# Patient Record
Sex: FEMALE | Race: WHITE | Employment: FULL TIME | URBAN - METROPOLITAN AREA
[De-identification: names, ages, dates, MRNs, and addresses within clinical notes are randomized per-mention and may not be internally consistent; named-entity substitution may affect disease eponyms.]

---

## 2017-06-07 ENCOUNTER — TRANSFERRED RECORDS (OUTPATIENT)
Dept: HEALTH INFORMATION MANAGEMENT | Facility: CLINIC | Age: 22
End: 2017-06-07

## 2017-06-07 LAB — PAP SMEAR - HIM PATIENT REPORTED: NEGATIVE

## 2019-01-17 ENCOUNTER — OFFICE VISIT (OUTPATIENT)
Dept: PEDIATRICS | Facility: CLINIC | Age: 24
End: 2019-01-17
Payer: COMMERCIAL

## 2019-01-17 VITALS
HEART RATE: 91 BPM | SYSTOLIC BLOOD PRESSURE: 90 MMHG | DIASTOLIC BLOOD PRESSURE: 58 MMHG | BODY MASS INDEX: 23 KG/M2 | TEMPERATURE: 98.6 F | HEIGHT: 62 IN | WEIGHT: 125 LBS | OXYGEN SATURATION: 100 %

## 2019-01-17 DIAGNOSIS — R59.9 ENLARGED LYMPH NODES: Primary | ICD-10-CM

## 2019-01-17 DIAGNOSIS — M54.50 ACUTE RIGHT-SIDED LOW BACK PAIN WITHOUT SCIATICA: ICD-10-CM

## 2019-01-17 PROCEDURE — 99214 OFFICE O/P EST MOD 30 MIN: CPT | Performed by: FAMILY MEDICINE

## 2019-01-17 RX ORDER — CYCLOBENZAPRINE HCL 10 MG
5-10 TABLET ORAL 3 TIMES DAILY PRN
Qty: 21 TABLET | Refills: 0 | Status: SHIPPED | OUTPATIENT
Start: 2019-01-17 | End: 2019-02-12

## 2019-01-17 RX ORDER — IBUPROFEN 200 MG
400 TABLET ORAL EVERY 4 HOURS PRN
COMMUNITY
End: 2021-10-19

## 2019-01-17 ASSESSMENT — MIFFLIN-ST. JEOR: SCORE: 1259.12

## 2019-01-17 NOTE — PROGRESS NOTES
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 She laid down and iced her back. Continued to cause discomfort into the next day. 2 days later got to relax which was helpful. She has to do demonstrations while at work and this aggravates symptoms. If she waits about a couple days symptoms subside. Last 5 years she'll have about 2 flares up but this most recent episode was with more intensity and frequency. 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 She does have periods where the muscles do get tight in her lower back.   Denies weaknss of lower leg or numbness of lower extremities. Forward flexion is more discomforting.     Did calcium supplements, rest, physical therapy for her previous back injury/fracture (4 months away from gymnastics).      2 days of ear pain she reports in the back of left ear. No hearing difficulties at this time      Patient Active Problem List    Diagnosis Date Noted     Papanicolaou smear of cervix with low grade squamous intraepithelial lesion (LGSIL) 06/02/2015     Priority: Medium     06/02/15 LSIL, Age 19 years, Dx pap in 12 months  01/05/16 Normal. Repeat 12-18 months  06/07/17 Normal (pt reported). 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        ROS:  As above per HPI    Objective:   BP 90/58 (BP Location: Right arm, Patient Position: Chair, Cuff Size: Adult Regular)   Pulse 91   Temp 98.6  F (37  C) (Oral)   Ht 1.565 m (5' 1.61\")   Wt 56.7 kg (125 lb)   SpO2 100%   BMI 23.15 kg/m  , Body mass index is 23.15 kg/m .  Gen:  NAD, well-nourished, sitting in chair comfortably  HEENT: EOMI, sclera anicteric, Head normocephalic, ; nares patent; moist mucous membranes, 2 enlarged lymph nodes behind left ear. No focal pain at the mastoid and no redness of this area. Normal TM's bilaterally  Neck: trachea midline, no thyromegaly  CV:  Hemodynamically stable  Pulm:  no increased work of breathing , CTAB, no wheezes/rales/rhonchi   Back: good ROM in flexion/extension, negative straight leg test bilaterally, FABERs test negative, right discomfort that is focal in lower lumbar region adjacent to SI joint  Neuro: 2/4 patellar reflexes bilaterally  Extrem: no cyanosis, edema or clubbing  Skin: no obvious rashes or abnormalities  Psych: Euthymic, linear thoughts, normal rate of speech    2007 CT Scan:   Impression:  1. L5-S1 disc herniation causing some mass-effect upon the ventral  thecal sac.   2. Bilateral pars interarticularis defects without evidence of spondylolisthesis.    Assessment & Plan:     Naty MCKENZIE Pérez, 23 year old female who presents with:  Enlarged lymph nodes L postauricular lymph node  Inflamed but adjacent lymph nodes of the postauricular chain recommended patient to use as needed heat packs and to observe for the next 2 weeks if no resolution to return for evaluation.  No recent illnesses to explain why they are " aggravated. Normal middle ear exam.     Acute right-sided low back pain without sciatica  Previous history of fracture and presents today with no weakness of her lower extremities or any symptoms which would suggest nerve impingement.  I am recommending muscle relaxants to be used on an as-needed basis especially as she has recently started doing gymnastics teaching which is likely causing her flare of symptoms / muscular discomfort.  Ibuprofen/Tylenol on an as-needed. Follow-up as needed. Not indicative of SI joint dysfunction or flare.   - cyclobenzaprine (FLEXERIL) 10 MG tablet  Dispense: 21 tablet; Refill: 0      Ad Perez MD   [facility]      The use of Dragon/AiMeiWei dictation services may have been used to construct the content in this note; any grammatical or spelling errors are non-intentional. Please contact the author of this note directly if you are in need of any clarification.

## 2019-01-17 NOTE — PATIENT INSTRUCTIONS
Take half pill of the flexeril as needed (can move up to 1 pill if tolerating) when your back discomfort/tightness starts    For pain take ibuprofen 400mg every 4-6 hours okay to take with tylenol    Return if symptoms worsen or don't respond to above medications    --  Lymphd nodes: If not resolved in 2 weeks return

## 2019-02-12 ENCOUNTER — OFFICE VISIT (OUTPATIENT)
Dept: PEDIATRICS | Facility: CLINIC | Age: 24
End: 2019-02-12
Payer: COMMERCIAL

## 2019-02-12 VITALS
OXYGEN SATURATION: 99 % | BODY MASS INDEX: 23.52 KG/M2 | WEIGHT: 127 LBS | HEART RATE: 87 BPM | DIASTOLIC BLOOD PRESSURE: 58 MMHG | RESPIRATION RATE: 16 BRPM | SYSTOLIC BLOOD PRESSURE: 94 MMHG

## 2019-02-12 DIAGNOSIS — R53.83 FATIGUE, UNSPECIFIED TYPE: ICD-10-CM

## 2019-02-12 DIAGNOSIS — R07.0 THROAT PAIN: Primary | ICD-10-CM

## 2019-02-12 LAB
ALBUMIN SERPL-MCNC: 4.1 G/DL (ref 3.4–5)
ALP SERPL-CCNC: 67 U/L (ref 40–150)
ALT SERPL W P-5'-P-CCNC: 17 U/L (ref 0–50)
ANION GAP SERPL CALCULATED.3IONS-SCNC: 4 MMOL/L (ref 3–14)
AST SERPL W P-5'-P-CCNC: 17 U/L (ref 0–45)
BASOPHILS # BLD AUTO: 0 10E9/L (ref 0–0.2)
BASOPHILS NFR BLD AUTO: 0.4 %
BILIRUB SERPL-MCNC: 0.4 MG/DL (ref 0.2–1.3)
BUN SERPL-MCNC: 7 MG/DL (ref 7–30)
CALCIUM SERPL-MCNC: 8.7 MG/DL (ref 8.5–10.1)
CHLORIDE SERPL-SCNC: 109 MMOL/L (ref 94–109)
CO2 SERPL-SCNC: 26 MMOL/L (ref 20–32)
CREAT SERPL-MCNC: 0.81 MG/DL (ref 0.52–1.04)
DEPRECATED S PYO AG THROAT QL EIA: NORMAL
DIFFERENTIAL METHOD BLD: NORMAL
EOSINOPHIL # BLD AUTO: 0.1 10E9/L (ref 0–0.7)
EOSINOPHIL NFR BLD AUTO: 1.7 %
ERYTHROCYTE [DISTWIDTH] IN BLOOD BY AUTOMATED COUNT: 11.6 % (ref 10–15)
GFR SERPL CREATININE-BSD FRML MDRD: >90 ML/MIN/{1.73_M2}
GLUCOSE SERPL-MCNC: 86 MG/DL (ref 70–99)
HCT VFR BLD AUTO: 37.6 % (ref 35–47)
HGB BLD-MCNC: 12.7 G/DL (ref 11.7–15.7)
LYMPHOCYTES # BLD AUTO: 2.3 10E9/L (ref 0.8–5.3)
LYMPHOCYTES NFR BLD AUTO: 47.7 %
MCH RBC QN AUTO: 32.1 PG (ref 26.5–33)
MCHC RBC AUTO-ENTMCNC: 33.8 G/DL (ref 31.5–36.5)
MCV RBC AUTO: 95 FL (ref 78–100)
MONOCYTES # BLD AUTO: 0.4 10E9/L (ref 0–1.3)
MONOCYTES NFR BLD AUTO: 9.1 %
NEUTROPHILS # BLD AUTO: 2 10E9/L (ref 1.6–8.3)
NEUTROPHILS NFR BLD AUTO: 41.1 %
PLATELET # BLD AUTO: 292 10E9/L (ref 150–450)
POTASSIUM SERPL-SCNC: 3.8 MMOL/L (ref 3.4–5.3)
PROT SERPL-MCNC: 7.9 G/DL (ref 6.8–8.8)
RBC # BLD AUTO: 3.96 10E12/L (ref 3.8–5.2)
SODIUM SERPL-SCNC: 138 MMOL/L (ref 133–144)
SPECIMEN SOURCE: NORMAL
TSH SERPL DL<=0.005 MIU/L-ACNC: 1.53 MU/L (ref 0.4–4)
WBC # BLD AUTO: 4.8 10E9/L (ref 4–11)

## 2019-02-12 PROCEDURE — 99213 OFFICE O/P EST LOW 20 MIN: CPT | Performed by: FAMILY MEDICINE

## 2019-02-12 PROCEDURE — 36415 COLL VENOUS BLD VENIPUNCTURE: CPT | Performed by: FAMILY MEDICINE

## 2019-02-12 PROCEDURE — 87880 STREP A ASSAY W/OPTIC: CPT | Performed by: FAMILY MEDICINE

## 2019-02-12 PROCEDURE — 84443 ASSAY THYROID STIM HORMONE: CPT | Performed by: FAMILY MEDICINE

## 2019-02-12 PROCEDURE — 87081 CULTURE SCREEN ONLY: CPT | Performed by: FAMILY MEDICINE

## 2019-02-12 PROCEDURE — 80053 COMPREHEN METABOLIC PANEL: CPT | Performed by: FAMILY MEDICINE

## 2019-02-12 PROCEDURE — 85025 COMPLETE CBC W/AUTO DIFF WBC: CPT | Performed by: FAMILY MEDICINE

## 2019-02-12 NOTE — PROGRESS NOTES
SUBJECTIVE:   Naty Pérez is a 23 year old female who presents to clinic today for the following health issues:    Acute Illness   Acute illness concerns: fatigue, sore throat  Onset: started a couple days after last OV - is intermittent    Fever: no    Chills/Sweats: no    Headache (location?): no    Sinus Pressure:no    Conjunctivitis:  no    Ear Pain: no    Rhinorrhea: no    Congestion: no    Sore Throat: YES     Cough: no    Wheeze: no    Decreased Appetite: no    Nausea: no    Vomiting: no    Diarrhea:  no    Dysuria/Freq.: no    Fatigue/Achiness: YES    Sick/Strep Exposure: no     Therapies Tried and outcome: none    Had intermittent sore throat and fairly marked fatigue over 2-3 weeks.  Not especially congested and has not noticed fever.    Patient reports having mono at age 15.    History indicates that she returned after a 2-year time in the [employer].  She was in [facility] and returned in December.        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  No cough or S OB.  No abdominal pain, vomiting, diarrhea.  No urinary discomfort.  No unusual rashes.  No headache, numbness, weakness.      Past Medical History:   Diagnosis Date     Papanicolaou smear of cervix with low grade squamous intraepithelial lesion (LGSIL) 06/02/15    Age 19 years, Dx pap in 12 months       EXAM  BP 94/58 (BP Location: Right arm, Patient Position: Sitting, Cuff Size: Adult Regular)   Pulse 87   Resp 16   Wt 57.6 kg (127 lb)   SpO2 99%   BMI 23.52 kg/m      Patient appears well in general.  Tympanic membranes normal.   Pharynx mildly red tonsils without inflammation or exudate.  Minimally enlarged anterior cervical nodes, no significant posterior cervical nodes.  No thyromegaly.  Chest clear.  Abdomen unremarkable.    Results for orders placed or performed in visit on 02/12/19   CBC with platelets and differential   Result Value Ref Range    WBC 4.8 4.0 - 11.0 10e9/L    RBC Count 3.96 3.8 - 5.2 10e12/L    Hemoglobin 12.7 11.7 - 15.7 g/dL    Hematocrit 37.6 35.0 - 47.0 %    MCV 95 78 - 100 fl    MCH 32.1 26.5 - 33.0 pg    MCHC 33.8 31.5 - 36.5 g/dL    RDW 11.6 10.0 - 15.0 %    Platelet Count 292 150 - 450 10e9/L    % Neutrophils 41.1 %    % Lymphocytes 47.7 %    % Monocytes 9.1 %    % Eosinophils 1.7 %    % Basophils 0.4 %    Absolute Neutrophil 2.0 1.6 - 8.3 10e9/L    Absolute Lymphocytes 2.3 0.8 - 5.3 10e9/L    Absolute Monocytes 0.4 0.0 - 1.3 10e9/L    Absolute Eosinophils 0.1 0.0 - 0.7 10e9/L    Absolute Basophils 0.0 0.0 - 0.2 10e9/L    Diff Method Automated Method    Strep, Rapid Screen   Result Value Ref Range    Specimen Description Throat     Rapid Strep A Screen       NEGATIVE: No Group A streptococcal antigen detected by immunoassay, await culture report.           (R07.0) Throat pain  (primary encounter diagnosis)  Comment: Strep throat and mono unlikely.  She may be having some lingering viral symptoms.    Plan: Strep, Rapid Screen, Beta strep group A culture            (R53.83) Fatigue, unspecified type  Comment:   In view of her recent travel and prolonged period out of the country, I suggested that we at least obtain some basic labs.  Plan: Comprehensive metabolic panel (BMP + Alb, Alk         Phos, ALT, AST, Total. Bili, TP), TSH with free        T4 reflex, CBC with platelets and differential

## 2019-02-13 LAB
BACTERIA SPEC CULT: NORMAL
SPECIMEN SOURCE: NORMAL

## 2019-02-21 ENCOUNTER — OFFICE VISIT (OUTPATIENT)
Dept: FAMILY MEDICINE | Facility: CLINIC | Age: 24
End: 2019-02-21
Payer: COMMERCIAL

## 2019-02-21 VITALS
OXYGEN SATURATION: 98 % | WEIGHT: 123 LBS | SYSTOLIC BLOOD PRESSURE: 106 MMHG | BODY MASS INDEX: 22.78 KG/M2 | TEMPERATURE: 98.5 F | DIASTOLIC BLOOD PRESSURE: 68 MMHG | HEART RATE: 86 BPM | RESPIRATION RATE: 16 BRPM

## 2019-02-21 DIAGNOSIS — Z30.46 NEXPLANON REMOVAL: Primary | ICD-10-CM

## 2019-02-21 DIAGNOSIS — Z30.017 INSERTION OF NEXPLANON: ICD-10-CM

## 2019-02-21 PROCEDURE — 99207 ZZC DROP WITH A PROCEDURE: CPT | Performed by: NURSE PRACTITIONER

## 2019-02-21 PROCEDURE — 11976 REMOVE CONTRACEPTIVE CAPSULE: CPT | Performed by: NURSE PRACTITIONER

## 2019-02-21 PROCEDURE — 11981 INSERTION DRUG DLVR IMPLANT: CPT | Mod: 51 | Performed by: NURSE PRACTITIONER

## 2019-02-21 NOTE — PROGRESS NOTES
SUBJECTIVE:   Naty Pérez is a 23 year old female who presents to clinic today for the following health issues:      Patient is coming in for Nexplanon removal    Placed 3 years ago.  Has had some intermittent bleeding but otherwise no side effects.  She has tolerated well, would like another placed today.  Not with previous partner.  She is utd on pap smears.    Problem list and histories reviewed & adjusted, as indicated.  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 No pertinent surgical history.    Social History     Tobacco Use     Smoking status: Never Smoker     Smokeless tobacco: Never Used     Tobacco comment: non smoking home   Substance Use Topics     Alcohol use: No     Family History   Problem Relation Age of Onset     Diabetes Maternal Grandfather      Hypertension Maternal Grandfather      Cancer Paternal Grandfather      Cerebrovascular Disease Paternal Grandfather      Cancer - colorectal Paternal Grandfather      Unknown/Adopted Father      Colorectal Cancer Father      Multiple myeloma Paternal Uncle      C.A.D. No family hx of      Breast Cancer No family hx of      Prostate Cancer No family hx of      Thyroid Disease No family hx of            Reviewed and updated as needed this visit by clinical staff       Reviewed and updated as needed this visit by Provider         ROS:  SEE HPI.    OBJECTIVE:     /68 (BP Location: Right arm, Patient Position: Chair, Cuff Size: Adult Regular)   Pulse 86   Temp 98.5  F (36.9  C) (Tympanic)   Resp 16   Wt 55.8 kg (123 lb)   LMP 12/10/2018 (Approximate)   SpO2 98%   BMI 22.78 kg/m    Body mass index is 22.78 kg/m .  GENERAL: healthy, alert and no distress  PSYCH: mentation appears normal,  affect normal/bright    Diagnostic Test Results:  none     ASSESSMENT/PLAN:   1. Nexplanon removal  Tolerated well, see below.    2. Insertion of Nexplanon  Tolerated well see below.    Reviewed r/b/se of procedure including risk for infection.   Explained procedure to pt.  Consent signed.    Procedure:  Removal of nexplanon    Pt was supine with L arm at 90 degree angle.  Arm was cleansed with alcohol pad and betadine swab x3.  Anesthesia injected, lidocaine 1%, just under the distal end of the nexplanon moe.  2mm longitudinal incision at distal end of nexplanon moe.  Pressure applied to proximal end of nexplanon moe.  Nexplanon was removed without complication.  Measured to confirm 4cm in length.  Minimal bleeding.    NEXPLANON PLACEMENT:    The patient meets and is agreeable to the following conditions:  She is not interested in conception in the near future.  There is no history of unresolved abnormal uterine bleeding.  There is no history of an unresolved abnormal PAP smear.  She has no history of diabetes, AIDS, leukemia, IV drug use or chronic steroid use.  She denies the possibility of pregnancy. Patient's last menstrual period was N/A.    The patient was given patient information on the Nexplanon and the patient education brochure from the . The patient has given consent to proceed with placement of the Nexplanon.  A written consent form is present in the chart.  She wishes to proceed.    PROCEDURE:  Type of Device: Nexplanon  The patient lied in supine with the full length of her arm exposed and supported by the pillow and table.   The positioning the upper inner aspect of her nondominant arm was bent at her elbow to 90 degrees and rotated upward and outward opposite her head.  The insertion site was Identified approximately four finger  breadths/ 8cm superior and lateral to the medial epicondyle of the humerus.    Previous incision site used to insert new nexplanon.  The arm was cleansed the  insertion site with an antiseptic solution.  Anesthesia using Lidocaine 1% was injected into the dermis to raise a wheal along the planned track of the moe insertion needle.   The clear plastic needle cover was removed. The needle was placed against the insertion site holding the applicator at an angle 30 degrees to the skin. While applying counter traction to the skin around the insertion site, the skin was punctured with the needle tip. The applicator was the lowered so that it is parallel to the skin and then the needle was advanced in the subdermal connective tissue while lifting the skin with the tip of the needle. The needle was advanced to its full length under the skin. I unlocked the slider with downward finger pressure on the lever, then moved the slider fully backward (distally) and removed the applicator.  Immediately after insertion, I palpated the skin to verify correct placement of the moe; both ends were palpable. Patient was also asked to feel her implant.    The patient tolerated this procedure without immediate complication.  The patient is to return or call immediately for any unexplained fever, redness, pain and swelling at the insertion site. Recommended to take Tylenol or NSAID for mild to moderate pain.    Patient was instructed may remove the pressure bandage in 24 hours and the small bandage over the insertion site after 3-5 days.   A completed the User Card was given to the patient to keep.     Information on Nexplanon was given to patient. She was instructed to watch for signs of infection such as redness, swelling, discharge, watch for increased bleeding, worsening pain and fever and to RTC ASAP. Questions and concerns were addressed.    VALE Ang Ra, CNP  [facility]

## 2019-04-12 ENCOUNTER — OFFICE VISIT (OUTPATIENT)
Dept: PEDIATRICS | Facility: CLINIC | Age: 24
End: 2019-04-12
Payer: COMMERCIAL

## 2019-04-12 VITALS
SYSTOLIC BLOOD PRESSURE: 94 MMHG | BODY MASS INDEX: 24.41 KG/M2 | HEIGHT: 61 IN | DIASTOLIC BLOOD PRESSURE: 62 MMHG | HEART RATE: 72 BPM | WEIGHT: 129.3 LBS | OXYGEN SATURATION: 100 % | TEMPERATURE: 98.5 F

## 2019-04-12 DIAGNOSIS — J06.9 VIRAL URI: Primary | ICD-10-CM

## 2019-04-12 DIAGNOSIS — J02.9 SORE THROAT: ICD-10-CM

## 2019-04-12 LAB
DEPRECATED S PYO AG THROAT QL EIA: NORMAL
SPECIMEN SOURCE: NORMAL

## 2019-04-12 PROCEDURE — 99213 OFFICE O/P EST LOW 20 MIN: CPT | Performed by: PHYSICIAN ASSISTANT

## 2019-04-12 PROCEDURE — 87880 STREP A ASSAY W/OPTIC: CPT | Performed by: PHYSICIAN ASSISTANT

## 2019-04-12 PROCEDURE — 87081 CULTURE SCREEN ONLY: CPT | Performed by: PHYSICIAN ASSISTANT

## 2019-04-12 ASSESSMENT — ENCOUNTER SYMPTOMS
FEVER: 0
MYALGIAS: 1
ABDOMINAL PAIN: 0
FOCAL WEAKNESS: 0
SHORTNESS OF BREATH: 0
DIARRHEA: 0
VOMITING: 0
SORE THROAT: 1
CHILLS: 0
HEADACHES: 0
COUGH: 1
NAUSEA: 0

## 2019-04-12 ASSESSMENT — MIFFLIN-ST. JEOR: SCORE: 1268.88

## 2019-04-12 NOTE — PROGRESS NOTES
"  SUBJECTIVE:   Naty Pérez is a 23 year old female who presents to clinic today for the following   health issues:        Acute Illness   Acute illness concerns: Step   Onset: 1 day ago     Fever: no    Chills/Sweats: YES- both     Headache (location?): YES     Sinus Pressure:no    Conjunctivitis:  no    Ear Pain: YES: bilateral plugged feeling     Rhinorrhea: YES    Congestion: YES- both     Sore Throat: YES-  Hurts to swallow, feels puffy, hoarse voice      Cough: YES    Wheeze: no    Decreased Appetite: no    Nausea: no    Vomiting: no    Diarrhea:  no    Dysuria/Freq.: no    Fatigue/Achiness: YES- both     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

## 2019-04-12 NOTE — PROGRESS NOTES
HPI  SUBJECTIVE:   Naty Pérez is a 23 year old female who presents to clinic today for the following   health issues:        Acute Illness   Acute illness concerns: Sore throat  Onset: yesterday    Fever: no    Chills/Sweats: YES- both     Headache (location?): YES     Sinus Pressure:no    Conjunctivitis:  no    Ear Pain: no bilateral plugged feeling     Rhinorrhea: YES    Congestion: YES- both     Sore Throat: YES-  Hurts to swallow, feels puffy, hoarse voice      Cough: YES    Wheeze: no    Decreased Appetite: no    Nausea: no    Vomiting: no    Diarrhea:  no    Dysuria/Freq.: no    Fatigue/Achiness: YES- both     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  No flowsheet data found.    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 No pertinent surgical history.  Family History   Problem Relation Age of Onset     Diabetes Maternal Grandfather      Hypertension Maternal Grandfather      Cancer Paternal Grandfather      Cerebrovascular Disease Paternal Grandfather      Cancer - colorectal Paternal Grandfather      Unknown/Adopted Father      Colorectal Cancer Father      Multiple myeloma Paternal Uncle      C.A.D. No family hx of      Breast Cancer No family hx of      Prostate Cancer No family hx of      Thyroid Disease No family hx of       Social History     Tobacco Use     Smoking status: Never Smoker     Smokeless tobacco: Never Used     Tobacco comment: non smoking home   Substance Use Topics     Alcohol use: No        Problem list, Medication list, Allergies, Medical/Social/Surg hx reviewed in [facility], updated as appropriate.      Review of Systems  "  Constitutional: Positive for malaise/fatigue. Negative for chills and fever.   HENT: Positive for congestion and sore throat.         Rhinorrhea, ears plugged   Respiratory: Positive for cough. Negative for shortness of breath.    Cardiovascular: Negative for chest pain.   Gastrointestinal: Negative for abdominal pain, diarrhea, nausea and vomiting.   Musculoskeletal: Positive for myalgias.   Skin: Negative for rash.   Neurological: Negative for focal weakness and headaches.   All other systems reviewed and are negative.        Physical Exam   Constitutional: She is oriented to person, place, and time.   HENT:   Head: Normocephalic and atraumatic.   Right Ear: Tympanic membrane and external ear normal.   Left Ear: Tympanic membrane and external ear normal.   Nose: Nose normal.   Mouth/Throat: Uvula is midline and mucous membranes are normal. Posterior oropharyngeal erythema present. No oropharyngeal exudate, posterior oropharyngeal edema or tonsillar abscesses.   Cardiovascular: Normal rate, regular rhythm and normal heart sounds.   Pulmonary/Chest: Effort normal and breath sounds normal.   Musculoskeletal: Normal range of motion.   Neurological: She is alert and oriented to person, place, and time.   Skin: Skin is warm and dry.   Nursing note and vitals reviewed.    Vital Signs  BP 94/62 (BP Location: Right arm, Patient Position: Sitting, Cuff Size: Adult Regular)   Pulse 72   Temp 98.5  F (36.9  C) (Tympanic)   Ht 1.549 m (5' 1\")   Wt 58.7 kg (129 lb 4.8 oz)   SpO2 100%   BMI 24.43 kg/m     Body mass index is 24.43 kg/m .    Diagnostic Test Results:  Results for orders placed or performed in visit on 04/12/19 (from the past 24 hour(s))   Rapid strep screen   Result Value Ref Range    Specimen Description Throat     Rapid Strep A Screen       NEGATIVE: No Group A streptococcal antigen detected by immunoassay, await culture report.       ASSESSMENT/PLAN:                                                        " ICD-10-CM    1. Viral URI J06.9    2. Sore throat J02.9 Rapid strep screen     Beta strep group A culture   Lungs CTAB, afebrile. Strep negative. C/w viral etiology, supportive treatments discussed.    I have discussed any lab or imaging results, the patient's diagnosis, and my plan of treatment with the patient and/or family. Patient is aware to come back in if with worsening symptoms or if no relief despite treatment plan.  Patient voiced understanding and had no further questions.       Follow Up: Return in about 2 weeks (around 4/26/2019) for Follow up w/ PCP if not better.    DANDY Avila, PA-C  [facility]

## 2019-04-13 LAB
BACTERIA SPEC CULT: NORMAL
SPECIMEN SOURCE: NORMAL

## 2019-04-26 ENCOUNTER — OFFICE VISIT (OUTPATIENT)
Dept: FAMILY MEDICINE | Facility: CLINIC | Age: 24
End: 2019-04-26
Payer: COMMERCIAL

## 2019-04-26 VITALS
DIASTOLIC BLOOD PRESSURE: 58 MMHG | SYSTOLIC BLOOD PRESSURE: 96 MMHG | BODY MASS INDEX: 24.19 KG/M2 | TEMPERATURE: 98.7 F | OXYGEN SATURATION: 100 % | HEART RATE: 93 BPM | WEIGHT: 128 LBS

## 2019-04-26 DIAGNOSIS — J01.90 ACUTE SINUSITIS WITH SYMPTOMS > 10 DAYS: Primary | ICD-10-CM

## 2019-04-26 PROCEDURE — 99213 OFFICE O/P EST LOW 20 MIN: CPT | Performed by: PHYSICIAN ASSISTANT

## 2019-04-26 RX ORDER — FLUTICASONE PROPIONATE 50 MCG
1-2 SPRAY, SUSPENSION (ML) NASAL DAILY
Qty: 16 G | Refills: 3 | Status: SHIPPED | OUTPATIENT
Start: 2019-04-26 | End: 2020-02-06

## 2019-04-26 NOTE — PROGRESS NOTES
SUBJECTIVE:   Naty Pérez is a 23 year old female who presents to clinic today for the following health issues:      HPI  Acute Illness   Acute illness concerns: right ear pain  Onset: 4/11/19    Fever: no     Chills/Sweats: YES    Headache (location?): YES - temporal    Sinus Pressure:YES    Conjunctivitis:  no    Ear Pain: YES: right    Rhinorrhea: YES    Congestion: YES    Sore Throat: YES - was worse     Cough: YES-productive of yellow sputum    Wheeze: no in am    Decreased Appetite: YES    Nausea: no     Vomiting: no     Diarrhea:  no     Dysuria/Freq.: no     Fatigue/Achiness: YES    Sick/Strep Exposure: no      Therapies Tried and outcome:  Ibuprofen, cough drops - helps    Went to [facility] on 4/12/19 and strep was negative    Additional history: as documented    Reviewed and updated as needed this visit by clinical staff  Tobacco  Allergies  Meds  Problems  Med Hx  Surg Hx  Fam Hx  Soc Hx          Reviewed and updated as needed this visit by Provider  Tobacco  Allergies  Meds  Problems  Med Hx  Surg Hx  Fam Hx         Patient Active Problem List   Diagnosis     Allergy to insects and arachnids     Ingrowing nail     Epistaxis     Lumbosacral spondylosis without myelopathy     Papanicolaou smear of cervix with low grade squamous intraepithelial lesion (LGSIL)     History reviewed. No pertinent surgical history.    Social History     Tobacco Use     Smoking status: Never Smoker     Smokeless tobacco: Never Used     Tobacco comment: non smoking home   Substance Use Topics     Alcohol use: No     Family History   Problem Relation Age of Onset     Diabetes Maternal Grandfather      Hypertension Maternal Grandfather      Cancer Paternal Grandfather      Cerebrovascular Disease Paternal Grandfather      Cancer - colorectal Paternal Grandfather      Unknown/Adopted Father      Colorectal Cancer Father      Multiple myeloma Paternal Uncle      C.A.D. No family hx of      Breast Cancer No  family hx of      Prostate Cancer No family hx of      Thyroid Disease No family hx of          Current Outpatient Medications   Medication Sig Dispense Refill     amoxicillin-clavulanate (AUGMENTIN) 875-125 MG tablet Take 1 tablet by mouth 2 times daily for 10 days 20 tablet 0     fluticasone (FLONASE) 50 MCG/ACT nasal spray Spray 1-2 sprays into both nostrils daily 16 g 3     ibuprofen (ADVIL/MOTRIN) 200 MG tablet Take 400 mg by mouth every 4 hours as needed for mild pain       No Known Allergies  BP Readings from Last 3 Encounters:   04/26/19 96/58   04/12/19 94/62   02/21/19 106/68    Wt Readings from Last 3 Encounters:   04/26/19 58.1 kg (128 lb)   04/12/19 58.7 kg (129 lb 4.8 oz)   02/21/19 55.8 kg (123 lb)                    ROS:  Constitutional, HEENT, cardiovascular, pulmonary, gi and gu systems are negative, except as otherwise noted.    OBJECTIVE:     BP 96/58 (BP Location: Right arm, Patient Position: Sitting, Cuff Size: Adult Regular)   Pulse 93   Temp 98.7  F (37.1  C) (Oral)   Wt 58.1 kg (128 lb)   LMP 04/17/2019   SpO2 100%   BMI 24.19 kg/m    Body mass index is 24.19 kg/m .  GENERAL: healthy, alert and no distress  EYES: PERRL, EOMI and visual fields normal  HENT: normal cephalic/atraumatic, both ears: clear effusion, nasal mucosa edematous , oropharynx clear, oral mucous membranes moist and sinuses: maxillary tenderness on right  NECK: no adenopathy, no asymmetry, masses, or scars and thyroid normal to palpation  RESP: lungs clear to auscultation - no rales, rhonchi or wheezes  CV: regular rates and rhythm, normal S1 S2, no S3 or S4 and no murmur, click or rub  PSYCH: mentation appears normal, affect normal/bright    Diagnostic Test Results:  none     ASSESSMENT/PLAN:     (J01.90) Acute sinusitis with symptoms > 10 days  (primary encounter diagnosis)  Comment: evident on exam and history. Given course length without imrpovement, abx to be used with flonase and otc claritin-d. If no  improvement in 1 week, rtc. Sooner if worsening.   Plan: fluticasone (FLONASE) 50 MCG/ACT nasal spray,         amoxicillin-clavulanate (AUGMENTIN) 875-125 MG         tablet        -Medication use and side effects discussed with the patient. Patient is in complete understanding and agreement with plan.         Follow up: as above     Julio C Ross PA-C  [facility]

## 2019-04-26 NOTE — PATIENT INSTRUCTIONS
Patient Education     Acute Bacterial Rhinosinusitis (ABRS)    Acute bacterial rhinosinusitis (ABRS) is an infection of your nasal cavity and sinuses. It s caused by bacteria. Acute means that you ve had symptoms for less than 4 weeks, but possibly up to 12 weeks.  Understanding your sinuses  The nasal cavity is the large air-filled space behind your nose. The sinuses are a group of spaces formed by the bones of your face. They connect with your nasal cavity. ABRS causes the tissue lining these spaces to become inflamed. Mucus may not drain normally. This leads to facial pain and other symptoms.  What causes ABRS?  ABRS most often follows an upper respiratory infection caused by a virus. Bacteria then infect the lining of your nasal cavity and sinuses. But you can also get ABRS if you have:    Nasal allergies    Long-term nasal swelling and congestion not caused by allergies    Blockage in the nose  Symptoms of ABRS  The symptoms of ABRS may be different for each person and include:    Nasal congestion or blockage    Pain or pressure in the face    Thick, colored drainage from the nose  Other symptoms may include:    Runny nose    Fluid draining from the nose down the throat (postnasal drip)    Headache    Cough    Pain    Fever  Diagnosing ABRS  ABRS may be diagnosed if you ve had an upper respiratory infection like a cold and cough for 10 or more days without improvement or with worsening symptoms. Your healthcare provider will ask about your symptoms and your medical history. The provider will check your vital signs, including your temperature. You ll have a physical exam. The healthcare provider will check your ears, nose, and throat. You likely won t need any tests. If ABRS comes back, you may have a culture or other tests.  Treatment for ABRS  Treatment may include:    Antibiotic medicine. This is for symptoms that last for at least 10 to 14 days.    Nasal corticosteroid medicine. Drops or spray used in the  nose can lessen swelling and congestion.    Over-the-counter pain medicine. This is to lessen sinus pain and pressure.    Nasal decongestant medicine. Spray or drops may help to lessen congestion. Do not use them for more than a few days.    Salt wash (saline irrigation). This can help to loosen mucus.  Possible complications of ABRS  ABRS may come back or become long-term (chronic). In rare cases, ABRS may cause complications such as:     Inflamed tissue around the brain and spinal cord (meningitis)    Inflamed tissue around the eyes (orbital cellulitis)    Inflamed bones around the sinuses (osteitis)  These problems may need to be treated in a hospital with intravenous (IV) antibiotic medicine or surgery.  When to call the healthcare provider  Call your healthcare provider if you have any of the following:    Symptoms that don t get better, or get worse    Symptoms that don t get better after 3 to 5 days on antibiotics    Trouble seeing    Swelling around your eyes    Confusion or trouble staying awake   Date Last Reviewed: 5/1/2017 2000-2018 [employer]. [address] [place] [address], [place] [address]. All rights reserved. This information is not intended as a substitute for professional medical care. Always follow your healthcare professional's instructions.

## 2020-02-05 NOTE — PROGRESS NOTES
Pre-Visit Planning     Future Appointments   Date Time Provider Department Center   2/6/2020 11:10 AM Coming Rachell Kothari MD [facility]     Arrival Time for this Appointment: 10:50 AM   Appointment Notes for this encounter:   Digestive issues, mental health, flu shot    Questionnaires Reviewed/Assigned        Patient preferred phone number: [phone]    Unable to reach. Left voicemail. Advised patient to call clinic back at [phone].

## 2020-02-06 ENCOUNTER — OFFICE VISIT (OUTPATIENT)
Dept: FAMILY MEDICINE | Facility: CLINIC | Age: 25
End: 2020-02-06
Payer: COMMERCIAL

## 2020-02-06 VITALS
TEMPERATURE: 99.2 F | HEART RATE: 94 BPM | WEIGHT: 124.1 LBS | OXYGEN SATURATION: 100 % | SYSTOLIC BLOOD PRESSURE: 113 MMHG | HEIGHT: 62 IN | BODY MASS INDEX: 22.84 KG/M2 | DIASTOLIC BLOOD PRESSURE: 73 MMHG

## 2020-02-06 DIAGNOSIS — K59.00 CONSTIPATION, UNSPECIFIED CONSTIPATION TYPE: Primary | ICD-10-CM

## 2020-02-06 DIAGNOSIS — Z23 NEED FOR PROPHYLACTIC VACCINATION AND INOCULATION AGAINST INFLUENZA: ICD-10-CM

## 2020-02-06 PROCEDURE — 90471 IMMUNIZATION ADMIN: CPT | Performed by: FAMILY MEDICINE

## 2020-02-06 PROCEDURE — 90686 IIV4 VACC NO PRSV 0.5 ML IM: CPT | Performed by: FAMILY MEDICINE

## 2020-02-06 PROCEDURE — 99213 OFFICE O/P EST LOW 20 MIN: CPT | Mod: 25 | Performed by: FAMILY MEDICINE

## 2020-02-06 RX ORDER — POLYETHYLENE GLYCOL 3350 17 G/17G
1 POWDER, FOR SOLUTION ORAL DAILY
Qty: 578 G | Refills: 1 | Status: SHIPPED | OUTPATIENT
Start: 2020-02-06 | End: 2020-08-10

## 2020-02-06 ASSESSMENT — MIFFLIN-ST. JEOR: SCORE: 1248.78

## 2020-02-06 NOTE — PATIENT INSTRUCTIONS
Patient Education     Eating a High-Fiber Diet  Fiber is what gives strength and structure to plants. Most grains, beans, vegetables, and fruits contain fiber. Foods rich in fiber are often low in calories and fat, and they fill you up more. They may also reduce your risks for certain health problems. To find out the amount of fiber in canned, packaged, or frozen foods, read the Nutrition Facts label. It tells you how much fiber is in one serving.    Types of fiber and their benefits  There are two types of fiber: insoluble and soluble. They both aid digestion and help you maintain a healthy weight.    Insoluble fiber. This is found in whole grains, cereals, certain fruits and vegetables such as apple skin, corn, and carrots. Insoluble fiber may prevent constipation and reduce the risk for certain types of cancer. It is called insoluble because it does not dissolve in water.    Soluble fiber. This type of fiber is in oats, beans, and certain fruits and vegetables such as strawberries and peas. Soluble fiber can reduce cholesterol, which may help lower the risk for heart disease. It also helps control blood sugar levels.  Look for high-fiber foods  Try these foods to add fiber to your diet:    Whole-grain breads and cereals. Try to eat 6 to 8 ounces a day. Include wheat and oat bran cereals, whole-wheat muffins or toast, and corn tortillas in your meals.    Fruits. Try to eat 2 cups a day. Apples, oranges, strawberries, pears, and bananas are good sources. (Note: Fruit juice is low in fiber.)    Vegetables. Try to eat at least 2.5 cups a day. Add asparagus, carrots, broccoli, peas, and corn to your meals.    Beans. One cup of cooked lentils gives you over 15 grams of fiber. Try navy beans, lentils, and chickpeas.    Seeds. A small handful of seeds gives you about 3 grams of fiber. Try sunflower or walker seeds.  Keep track of your fiber  Keep track of how much fiber you eat. Start by reading food labels. Then eat a  variety of foods high in fiber. As you start to eat more fiber, ask your healthcare provider how much water you should be drinking to keep your digestive system working smoothly.  Aim for a certain amount of fiber in your diet each day. If you are a woman, that amount is between 25 and 28 grams per day. Men should aim for 30 to 33 grams per day. After age 50, your daily fiber needs drop to 22 grams for women and 28 grams for men.  Before you reach for the fiber supplements, think about this. Fiber is found naturally in healthy whole foods. It gives you that feeling of fullness after you eat. Taking fiber supplements or eating fiber-enriched foods will not give you this full feeling.  Your fiber intake is a good measure for the quality of your overall diet. If you are missing out on your daily amount of fiber, you may be lacking other important nutrients as well.  Date Last Reviewed: 7/1/2017 2000-2019 [employer]. [address] [place] [address], [place] [address]. All rights reserved. This information is not intended as a substitute for professional medical care. Always follow your healthcare professional's instructions.

## 2020-02-06 NOTE — PROGRESS NOTES
Subjective     Naty Pérez is a 24 year old female who presents to clinic today for the following health issues:    History of Present Illness        She eats 2-3 servings of fruits and vegetables daily.She consumes 0 sweetened beverage(s) daily.She exercises with enough effort to increase her heart rate 30 to 60 minutes per day.  She exercises with enough effort to increase her heart rate 5 days per week.   She is taking medications regularly.     Constipation     Onset: 11/1/19    Description:   Frequency of bowel movements: 2-3 days.  Stool consistency: hard, small caliber    Progression of Symptoms:  constant    Accompanying Signs & Symptoms:  Abdominal pain (cramping?): YES- only after a bowel movement  Blood in stool: no   Rectal pain: no   Nausea/vomiting: YES- a little nausea   Weight loss or gain: no    History:   History of abdominal surgery: no     Precipitating factors:   Recent use of narcotics, anticholinergics, calcium channel blockers, antacids, or iron supplements: no   Chronic Laxative Use: YES- just one time         Therapies Tried and outcome: none    Having problems with constipation for the last three months.  After Halloween fad a pressure in rectum, tried to have BM but was unable, so used a laxative.  Since then, has been having lots of straining, pebble like stools.  Gets abdominal pain after have normal BM.  Real BM every 2-3 days, but will have small anabell on occasion.  Has nausea after having BM now, no pain.  Water intake is good, no changes in intake in last few months.  No diet changes in last few months.  No medication changes. Denies diarrhea, vomiting.  A few episodes of acid reflux, but not chronic.  No melena or hematochezia.  No vitamins or supplements.  Still passing gas, but not more than normal.        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 No pertinent surgical history.    Social History     Tobacco Use     Smoking status: Never Smoker     Smokeless tobacco: Never Used     Tobacco comment: non smoking home   Substance Use Topics     Alcohol use: No     Family History   Problem Relation Age of Onset     Diabetes Maternal Grandfather      Hypertension Maternal Grandfather      Cancer Paternal Grandfather      Cerebrovascular Disease Paternal Grandfather      Cancer - colorectal Paternal Grandfather      Unknown/Adopted Father      Colorectal Cancer Father      Multiple myeloma Paternal Uncle      C.A.D. No family hx of      Breast Cancer No family hx of      Prostate Cancer No family hx of      Thyroid Disease No family hx of          Current Outpatient Medications   Medication Sig Dispense Refill     ibuprofen (ADVIL/MOTRIN) 200 MG tablet Take 400 mg by mouth every 4 hours as needed for mild pain       polyethylene glycol (MIRALAX/GLYCOLAX) powder Take 17 g (1 capful) by mouth daily 578 g 1     No Known Allergies    Reviewed and updated as needed this visit by Provider  Tobacco  Meds  Problems  Med Hx  Surg Hx  Fam Hx  Soc Hx        Review of Systems   ROS COMP: Constitutional, HEENT, cardiovascular, pulmonary, gi and gu systems are negative, except as otherwise noted.      Objective    /73 (BP Location: Right arm, Patient Position: Chair, Cuff Size: Adult Regular)   Pulse 94   Temp 99.2  F (37.3  C) (Oral)   Ht 1.563 m (5' 1.54\")   Wt 56.3 kg (124 lb 1.6 oz)   SpO2 100%   BMI 23.04 kg/m    Body mass index is 23.04 kg/m .  Physical Exam   GENERAL: healthy, alert and no distress  ABDOMEN: soft, nontender, no hepatosplenomegaly, no masses and bowel sounds normal  PSYCH: mentation appears normal, affect normal/bright            Assessment & Plan     1. Constipation, unspecified constipation type  Patient does have signs and symptoms " of constipation.  Physical exam normal.  No warning signs and history or on examination.  Discussed options for treating current constipation episode with magnesium citrate or other laxatives.  Discussed the use of MiraLAX daily as needed for both cleanout purposes and maintenance.  Provided information on increasing dietary fiber.  Patient will follow-up with us as needed.  - polyethylene glycol (MIRALAX/GLYCOLAX) powder; Take 17 g (1 capful) by mouth daily  Dispense: 578 g; Refill: 1    2. Need for prophylactic vaccination and inoculation against influenza  - INFLUENZA VACCINE IM > 6 MONTHS VALENT IIV4 [08305]       Patient Instructions     Patient Education     Eating a High-Fiber Diet  Fiber is what gives strength and structure to plants. Most grains, beans, vegetables, and fruits contain fiber. Foods rich in fiber are often low in calories and fat, and they fill you up more. They may also reduce your risks for certain health problems. To find out the amount of fiber in canned, packaged, or frozen foods, read the Nutrition Facts label. It tells you how much fiber is in one serving.    Types of fiber and their benefits  There are two types of fiber: insoluble and soluble. They both aid digestion and help you maintain a healthy weight.    Insoluble fiber. This is found in whole grains, cereals, certain fruits and vegetables such as apple skin, corn, and carrots. Insoluble fiber may prevent constipation and reduce the risk for certain types of cancer. It is called insoluble because it does not dissolve in water.    Soluble fiber. This type of fiber is in oats, beans, and certain fruits and vegetables such as strawberries and peas. Soluble fiber can reduce cholesterol, which may help lower the risk for heart disease. It also helps control blood sugar levels.  Look for high-fiber foods  Try these foods to add fiber to your diet:    Whole-grain breads and cereals. Try to eat 6 to 8 ounces a day. Include wheat and oat  bran cereals, whole-wheat muffins or toast, and corn tortillas in your meals.    Fruits. Try to eat 2 cups a day. Apples, oranges, strawberries, pears, and bananas are good sources. (Note: Fruit juice is low in fiber.)    Vegetables. Try to eat at least 2.5 cups a day. Add asparagus, carrots, broccoli, peas, and corn to your meals.    Beans. One cup of cooked lentils gives you over 15 grams of fiber. Try navy beans, lentils, and chickpeas.    Seeds. A small handful of seeds gives you about 3 grams of fiber. Try sunflower or walker seeds.  Keep track of your fiber  Keep track of how much fiber you eat. Start by reading food labels. Then eat a variety of foods high in fiber. As you start to eat more fiber, ask your healthcare provider how much water you should be drinking to keep your digestive system working smoothly.  Aim for a certain amount of fiber in your diet each day. If you are a woman, that amount is between 25 and 28 grams per day. Men should aim for 30 to 33 grams per day. After age 50, your daily fiber needs drop to 22 grams for women and 28 grams for men.  Before you reach for the fiber supplements, think about this. Fiber is found naturally in healthy whole foods. It gives you that feeling of fullness after you eat. Taking fiber supplements or eating fiber-enriched foods will not give you this full feeling.  Your fiber intake is a good measure for the quality of your overall diet. If you are missing out on your daily amount of fiber, you may be lacking other important nutrients as well.  Date Last Reviewed: 7/1/2017 2000-2019 [employer]. [address] [place] [address], [place] [address]. All rights reserved. This information is not intended as a substitute for professional medical care. Always follow your healthcare professional's instructions.               Return if symptoms worsen or fail to improve.    Rachell Kothari MD  [facility]

## 2020-02-16 ENCOUNTER — HEALTH MAINTENANCE LETTER (OUTPATIENT)
Age: 25
End: 2020-02-16

## 2020-03-15 ENCOUNTER — VIRTUAL VISIT (OUTPATIENT)
Dept: FAMILY MEDICINE | Facility: OTHER | Age: 25
End: 2020-03-15

## 2020-03-16 ENCOUNTER — OFFICE VISIT (OUTPATIENT)
Dept: URGENT CARE | Facility: URGENT CARE | Age: 25
End: 2020-03-16
Payer: COMMERCIAL

## 2020-03-16 ENCOUNTER — VIRTUAL VISIT (OUTPATIENT)
Dept: FAMILY MEDICINE | Facility: OTHER | Age: 25
End: 2020-03-16

## 2020-03-16 DIAGNOSIS — R05.9 COUGH: Primary | ICD-10-CM

## 2020-03-16 PROCEDURE — U0002 COVID-19 LAB TEST NON-CDC: HCPCS | Mod: 90 | Performed by: FAMILY MEDICINE

## 2020-03-16 PROCEDURE — 99000 SPECIMEN HANDLING OFFICE-LAB: CPT | Performed by: FAMILY MEDICINE

## 2020-03-16 PROCEDURE — 99213 OFFICE O/P EST LOW 20 MIN: CPT | Performed by: FAMILY MEDICINE

## 2020-03-16 NOTE — PROGRESS NOTES
"Date: 03/15/2020 20:13:21  Clinician: Tim Fulton  Clinician NPI: [id number]  Patient: Naty Pérez  Patient : 1995  Patient Address: [address] [place] [address]  Patient Phone: [phone]  Visit Protocol: URI  Patient Summary:  Naty is a 24 year old ( : 1995 ) female who initiated a Visit for COVID-19 (Coronavirus) evaluation and screening. When asked the question \"Please sign me up to receive news, health information and promotions from [employer].\", Naty responded \"No\".    Naty states her symptoms started suddenly 7-9 days ago. After her symptoms started, they improved and then got worse again.   Her symptoms consist of malaise, a headache, rhinitis, enlarged lymph nodes, facial pain or pressure, chills, a sore throat, a cough, and tooth pain. She is experiencing mild difficulty breathing with activities but can speak normally in full sentences.   Symptom details     Nasal secretions: The color of her mucus is blood-tinged.    Cough: Naty coughs a few times an hour and her cough is not more bothersome at night. Phlegm does not come into her throat when she coughs. She does not believe her cough is caused by post-nasal drip.     Sore throat: Naty reports having moderate throat pain (4-6 on a 10 point pain scale), does not have exudate on her tonsils, and can swallow liquids. The lymph nodes in her neck are enlarged. A rash has not appeared on the skin since the sore throat started.     Facial pain or pressure: The facial pain or pressure does not feel worse when bending or leaning forward.     Headache: She states the headache is moderate (4-6 on a 10 point pain scale).     Tooth pain: The tooth pain is not caused by a cavity, recent dental work, or other mouth problems.      Naty denies having ear pain, fever, myalgias, wheezing, and nasal congestion. She also denies having recent facial or sinus surgery in the past 60 days and taking antibiotic medication for the " symptoms.   Precipitating events  Within the past week, Naty has not been exposed to someone with strep throat. She has not recently been exposed to someone with influenza. Naty has been in close contact with the following high risk individuals: adults 65 or older and children under the age of 5.   Pertinent COVID-19 (Coronavirus) information  Naty has not traveled internationally or to the areas where COVID-19 (Coronavirus) is widespread in the last 14 days before the start of her symptoms.   Naty has not had close contact with a suspected or laboratory-confirmed COVID-19 patient within 14 days of symptom onset.   Naty is not a healthcare worker and does not work in a healthcare facility.   Pertinent medical history  Naty had 1 sinus infection within the past year.   Naty does not get yeast infections when she takes antibiotics.   Naty needs a return to work/school note.   Weight: 125 lbs   Naty does not smoke or use smokeless tobacco.   She denies pregnancy and denies breastfeeding. She has menstruated in the past month.   Additional information as reported by the patient (free text): Have a pressure in my chest, like under my sternum.   Weight: 125 lbs    MEDICATIONS: Nexplanon subdermal, ALLERGIES: NKDA  Clinician Response:  Dear Naty,  I am sorry you are not feeling well. To determine the most appropriate care for you, I would like you to be seen in person to further discuss your health history and symptoms.  You will not be charged for this Visit. Thank you for trusting us with your care.  COVID-19 (Coronavirus) General Information  With the increase in the number of COVID-19 (Coronavirus) cases, we understand you may have some questions. Below is some helpful information on COVID-19 (Coronavirus).  How can I protect myself and others from the COVID-19 (Coronavirus)?  Because there is currently no vaccine to prevent infection, the best way to protect yourself is to avoid being  exposed to this virus. Put distance between yourself and other people if COVID-19 (Coronavirus) is spreading in your community. The virus is thought to spread mainly from person-to-person.     Between people who are in close contact with one another (within about 6 about) for prolonged period (10 minutes or longer).    Through respiratory droplets produced when an infected person coughs or sneezes.     The CDC recommends the following additional steps to protect yourself and others:     Wash your hands often with soap and water for at least 20 seconds, especially after blowing your nose, coughing, or sneezing; going to the bathroom; and before eating or preparing food.  Use an alcohol-based hand  that contains at least 60 percent alcohol if soap and water are not available.        Avoid touching your eyes, nose and mouth with unwashed hands.    Avoid close contact with people who are sick.    Stay home when you are sick.    Cover your cough or sneeze with a tissue, then throw the tissue in the trash.    Clean and disinfect frequently touched objects and surfaces.     You can help stop COVID-19 (Coronavirus) by knowing the signs and symptoms:     Fever    Cough    Shortness of breath     Contact your healthcare provider if   Develop symptoms   AND   Have been in close contact with a person known to have COVID-19 (Coronavirus) or live in or have recently traveled from an area with ongoing spread of COVID-19 (Coronavirus). Call ahead before you go to a doctor's office or emergency room. Tell them about your recent travel and your symptoms.   For the most up to date information, visit the CDC's website.  Steps to help prevent the spread of COVID-19 (Coronavirus) if you are sick  If you are sick with COVID-19 (Coronavirus) or suspect you are infected with the virus that causes COVID-19 (Coronavirus), follow the steps below to help prevent the disease from spreading&nbsp;to people in your home and community.      "Stay home except to get medical care. Home isolation may be started in consultation with your healthcare clinician.    Separate yourself from other people and animals in your home.    Call ahead before visiting your doctor if you have a medical appointment.    Wear a facemask when you are around other people.    Cover your cough and sneezes.    Clean your hands often.    Avoid sharing personal household items.    Clean and disinfect frequently touched objects and surfaces everyday.    You will need to have someone drop off medications or household supplies (if needed) at your house without coming inside or in contact with you or others living in your house.    Monitor your symptoms and seek prompt medical care if your illness is worsening (e.g. Difficulty breathing).    Discontinue home isolation only in consultation with your healthcare provider.     For more detailed and up to date information on what to do if you are sick, visit this link: What to Do If You Are Sick With Coronavirus Disease 2019 (COVID-19).  Do I need to be tested for COVID-19 (Coronavirus)?     At this time, the limited number of tests available are controlled by the state and local health departments and are being reserved for more seriously ill patients, those with known exposure to confirmed patients, and those with recent travel (within 14 days) to countries with high rates of COVID-19 (Coronavirus).    Decisions on which patients receive testing will be based on the local spread of COVID-19 (Coronavirus) as well as the symptoms. Your healthcare provider will make the final decision on whether you should be tested.    In the meantime, if you have concerns that you may have been exposed, it is reasonable to practice \"social distancing.\"&nbsp; If you are ill with a cold or flu-like illness, please monitor your symptoms and reach out to your healthcare provider if your symptoms worsen.    For more up to date information, visit this link: " COVID-19 (Coronavirus) Frequently Asked Questions and Answers.      Diagnosis: Refer for additional evaluation  Diagnosis ICD: R69

## 2020-03-16 NOTE — PROGRESS NOTES
SUBJECTIVE:  This 24 year old female presents for COVID-19 testing.  she reports cough, ST, malaise.  Onset of symptoms was 7-9 days ago.  Exposure history: none    OBJECTIVE:  Visual assessment shows:  GENERAL APPEARANCE is healthy without evident apparent respiratory distress  BREATHING: the patient is breathing comfortably and is speaking full sentences    ASSESSMENT/PLAN:    ICD-10-CM    1. Cough  R05 Novel COVID-19 (Coronavirus) SARS-CoV-2 by PCR Nasopharyngeal swab        You are being tested for Corona Virus 19.    Please use the information at the end of this document to sign up for [facility] [employer]at where you can get your results and a message about those results sent to you through the [employer] application. If you do not have [employer] we will call you with your results but it may take longer.    Isolate Yourself:    Isolate yourself while traveling.    Do Not allow any visitors within 6 feet.    Do Not go to work or school.    Do Not go to Worship,  centers, shopping, or other public places.    Do Not shake hands.    Avoid close contact with others (hugging, kissing).    Protect Others:    Cover Your Mouth and Nose with a mask, disposable tissue or wash cloth to avoid spreading germs to others.    Wash your hands and face frequently with soap and water    Call Back If: Breathing difficulty develops or you become worse.    For more information about COVID19 and options for caring for yourself at home, please visit the CDC website at https://www.cdc.gov/coronavirus/2019-ncov/about/steps-when-sick.html  For more options for care at [facility], please visit our website at [email or web address]    You will be notified in 3-5 days by phone.  Please self quarantine until then.    Batool Fish MD on 3/16/2020 at 4:26 PM

## 2020-03-16 NOTE — PROGRESS NOTES
"Date: 2020 11:38:38  Clinician: Lavonne Evans  Clinician NPI: [id number]  Patient: Naty Pérez  Patient : 1995  Patient Address: [address] [place] [address]  Patient Phone: [phone]  Visit Protocol: URI  Patient Summary:  Naty is a 24 year old ( : 1995 ) female who initiated a Visit for COVID-19 (Coronavirus) evaluation and screening. When asked the question \"Please sign me up to receive news, health information and promotions from [employer].\", Naty responded \"No\".    Naty states her symptoms started gradually 7-9 days ago. After her symptoms started, they improved and then got worse again.   Her symptoms consist of malaise, enlarged lymph nodes, a sore throat, a cough, and nasal congestion. She is experiencing mild difficulty breathing with activities but can speak normally in full sentences.   Symptom details     Nasal secretions: The color of her mucus is blood-tinged and yellow.    Cough: Naty coughs a few times an hour and her cough is not more bothersome at night. Phlegm does not come into her throat when she coughs. She does not believe her cough is caused by post-nasal drip.     Sore throat: Naty reports having mild throat pain (1-3 on a 10 point pain scale), does not have exudate on her tonsils, and can swallow liquids. The lymph nodes in her neck are enlarged. A rash has not appeared on the skin since the sore throat started.      Naty denies having ear pain, headache, rhinitis, facial pain or pressure, myalgias, fever, chills, wheezing, and teeth pain. She also denies having recent facial or sinus surgery in the past 60 days and taking antibiotic medication for the symptoms.   Precipitating events  Within the past week, Naty has not been exposed to someone with strep throat. She has not recently been exposed to someone with influenza. Naty has been in close contact with the following high risk individuals: adults 65 or older and children " under the age of 5.   Pertinent COVID-19 (Coronavirus) information  Naty has not traveled internationally or to the areas where COVID-19 (Coronavirus) is widespread in the last 14 days before the start of her symptoms.   Naty has not had close contact with a suspected or laboratory-confirmed COVID-19 patient within 14 days of symptom onset.   Naty is not a healthcare worker and does not work in a healthcare facility.   Pertinent medical history  Naty had 1 sinus infection within the past year.   Naty does not get yeast infections when she takes antibiotics.   Naty needs a return to work/school note.   Weight: 125 lbs   Naty does not smoke or use smokeless tobacco.   She denies pregnancy and denies breastfeeding. She has menstruated in the past month.   Additional information as reported by the patient (free text): It feels like there is a pressure on my chest   Weight: 125 lbs  A synchronous phone visit was initiated by the provider for the following reason: better define the pressure in her chest    MEDICATIONS: Nexplanon subdermal, ALLERGIES: NKDA  Clinician Response:  Dear Naty,   Based on the information you have provided, it is recommended that you go to one of our designated Coronavirus (Covid-19) testing centers to get a test done from your car.To do this follow these instructions:  You should go to one of our dedicated testing centers as soon as able during the hours below at one of these locations:    Walk-in Care: [facility] at [address] [address], [place] [address]. Hours: M-F 7am - 6pm, Sat-Sun 8am -- 3pm   [facility] at [address] [address]. Hours: Every Day 9am -- 7pm  Walk-in Care: [facility] at [address] [place] [address]. Hours: M-F 7am - 6pm, Sat-Sun 8am -- 3pm  [place] [address] [place] [address]. Hours: M-F 11am -- 7pm, Sat-Sun 9am-4pm  [place]  [facility] ([facility])[address], [place] [address].Hours: M-F 730-5     What to expect:    When you arrive please come park in the parking lot.  Be prepared to present photo ID  Call [phone] (For [facility] location call [phone]) and let them know which clinic you are at, the description of your car and where you are parked. Mention you did an [facility] visit and were sent for testing.  On that phone call you will give them the information to register your for the visit.  They will add you to the queue to get your test (you will stay in your car the entire time).  You will then be met by a provider who will perform a brief assessment in your car and collect samples to test for coronavirus and possibly influenza or RSV.From now until your test results return, please follow self-isolation practices:Isolate Yourself:     Isolate yourself while traveling.  Do Not allow any visitors within 6 feet.  Do Not go to work or school.  Do Not go to [employer],  centers, shopping, or other public places.  Do Not shake hands.  Avoid close contact with others (hugging, kissing).Protect Others:     Cover Your Mouth and Nose with a mask, disposable tissue or wash cloth to avoid spreading germs to others.  Wash your hands and face frequently with soap and water      Fever Medicines:    For fever relief, take acetaminophen or ibuprofen.  Treat fevers above 101deg F (38.3deg C) to lower fevers and make you more comfortable.   Acetaminophen (e.g., Tylenol): Take 650 mg (two 325 mg pills) by mouth every 4-6 hours as needed of regular strength Tylenol or 1,000 mg (two 500 mg pills) every 8 hours as needed of Extra Strength Tylenol.   Ibuprofen (e.g., Motrin, Advil): Take 400 mg (two 200 mg pills) by mouth every 6 hours as needed.   Acetaminophen is thought to be safer than ibuprofen or naproxen for people over 65 years old. Acetaminophen is in many OTC and prescription medicines. It might be in more than one  medicine that you are taking. You need to be careful and not take an overdose. Before taking any medicine, read all the instructions on the package.  Caution -NSAIDs (e.g., ibuprofen, naproxen): Do not take nonsteroidal anti-inflammatory drugs (NSAIDs) if you have stomach problems, kidney disease, heart failure, or other contraindications to using this type of medicine. Do not take NSAID medicines for over 7 days without consulting your PCP. Do not take NSAID medicines if you are pregnant. Do not take NSAID medicines if you are also taking blood thinners.    Call Back If: Breathing difficulty develops or you become worse.  Thank you for limiting contact with others, wearing a simple mask to cover your cough, practice good hand hygiene habits and accessing our virtual services where possible to limit the spread of this virus.     For more information about COVID19 and options for caring for yourself at home, please visit the CDC website at https://www.cdc.gov/coronavirus/2019-ncov/about/steps-when-sick.html  For more options for care at [facility], please visit our website at [email or web address]    Diagnosis: Cough  Diagnosis ICD: R05  Triage Notes: I reviewed the patient's history, verified their identity, and explained the Visit process.    Pt describes the pressure in her chest as pain center of chest with movement.  feels like she ran a long distance and with talking.  started last 2days.  No history lung problems.  was in [place] in Jan.  No hx of blood clots.  Synchronous Triage: phone, status: completed, duration: 425 seconds  Addendum created: March 24 09:06:18, 2020 created by: VIGNESH Ennis body: I reviewed your lab results in [place]. The test was negative, you did not have COVID-19 at that time.

## 2020-03-16 NOTE — PATIENT INSTRUCTIONS
You are being tested for Corona virus and possibly Influenza and/or RSV.     Please use the information at the end of this document to sign up for [facility] Find[employer]Coursehart where you can get your results and a message about those results sent to you through the [employer] application. If you do not have mychart we will call you with your results but it may take longer.    Isolate Yourself:    Isolate yourself while traveling.    Do Not allow any visitors within 6 feet.    Do Not go to work or school.    Do Not go to Worship,  centers, shopping, or other public places.    Do Not shake hands.    Avoid close contact with others (hugging, kissing).    Protect Others:    Cover Your Mouth and Nose with a mask, disposable tissue or wash cloth to avoid spreading germs to others.    Wash your hands and face frequently with soap and water    Call Back If: Breathing difficulty develops or you become worse.    For more information about COVID19 and options for caring for yourself at home, please visit the CDC website at https://www.cdc.gov/coronavirus/2019-ncov/about/steps-when-sick.html  For more options for care at [facility], please visit our website at [email or web address]

## 2020-03-21 LAB
COVID-19 VIRUS PCR TO MAYO - RESULT: NORMAL
SPECIMEN SOURCE: NORMAL

## 2020-03-28 ENCOUNTER — TELEPHONE (OUTPATIENT)
Dept: EMERGENCY MEDICINE | Facility: CLINIC | Age: 25
End: 2020-03-28

## 2020-03-28 NOTE — TELEPHONE ENCOUNTER
Coronavirus (COVID-19) Notification    Reason for call  Notify of Negative COVID-19 lab result, assess symptoms,  review [facility] recommendations    Lab Result   Lab test 2019-nCoV rRt-PCR  Oropharyngeal AND/OR nasopharyngeal swabs were NEGATIVE for 2019-nCoV RNA    We have been unable to reach Patient by phone at this time to notify of their Negative COVID-19 result.  Left voicemail message requesting a call back between 8A to 6:30P to [facility] at [phone].  (Weekends, this line is available from 10A to 6:30P)       [RN/LPN Name]   Kat Smith LPN

## 2020-03-31 NOTE — TELEPHONE ENCOUNTER
Clinic Action Needed:No  Reason for Call: Pt is returning a call from 3/28/20. I gave her the information below of NEGATIVE for 2019-nCoV RNA. She says she is feeling much better although was more active yesterday, walking and riding bikes and the chest tightness which she has had was getting better, is a little worse today. No SOB and certainly not as bad as it was. Declined RN triage at this time. Advise if continues to worsen or new symptoms develop to call back for RN triage. Patient voices understanding and is in agreement with this plan.   Routed to: None.    Zuleima Eid RN  [facility]  Triage Nurse Advisors

## 2020-04-02 ENCOUNTER — MYC MEDICAL ADVICE (OUTPATIENT)
Dept: PEDIATRICS | Facility: CLINIC | Age: 25
End: 2020-04-02

## 2020-04-02 NOTE — TELEPHONE ENCOUNTER
Instructions for Patients  It is recommended that you setup a virtual visit with one of our virtual providers.  To do this follow these instructions:    1. Go to the website [email or web address]  2. Create an account (you will need your insurance information)  3. Start a new visit  4. Choose your diagnosis (e.g. COVID19)  5. Fill out the information about your symptoms  6. A provider will reach out to you by text, phone call or video visit based on your request    While you are at home please follow these instructions to care for yourself:    Regardless of if you have been tested or not:  Patient who have symptoms (cough, fever, or shortness of breath), need to isolate for 7 days from when symptoms started AND 72 hours after fever resolves (without fever reducing medications) AND improvement of respiratory symptoms (whichever is longer).      Isolate yourself at home (in own room/own bathroom if possible)    Do Not allow any visitors    Do Not go to work or school    Do Not go to [employer],  centers, shopping, or other public places.    Do Not shake hands.    Avoid close and intimate contact with others (hugging, kissing).    Follow CDC recommendations for household cleaning of frequently touched services.     After the initial 7 days, continue to isolate yourself from household members as much as possible. To continue decrease the risk of community spread and exposure, you and any members of your household should limit activities in public for 14 days after starting home isolation.     You can reference the following CDC link for helpful home isolation/care tips:  https://www.cdc.gov/coronavirus/2019-ncov/downloads/10Things.pdf    Protect Others:    Cover Your Mouth and Nose with a mask, disposable tissue or wash cloth to avoid spreading germs to others.    Wash your hands and face frequently with soap and water.    Fever Medicines:    For fever relief, take acetaminophen or ibuprofen.    Treat fevers above  101  F (38.3  C) to lower fevers and make you more comfortable.     Acetaminophen (e.g., Tylenol): Take 650 mg (two 325 mg pills) by mouth every 4-6 hours as needed of regular strength Tylenol or 1,000 mg (two 500 mg pills) every 8 hours as needed of Extra Strength Tylenol.     Ibuprofen (e.g., Motrin, Advil): Take 400 mg (two 200 mg pills) by mouth every 6 hours as needed.     Acetaminophen is thought to be safer than ibuprofen or naproxen for people over 65 years old. Acetaminophen is in many OTC and prescription medicines. It might be in more than one medicine that you are taking. You need to be careful and not take an overdose. Before taking any medicine, read all the instructions on the package.    Caution - NSAIDs (e.g., ibuprofen, naproxen): Do not take nonsteroidal anti-inflammatory drugs (NSAIDs) if you have stomach problems, kidney disease, heart failure, or other contraindications to using this type of medicine. Do not take NSAID medicines for over 7 days without consulting your PCP. Do not take NSAID medicines if you are pregnant. Do not take NSAID medicines if you are also taking blood thinners.     Call Back If: Breathing difficulty develops or you become worse.    Thank you for limiting contact with others, wearing a simple mask to cover your cough, practice good hand hygiene habits and accessing our virtual services where possible to limit the spread of this virus.    For more information about COVID19 and options for caring for yourself at home, please visit the CDC website at https://www.cdc.gov/coronavirus/2019-ncov/about/steps-when-sick.html  For more options for care at [facility], please visit our website at [email or web address]

## 2020-08-10 ENCOUNTER — VIRTUAL VISIT (OUTPATIENT)
Dept: BEHAVIORAL HEALTH | Facility: CLINIC | Age: 25
End: 2020-08-10
Payer: COMMERCIAL

## 2020-08-10 ENCOUNTER — OFFICE VISIT (OUTPATIENT)
Dept: INTERNAL MEDICINE | Facility: CLINIC | Age: 25
End: 2020-08-10
Payer: COMMERCIAL

## 2020-08-10 VITALS
SYSTOLIC BLOOD PRESSURE: 115 MMHG | BODY MASS INDEX: 23.22 KG/M2 | OXYGEN SATURATION: 100 % | DIASTOLIC BLOOD PRESSURE: 78 MMHG | HEART RATE: 90 BPM | TEMPERATURE: 97.7 F | RESPIRATION RATE: 20 BRPM | HEIGHT: 62 IN | WEIGHT: 126.2 LBS

## 2020-08-10 DIAGNOSIS — F34.1 DYSTHYMIA: ICD-10-CM

## 2020-08-10 DIAGNOSIS — Z00.00 PREVENTATIVE HEALTH CARE: Primary | ICD-10-CM

## 2020-08-10 DIAGNOSIS — R69 DIAGNOSIS DEFERRED: Primary | ICD-10-CM

## 2020-08-10 LAB
ALBUMIN SERPL-MCNC: 4.1 G/DL (ref 3.4–5)
ALP SERPL-CCNC: 68 U/L (ref 40–150)
ALT SERPL W P-5'-P-CCNC: 15 U/L (ref 0–50)
ANION GAP SERPL CALCULATED.3IONS-SCNC: 4 MMOL/L (ref 3–14)
AST SERPL W P-5'-P-CCNC: 17 U/L (ref 0–45)
BASOPHILS # BLD AUTO: 0 10E9/L (ref 0–0.2)
BASOPHILS NFR BLD AUTO: 0.4 %
BILIRUB SERPL-MCNC: 0.6 MG/DL (ref 0.2–1.3)
BUN SERPL-MCNC: 8 MG/DL (ref 7–30)
CALCIUM SERPL-MCNC: 9.1 MG/DL (ref 8.5–10.1)
CHLORIDE SERPL-SCNC: 107 MMOL/L (ref 94–109)
CHOLEST SERPL-MCNC: 177 MG/DL
CO2 SERPL-SCNC: 25 MMOL/L (ref 20–32)
CREAT SERPL-MCNC: 0.82 MG/DL (ref 0.52–1.04)
DIFFERENTIAL METHOD BLD: NORMAL
EOSINOPHIL # BLD AUTO: 0.1 10E9/L (ref 0–0.7)
EOSINOPHIL NFR BLD AUTO: 1.8 %
ERYTHROCYTE [DISTWIDTH] IN BLOOD BY AUTOMATED COUNT: 11.9 % (ref 10–15)
GFR SERPL CREATININE-BSD FRML MDRD: >90 ML/MIN/{1.73_M2}
GLUCOSE SERPL-MCNC: 87 MG/DL (ref 70–99)
HCT VFR BLD AUTO: 39.6 % (ref 35–47)
HDLC SERPL-MCNC: 50 MG/DL
HGB BLD-MCNC: 13.3 G/DL (ref 11.7–15.7)
LDLC SERPL CALC-MCNC: 106 MG/DL
LYMPHOCYTES # BLD AUTO: 2.4 10E9/L (ref 0.8–5.3)
LYMPHOCYTES NFR BLD AUTO: 47.3 %
MCH RBC QN AUTO: 31.6 PG (ref 26.5–33)
MCHC RBC AUTO-ENTMCNC: 33.6 G/DL (ref 31.5–36.5)
MCV RBC AUTO: 94 FL (ref 78–100)
MONOCYTES # BLD AUTO: 0.5 10E9/L (ref 0–1.3)
MONOCYTES NFR BLD AUTO: 9.1 %
NEUTROPHILS # BLD AUTO: 2.1 10E9/L (ref 1.6–8.3)
NEUTROPHILS NFR BLD AUTO: 41.4 %
NONHDLC SERPL-MCNC: 127 MG/DL
PLATELET # BLD AUTO: 285 10E9/L (ref 150–450)
POTASSIUM SERPL-SCNC: 3.8 MMOL/L (ref 3.4–5.3)
PROT SERPL-MCNC: 8.5 G/DL (ref 6.8–8.8)
RBC # BLD AUTO: 4.21 10E12/L (ref 3.8–5.2)
SODIUM SERPL-SCNC: 136 MMOL/L (ref 133–144)
TRIGL SERPL-MCNC: 103 MG/DL
TSH SERPL DL<=0.005 MIU/L-ACNC: 1.45 MU/L (ref 0.4–4)
WBC # BLD AUTO: 5.1 10E9/L (ref 4–11)

## 2020-08-10 PROCEDURE — 36415 COLL VENOUS BLD VENIPUNCTURE: CPT | Performed by: INTERNAL MEDICINE

## 2020-08-10 PROCEDURE — G0145 SCR C/V CYTO,THINLAYER,RESCR: HCPCS | Performed by: INTERNAL MEDICINE

## 2020-08-10 PROCEDURE — 99395 PREV VISIT EST AGE 18-39: CPT | Performed by: INTERNAL MEDICINE

## 2020-08-10 PROCEDURE — 80050 GENERAL HEALTH PANEL: CPT | Performed by: INTERNAL MEDICINE

## 2020-08-10 PROCEDURE — 80061 LIPID PANEL: CPT | Performed by: INTERNAL MEDICINE

## 2020-08-10 PROCEDURE — G0476 HPV COMBO ASSAY CA SCREEN: HCPCS | Performed by: INTERNAL MEDICINE

## 2020-08-10 ASSESSMENT — ENCOUNTER SYMPTOMS
PARESTHESIAS: 0
ARTHRALGIAS: 0
DIZZINESS: 0
FEVER: 0
NERVOUS/ANXIOUS: 0
CHILLS: 0
HEARTBURN: 0
HEMATURIA: 0
CONSTIPATION: 0
MYALGIAS: 0
NAUSEA: 0
EYE PAIN: 0
WEAKNESS: 0
DYSURIA: 0
HEADACHES: 0
PALPITATIONS: 0
SORE THROAT: 0
ABDOMINAL PAIN: 0
JOINT SWELLING: 0
HEMATOCHEZIA: 0
SHORTNESS OF BREATH: 0
COUGH: 0
DIARRHEA: 0
FREQUENCY: 0

## 2020-08-10 ASSESSMENT — PATIENT HEALTH QUESTIONNAIRE - PHQ9
SUM OF ALL RESPONSES TO PHQ QUESTIONS 1-9: 8
5. POOR APPETITE OR OVEREATING: NOT AT ALL

## 2020-08-10 ASSESSMENT — ANXIETY QUESTIONNAIRES
6. BECOMING EASILY ANNOYED OR IRRITABLE: SEVERAL DAYS
2. NOT BEING ABLE TO STOP OR CONTROL WORRYING: NOT AT ALL
5. BEING SO RESTLESS THAT IT IS HARD TO SIT STILL: SEVERAL DAYS
IF YOU CHECKED OFF ANY PROBLEMS ON THIS QUESTIONNAIRE, HOW DIFFICULT HAVE THESE PROBLEMS MADE IT FOR YOU TO DO YOUR WORK, TAKE CARE OF THINGS AT HOME, OR GET ALONG WITH OTHER PEOPLE: SOMEWHAT DIFFICULT
3. WORRYING TOO MUCH ABOUT DIFFERENT THINGS: SEVERAL DAYS
1. FEELING NERVOUS, ANXIOUS, OR ON EDGE: SEVERAL DAYS
7. FEELING AFRAID AS IF SOMETHING AWFUL MIGHT HAPPEN: SEVERAL DAYS
GAD7 TOTAL SCORE: 5

## 2020-08-10 ASSESSMENT — MIFFLIN-ST. JEOR: SCORE: 1252.75

## 2020-08-10 NOTE — PROGRESS NOTES
[facility]  No data recorded      Social Work Care Navigator Note      Patient: Naty Pérez  Date: August 10, 2020  Preferred Name: Naty    Previous PHQ-9:   PHQ-9 SCORE 8/10/2020   PHQ-9 Total Score 8     Previous ODILON-7:   ODILON-7 SCORE 8/10/2020   Total Score 5     CHARLES LEVEL:  CHARLES Score (Last Two) 2/6/2020   CHARLES Raw Score 28   Activation Score 50   CHARLES Level 2       Preferred Contact:  No data recorded    Type of Contact Today: Phone call (patient / identified key support person reached)      Data: (subjective / Objective):    [facility] Introduction:  Hi my name is JANICE Batista from your (name) primary care clinic.     I work closely with your primary care provider, Gwen George.     If it's ok I'd like to talk about some new services available to you, at no out of pocket cost to you.      Before we get started can you verify your insurance for me? [employer]    What social work or case management services do you receive? (If so, are you receiving ACT or TCM?).  Unable to discuss - Patient not interested in Behavioral Health Home ([facility]) services at this time.    Getting to Know You - Whole Person Care:  This new service is called Behavioral Health Home services, which is designed to support you as a whole person beyond just your medical needs.        I'm here to be a central point of contact for your healthcare needs and to help with:    Housing    Transportation    Financial resources    Comprehensive Health needs (appointment help, medication costs, etc.)    Employment    Education    Health Insurance applications    And connecting with social supports or community resources    Out of the things I mentioned what would you find helpful?    Patient states she is currently living with her parents and housing is stable. She owns an older car but it is running ok and does not need assistance in this area.  Patient reports she is currently on furlough from work but due to living with her parents,  she feels stable enough financially and for food.    Patient denies needing any assistance at this time. [facility] informed patient that if she were interested in Behavioral Health Home ([facility]) in the future, she may reach out to any clinic staff at any [facility] to get her in tough with this worker to discuss enrollment.    Patient response to [facility] Service offering:   Not interested enrolling in [facility] services    JANICE Batista  Behavioral Health Home ([facility])   [facility]  [phone]

## 2020-08-10 NOTE — PROGRESS NOTES
SUBJECTIVE:   CC: Naty Pérez is an 25 year old woman who presents for preventive health visit.     Fasting.    Healthy Habits:     Getting at least 3 servings of Calcium per day:  NO    Bi-annual eye exam:  Yes    Dental care twice a year:  Yes    Sleep apnea or symptoms of sleep apnea:  None    Diet:  Regular (no restrictions)    Frequency of exercise:  2-3 days/week    Duration of exercise:  15-30 minutes    Taking medications regularly:  Yes    Medication side effects:  Not applicable    PHQ-2 Total Score: 2    Additional concerns today:  Yes      HPI:   anxiety and depression run on her moms side of the family. She has learned to manage her anxiety. But about 1 week out of the month she will have no energy, can get tearful with low mood. Usually correlates with her period but not always. She would like to work with a counselor before she tries medication.         Today's PHQ-2 Score:   PHQ-2 ( 1999 [employer]) 2/6/2020   Q1: Little interest or pleasure in doing things 1   Q2: Feeling down, depressed or hopeless 1   PHQ-2 Score 2   Q1: Little interest or pleasure in doing things Several days   Q2: Feeling down, depressed or hopeless Several days   PHQ-2 Score 2       Abuse: Current or Past(Physical, Sexual or Emotional)- No  Do you feel safe in your environment? Yes        Social History     Tobacco Use     Smoking status: Never Smoker     Smokeless tobacco: Never Used     Tobacco comment: non smoking home   Substance Use Topics     Alcohol use: No     If you drink alcohol do you typically have >3 drinks per day or >7 drinks per week? No    Alcohol Use 2/19/2016   Prescreen: >3 drinks/day or >7 drinks/week? The patient does not drink >3 drinks per day nor >7 drinks per week.       Reviewed orders with patient.  Reviewed health maintenance and updated orders accordingly - Yes  BP Readings from Last 3 Encounters:   08/10/20 115/78   02/06/20 113/73   04/26/19 96/58    Wt Readings from Last 3 Encounters:    08/10/20 57.2 kg (126 lb 3.2 oz)   02/06/20 56.3 kg (124 lb 1.6 oz)   04/26/19 58.1 kg (128 lb)                    Mammogram not appropriate for this patient based on age.    Pertinent mammograms are reviewed under the imaging tab.  History of abnormal Pap smear: NO - age 21-29 PAP every 3 years recommended  PAP / HPV 1/5/2016 6/2/2015   PAP NIL LSIL(A)     Reviewed and updated as needed this visit by clinical staff  Tobacco  Allergies  Meds  Med Hx  Surg Hx  Fam Hx  Soc Hx        Reviewed and updated as needed this visit by Provider            Review of Systems   Constitutional: Negative for chills and fever.   HENT: Negative for congestion, ear pain, hearing loss and sore throat.    Eyes: Negative for pain and visual disturbance.   Respiratory: Negative for cough and shortness of breath.    Cardiovascular: Negative for chest pain, palpitations and peripheral edema.   Gastrointestinal: Negative for abdominal pain, constipation, diarrhea, heartburn, hematochezia and nausea.   Genitourinary: Negative for dysuria, frequency, genital sores, hematuria and urgency.   Musculoskeletal: Negative for arthralgias, joint swelling and myalgias.   Skin: Negative for rash.   Neurological: Negative for dizziness, weakness, headaches and paresthesias.   Psychiatric/Behavioral: Negative for mood changes. The patient is not nervous/anxious.           OBJECTIVE:   There were no vitals taken for this visit.  Physical Exam  GENERAL: healthy, alert and no distress  EYES: Eyes grossly normal to inspection, PERRL and conjunctivae and sclerae normal  HENT: ear canals and TM's normal, nose and mouth without ulcers or lesions  NECK: no adenopathy, no asymmetry, masses, or scars and thyroid normal to palpation  RESP: lungs clear to auscultation - no rales, rhonchi or wheezes  BREAST: normal without masses, tenderness or nipple discharge and no palpable axillary masses or adenopathy  CV: regular rate and rhythm, normal S1 S2, no S3 or  "S4, no murmur, click or rub, no peripheral edema and peripheral pulses strong  ABDOMEN: soft, nontender, no hepatosplenomegaly, no masses and bowel sounds normal   (female): normal female external genitalia, normal urethral meatus, vaginal mucosa pink, moist, well rugated, and normal cervix/adnexa/uterus without masses or discharge  MS: no gross musculoskeletal defects noted, no edema  SKIN: no suspicious lesions or rashes  NEURO: Normal strength and tone, mentation intact and speech normal  PSYCH: mentation appears normal, affect normal/bright        ASSESSMENT/PLAN:   1. Preventative health care     - CBC with platelets and differential  - Comprehensive metabolic panel (BMP + Alb, Alk Phos, ALT, AST, Total. Bili, TP)  - TSH with free T4 reflex  - Lipid Profile (Chol, Trig, HDL, LDL calc)  - Pap imaged thin layer screen with HPV - recommended age 30 - 65 years (select HPV order below)    2. Dysthymia  Will have Vanita call her.sounds like possible PMS.       COUNSELING:  Reviewed preventive health counseling, as reflected in patient instructions       Regular exercise       Healthy diet/nutrition    Estimated body mass index is 23.04 kg/m  as calculated from the following:    Height as of 2/6/20: 1.563 m (5' 1.54\").    Weight as of 2/6/20: 56.3 kg (124 lb 1.6 oz).         reports that she has never smoked. She has never used smokeless tobacco.      Counseling Resources:  ATP IV Guidelines  Pooled Cohorts Equation Calculator  Breast Cancer Risk Calculator  FRAX Risk Assessment  ICSI Preventive Guidelines  Dietary Guidelines for Americans, 2010  USDA's MyPlate  ASA Prophylaxis  Lung CA Screening    Renee Cool MD  [facility]  "

## 2020-08-11 ENCOUNTER — TELEPHONE (OUTPATIENT)
Dept: BEHAVIORAL HEALTH | Facility: CLINIC | Age: 25
End: 2020-08-11

## 2020-08-11 ASSESSMENT — ANXIETY QUESTIONNAIRES: GAD7 TOTAL SCORE: 5

## 2020-08-11 NOTE — TELEPHONE ENCOUNTER
Called pt per pcp to schedule. Denies any current SI,plans or intentions. Scheduled for 8/25/2020.

## 2020-08-12 LAB
COPATH REPORT: NORMAL
PAP: NORMAL

## 2020-08-13 LAB
FINAL DIAGNOSIS: NORMAL
HPV HR 12 DNA CVX QL NAA+PROBE: NEGATIVE
HPV16 DNA SPEC QL NAA+PROBE: NEGATIVE
HPV18 DNA SPEC QL NAA+PROBE: NEGATIVE
SPECIMEN DESCRIPTION: NORMAL
SPECIMEN SOURCE CVX/VAG CYTO: NORMAL

## 2020-08-25 ENCOUNTER — VIRTUAL VISIT (OUTPATIENT)
Dept: BEHAVIORAL HEALTH | Facility: CLINIC | Age: 25
End: 2020-08-25
Payer: COMMERCIAL

## 2020-08-25 DIAGNOSIS — F41.1 GAD (GENERALIZED ANXIETY DISORDER): Primary | ICD-10-CM

## 2020-08-25 PROCEDURE — 90791 PSYCH DIAGNOSTIC EVALUATION: CPT | Mod: 95 | Performed by: MARRIAGE & FAMILY THERAPIST

## 2020-08-25 ASSESSMENT — ANXIETY QUESTIONNAIRES
3. WORRYING TOO MUCH ABOUT DIFFERENT THINGS: SEVERAL DAYS
5. BEING SO RESTLESS THAT IT IS HARD TO SIT STILL: SEVERAL DAYS
7. FEELING AFRAID AS IF SOMETHING AWFUL MIGHT HAPPEN: NOT AT ALL
7. FEELING AFRAID AS IF SOMETHING AWFUL MIGHT HAPPEN: NOT AT ALL
4. TROUBLE RELAXING: SEVERAL DAYS
2. NOT BEING ABLE TO STOP OR CONTROL WORRYING: NOT AT ALL
GAD7 TOTAL SCORE: 5
6. BECOMING EASILY ANNOYED OR IRRITABLE: SEVERAL DAYS
1. FEELING NERVOUS, ANXIOUS, OR ON EDGE: SEVERAL DAYS

## 2020-08-25 ASSESSMENT — COLUMBIA-SUICIDE SEVERITY RATING SCALE - C-SSRS
6. HAVE YOU EVER DONE ANYTHING, STARTED TO DO ANYTHING, OR PREPARED TO DO ANYTHING TO END YOUR LIFE?: NO
TOTAL  NUMBER OF ABORTED OR SELF INTERRUPTED ATTEMPTS PAST LIFETIME: NO
2. HAVE YOU ACTUALLY HAD ANY THOUGHTS OF KILLING YOURSELF?: NO
5. HAVE YOU STARTED TO WORK OUT OR WORKED OUT THE DETAILS OF HOW TO KILL YOURSELF? DO YOU INTEND TO CARRY OUT THIS PLAN?: NO
5. HAVE YOU STARTED TO WORK OUT OR WORKED OUT THE DETAILS OF HOW TO KILL YOURSELF? DO YOU INTEND TO CARRY OUT THIS PLAN?: NO
TOTAL  NUMBER OF INTERRUPTED ATTEMPTS PAST 3 MONTHS: NO
1. IN THE PAST MONTH, HAVE YOU WISHED YOU WERE DEAD OR WISHED YOU COULD GO TO SLEEP AND NOT WAKE UP?: NO
3. HAVE YOU BEEN THINKING ABOUT HOW YOU MIGHT KILL YOURSELF?: NO
2. HAVE YOU ACTUALLY HAD ANY THOUGHTS OF KILLING YOURSELF LIFETIME?: NO
4. HAVE YOU HAD THESE THOUGHTS AND HAD SOME INTENTION OF ACTING ON THEM?: NO
ATTEMPT PAST THREE MONTHS: NO
1. IN THE PAST MONTH, HAVE YOU WISHED YOU WERE DEAD OR WISHED YOU COULD GO TO SLEEP AND NOT WAKE UP?: NO
4. HAVE YOU HAD THESE THOUGHTS AND HAD SOME INTENTION OF ACTING ON THEM?: NO
TOTAL  NUMBER OF INTERRUPTED ATTEMPTS LIFETIME: NO
ATTEMPT LIFETIME: NO
TOTAL  NUMBER OF ABORTED OR SELF INTERRUPTED ATTEMPTS PAST 3 MONTHS: NO
6. HAVE YOU EVER DONE ANYTHING, STARTED TO DO ANYTHING, OR PREPARED TO DO ANYTHING TO END YOUR LIFE?: NO

## 2020-08-25 ASSESSMENT — PATIENT HEALTH QUESTIONNAIRE - PHQ9
10. IF YOU CHECKED OFF ANY PROBLEMS, HOW DIFFICULT HAVE THESE PROBLEMS MADE IT FOR YOU TO DO YOUR WORK, TAKE CARE OF THINGS AT HOME, OR GET ALONG WITH OTHER PEOPLE: SOMEWHAT DIFFICULT
SUM OF ALL RESPONSES TO PHQ QUESTIONS 1-9: 7
SUM OF ALL RESPONSES TO PHQ QUESTIONS 1-9: 7

## 2020-08-25 NOTE — PROGRESS NOTES
Telemedicine Visit: The patient's condition can be safely assessed and treated via synchronous audio and visual telemedicine encounter.      Reason for Telemedicine Visit: Services only offered telehealth    Originating Site (Patient Location): Patient's home    Distant Site (Provider Location): [facility]    Consent:  The patient/guardian has verbally consented to: the potential risks and benefits of telemedicine (video visit) versus in person care; bill my insurance or make self-payment for services provided; and responsibility for payment of non-covered services.     Mode of Communication:  Video Conference via [facility]    As the provider I attest to compliance with applicable laws and regulations related to telemedicine.    [facility] Primary Care: Integrated Behavioral Health  Evaluator Name: Vnaita Negrete     Credentials:  LMFT    PATIENT'S NAME: Naty RINCON Beto  PREFERRED NAME: Naty  PREFERRED PRONOUNS:       MRN:   [id number]  :   1995   ACCT. NUMBER: [id number]  DATE OF SERVICE: 20  START TIME: 2:04pm  END TIME: 2:58pm  PREFERRED PHONE: [phone]  May we leave a program related message: Yes  Service Modality:  Video Visit:    Telemedicine Visit: The patient's condition can be safely assessed and treated via synchronous audio and visual telemedicine encounter.      Reason for Telemedicine Visit: Services only offered telehealth    Originating Site (Patient Location): Patient's home    Distant Site (Provider Location): [facility]    Consent:  The patient/guardian has verbally consented to: the potential risks and benefits of telemedicine (video visit) versus in person care; bill my insurance or make self-payment for services provided; and responsibility for payment of non-covered services.     Patient would like the video invitation sent by: Text to cell phone: [phone]}     Mode of Communication:  Video Conference via  "[place]    As the provider I attest to compliance with applicable laws and regulations related to telemedicine.    STANDARD ADULT DIAGNOSTIC ASSESSMENT      Identifying Information:  Patient is a 25 year old, .  The pronoun use throughout this assessment reflects the patient's chosen pronoun.  Patient was referred for an assessment by self.  Patient attended the session alone.     Chief Complaint:   The reason for seeking services at this time is: \" I have noticed I have anxiety and have started to notice triggers.  She has notices that once a week that I get depressed. She is wondering if it is related to her PMS. She does want to be on medication \"   Likes therapy to help process and feels therapy have been helpful and wants to do try again.The problem(s) began in College. Patient has attempted to resolve these concerns in the past through therapy.    Social/Family History:  Patient reported they grew up in [place], [place].  They were raised by biological mother and biological father.    20 years ago when the client was 5 years old. The client's mother did remarry 15 years ago.   Patient reported that     childhood was good .  Patient described their current relationships with family of origin as dad has some anger issues.      The patient describes their cultural background as Denies.  Cultural influences and impact on patient's life structure, values, norms, and healthcare: Pt denies.  Contextual influences on patient's health include: Family Factors dad's anger and mom;s upcoming divorce.    These factors will be addressed in the Preliminary Treatment plan.  Patient identified their preferred language to be English. Patient reported they does not need the assistance of an  or other support involved in therapy.     Patient reported had no significant delays in developmental tasks.   Patient's highest education level was graduate school. Patient identified the following " learning problems: none reported.  Modifications will not be used to assist communication in therapy.   Patient reports they are  able to understand written materials.    Patient reported the following relationship history currently in a relationships-.  Patient's current relationship status is partnered / significant other for 1.   Patient identified their sexual orientation as heterosexual.  Patient reported having zero child(charlee). Patient identified partner, parents, mother, father and pets as part of their support system.  Patient identified the quality of these relationships as stable and meaningful.      Patient's current living/housing situation involves staying with mom.  They live with mom and they report that housing is not stable.     Patient is currently a student and reports they are able to function appropriately at school..  Patient reports their finances are obtained through employment and parents.  Patient does not identify finances as a current stressor.      Patient reported that they have not been involved with the legal system.   Patient denies being on probation / parole / under the jurisdiction of the court.        Patient's Strengths and Limitations:  Patient identified the following strengths or resources that will help them succeed in treatment: commitment to health and well being, community involvement, exercise routine, friends / good social support, insight, intelligence, positive school connection, motivation and work ethic. Things that may interfere with the patient's success in treatment include: none identified.   _______________________________________________  Personal and Family Medical History:   Patient did report a family history of mental health concerns.  Patient reports family history includes Cancer in her paternal grandfather; Cancer - colorectal in her paternal grandfather; Cerebrovascular Disease in her paternal grandfather; Colorectal Cancer in her father; Diabetes in  her maternal grandfather; Hypertension in her maternal grandfather; Multiple myeloma in her paternal uncle; Unknown/Adopted in her father..     Patient reported the following previous diagnoses which include(s): an Anxiety Disorder.  Patient reported symptoms began Middle School and High School.   Patient has received mental health services in the past: therapy with Don't remember names- [employer] student services, [facility] and [facility].  Psychiatric Hospitalizations: None.  Patient denies a history of civil commitment.  Currently, patient is not receiving other mental health services.  These include none.   Patient has had a physical exam to rule out medical causes for current symptoms.  Date of last physical exam was within the past year. Client was encouraged to follow up with PCP if symptoms were to develop. The patient has a [facility] Primary Care Provider, who is named Gwen George.  Patient reports no current medical concerns.  There are not significant appetite / nutritional concerns / weight changes.   Patient does not report a history of head injury / trauma / cognitive impairment.      Patient reports current meds as:   No outpatient medications have been marked as taking for the 8/25/20 encounter (Appointment) with Vanita Negrete LMFT.     Current Facility-Administered Medications for the 8/25/20 encounter (Appointment) with Vanita Negrete LMFT   Medication     etonogestrel (IMPLANON/NEXPLANON) subdermal implant 68 mg       Medication Adherence:  Patient reports taking prescribed medications as prescribed.    Patient Allergies:  No Known Allergies    Medical History:    Past Medical History:   Diagnosis Date     Papanicolaou smear of cervix with low grade squamous intraepithelial lesion (LGSIL) 06/02/15    Age 19 years, Dx pap in 12 months         Current Mental Status Exam:   Appearance:  Appropriate    Eye Contact:  Good   Psychomotor:  Normal       Gait / station:  no  problem  Attitude / Demeanor: Cooperative   Speech      Rate / Production: Normal/ Responsive      Volume:  Normal  volume      Language:  intact  Mood:   Anxious   Affect:   Appropriate    Thought Content: Clear   Thought Process: Goal Directed       Associations: No loosening of associations  Insight:   Good   Judgment:  Intact   Orientation:  Person  Attention/concentration: Good    Rating Scales:    PHQ9:    PHQ-9 SCORE 8/10/2020 8/25/2020   PHQ-9 Total Score MyChart - 7 (Mild depression)   PHQ-9 Total Score 8 7   ;    GAD7:    ODILON-7 SCORE 8/10/2020 8/25/2020   Total Score - 5 (mild anxiety)   Total Score 5 5     CGI:     First:Considering your total clinical experience with this particular patient population, how severe are the patient's symptoms at this time?: 4 (8/25/2020  2:37 PM)  ;    Most recentNo data recorded    Substance Use:  Patient did report a family history of substance use concerns; see medical history section for details.  Patient has not received chemical dependency treatment in the past.  Patient has not ever been to detox.      Patient is not currently receiving any chemical dependency treatment. Patient reported the following problems as a result of their substance use: Denies.    Patient denies using alcohol.  Patient denies using tobacco.  Patient denies using marijuana.  Patient denies using caffeine.  Patient reports using/abusing the following substance(s). Patient reported no other substance use.     CAGE- AID:    CAGE-AID Total Score 8/25/2020   Total Score 0   Total Score MyChart 0 (A total score of 2 or greater is considered clinically significant)       Substance Use: No symptoms    Based on the negative CAGE score and clinical interview there  are not indications of drug or alcohol abuse.    Significant Losses / Trauma / Abuse / Neglect Issues:   Patient did not serve in the .  There are indications or report of significant loss, trauma, abuse or neglect issues related to:  Denies.  Concerns for possible neglect are not present.     Safety Assessment:   Current Safety Concerns:  Brady Suicide Severity Rating Scale (Lifetime/Recent)  Brady Suicide Severity Rating (Lifetime/Recent) 8/25/2020   1. Wish to be Dead (Lifetime) No   1. Wish to be Dead (Recent) No   2. Non-Specific Active Suicidal Thoughts (Lifetime) No   2. Non-Specific Active Suicidal Thoughts (Recent) No   3. Active Suicidal Ideation with any Methods (Not Plan) Without Intent to Act (Lifetime) No   3. Active Sucidal Ideation with any Methods (Not Plan) Without Intent to Act (Recent) No   4. Active Suicidal Ideation with Some Intent to Act, Without Specific Plan (Lifetime) No   4. Active Suicidal Ideation with Some Intent to Act, Without Specific Plan (Recent) No   5. Active Suicidal Ideation with Specific Plan and Intent (Lifetime) No   5. Active Suicidal Ideation with Specific Plan and Intent (Recent) No   Most Severe Ideation Rating (Lifetime) NA   Frequency (Lifetime) NA   Duration (Lifetime) NA   Controllability (Lifetime) NA   Protective Factors  (Lifetime) NA   Reasons for Ideation (Lifetime) NA   Most Severe Ideation Rating (Past Month) NA   Frequency (Past Month) NA   Duration (Past Month) NA   Controllability (Past Month) NA   Protective Factors (Past Month) NA   Reasons for Ideation (Past Month) NA   Actual Attempt (Lifetime) No   Actual Attempt (Past 3 Months) No   Has subject engaged in non-suicidal self-injurious behavior? (Lifetime) No   Has subject engaged in non-suicidal self-injurious behavior? (Past 3 Months) No   Interrupted Attempts (Lifetime) No   Interrupted Attempts (Past 3 Months) No   Aborted or Self-Interrupted Attempt (Lifetime) No   Aborted or Self-Interrupted Attempt (Past 3 Months) No   Preparatory Acts or Behavior (Lifetime) No   Preparatory Acts or Behavior (Past 3 Months) No   Most Recent Attempt Actual Lethality Code NA   Most Lethal Attempt Actual Lethality Code NA   Initial/First  Attempt Actual Lethality Code NA     Patient denies current homicidal ideation and behaviors.  Patient denies current self-injurious ideation and behaviors.    Patient denied risk behaviors associated with substance use.  Patient denies any high risk behaviors associated with mental health symptoms.  Patient reports the following current concerns for their personal safety: None.  Patient reports there are firearms in the house. The firearms are secured in a locked space.     History of Safety Concerns:  Patient denied a history of homicidal ideation.     Patient denied a history of personal safety concerns.    Patient denied a history of assaultive behaviors.    Patient denied a history of sexual assault behaviors.     Patient denied a history of risk behaviors associated with substance use.  Patient denies any history of high risk behaviors associated with mental health symptoms.  Patient reports the following protective factors: safe and stable environment, secure attachment, living with other people, structured day and pets    Risk Plan:  See Preliminary Treatment Plan for Safety and Risk Management Plan    Review of Symptoms per patient report:  Depression: Change in sleep, Lack of interest, Change in energy level, Change in appetite, Ruminations, Irritability, Feeling sad, down, or depressed and Withdrawn  Michelle:  No Symptoms  Psychosis: No Symptoms  Anxiety: Excessive worry, Nervousness, Physical complaints, such as headaches, stomachaches, muscle tension, Sleep disturbance, Psychomotor agitation, Ruminations, Poor concentration and Irritability  Panic:  Palpitations and Stomach pains  Post Traumatic Stress Disorder:  No Symptoms   Eating Disorder: No Symptoms  ADD / ADHD:  No symptoms  Conduct Disorder: No symptoms  Autism Spectrum Disorder: No symptoms  Obsessive Compulsive Disorder: No Symptoms    Patient reports the following compulsive behaviors and treatment history: Denies.      Diagnostic Criteria:    A. Excessive anxiety and worry about a number of events or activities (such as work or school performance).   B. The person finds it difficult to control the worry.  C. Select 3 or more symptoms (required for diagnosis). Only one item is required in children.   - Restlessness or feeling keyed up or on edge.    - Being easily fatigued.    - Difficulty concentrating or mind going blank.    - Irritability.    - Muscle tension.    - Sleep disturbance (difficulty falling or staying asleep, or restless unsatisfying sleep).   D. The focus of the anxiety and worry is not confined to features of an Axis I disorder.  E. The anxiety, worry, or physical symptoms cause clinically significant distress or impairment in social, occupational, or other important areas of functioning.   F. The disturbance is not due to the direct physiological effects of a substance (e.g., a drug of abuse, a medication) or a general medical condition (e.g., hyperthyroidism) and does not occur exclusively during a Mood Disorder, a Psychotic Disorder, or a Pervasive Developmental Disorder.    Functional Status:  Patient reports the following functional impairments: educational activities, relationship(s), self-care, social interactions and work / vocational responsibilities.     WHODAS:   WHODAS 2.0 Total Score 8/25/2020   Total Score 21   Total Score MyChart 21       Clinical Summary:  1. Reason for assessment: anxiety due to mom's relationship and wondering about depression and is it PMS  .  2. Psychosocial, Cultural and Contextual Factors: [place] passive  .  3. Principal DSM5 Diagnoses  (Sustained by DSM5 Criteria Listed Above):   300.02 (F41.1) Generalized Anxiety Disorder.  4. Other Diagnoses that is relevant to services:   300.02 (F41.1) Generalized Anxiety Disorder.    6. Prognosis: Return to Normal Functioning.  7. Likely consequences of symptoms if not treated: gets worse.  8. Client strengths include:  caring, creative, employed, goal-focused,  good listener, has a previous history of therapy, insightful, intelligent, motivated, support of family, friends and providers, supportive, wants to learn, willing to ask questions, willing to relate to others and work history .     Recommendations:     1. Plan for Safety and Risk Management:Recommended that patient call 911 or go to the local ED should there be a change in any of these risk factors..  Report to child / adult protection services was NA.     2. Patient's identified mental health concerns with a cultural influence will be addressed by Therapist.     3. Initial Treatment will focus on: Anxiety - increase coping skills.     4. Resources/Service Plan:       services are not indicated.     Modifications to assist communication are not indicated.     Additional disability accommodations are not indicated.      5. Collaboration:  Collaboration / coordination of treatment will be initiated with the following support professionals: primary care physician.      6.  Referrals:  The following referral(s) will be initiated: n/a. Next Scheduled Appointment: 9/8/2020.  A Release of Information has been obtained for the following: outpatient therapist.    7. DIANE: DIANE:  Discussed n/a. Provider gave patient printed information about the effects of chemical use on their health and well being. Recommendations:  n/a .     8. Records were reviewed at time of assessment.  Information in this assessment was obtained from the medical record and provided by patient who is a good historian.   Patient will have open access to their mental health medical record.      Eval type:  Mental Health    Staff Name/Credentials:  Vanita Negrete MA [facility]  August 25, 2020

## 2020-08-26 ASSESSMENT — PATIENT HEALTH QUESTIONNAIRE - PHQ9: SUM OF ALL RESPONSES TO PHQ QUESTIONS 1-9: 7

## 2020-08-26 ASSESSMENT — ANXIETY QUESTIONNAIRES: GAD7 TOTAL SCORE: 5

## 2020-09-08 ENCOUNTER — VIRTUAL VISIT (OUTPATIENT)
Dept: BEHAVIORAL HEALTH | Facility: CLINIC | Age: 25
End: 2020-09-08
Payer: COMMERCIAL

## 2020-09-08 DIAGNOSIS — F41.1 GAD (GENERALIZED ANXIETY DISORDER): Primary | ICD-10-CM

## 2020-09-08 PROCEDURE — 90834 PSYTX W PT 45 MINUTES: CPT | Mod: 95 | Performed by: MARRIAGE & FAMILY THERAPIST

## 2020-09-08 NOTE — PROGRESS NOTES
Telemedicine Visit: The patient's condition can be safely assessed and treated via synchronous audio and visual telemedicine encounter.      Reason for Telemedicine Visit: Services only offered telehealth    Originating Site (Patient Location): Patient's home    Distant Site (Provider Location): [facility]    Consent:  The patient/guardian has verbally consented to: the potential risks and benefits of telemedicine (video visit) versus in person care; bill my insurance or make self-payment for services provided; and responsibility for payment of non-covered services.     Mode of Communication:  Video Conference via [employer]    As the provider I attest to compliance with applicable laws and regulations related to telemedicine.    [facility] Primary Care: Integrated Behavioral Health  September 8, 2020      Behavioral Health Clinician Progress Note    Patient Name: Naty Pérez           Service Type:  Individual      Service Location:   Face to Face in Home / Community     Session Start Time: 5:00pm  Session End Time: 5:48pm      Session Length: 38 - 52      Attendees: Client    Visit Activities (Refresh list every visit): [facility] Only    Diagnostic Assessment Date: 8/25/2020  Treatment Plan Review Date: 12/8/2020  See Flowsheets for today's PHQ-9 and ODILON-7 results  Previous PHQ-9:   PHQ-9 SCORE 8/10/2020 8/25/2020   PHQ-9 Total Score MyChart - 7 (Mild depression)   PHQ-9 Total Score 8 7     Previous ODILON-7:   ODILON-7 SCORE 8/10/2020 8/25/2020   Total Score - 5 (mild anxiety)   Total Score 5 5       CHARLES LEVEL:  CHARLES Score (Last Two) 2/6/2020   CHARLES Raw Score 28   Activation Score 50   CHARLES Level 2       DATA  Extended Session (60+ minutes): No  Interactive Complexity: No  Crisis: No  [facility] Patient: No    Treatment Objective(s) Addressed in This Session:  Target Behavior(s): PMS    Anxiety: will experience a reduction in anxiety, will develop more effective coping skills to manage  anxiety symptoms, will develop healthy cognitive patterns and beliefs and will increase ability to function adaptively    Current Stressors / Issues:  Discussed what patient feels is stress- new job vs what she calls anxiety- when I do something wrong or I can't fixed. Started to work on cbt skills to help. Pt denied any current SI,plans or intentions.   COMMON COGNITIVE ERRORS     We all have patterns of thinking, and this may impact our emotional state and behavior. Sometimes our patterns are less than accurate. These are cognitive errors or cognitive distortions, and they typically fall into certain categories. Learning to recognize our own cognitive errors increases our ability to ignore the negative thought or actively change it, which enables us to intentionally change our emotions and our behaviors. The following is a list of the most common cognitive distortions:     1. All-or-Nothing Thinking Putting experiences in one of two categories Examples: 1) People are all good or all bad. 2) Projects are perfect or failures. 3) I am a sinner, or I am a saint.   2. Overgeneralizing Believing that something will always happen because it happened once Examples: 1) I will never be able to make friends at a party because I once made an awkward statement to someone, and they didn t want to be my friend. 2) I will never be able to speak in public because I once had a panic attack before giving a speech.   3. Discounting the Positive Deciding that if a good thing happens, it must not be important or doesn t count Examples: 1) I passed the exam this time, but it was a fluke. 2) I didn t have a panic attack today, but it s only because I was too busy to be worried.   4. Jumping to Conclusions Deciding how to respond to a situation without having all the information Examples: 1) The man/woman I am interested in never called me back because he thinks I m stupid. 2) That person cut me off in traffic because he/she is a jerk!    5. Mind Reading Believing that you know how someone else is feeling or what they are thinking without any evidence Examples: 1) I know she hates my guts. 2) That person thinks I m a loser.   6. Fortunetelling Believing that you can predict a future outcome, while ignoring other alternatives Examples: 1) I m going to fail this test. 2) I m going to have a panic attack if I go out in public.   7. Magnifying (Catastrophizing) or Minimizing Distorting the importance of positive and negative events Examples: 1) I said the wrong thing so I will never have a boyfriend/girlfriend. 2) My nose is so big that no one will ever love me. 3) It doesn t matter if I m smart because I will never be attractive, athletic, popular, rich, etc. 4) Making a mountain out of a molehill.    8. Emotional Reasoning Believing something to be true because it feels true. Examples: 1) I am a failure because I feel like a failure. 2) I am worthless because I feel worthless.   9.  Should-y  Thinking Telling yourself you should, should not, or should have done something when it is more accurate to say that you would have preferred or wished you had or had not done something Examples: 1) I should be perfect. 2) I should never make mistakes. 3) I should not be anxious. 4) I should have done something to help.   10. Labeling (or Mis-Labeling) Using a label to describe a behavior or error Examples: 1) He s a bad person (instead of  He made a mistake when he lied. ) 2) I m stupid (instead of  I didn t study for my test, and I failed it. )   11. Personalization Taking blame for some negative event even though you were not responsible, you could not have known to do differently, there were extenuating circumstances, or other people were involved. Examples: 1) It s my fault he hits me. 2) My mother is unhappy because of me.     Cognitive Therapy    Primary Care Cognitive Therapy    Noticing and Questioning Thoughts that Don t Work    The idea behind the  following script is to let people know they can manage distress differently by questioning their thoughts and how their thoughts are working or not working for them.       Often when people get stressed/anxious/depressed their minds will tell them all kinds of things that can get them more stressed/anxious/depressed than they would like to be.    You can t stop your mind from talking to you, that s its job;  But you can get really good at noticing what your mind is telling you .. stepping back from those thoughts and  asking yourself some questions about how useful or helpful those thoughts are.      The idea here is to have you get really good at choosing how do you want to respond to a situation and your thoughts instead of just reacting.     Questioning your thoughts gives you the opportunity to respond in a manner consistent with your values and how you want to represent yourself to other.      This can allow you to turn the volume down on any distressing responses you might be having.  The idea is not that you will think happy or positive thought that will make everything better, but instead be able to look at your initial thinking and determine if it is helpful, useful and/or accurate and how would you need to think in order to change how you feel and/or live your life in a way consistent with your values.      Questioning you thoughts is a skill that you can learn and get better at with practice.  What I would encourage you to do before you go to bed tonight is look at the 18 groups of questions and statements here and pick a few that really jump off the page at you as being good to ask or tell yourself when you are getting more stressed/anxious/depressed and or not living your life in a satisfactory way.  By doing this you can start to be more aware of your unhelpful thinking, interrupt its effect on you and start to change your thinking so it works for you instead of against you.          How to Question  Stressful/Anxious/Depressed Thinking  ______________________________________________________________________________     1. Am I upsetting myself unnecessarily?  How can I see this another way?     2. Is my thinking working for or against me?  How could I view this in a less    upsetting way?     3. What am I demanding must happen?  What do I want or prefer, rather than need?     4. Am I making something too terrible?  Is it really that awful?  What would be so     terrible about that?     5. Am I labeling a person?  What is the action that I don t like?     6. What s untrue about my thoughts?  How can I stick to the facts?  What s the proof    for what I am thinking or believing about this?     7. Am I using extreme, black-and-white language?  What less extreme words might     be more accurate?     8. Am I fortune telling or mind reading in a way that gets me upset or unhappy?      What are the odds (percent chance -- e.g., there is a 5% chance...) that it will      really turn out the way I m thinking or imagining?     9. What are my options in this situation?  How would I like to respond?     10. Create more moderate, helpful, or realistic statements to replace the upsetting    ones.    11.   Have I had any experiences that show that this thought might not be completely true?    12.   If my best friend or someone I loved had this thought, what would I tell them?    13. If my best friend or someone I loved knew I was thinking this thought, what would they say to me?  What evidence would they point out to me that would suggest that my thought is not completely true?    14. Are there strengths in me or positives in the situation that I am ignoring?  Am I underestimating my ability to cope with unfortunate circumstances?    15. When I am not feeling this way, do I think about this situation any differently? How?    16. Have I been in this type of situation before? What happened?  What have I learned from prior  experiences that could help me now?    17. Five years from now, if I look back on this situation, will I look at it any differently?  Will I focus on any different part of my experience?    18. Am I blaming myself for something over which I do not have complete control?        Progress on Treatment Objective(s) / Homework:  New Objective established this session - PREPARATION (Decided to change - considering how); Intervened by negotiating a change plan and determining options / strategies for behavior change, identifying triggers, exploring social supports, and working towards setting a date to begin behavior change    Motivational Interviewing    MI Intervention: Expressed Empathy/Understanding, Supported Autonomy, Collaboration, Evocation, Permission to raise concern or advise, Open-ended questions and Reflections: simple and complex     Change Talk Expressed by the Patient: Desire to change Ability to change Reasons to change Need to change Committment to change Activation    Provider Response to Change Talk: E - Evoked more info from patient about behavior change, A - Affirmed patient's thoughts, decisions, or attempts at behavior change, R - Reflected patient's change talk and S - Summarized patient's change talk statements      Situation        Automatic Thoughts  Cognitive Distortions      Feelings        Behavior        Questioning Thoughts            Also provided psychoeducation about behavioral health condition, symptoms, and treatment options    Care Plan review completed: Yes    Medication Review:  No current psychiatric medications prescribed    Medication Compliance:  NA    Changes in Health Issues:   None reported    Chemical Use Review:   Substance Use: Chemical use reviewed, no active concerns identified      Tobacco Use: No current tobacco use.      Assessment: Current Emotional / Mental Status (status of significant symptoms):  Risk status (Self / Other harm or suicidal ideation)  Patient denies  a history of suicidal ideation, suicide attempts, self-injurious behavior, homicidal ideation, homicidal behavior and and other safety concerns  Patient denies current fears or concerns for personal safety.  Patient denies current or recent suicidal ideation or behaviors.  Patient denies current or recent homicidal ideation or behaviors.  Patient denies current or recent self injurious behavior or ideation.  Patient denies other safety concerns.  A safety and risk management plan has not been developed at this time, however patient was encouraged to call [place] [phone] should there be a change in any of these risk factors.    Appearance:   Appropriate   Eye Contact:   Good   Psychomotor Behavior: Normal   Attitude:   Cooperative   Orientation:   All  Speech   Rate / Production: Normal    Volume:  Normal   Mood:    Normal  Affect:    Appropriate   Thought Content:  Clear   Thought Form:  Coherent  Logical   Insight:    Good     Diagnoses:  1. ODILON (generalized anxiety disorder)        Collateral Reports Completed:  Not Applicable    Plan: (Homework, other):  Patient was given information about behavioral services and encouraged to schedule a follow up appointment with the clinic [facility] [place] in 1 week, in 2 weeks.  She was also given Cognitive Behavioral Therapy skills to practice when experiencing anxiety.  CD Recommendations: No indications of CD issues.  XENIA Carrero, [facility] [place]      ______________________________________________________________________    Integrated Primary Care Behavioral Health Treatment Plan    Patient's Name: Naty Pérez  YOB: 1995    Date: 9/8/2020    DSM-V Diagnoses: 300.02 (F41.1) Generalized Anxiety Disorder  Psychosocial / Contextual Factors: job and relationships  WHODAS:   WHODAS 2.0 Total Score 8/25/2020   Total Score 21   Total Score MyChart 21         Referral / Collaboration:  Referral to another professional/service is not indicated at this time..    Anticipated  number of session or this episode of care: 6-8      MeasurableTreatment Goal(s) related to diagnosis / functional impairment(s)  Goal 1: Patient will increase coping skills to lower anxiety    I will know I've met my goal when less anxious.      Objective #A (Patient Action)    Patient will use cognitive strategies identified in therapy to challenge anxious thoughts.  Status: New - Date: 9/8/2020     Intervention(s)  [facility] will teach emotional recognition/identification. CBT skills.    Objective #B  Patient will use thought-stopping strategy daily to reduce intrusive thoughts.  Status: New - Date: 9/8/2020     Intervention(s)  [facility] will assign homework STOP TECHnique.    Patient has reviewed and agreed to the above plan.      Vanita Negrete, LMFT  September 8, 2020

## 2020-09-22 ENCOUNTER — VIRTUAL VISIT (OUTPATIENT)
Dept: BEHAVIORAL HEALTH | Facility: CLINIC | Age: 25
End: 2020-09-22
Payer: COMMERCIAL

## 2020-09-22 DIAGNOSIS — F41.1 GAD (GENERALIZED ANXIETY DISORDER): Primary | ICD-10-CM

## 2020-09-22 PROCEDURE — 90832 PSYTX W PT 30 MINUTES: CPT | Mod: 95 | Performed by: MARRIAGE & FAMILY THERAPIST

## 2020-09-22 ASSESSMENT — ANXIETY QUESTIONNAIRES
1. FEELING NERVOUS, ANXIOUS, OR ON EDGE: SEVERAL DAYS
7. FEELING AFRAID AS IF SOMETHING AWFUL MIGHT HAPPEN: SEVERAL DAYS
6. BECOMING EASILY ANNOYED OR IRRITABLE: SEVERAL DAYS
7. FEELING AFRAID AS IF SOMETHING AWFUL MIGHT HAPPEN: SEVERAL DAYS
3. WORRYING TOO MUCH ABOUT DIFFERENT THINGS: SEVERAL DAYS
2. NOT BEING ABLE TO STOP OR CONTROL WORRYING: SEVERAL DAYS
GAD7 TOTAL SCORE: 6
GAD7 TOTAL SCORE: 6
4. TROUBLE RELAXING: SEVERAL DAYS
5. BEING SO RESTLESS THAT IT IS HARD TO SIT STILL: NOT AT ALL
GAD7 TOTAL SCORE: 6

## 2020-09-22 ASSESSMENT — PATIENT HEALTH QUESTIONNAIRE - PHQ9
10. IF YOU CHECKED OFF ANY PROBLEMS, HOW DIFFICULT HAVE THESE PROBLEMS MADE IT FOR YOU TO DO YOUR WORK, TAKE CARE OF THINGS AT HOME, OR GET ALONG WITH OTHER PEOPLE: SOMEWHAT DIFFICULT
SUM OF ALL RESPONSES TO PHQ QUESTIONS 1-9: 4
SUM OF ALL RESPONSES TO PHQ QUESTIONS 1-9: 4

## 2020-09-22 NOTE — PROGRESS NOTES
Telemedicine Visit: The patient's condition can be safely assessed and treated via synchronous audio and visual telemedicine encounter.      Reason for Telemedicine Visit: Services only offered telehealth    Originating Site (Patient Location): Patient's home    Distant Site (Provider Location): [facility]    Consent:  The patient/guardian has verbally consented to: the potential risks and benefits of telemedicine (video visit) versus in person care; bill my insurance or make self-payment for services provided; and responsibility for payment of non-covered services.     Mode of Communication:  Video Conference via [employer]    As the provider I attest to compliance with applicable laws and regulations related to telemedicine.    [facility] Primary Care: Integrated Behavioral Health  September 22, 2020      Behavioral Health Clinician Progress Note    Patient Name: Naty Pérez           Service Type:  Individual      Service Location:   Face to Face in Home / Community     Session Start Time: 4:01pm  Session End Time: 4:31pm      Session Length: 16 - 37      Attendees: Client    Visit Activities (Refresh list every visit): [facility] Only    Diagnostic Assessment Date: 8/25/2020  Treatment Plan Review Date: 12/8/2020  See Flowsheets for today's PHQ-9 and ODILON-7 results  Previous PHQ-9:   PHQ-9 SCORE 8/10/2020 8/25/2020 9/22/2020   PHQ-9 Total Score MyChart - 7 (Mild depression) 4 (Minimal depression)   PHQ-9 Total Score 8 7 4     Previous ODILON-7:   ODILON-7 SCORE 8/10/2020 8/25/2020 9/22/2020   Total Score - 5 (mild anxiety) 6 (mild anxiety)   Total Score 5 5 6       CHARLES LEVEL:  CHARLES Score (Last Two) 2/6/2020   CHARLES Raw Score 28   Activation Score 50   CHARLES Level 2       DATA  Extended Session (60+ minutes): No  Interactive Complexity: No  Crisis: No  BH Patient: No    Treatment Objective(s) Addressed in This Session:  Target Behavior(s): PMS    Anxiety: will experience a  reduction in anxiety, will develop more effective coping skills to manage anxiety symptoms, will develop healthy cognitive patterns and beliefs and will increase ability to function adaptively    Current Stressors / Issues:    Continued to work on CCBT skills to lower anxiety.   Took the election and how patient is worried. Worked on bringing things down to a smaller level. USed  Situation        Automatic Thoughts  Cognitive Distortions      Feelings        Behavior        Questioning Thoughts          Reviewed safety- pt denied any SI, plans or intentions.           Progress on Treatment Objective(s) / Homework:  New Objective established this session - PREPARATION (Decided to change - considering how); Intervened by negotiating a change plan and determining options / strategies for behavior change, identifying triggers, exploring social supports, and working towards setting a date to begin behavior change    Motivational Interviewing    MI Intervention: Expressed Empathy/Understanding, Supported Autonomy, Collaboration, Evocation, Permission to raise concern or advise, Open-ended questions and Reflections: simple and complex     Change Talk Expressed by the Patient: Desire to change Ability to change Reasons to change Need to change Committment to change Activation    Provider Response to Change Talk: E - Evoked more info from patient about behavior change, A - Affirmed patient's thoughts, decisions, or attempts at behavior change, R - Reflected patient's change talk and S - Summarized patient's change talk statements      Situation        Automatic Thoughts  Cognitive Distortions      Feelings        Behavior        Questioning Thoughts            Also provided psychoeducation about behavioral health condition, symptoms, and treatment options    Care Plan review completed: Yes    Medication Review:  No current psychiatric medications prescribed    Medication Compliance:  NA    Changes in Health Issues:   None  reported    Chemical Use Review:   Substance Use: Chemical use reviewed, no active concerns identified      Tobacco Use: No current tobacco use.      Assessment: Current Emotional / Mental Status (status of significant symptoms):  Risk status (Self / Other harm or suicidal ideation)  Patient denies a history of suicidal ideation, suicide attempts, self-injurious behavior, homicidal ideation, homicidal behavior and and other safety concerns  Patient denies current fears or concerns for personal safety.  Patient denies current or recent suicidal ideation or behaviors.  Patient denies current or recent homicidal ideation or behaviors.  Patient denies current or recent self injurious behavior or ideation.  Patient denies other safety concerns.  A safety and risk management plan has not been developed at this time, however patient was encouraged to call [place] [phone] should there be a change in any of these risk factors.    Appearance:   Appropriate   Eye Contact:   Good   Psychomotor Behavior: Normal   Attitude:   Cooperative   Orientation:   All  Speech   Rate / Production: Normal    Volume:  Normal   Mood:    Normal  Affect:    Appropriate   Thought Content:  Clear   Thought Form:  Coherent  Logical   Insight:    Good     Diagnoses:  1. ODILON (generalized anxiety disorder)        Collateral Reports Completed:  Not Applicable    Plan: (Homework, other):  Patient was given information about behavioral services and encouraged to schedule a follow up appointment with the clinic [facility] in 1 week, in 2 weeks.  She was also given Cognitive Behavioral Therapy skills to practice when experiencing anxiety.  CD Recommendations: No indications of CD issues.  XENIA Carrero, [facility]      ______________________________________________________________________    Integrated Primary Care Behavioral Health Treatment Plan    Patient's Name: Naty Pérez  YOB: 1995    Date: 9/8/2020    DSM-V Diagnoses: 300.02 (F41.1)  Generalized Anxiety Disorder  Psychosocial / Contextual Factors: job and relationships  WHODAS:   WHODAS 2.0 Total Score 8/25/2020   Total Score 21   Total Score MyChart 21         Referral / Collaboration:  Referral to another professional/service is not indicated at this time..    Anticipated number of session or this episode of care: 6-8      MeasurableTreatment Goal(s) related to diagnosis / functional impairment(s)  Goal 1: Patient will increase coping skills to lower anxiety    I will know I've met my goal when less anxious.      Objective #A (Patient Action)    Patient will use cognitive strategies identified in therapy to challenge anxious thoughts.  Status: New - Date: 9/8/2020     Intervention(s)  [facility] will teach emotional recognition/identification. CBT skills.    Objective #B  Patient will use thought-stopping strategy daily to reduce intrusive thoughts.  Status: New - Date: 9/8/2020     Intervention(s)  [facility] will assign homework STOP TECHnique.    Patient has reviewed and agreed to the above plan.      Vanita Negrete, LMFT  September 8, 2020

## 2020-09-23 ASSESSMENT — PATIENT HEALTH QUESTIONNAIRE - PHQ9: SUM OF ALL RESPONSES TO PHQ QUESTIONS 1-9: 4

## 2020-09-23 ASSESSMENT — ANXIETY QUESTIONNAIRES: GAD7 TOTAL SCORE: 6

## 2020-10-06 ENCOUNTER — VIRTUAL VISIT (OUTPATIENT)
Dept: BEHAVIORAL HEALTH | Facility: CLINIC | Age: 25
End: 2020-10-06
Payer: COMMERCIAL

## 2020-10-06 DIAGNOSIS — F41.1 GAD (GENERALIZED ANXIETY DISORDER): Primary | ICD-10-CM

## 2020-10-06 PROCEDURE — 90834 PSYTX W PT 45 MINUTES: CPT | Mod: 95 | Performed by: MARRIAGE & FAMILY THERAPIST

## 2020-10-15 ENCOUNTER — OFFICE VISIT (OUTPATIENT)
Dept: OBGYN | Facility: CLINIC | Age: 25
End: 2020-10-15
Payer: COMMERCIAL

## 2020-10-15 VITALS — SYSTOLIC BLOOD PRESSURE: 100 MMHG | BODY MASS INDEX: 23.98 KG/M2 | WEIGHT: 129 LBS | DIASTOLIC BLOOD PRESSURE: 60 MMHG

## 2020-10-15 DIAGNOSIS — N94.3 PMS (PREMENSTRUAL SYNDROME): Primary | ICD-10-CM

## 2020-10-15 PROCEDURE — 99203 OFFICE O/P NEW LOW 30 MIN: CPT | Performed by: ADVANCED PRACTICE MIDWIFE

## 2020-10-15 RX ORDER — DESOGESTREL AND ETHINYL ESTRADIOL 0.15-0.03
1 KIT ORAL DAILY
Qty: 28 TABLET | Refills: 3 | Status: SHIPPED | OUTPATIENT
Start: 2020-10-15 | End: 2021-02-01

## 2020-10-15 NOTE — PATIENT INSTRUCTIONS
Birth Control Pills    Combination birth control pills contain both estrogen and progestin.  There are numerous brands of birth control pills otherwise known as oral contraceptive pills (OCP's).  Each brand has a different combination of estrogen and progestin so every woman can find the one that is right for her.  OCP's are a safe and effective way to prevent pregnancy in most women.    How do OCP's work  OCP's work by several different mechanisms.  They cause changes in the cervix and the lining of the uterus.  The cervical mucus becomes thicker which will prevent the sperm from entering the cervix.  The lining of the uterus becomes thin which helps prevent an egg from attaching to it.  In combination, these events make it unlikely that you will get pregnant. It may also prevent ovulation completely.    Benefits of OCP's  May reduce your risk of:  Cancer of the uterus and ovary, ovarian cysts, pelvic infection, bone loss, benign breast disease, anemia, ectopic pregnancy and acne.  It may also decrease symptoms of PCOS (Polycystic Ovarian Syndrome). OCP's may also improve cramping during menstrual cycle and may make you cycle shorter and lighter.    How to take OCP's  You have several choices on how to start taking your OCP's:    You can start the pill on the first day of your next period    You can start the pill on the Sunday after your next period starts    You can start the pill on the first day it was prescribed no matter where you are in your cycle.  In this case, you will need to make sure you are not pregnant.    No matter when you start your first pack, you will always start your next pack on the same day you started your first pack.    You should take the pill at the same time every day.  Do not skip any pills.  If you miss any pills, are taking antibiotics or vomit, use a backup method of birth control until you get your next period.    Pills come in packs of 21, 28 or 91 pills:      21 Pills:  Take one  pill at the same time every day for 21 days.  Wait 7 days before beginning your next pack.  During these 7 days you will have your period.    28 Pills:  Take one pill at the same time every day for 28 days.  The last 7 pills in the pack do not contain estrogen/progestin.  During these 7 days you will have your period.      91 Pills:  Take one pill at the same time every day for 91 days.  The last 7 pills in the pack do not contain estrogen/progestin.  During these 7 days you will have your period.  With this method you will only have 4 periods a year.  Some women eventually have no bleeding at all.    Each pill pack comes with instructions.  Please make sure you read them and understand these instructions.      What to do if you miss a pill    Occasionally you may forget to take a pill or not take it on time.  Take the missed pill as soon as you remember.  Take the next pill at the regular time.  It is ok if you take two pills in one day.  You may feel a bit queasy or have some spotting, this is normal and should not be concerning.  If you have missed more than one pill use a back up method of birth control and call the clinic for instructions on how to proceed.    Who should not take Combined OCP's    If you have a history or have blood clots    A history of cerebral vascular accident (stroke)    If you have ischemic heart or coronary artery disease    Known of suspected breast cancer    Known or suspected pregnancy    Smoker and over age 35    Any know liver abnormality    Migraine headaches with an aura    Undiagnosed abnormal vaginal bleeding    High blood pressure    Common side effects when starting OCP's  Headache, nausea, dizziness, breakthrough bleeding, missed periods, tender breasts, depression and anxiety.  Most side effects are minor and resolve in the first few months. Take the pill with meals or at bedtime if nausea occurs.    Call or return for care in the following circumstances:      Unexpected  missed periods or very heavy bleeding    Persistent vaginal bleeding    Depression    Suspected pregnancy    Persistent side effects such as:  Nausea, irregular menses or mood changes.    Seek emergency care immediately for the following:  ACHES    Abdominal or pelvic pain    Chest pain    Severe headache     Visual disturbances    Severe leg pain or numbness or tingling of extremities    Lastly-    Use of a backup method is recommended for the first cycle    Condoms are recommended to protect against STI's    OCP's are 99% effective if take correctly.    The pill helps to keep your periods regular, lighter and shorter and reduces cramps.  If you desire a pregnancy, you may stop taking your OCPs.     Please call the clinic with questions and concerns  [facility]  [phone]  Patient Education     Managing PMS: Diet and Nutrition     Nutrients in fresh fruits and vegetables can help you manage PMS.     Maintaining a healthy diet helps your body counter PMS. Certain foods boost serotonin levels and give you the energy to cope with symptoms. Other foods can be avoided to ease symptoms.  About supplements  Ask your healthcare provider about supplements before trying them.  Benefits of a balanced diet  To counter PMS symptoms, maintain a balanced diet. Eat foods from all the food groups: dairy, grains, fruits and vegetables, and protein. When planning meals, know that:    Calcium may ease mood swings, headache, bloating, and irritability. It's found in dairy products such as milk, cheese, and yogurt. Some juices, breads, cereals, and soy products have calcium added (fortified).    Magnesium may relieve bloating and breast tenderness. It's found in many foods, including fresh fruits and vegetables. To help your body get enough magnesium, eat 5 or more servings of a variety of fruits and vegetables a day.    Vitamin B6 helps the body use serotonin, thereby helping to ease depression. It's found in chicken, fish,  "potatoes, eggs, and carrots.    Vitamin E may reduce headache and breast tenderness. It's found in nuts such as almonds, peanuts, and hazelnuts. It's also found in green leafy vegetables.  \"Good mood\" foods  Eating foods high in carbohydrates (carbs) and fiber can help you manage PMS. That's because carbs raise serotonin levels. Carbs are also your body's main source of energy. To help keep energy and serotonin levels steady, eat small amounts throughout the day. High-fiber carbs include:    Whole-grain foods. Brown rice, whole-wheat pasta, whole-grain bread, and buckwheat noodles are good choices.    Fresh fruits and vegetables, especially when eaten unpeeled.    Beans and legumes, such as kidney beans, peas, and lentils.  Foods to limit  Some foods can make PMS symptoms worse. Know that:    Salt can cause bloating. Since canned vegetables are often high in salt, buy fresh instead. Flavor with herbs, lemon, or salt-free seasonings.    Sugar is a carb that provides only short bursts of energy. If you crave sugar, choose a food that's also high in fiber, like an unpeeled apple or a bran muffin.    Caffeine can disrupt sleep, which makes symptoms harder to cope with. Caffeine can also cause breast tenderness. Try to limit chocolate and caffeinated drinks, such as coffee or soda.    Alcohol can make you feel depressed and can disrupt sleep. Many kinds of alcohol are also high in sugar. You may try limiting the amount of alcohol you drink.  Date Last Reviewed: 3/1/2017    [id number] [employer]. [address] [place] [address], [place] [address]. All rights reserved. This information is not intended as a substitute for professional medical care. Always follow your healthcare professional's instructions.           "

## 2020-10-15 NOTE — NURSING NOTE
"Chief Complaint   Patient presents with     Consult     Symptoms of PMS, reports fatigued and unmotivated, depression, breast enlargement and tenderness. Symptoms start about week prior to the start of her period. Started a couple years ago. Has never been on a medication for PMS in the past.        Initial /60 (BP Location: Left arm, Cuff Size: Adult Regular)   Wt 58.5 kg (129 lb)   LMP 10/14/2020 (Exact Date)   Breastfeeding No   BMI 23.98 kg/m   Estimated body mass index is 23.98 kg/m  as calculated from the following:    Height as of 8/10/20: 1.562 m (5' 1.5\").    Weight as of this encounter: 58.5 kg (129 lb).  BP completed using cuff size: regular    Questioned patient about current smoking habits.  Pt. has never smoked.          Lobito Yoder MA             "

## 2020-10-15 NOTE — PROGRESS NOTES
SUBJECTIVE:                                                   Naty Pérez is a 25 year old who presents to clinic today for the following health issue(s):  Patient presents with:  Consult: Symptoms of PMS, reports fatigued and unmotivated, depression, breast enlargement and tenderness. Symptoms start about week prior to the start of her period. Started a couple years ago. Has never been on a medication for PMS in the past.       HPI:  Patient reports feelings of depression, fatigue, and breast symptoms for the week before her menses. Currently has the Nexplanon for birth control. She has been using this for the past 4.5 years. Discussed options for treatment of PMS. Patient would like to try a three month course of birth control pills.    The use of the oral contraceptive pill has been fully discussed with the patient. This includes the proper method to initiate  and continue the pill, the need for regular compliance to ensure adequate contraceptive effect, the physiology which makes the pill effective, the instructions for what to do in event of a missed pill, and warnings about anticipated minor side effects such as breakthrough spotting, nausea, breast tenderness, weight changes, acne, headaches, etc. She was informed of the irregular bleeding pattern that can occur when the pill is first started or a new form is changed over for the first 2-3 months.  She has been told of the more serious potential side effects such as MI, stroke, and deep vein thrombosis, all of which are very unlikely.  She has been asked to report any signs of such serious problems immediately.   She understands and wishes to take the medication as prescribed.    Patient's last menstrual period was 10/14/2020 (exact date).  Menstrual History: irregular due to Nexplanon  Patient is sexually active  .  Using Nexplanon for contraception.   Health maintenance updated:  yes      Last PHQ-9 score on record =   PHQ-9 SCORE 2020    PHQ-9 Total Score MyChart 4 (Minimal depression)   PHQ-9 Total Score 4     Last GAD7 score on record =   ODILON-7 SCORE 9/22/2020   Total Score 6 (mild anxiety)   Total Score 6         Problem list and histories reviewed & adjusted, as indicated.  Additional history: as documented.    Patient Active Problem List   Diagnosis     Allergy to insects and arachnids     Ingrowing nail     Epistaxis     Lumbosacral spondylosis without myelopathy     Papanicolaou smear of cervix with low grade squamous intraepithelial lesion (LGSIL)     ODILON (generalized anxiety disorder)     History reviewed. No pertinent surgical history.   Social History     Tobacco Use     Smoking status: Never Smoker     Smokeless tobacco: Never Used     Tobacco comment: non smoking home   Substance Use Topics     Alcohol use: No      Problem (# of Occurrences) Relation (Name,Age of Onset)    Cancer (1) Paternal Grandfather    Cancer - colorectal (1) Paternal Grandfather    Cerebrovascular Disease (1) Paternal Grandfather    Colorectal Cancer (1) Father    Diabetes (1) Maternal Grandfather    Hypertension (1) Maternal Grandfather    Multiple myeloma (1) Paternal Uncle    Unknown/Adopted (1) Father       Negative family history of: C.A.D., Breast Cancer, Prostate Cancer, Thyroid Disease            Current Outpatient Medications   Medication Sig     desogestrel-ethinyl estradiol (APRI) 0.15-30 MG-MCG tablet Take 1 tablet by mouth daily     ibuprofen (ADVIL/MOTRIN) 200 MG tablet Take 400 mg by mouth every 4 hours as needed for mild pain     Current Facility-Administered Medications   Medication     etonogestrel (IMPLANON/NEXPLANON) subdermal implant 68 mg     No Known Allergies    ROS:  CONSTITUTIONAL: NEGATIVE for fever, chills, change in weight  GI: NEGATIVE for nausea, abdominal pain, heartburn, or change in bowel habits  : NEGATIVE for unusual urinary or vaginal symptoms. Periods are iregular.  NEURO: NEGATIVE for weakness, dizziness or  paresthesias  HEME/ALLERGY/IMMUNE: NEGATIVE for bleeding problems  PSYCHIATRIC: NEGATIVE for changes in mood or affect usually  POSITIVE for depression and fatigue the week prior to her menses    OBJECTIVE:     /60 (BP Location: Left arm, Cuff Size: Adult Regular)   Wt 58.5 kg (129 lb)   LMP 10/14/2020 (Exact Date)   Breastfeeding No   BMI 23.98 kg/m    Body mass index is 23.98 kg/m .    PHYSICAL EXAM:  Constitutional:  Appearance: Well nourished, well developed alert, in no acute distress  Chest:  Respiratory Effort:  Breathing unlabored.   Neurologic:  Mental Status:  Oriented X3.  Normal strength and tone, sensory exam grossly normal, mentation intact and speech normal.    Psychiatric:  Mentation appears normal and affect normal/bright.     In-Clinic Test Results:  No results found for this or any previous visit (from the past 24 hour(s)).    ASSESSMENT/PLAN:                                                        ICD-10-CM    1. PMS (premenstrual syndrome)  N94.3 desogestrel-ethinyl estradiol (APRI) 0.15-30 MG-MCG tablet       PLAN:    Will try a three month course of COCs. Will see patient back in three months for follow up. Advised patient to call if symptoms worsen in the interim.    VALE Harris, CNM

## 2020-10-20 ENCOUNTER — VIRTUAL VISIT (OUTPATIENT)
Dept: BEHAVIORAL HEALTH | Facility: CLINIC | Age: 25
End: 2020-10-20
Payer: COMMERCIAL

## 2020-10-20 DIAGNOSIS — F41.1 GAD (GENERALIZED ANXIETY DISORDER): Primary | ICD-10-CM

## 2020-10-20 PROCEDURE — 90834 PSYTX W PT 45 MINUTES: CPT | Mod: 95 | Performed by: MARRIAGE & FAMILY THERAPIST

## 2020-10-20 ASSESSMENT — ANXIETY QUESTIONNAIRES
GAD7 TOTAL SCORE: 7
2. NOT BEING ABLE TO STOP OR CONTROL WORRYING: SEVERAL DAYS
GAD7 TOTAL SCORE: 7
6. BECOMING EASILY ANNOYED OR IRRITABLE: MORE THAN HALF THE DAYS
7. FEELING AFRAID AS IF SOMETHING AWFUL MIGHT HAPPEN: SEVERAL DAYS
7. FEELING AFRAID AS IF SOMETHING AWFUL MIGHT HAPPEN: SEVERAL DAYS
1. FEELING NERVOUS, ANXIOUS, OR ON EDGE: SEVERAL DAYS
5. BEING SO RESTLESS THAT IT IS HARD TO SIT STILL: NOT AT ALL
4. TROUBLE RELAXING: SEVERAL DAYS
3. WORRYING TOO MUCH ABOUT DIFFERENT THINGS: SEVERAL DAYS
GAD7 TOTAL SCORE: 7

## 2020-10-20 ASSESSMENT — PATIENT HEALTH QUESTIONNAIRE - PHQ9
10. IF YOU CHECKED OFF ANY PROBLEMS, HOW DIFFICULT HAVE THESE PROBLEMS MADE IT FOR YOU TO DO YOUR WORK, TAKE CARE OF THINGS AT HOME, OR GET ALONG WITH OTHER PEOPLE: SOMEWHAT DIFFICULT
SUM OF ALL RESPONSES TO PHQ QUESTIONS 1-9: 9
SUM OF ALL RESPONSES TO PHQ QUESTIONS 1-9: 9

## 2020-10-20 NOTE — PROGRESS NOTES
Telemedicine Visit: The patient's condition can be safely assessed and treated via synchronous audio and visual telemedicine encounter.      Reason for Telemedicine Visit: Services only offered telehealth    Originating Site (Patient Location): Patient's home    Distant Site (Provider Location): [facility]    Consent:  The patient/guardian has verbally consented to: the potential risks and benefits of telemedicine (video visit) versus in person care; bill my insurance or make self-payment for services provided; and responsibility for payment of non-covered services.     Mode of Communication:  Video Conference via [employer]    As the provider I attest to compliance with applicable laws and regulations related to telemedicine.    [facility] Primary Care: Integrated Behavioral Health  October 20, 2020      Behavioral Health Clinician Progress Note    Patient Name: Naty Pérez           Service Type:  Individual      Service Location:   Face to Face in Home / Community     Session Start Time: 4:06pm  Session End Time: 4:45pm      Session Length: 38 - 52      Attendees: Client    Visit Activities (Refresh list every visit): [facility] Only    Diagnostic Assessment Date: 8/25/2020  Treatment Plan Review Date: 12/8/2020  See Flowsheets for today's PHQ-9 and ODILON-7 results  Previous PHQ-9:   PHQ-9 SCORE 8/25/2020 9/22/2020 10/20/2020   PHQ-9 Total Score MyChart 7 (Mild depression) 4 (Minimal depression) 9 (Mild depression)   PHQ-9 Total Score 7 4 9     Previous ODILON-7:   ODILON-7 SCORE 8/25/2020 9/22/2020 10/20/2020   Total Score 5 (mild anxiety) 6 (mild anxiety) 7 (mild anxiety)   Total Score 5 6 7       CHARLES LEVEL:  CHARLES Score (Last Two) 2/6/2020   CHARLES Raw Score 28   Activation Score 50   CHARLES Level 2       DATA  Extended Session (60+ minutes): No  Interactive Complexity: No  Crisis: No  BH Patient: No    Treatment Objective(s) Addressed in This Session:  Target Behavior(s):  PMS    Anxiety: will experience a reduction in anxiety, will develop more effective coping skills to manage anxiety symptoms, will develop healthy cognitive patterns and beliefs and will increase ability to function adaptively    Current Stressors / Issues:      Reviewed safety- pt denied any SI, plans or intentions.   Saw her OBGYN and feels like she has a good plan with her to get her hormones back in order.   Talked about her screenings. Very irritability after losing a paper. Processed how it is not about the paper but her doing too many things. Worked on saying no. Talked about how she doesn't have to justify her no. Worked on progressive muscle relaxation to help with tension.   Progress on Treatment Objective(s) / Homework:  Minimal progress - PREPARATION (Decided to change - considering how); Intervened by negotiating a change plan and determining options / strategies for behavior change, identifying triggers, exploring social supports, and working towards setting a date to begin behavior change    Motivational Interviewing    MI Intervention: Expressed Empathy/Understanding, Supported Autonomy, Collaboration, Evocation, Permission to raise concern or advise, Open-ended questions and Reflections: simple and complex     Change Talk Expressed by the Patient: Desire to change Ability to change Reasons to change Need to change Committment to change Activation    Provider Response to Change Talk: E - Evoked more info from patient about behavior change, A - Affirmed patient's thoughts, decisions, or attempts at behavior change, R - Reflected patient's change talk and S - Summarized patient's change talk statements      Situation        Automatic Thoughts  Cognitive Distortions      Feelings        Behavior        Questioning Thoughts            Also provided psychoeducation about behavioral health condition, symptoms, and treatment options    Care Plan review completed: Yes    Medication Review:  No current  psychiatric medications prescribed    Medication Compliance:  NA    Changes in Health Issues:   None reported    Chemical Use Review:   Substance Use: Chemical use reviewed, no active concerns identified      Tobacco Use: No current tobacco use.      Assessment: Current Emotional / Mental Status (status of significant symptoms):  Risk status (Self / Other harm or suicidal ideation)  Patient denies a history of suicidal ideation, suicide attempts, self-injurious behavior, homicidal ideation, homicidal behavior and and other safety concerns  Patient denies current fears or concerns for personal safety.  Patient denies current or recent suicidal ideation or behaviors.  Patient denies current or recent homicidal ideation or behaviors.  Patient denies current or recent self injurious behavior or ideation.  Patient denies other safety concerns.  A safety and risk management plan has not been developed at this time, however patient was encouraged to call [place] [phone] should there be a change in any of these risk factors.    Appearance:   Appropriate   Eye Contact:   Good   Psychomotor Behavior: Normal   Attitude:   Cooperative   Orientation:   All  Speech   Rate / Production: Normal    Volume:  Normal   Mood:    Normal  Affect:    Appropriate   Thought Content:  Clear   Thought Form:  Coherent  Logical   Insight:    Good     Diagnoses:  1. ODILON (generalized anxiety disorder)        Collateral Reports Completed:  Not Applicable    Plan: (Homework, other):  Patient was given information about behavioral services and encouraged to schedule a follow up appointment with the clinic [facility] in 1 week, in 2 weeks.  She was also given Cognitive Behavioral Therapy skills to practice when experiencing anxiety.  CD Recommendations: No indications of CD issues.  XENIA Carrero, [facility]      ______________________________________________________________________    Integrated Primary Care Behavioral Health Treatment  Plan    Patient's Name: Naty Pérez  YOB: 1995    Date: 9/8/2020    DSM-V Diagnoses: 300.02 (F41.1) Generalized Anxiety Disorder  Psychosocial / Contextual Factors: job and relationships  WHODAS:   WHODAS 2.0 Total Score 8/25/2020   Total Score 21   Total Score MyChart 21         Referral / Collaboration:  Referral to another professional/service is not indicated at this time..    Anticipated number of session or this episode of care: 6-8      MeasurableTreatment Goal(s) related to diagnosis / functional impairment(s)  Goal 1: Patient will increase coping skills to lower anxiety    I will know I've met my goal when less anxious.      Objective #A (Patient Action)    Patient will use cognitive strategies identified in therapy to challenge anxious thoughts.  Status: New - Date: 9/8/2020     Intervention(s)  [facility] will teach emotional recognition/identification. CBT skills.    Objective #B  Patient will use thought-stopping strategy daily to reduce intrusive thoughts.  Status: New - Date: 9/8/2020     Intervention(s)  [facility] will assign homework STOP TECHnique.    Patient has reviewed and agreed to the above plan.      Vanita Ngerete, LMFT  September 8, 2020

## 2020-10-21 ASSESSMENT — PATIENT HEALTH QUESTIONNAIRE - PHQ9: SUM OF ALL RESPONSES TO PHQ QUESTIONS 1-9: 9

## 2020-10-21 ASSESSMENT — ANXIETY QUESTIONNAIRES: GAD7 TOTAL SCORE: 7

## 2020-10-30 ENCOUNTER — VIRTUAL VISIT (OUTPATIENT)
Dept: INTERNAL MEDICINE | Facility: CLINIC | Age: 25
End: 2020-10-30
Payer: COMMERCIAL

## 2020-10-30 VITALS — BODY MASS INDEX: 23.79 KG/M2 | WEIGHT: 128 LBS

## 2020-10-30 DIAGNOSIS — Z20.822 EXPOSURE TO COVID-19 VIRUS: Primary | ICD-10-CM

## 2020-10-30 PROCEDURE — 99207 PR NO BILLABLE SERVICE THIS VISIT: CPT | Mod: 95 | Performed by: NURSE PRACTITIONER

## 2020-10-30 NOTE — NURSING NOTE
"Chief Complaint   Patient presents with     Suspected Covid     pt wants to get tested for Covid her friend she was with on Monday was tested positive     initial LMP 10/14/2020 (Exact Date)  Estimated body mass index is 23.98 kg/m  as calculated from the following:    Height as of 8/10/20: 1.562 m (5' 1.5\").    Weight as of 10/15/20: 58.5 kg (129 lb)..  bp completed using cuff size NA (Not Taken)  ROYA JAY LPN  "

## 2020-10-30 NOTE — PROGRESS NOTES
"Naty Pérez is a 25 year old female who is being evaluated via a billable video visit.      The patient has been notified of following:     \"This video visit will be conducted via a call between you and your physician/provider. We have found that certain health care needs can be provided without the need for an in-person physical exam.  This service lets us provide the care you need with a video conversation.  If a prescription is necessary we can send it directly to your pharmacy.  If lab work is needed we can place an order for that and you can then stop by our lab to have the test done at a later time.    Video visits are billed at different rates depending on your insurance coverage.  Please reach out to your insurance provider with any questions.    If during the course of the call the physician/provider feels a video visit is not appropriate, you will not be charged for this service.\"    Patient has given verbal consent for Video visit? Yes  How would you like to obtain your AVS? MyChart  If you are dropped from the video visit, the video invite should be resent to: Text to cell phone: [phone]  Will anyone else be joining your video visit? 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      Objective           Vitals:  No vitals were obtained today due to virtual visit.    Physical Exam     GENERAL: alert and no " distress  EYES: Eyes grossly normal to inspection.  No discharge or erythema, or obvious scleral/conjunctival abnormalities.  RESP: No audible wheeze, cough, or visible cyanosis.  No visible retractions or increased work of breathing.    SKIN: Visible skin clear. No significant rash, abnormal pigmentation or lesions.  NEURO: Cranial nerves grossly intact.  Mentation and speech appropriate for age.  PSYCH: Mentation appears normal, affect normal/bright, judgement and insight intact, normal speech and appearance well-groomed.      covid test         Assessment & Plan     Exposure to COVID-19 virus  Has a little sore throat   - Symptomatic COVID-19 Virus (Coronavirus) by PCR; Future        Patient Instructions   covid test       No follow-ups on file.    VALE Cleaning CNP  [facility]      Video-Visit Details    Type of service:  Video Visit    Video End Time:10:58 AM    Originating Location (pt. Location): Home    Distant Location (provider location):  [facility]     Platform used for Video Visit: Rian

## 2020-11-02 ENCOUNTER — MYC MEDICAL ADVICE (OUTPATIENT)
Dept: OBGYN | Facility: CLINIC | Age: 25
End: 2020-11-02

## 2020-11-07 DIAGNOSIS — Z20.822 EXPOSURE TO COVID-19 VIRUS: ICD-10-CM

## 2020-11-07 PROCEDURE — U0003 INFECTIOUS AGENT DETECTION BY NUCLEIC ACID (DNA OR RNA); SEVERE ACUTE RESPIRATORY SYNDROME CORONAVIRUS 2 (SARS-COV-2) (CORONAVIRUS DISEASE [COVID-19]), AMPLIFIED PROBE TECHNIQUE, MAKING USE OF HIGH THROUGHPUT TECHNOLOGIES AS DESCRIBED BY CMS-2020-01-R: HCPCS | Performed by: NURSE PRACTITIONER

## 2020-11-09 LAB
SARS-COV-2 RNA SPEC QL NAA+PROBE: NOT DETECTED
SPECIMEN SOURCE: NORMAL

## 2020-11-10 ENCOUNTER — VIRTUAL VISIT (OUTPATIENT)
Dept: BEHAVIORAL HEALTH | Facility: CLINIC | Age: 25
End: 2020-11-10
Payer: COMMERCIAL

## 2020-11-10 DIAGNOSIS — F41.1 GAD (GENERALIZED ANXIETY DISORDER): Primary | ICD-10-CM

## 2020-11-10 PROCEDURE — 90837 PSYTX W PT 60 MINUTES: CPT | Mod: 95 | Performed by: MARRIAGE & FAMILY THERAPIST

## 2020-11-10 NOTE — PROGRESS NOTES
Telemedicine Visit: The patient's condition can be safely assessed and treated via synchronous audio and visual telemedicine encounter.      Reason for Telemedicine Visit: Services only offered telehealth    Originating Site (Patient Location): Patient's home    Distant Site (Provider Location): [facility]    Consent:  The patient/guardian has verbally consented to: the potential risks and benefits of telemedicine (video visit) versus in person care; bill my insurance or make self-payment for services provided; and responsibility for payment of non-covered services.     Mode of Communication:  Video Conference via [employer]    As the provider I attest to compliance with applicable laws and regulations related to telemedicine.    [facility] Primary Care: Integrated Behavioral Health  November 10th, 2020      Behavioral Health Clinician Progress Note    Patient Name: Naty Pérez           Service Type:  Individual      Service Location:   Face to Face in Home / Community     Session Start Time: 4:58 pm  Session End Time: 6:00pm      Session Length: 53 - 60      Attendees: Client    Visit Activities (Refresh list every visit): [facility] Only    Diagnostic Assessment Date: 8/25/2020  Treatment Plan Review Date: 12/8/2020  See Flowsheets for today's PHQ-9 and ODILON-7 results  Previous PHQ-9:   PHQ-9 SCORE 8/25/2020 9/22/2020 10/20/2020   PHQ-9 Total Score MyChart 7 (Mild depression) 4 (Minimal depression) 9 (Mild depression)   PHQ-9 Total Score 7 4 9     Previous ODILON-7:   ODILON-7 SCORE 8/25/2020 9/22/2020 10/20/2020   Total Score 5 (mild anxiety) 6 (mild anxiety) 7 (mild anxiety)   Total Score 5 6 7       CHARLES LEVEL:  CHARLES Score (Last Two) 2/6/2020   CHARLES Raw Score 28   Activation Score 50   CHARLES Level 2       DATA  Extended Session (60+ minutes): No  Interactive Complexity: No  Crisis: No  BH Patient: No    Treatment Objective(s) Addressed in This Session:  Target Behavior(s):  PMS    Anxiety: will experience a reduction in anxiety, will develop more effective coping skills to manage anxiety symptoms, will develop healthy cognitive patterns and beliefs and will increase ability to function adaptively    Current Stressors / Issues:    Reviewed safety- pt denied any SI, plans or intentions.   Processed her mom and step-dad's divorce. Talked about the stages of grief. Discussed how letter writing can be helpful with dealing with this.     Progress on Treatment Objective(s) / Homework:  Minimal progress - PREPARATION (Decided to change - considering how); Intervened by negotiating a change plan and determining options / strategies for behavior change, identifying triggers, exploring social supports, and working towards setting a date to begin behavior change    Motivational Interviewing    MI Intervention: Expressed Empathy/Understanding, Supported Autonomy, Collaboration, Evocation, Permission to raise concern or advise, Open-ended questions and Reflections: simple and complex     Change Talk Expressed by the Patient: Desire to change Ability to change Reasons to change Need to change Committment to change Activation    Provider Response to Change Talk: E - Evoked more info from patient about behavior change, A - Affirmed patient's thoughts, decisions, or attempts at behavior change, R - Reflected patient's change talk and S - Summarized patient's change talk statements      Situation        Automatic Thoughts  Cognitive Distortions      Feelings        Behavior        Questioning Thoughts            Also provided psychoeducation about behavioral health condition, symptoms, and treatment options    Care Plan review completed: Yes    Medication Review:  No current psychiatric medications prescribed    Medication Compliance:  NA    Changes in Health Issues:   None reported    Chemical Use Review:   Substance Use: Chemical use reviewed, no active concerns identified      Tobacco Use: No current  tobacco use.      Assessment: Current Emotional / Mental Status (status of significant symptoms):  Risk status (Self / Other harm or suicidal ideation)  Patient denies a history of suicidal ideation, suicide attempts, self-injurious behavior, homicidal ideation, homicidal behavior and and other safety concerns  Patient denies current fears or concerns for personal safety.  Patient denies current or recent suicidal ideation or behaviors.  Patient denies current or recent homicidal ideation or behaviors.  Patient denies current or recent self injurious behavior or ideation.  Patient denies other safety concerns.  A safety and risk management plan has not been developed at this time, however patient was encouraged to call [place] [phone] should there be a change in any of these risk factors.    Appearance:   Appropriate   Eye Contact:   Good   Psychomotor Behavior: Normal   Attitude:   Cooperative   Orientation:   All  Speech   Rate / Production: Normal    Volume:  Normal   Mood:    Normal  Affect:    Appropriate   Thought Content:  Clear   Thought Form:  Coherent  Logical   Insight:    Good     Diagnoses:  1. ODILON (generalized anxiety disorder)        Collateral Reports Completed:  Not Applicable    Plan: (Homework, other):  Patient was given information about behavioral services and encouraged to schedule a follow up appointment with the clinic [facility] in 1 week, in 2 weeks.  She was also given Cognitive Behavioral Therapy skills to practice when experiencing anxiety.  CD Recommendations: No indications of CD issues.  XENIA Carrero, [facility]      ______________________________________________________________________    Integrated Primary Care Behavioral Health Treatment Plan    Patient's Name: Naty Pérez  YOB: 1995    Date: 9/8/2020    DSM-V Diagnoses: 300.02 (F41.1) Generalized Anxiety Disorder  Psychosocial / Contextual Factors: job and relationships  WHODAS:   WHODAS 2.0 Total Score 8/25/2020    Total Score 21   Total Score MyChart 21         Referral / Collaboration:  Referral to another professional/service is not indicated at this time..    Anticipated number of session or this episode of care: 6-8      MeasurableTreatment Goal(s) related to diagnosis / functional impairment(s)  Goal 1: Patient will increase coping skills to lower anxiety    I will know I've met my goal when less anxious.      Objective #A (Patient Action)    Patient will use cognitive strategies identified in therapy to challenge anxious thoughts.  Status: New - Date: 9/8/2020     Intervention(s)  [facility] will teach emotional recognition/identification. CBT skills.    Objective #B  Patient will use thought-stopping strategy daily to reduce intrusive thoughts.  Status: New - Date: 9/8/2020     Intervention(s)  [facility] will assign homework STOP TECHnique.    Patient has reviewed and agreed to the above plan.      Vanita Negrete, LMFT  September 8, 2020

## 2020-11-29 ENCOUNTER — HEALTH MAINTENANCE LETTER (OUTPATIENT)
Age: 25
End: 2020-11-29

## 2020-12-01 ENCOUNTER — VIRTUAL VISIT (OUTPATIENT)
Dept: BEHAVIORAL HEALTH | Facility: CLINIC | Age: 25
End: 2020-12-01
Payer: COMMERCIAL

## 2020-12-01 DIAGNOSIS — F41.1 GAD (GENERALIZED ANXIETY DISORDER): Primary | ICD-10-CM

## 2020-12-01 PROCEDURE — 90837 PSYTX W PT 60 MINUTES: CPT | Mod: 95 | Performed by: MARRIAGE & FAMILY THERAPIST

## 2020-12-01 ASSESSMENT — ANXIETY QUESTIONNAIRES
7. FEELING AFRAID AS IF SOMETHING AWFUL MIGHT HAPPEN: SEVERAL DAYS
2. NOT BEING ABLE TO STOP OR CONTROL WORRYING: SEVERAL DAYS
GAD7 TOTAL SCORE: 8
7. FEELING AFRAID AS IF SOMETHING AWFUL MIGHT HAPPEN: SEVERAL DAYS
5. BEING SO RESTLESS THAT IT IS HARD TO SIT STILL: SEVERAL DAYS
GAD7 TOTAL SCORE: 8
GAD7 TOTAL SCORE: 8
1. FEELING NERVOUS, ANXIOUS, OR ON EDGE: SEVERAL DAYS
6. BECOMING EASILY ANNOYED OR IRRITABLE: SEVERAL DAYS
3. WORRYING TOO MUCH ABOUT DIFFERENT THINGS: SEVERAL DAYS
4. TROUBLE RELAXING: MORE THAN HALF THE DAYS

## 2020-12-01 ASSESSMENT — PATIENT HEALTH QUESTIONNAIRE - PHQ9
SUM OF ALL RESPONSES TO PHQ QUESTIONS 1-9: 10
SUM OF ALL RESPONSES TO PHQ QUESTIONS 1-9: 10
10. IF YOU CHECKED OFF ANY PROBLEMS, HOW DIFFICULT HAVE THESE PROBLEMS MADE IT FOR YOU TO DO YOUR WORK, TAKE CARE OF THINGS AT HOME, OR GET ALONG WITH OTHER PEOPLE: VERY DIFFICULT

## 2020-12-01 NOTE — PROGRESS NOTES
Telemedicine Visit: The patient's condition can be safely assessed and treated via synchronous audio and visual telemedicine encounter.      Reason for Telemedicine Visit: Services only offered telehealth    Originating Site (Patient Location): Patient's home    Distant Site (Provider Location): [facility]    Consent:  The patient/guardian has verbally consented to: the potential risks and benefits of telemedicine (video visit) versus in person care; bill my insurance or make self-payment for services provided; and responsibility for payment of non-covered services.     Mode of Communication:  Video Conference via [employer]    As the provider I attest to compliance with applicable laws and regulations related to telemedicine.    [facility] Primary Care: Integrated Behavioral Health  December 1st, 2020      Behavioral Health Clinician Progress Note    Patient Name: Naty Pérez           Service Type:  Individual      Service Location:   Face to Face in Home / Community     Session Start Time: 4:01 pm  Session End Time: 4:58:pm      Session Length: 53 - 60      Attendees: Client    Visit Activities (Refresh list every visit): [facility] Only    Diagnostic Assessment Date: 8/25/2020  Treatment Plan Review Date: 12/8/2020  See Flowsheets for today's PHQ-9 and ODILON-7 results  Previous PHQ-9:   PHQ-9 SCORE 9/22/2020 10/20/2020 12/1/2020   PHQ-9 Total Score MyChart 4 (Minimal depression) 9 (Mild depression) 10 (Moderate depression)   PHQ-9 Total Score 4 9 10     Previous ODILON-7:   ODILON-7 SCORE 9/22/2020 10/20/2020 12/1/2020   Total Score 6 (mild anxiety) 7 (mild anxiety) 8 (mild anxiety)   Total Score 6 7 8       CHARLES LEVEL:  CHARLES Score (Last Two) 2/6/2020   CHARLES Raw Score 28   Activation Score 50   CHARLES Level 2       DATA  Extended Session (60+ minutes): No  Interactive Complexity: No  Crisis: No  BH Patient: No    Treatment Objective(s) Addressed in This Session:  Target Behavior(s):  PMS    Anxiety: will experience a reduction in anxiety, will develop more effective coping skills to manage anxiety symptoms, will develop healthy cognitive patterns and beliefs and will increase ability to function adaptively    Current Stressors / Issues:    Reviewed screenings. Reviewed safety- pt denied any SI, plans or intentions.   Processed her screenings and the effect of  her mom and step-dad's divorce. He left last week. Discussed struggling with boundaries with her mom and being empathetic vs sympathetic.   Discussed self-care and what that looks like- fireplace, face mask, [employer] movies. Discussed ways to to more self-care.   Progress on Treatment Objective(s) / Homework:  Minimal progress - PREPARATION (Decided to change - considering how); Intervened by negotiating a change plan and determining options / strategies for behavior change, identifying triggers, exploring social supports, and working towards setting a date to begin behavior change    Motivational Interviewing    MI Intervention: Expressed Empathy/Understanding, Supported Autonomy, Collaboration, Evocation, Permission to raise concern or advise, Open-ended questions and Reflections: simple and complex     Change Talk Expressed by the Patient: Desire to change Ability to change Reasons to change Need to change Committment to change Activation    Provider Response to Change Talk: E - Evoked more info from patient about behavior change, A - Affirmed patient's thoughts, decisions, or attempts at behavior change, R - Reflected patient's change talk and S - Summarized patient's change talk statements      Situation        Automatic Thoughts  Cognitive Distortions      Feelings        Behavior        Questioning Thoughts            Also provided psychoeducation about behavioral health condition, symptoms, and treatment options    Care Plan review completed: Yes    Medication Review:  No current psychiatric medications prescribed    Medication  Compliance:  NA    Changes in Health Issues:   None reported    Chemical Use Review:   Substance Use: Chemical use reviewed, no active concerns identified      Tobacco Use: No current tobacco use.      Assessment: Current Emotional / Mental Status (status of significant symptoms):  Risk status (Self / Other harm or suicidal ideation)  Patient denies a history of suicidal ideation, suicide attempts, self-injurious behavior, homicidal ideation, homicidal behavior and and other safety concerns  Patient denies current fears or concerns for personal safety.  Patient denies current or recent suicidal ideation or behaviors.  Patient denies current or recent homicidal ideation or behaviors.  Patient denies current or recent self injurious behavior or ideation.  Patient denies other safety concerns.  A safety and risk management plan has not been developed at this time, however patient was encouraged to call [place] [phone] should there be a change in any of these risk factors.    Appearance:   Appropriate   Eye Contact:   Good   Psychomotor Behavior: Normal   Attitude:   Cooperative   Orientation:   All  Speech   Rate / Production: Normal    Volume:  Normal   Mood:    Normal  Affect:    Appropriate   Thought Content:  Clear   Thought Form:  Coherent  Logical   Insight:    Good     Diagnoses:  1. ODILON (generalized anxiety disorder)        Collateral Reports Completed:  Not Applicable    Plan: (Homework, other):  Patient was given information about behavioral services and encouraged to schedule a follow up appointment with the clinic [facility] in 1 week, in 2 weeks.  She was also given Cognitive Behavioral Therapy skills to practice when experiencing anxiety.  CD Recommendations: No indications of CD issues.  XENIA Carrero, [facility]      ______________________________________________________________________    Integrated Primary Care Behavioral Health Treatment Plan    Patient's Name: Naty RINCON Beto  Date Of  Birth: 1995    Date: 9/8/2020    DSM-V Diagnoses: 300.02 (F41.1) Generalized Anxiety Disorder  Psychosocial / Contextual Factors: job and relationships  WHODAS:   WHODAS 2.0 Total Score 8/25/2020   Total Score 21   Total Score MyChart 21         Referral / Collaboration:  Referral to another professional/service is not indicated at this time..    Anticipated number of session or this episode of care: 6-8      MeasurableTreatment Goal(s) related to diagnosis / functional impairment(s)  Goal 1: Patient will increase coping skills to lower anxiety    I will know I've met my goal when less anxious.      Objective #A (Patient Action)    Patient will use cognitive strategies identified in therapy to challenge anxious thoughts.  Status: New - Date: 9/8/2020     Intervention(s)  [facility] will teach emotional recognition/identification. CBT skills.    Objective #B  Patient will use thought-stopping strategy daily to reduce intrusive thoughts.  Status: New - Date: 9/8/2020     Intervention(s)  [facility] will assign homework STOP TECH[employer]ique.    Patient has reviewed and agreed to the above plan.      Vanita Negrete, LMFT  September 8, 2020

## 2020-12-02 ASSESSMENT — ANXIETY QUESTIONNAIRES: GAD7 TOTAL SCORE: 8

## 2020-12-02 ASSESSMENT — PATIENT HEALTH QUESTIONNAIRE - PHQ9: SUM OF ALL RESPONSES TO PHQ QUESTIONS 1-9: 10

## 2020-12-08 ENCOUNTER — OFFICE VISIT (OUTPATIENT)
Dept: PEDIATRICS | Facility: CLINIC | Age: 25
End: 2020-12-08
Payer: COMMERCIAL

## 2020-12-08 VITALS
TEMPERATURE: 98.1 F | HEIGHT: 62 IN | SYSTOLIC BLOOD PRESSURE: 100 MMHG | OXYGEN SATURATION: 100 % | HEART RATE: 77 BPM | BODY MASS INDEX: 23.55 KG/M2 | DIASTOLIC BLOOD PRESSURE: 56 MMHG | WEIGHT: 128 LBS

## 2020-12-08 DIAGNOSIS — Z23 FLU VACCINE NEED: ICD-10-CM

## 2020-12-08 DIAGNOSIS — H65.91 OME (OTITIS MEDIA WITH EFFUSION), RIGHT: Primary | ICD-10-CM

## 2020-12-08 PROCEDURE — 99213 OFFICE O/P EST LOW 20 MIN: CPT | Mod: 25 | Performed by: NURSE PRACTITIONER

## 2020-12-08 PROCEDURE — 90686 IIV4 VACC NO PRSV 0.5 ML IM: CPT | Performed by: NURSE PRACTITIONER

## 2020-12-08 PROCEDURE — 90471 IMMUNIZATION ADMIN: CPT | Performed by: NURSE PRACTITIONER

## 2020-12-08 ASSESSMENT — MIFFLIN-ST. JEOR: SCORE: 1260.91

## 2020-12-08 NOTE — PROGRESS NOTES
"Subjective     Naty Pérez is a 25 year old female who presents to clinic today for the following health issues:    HPI         Concern - R Ear Problem  Onset: 5 days ago at night  Description: Pain, ringing and fullness sensation \"like a marshmallow\"  Intensity: mild  Progression of Symptoms:  worsening and constant  Accompanying Signs & Symptoms: Ringing in ears, pt notes ear feels full, balance issues  Previous history of similar problem: None  Precipitating factors:        Worsened by: Laying down, movement, swallowing  Alleviating factors:        Improved by: Staying still  Therapies tried and outcome: Pt used a heating pad with some relief    Ringing and fullness of R ear started 5 days ago. Subsequently she had some mild throat pain on that side with swallowing. The pain improved, but worsened last night. She denies discharge, cough, fever.     Review of Systems   Constitutional, HEENT, cardiovascular, pulmonary, gi and gu systems are negative, except as otherwise noted.      Objective    /56 (BP Location: Right arm, Patient Position: Sitting, Cuff Size: Adult Regular)   Pulse 77   Temp 98.1  F (36.7  C) (Tympanic)   Ht 1.562 m (5' 1.5\")   Wt 58.1 kg (128 lb)   SpO2 100%   BMI 23.79 kg/m    Body mass index is 23.79 kg/m .  Physical Exam   GENERAL: healthy, alert and no distress  EYES: Eyes grossly normal to inspection, PERRL and conjunctivae and sclerae normal  HENT: normal cephalic/atraumatic, right ear: clear effusion, left ear: normal: no effusions, no erythema, normal landmarks, nose and mouth without ulcers or lesions, oropharynx clear and oral mucous membranes moist  NECK: no adenopathy, no asymmetry, masses, or scars and thyroid normal to palpation  RESP: lungs clear to auscultation - no rales, rhonchi or wheezes  CV: regular rate and rhythm, normal S1 S2, no S3 or S4, no murmur, click or rub, no peripheral edema and peripheral pulses strong  SKIN: no suspicious lesions or rashes  PSYCH: " mentation appears normal, affect normal/bright          Assessment & Plan     OME (otitis media with effusion), right  Diagnosis reviewd, could be viral, allergy. Will try otc nasal steroid and/or antihistamine and monitor    Flu vaccine need    - ADMIN 1st VACCINE          Return in about 1 year (around 12/8/2021) for Annual Preventative Exam.    Skyla Gray, [facility]

## 2020-12-08 NOTE — LETTER
My Depression Action Plan  Name: Naty Pérez   Date of Birth 1995  Date: 12/8/2020    My doctor: No Ref-Primary, Physician   My clinic: [facility]  [facility]  [address]  [place] [address] [phone]          GREEN    ZONE   Good Control    What it looks like:     Things are going generally well. You have normal ups and downs. You may even feel depressed from time to time, but bad moods usually last less than a day.   What you need to do:  1. Continue to care for yourself (see self care plan)  2. Check your depression survival kit and update it as needed  3. Follow your physician s recommendations including any medication.  4. Do not stop taking medication unless you consult with your physician first.           YELLOW         ZONE Getting Worse    What it looks like:     Depression is starting to interfere with your life.     It may be hard to get out of bed; you may be starting to isolate yourself from others.    Symptoms of depression are starting to last most all day and this has happened for several days.     You may have suicidal thoughts but they are not constant.   What you need to do:     1. Call your care team. Your response to treatment will improve if you keep your care team informed of your progress. Yellow periods are signs an adjustment may need to be made.     2. Continue your self-care.  Just get dressed and ready for the day.  Don't give yourself time to talk yourself out of it.    3. Talk to someone in your support network.    4. Open up your Depression Self-Care Plan/Wellness Kit.           RED    ZONE Medical Alert - Get Help    What it looks like:     Depression is seriously interfering with your life.     You may experience these or other symptoms: You can t get out of bed most days, can t work or engage in other necessary activities, you have trouble taking care of basic hygiene, or basic responsibilities, thoughts of suicide or  death that will not go away, self-injurious behavior.     What you need to do:  1. Call your care team and request a same-day appointment. If they are not available (weekends or after hours) call your local crisis line, emergency room or 911.            Depression Self-Care Plan / Wellness Kit    Self-Care for Depression  Here s the deal. Your body and mind are really not as separate as most people think.  What you do and think affects how you feel and how you feel influences what you do and think. This means if you do things that people who feel good do, it will help you feel better.  Sometimes this is all it takes.  There is also a place for medication and therapy depending on how severe your depression is, so be sure to consult with your medical provider and/ or Behavioral Health Consultant if your symptoms are worsening or not improving.     In order to better manage my stress, I will:    Exercise  Get some form of exercise, every day. This will help reduce pain and release endorphins, the  feel good  chemicals in your brain. This is almost as good as taking antidepressants!  This is not the same as joining a gym and then never going! (they count on that by the way ) It can be as simple as just going for a walk or doing some gardening, anything that will get you moving.      Hygiene   Maintain good hygiene (get out of bed in the morning, make your bed, brush your teeth, take a shower, and get dressed like you were going to work, even if you are unemployed).  If your clothes don't fit try to get ones that do.    Diet  Strive to eat foods that are good for me, drink plenty of water, and avoid excessive sugar, caffeine, alcohol, and other mood-altering substances.  Some foods that are helpful in depression are: complex carbohydrates, B vitamins, flaxseed, fish or fish oil, fresh fruits and vegetables.    Psychotherapy  Agree to participate in Individual Therapy (if recommended).    Medication  If prescribed  medications, I agree to take them.  Missing doses can result in serious side effects.  I understand that drinking alcohol, or other illicit drug use, may cause potential side effects.  I will not stop my medication abruptly without first discussing it with my provider.    Staying Connected With Others  Stay in touch with my friends, family members, and my primary care provider/team.    Use your imagination  Be creative.  We all have a creative side; it doesn t matter if it s oil painting, sand castles, or mud pies! This will also kick up the endorphins.    Witness Beauty  (AKA stop and smell the roses) Take a look outside, even in mid-winter. Notice colors, textures. Watch the squirrels and birds.     Service to others  Be of service to others.  There is always someone else in need.  By helping others we can  get out of ourselves  and remember the really important things.  This also provides opportunities for practicing all the other parts of the program.    Humor  Laugh and be silly!  Adjust your TV habits for less news and crime-drama and more comedy.    Control your stress  Try breathing deep, massage therapy, biofeedback, and meditation. Find time to relax each day.     Crisis Text Line  http://www.crisistextline.org    The Crisis Text Line serves anyone, in any type of crisis, providing access to free, 24/7 support and information via the medium people already use and trust:    Here's how it works:  1.  Text 026-972 from anywhere in the [place], anytime, about any type of crisis.  2.  A live, trained Crisis Counselor receives the text and responds quickly.  3.  The volunteer Crisis Counselor will help you move from a 'hot moment to a cool moment'.    My support system    Clinic Contact:  Phone number:    Contact 1:  Phone number:    Contact 2:  Phone number:    Buddhism/:  Phone number:    Therapist:  Phone number:    Local crisis center:    Phone number:    Other community support:  Phone number:

## 2020-12-22 ENCOUNTER — VIRTUAL VISIT (OUTPATIENT)
Dept: BEHAVIORAL HEALTH | Facility: CLINIC | Age: 25
End: 2020-12-22
Payer: COMMERCIAL

## 2020-12-22 DIAGNOSIS — F41.1 GAD (GENERALIZED ANXIETY DISORDER): Primary | ICD-10-CM

## 2020-12-22 PROCEDURE — 90837 PSYTX W PT 60 MINUTES: CPT | Mod: 95 | Performed by: MARRIAGE & FAMILY THERAPIST

## 2020-12-22 NOTE — PROGRESS NOTES
Telemedicine Visit: The patient's condition can be safely assessed and treated via synchronous audio and visual telemedicine encounter.      Reason for Telemedicine Visit: Services only offered telehealth    Originating Site (Patient Location): Patient's home    Distant Site (Provider Location): [facility]    Consent:  The patient/guardian has verbally consented to: the potential risks and benefits of telemedicine (video visit) versus in person care; bill my insurance or make self-payment for services provided; and responsibility for payment of non-covered services.     Mode of Communication:  Video Conference via [employer]    As the provider I attest to compliance with applicable laws and regulations related to telemedicine.    [facility] Primary Care: Integrated Behavioral Health  December 22 sd, 2020      Behavioral Health Clinician Progress Note    Patient Name: Naty Pérez           Service Type:  Individual      Service Location:   Face to Face in Home / Community     Session Start Time: 4:01 pm  Session End Time: 4:58:pm      Session Length: 53 - 60      Attendees: Client    Visit Activities (Refresh list every visit): [facility] Only    Diagnostic Assessment Date: 8/25/2020  Treatment Plan Review Date: 12/8/2020  See Flowsheets for today's PHQ-9 and ODILON-7 results  Previous PHQ-9:   PHQ-9 SCORE 9/22/2020 10/20/2020 12/1/2020   PHQ-9 Total Score MyChart 4 (Minimal depression) 9 (Mild depression) 10 (Moderate depression)   PHQ-9 Total Score 4 9 10     Previous ODILON-7:   ODILON-7 SCORE 9/22/2020 10/20/2020 12/1/2020   Total Score 6 (mild anxiety) 7 (mild anxiety) 8 (mild anxiety)   Total Score 6 7 8       CHARLES LEVEL:  CHARLES Score (Last Two) 2/6/2020   CHARLES Raw Score 28   Activation Score 50   CHARLES Level 2       DATA  Extended Session (60+ minutes): No  Interactive Complexity: No  Crisis: No  [facility] Patient: No    Treatment Objective(s) Addressed in This Session:  Target  Behavior(s): PMS    Anxiety: will experience a reduction in anxiety, will develop more effective coping skills to manage anxiety symptoms, will develop healthy cognitive patterns and beliefs and will increase ability to function adaptively    Current Stressors / Issues:    Reviewed safety- pt denied any SI, plans or intentions.   Pt reports doing better. She is talking again to her step-father and feels like that is going well. Doing some self--care. Continued to work on boundaries with family members and using I statements and feeling words to help her with this.     Progress on Treatment Objective(s) / Homework:  Minimal progress - PREPARATION (Decided to change - considering how); Intervened by negotiating a change plan and determining options / strategies for behavior change, identifying triggers, exploring social supports, and working towards setting a date to begin behavior change    Motivational Interviewing    MI Intervention: Expressed Empathy/Understanding, Supported Autonomy, Collaboration, Evocation, Permission to raise concern or advise, Open-ended questions and Reflections: simple and complex     Change Talk Expressed by the Patient: Desire to change Ability to change Reasons to change Need to change Committment to change Activation    Provider Response to Change Talk: E - Evoked more info from patient about behavior change, A - Affirmed patient's thoughts, decisions, or attempts at behavior change, R - Reflected patient's change talk and S - Summarized patient's change talk statements      Situation        Automatic Thoughts  Cognitive Distortions      Feelings        Behavior        Questioning Thoughts            Also provided psychoeducation about behavioral health condition, symptoms, and treatment options    Care Plan review completed: Yes    Medication Review:  No current psychiatric medications prescribed    Medication Compliance:  NA    Changes in Health Issues:   None reported    Chemical Use  Review:   Substance Use: Chemical use reviewed, no active concerns identified      Tobacco Use: No current tobacco use.      Assessment: Current Emotional / Mental Status (status of significant symptoms):  Risk status (Self / Other harm or suicidal ideation)  Patient denies a history of suicidal ideation, suicide attempts, self-injurious behavior, homicidal ideation, homicidal behavior and and other safety concerns  Patient denies current fears or concerns for personal safety.  Patient denies current or recent suicidal ideation or behaviors.  Patient denies current or recent homicidal ideation or behaviors.  Patient denies current or recent self injurious behavior or ideation.  Patient denies other safety concerns.  A safety and risk management plan has not been developed at this time, however patient was encouraged to call [place] [phone] should there be a change in any of these risk factors.    Appearance:   Appropriate   Eye Contact:   Good   Psychomotor Behavior: Normal   Attitude:   Cooperative   Orientation:   All  Speech   Rate / Production: Normal    Volume:  Normal   Mood:    Normal  Affect:    Appropriate   Thought Content:  Clear   Thought Form:  Coherent  Logical   Insight:    Good     Diagnoses:  1. ODILON (generalized anxiety disorder)        Collateral Reports Completed:  Not Applicable    Plan: (Homework, other):  Patient was given information about behavioral services and encouraged to schedule a follow up appointment with the clinic [facility] in 1 week, in 2 weeks.  She was also given Cognitive Behavioral Therapy skills to practice when experiencing anxiety.  CD Recommendations: No indications of CD issues.  XENIA Carrero, [facility]      ______________________________________________________________________    Integrated Primary Care Behavioral Health Treatment Plan    Patient's Name: Naty Pérez  YOB: 1995    Date: 9/8/2020    DSM-V Diagnoses: 300.02 (F41.1) Generalized Anxiety  Disorder  Psychosocial / Contextual Factors: job and relationships  WHODAS:   WHODAS 2.0 Total Score 8/25/2020   Total Score 21   Total Score MyChart 21         Referral / Collaboration:  Referral to another professional/service is not indicated at this time..    Anticipated number of session or this episode of care: 6-8      MeasurableTreatment Goal(s) related to diagnosis / functional impairment(s)  Goal 1: Patient will increase coping skills to lower anxiety    I will know I've met my goal when less anxious.      Objective #A (Patient Action)    Patient will use cognitive strategies identified in therapy to challenge anxious thoughts.  Status: New - Date: 9/8/2020     Intervention(s)  [facility] will teach emotional recognition/identification. CBT skills.    Objective #B  Patient will use thought-stopping strategy daily to reduce intrusive thoughts.  Status: New - Date: 9/8/2020     Intervention(s)  [facility] will assign homework STOP TECHnique.    Patient has reviewed and agreed to the above plan.      Vanita Negrete, LMFT  September 8, 2020

## 2021-01-05 ENCOUNTER — VIRTUAL VISIT (OUTPATIENT)
Dept: BEHAVIORAL HEALTH | Facility: CLINIC | Age: 26
End: 2021-01-05
Payer: COMMERCIAL

## 2021-01-05 DIAGNOSIS — F41.1 GAD (GENERALIZED ANXIETY DISORDER): Primary | ICD-10-CM

## 2021-01-05 PROCEDURE — 90834 PSYTX W PT 45 MINUTES: CPT | Mod: 95 | Performed by: MARRIAGE & FAMILY THERAPIST

## 2021-01-05 ASSESSMENT — PATIENT HEALTH QUESTIONNAIRE - PHQ9
10. IF YOU CHECKED OFF ANY PROBLEMS, HOW DIFFICULT HAVE THESE PROBLEMS MADE IT FOR YOU TO DO YOUR WORK, TAKE CARE OF THINGS AT HOME, OR GET ALONG WITH OTHER PEOPLE: SOMEWHAT DIFFICULT
SUM OF ALL RESPONSES TO PHQ QUESTIONS 1-9: 3
SUM OF ALL RESPONSES TO PHQ QUESTIONS 1-9: 3

## 2021-01-05 ASSESSMENT — ANXIETY QUESTIONNAIRES
7. FEELING AFRAID AS IF SOMETHING AWFUL MIGHT HAPPEN: NOT AT ALL
2. NOT BEING ABLE TO STOP OR CONTROL WORRYING: NOT AT ALL
6. BECOMING EASILY ANNOYED OR IRRITABLE: SEVERAL DAYS
GAD7 TOTAL SCORE: 3
5. BEING SO RESTLESS THAT IT IS HARD TO SIT STILL: NOT AT ALL
3. WORRYING TOO MUCH ABOUT DIFFERENT THINGS: NOT AT ALL
GAD7 TOTAL SCORE: 3
4. TROUBLE RELAXING: SEVERAL DAYS
1. FEELING NERVOUS, ANXIOUS, OR ON EDGE: SEVERAL DAYS
GAD7 TOTAL SCORE: 3
7. FEELING AFRAID AS IF SOMETHING AWFUL MIGHT HAPPEN: NOT AT ALL

## 2021-01-05 NOTE — PROGRESS NOTES
Telemedicine Visit: The patient's condition can be safely assessed and treated via synchronous audio and visual telemedicine encounter.      Reason for Telemedicine Visit: Services only offered telehealth    Originating Site (Patient Location): Patient's home    Distant Site (Provider Location): [facility]    Consent:  The patient/guardian has verbally consented to: the potential risks and benefits of telemedicine (video visit) versus in person care; bill my insurance or make self-payment for services provided; and responsibility for payment of non-covered services.     Mode of Communication:  Video Conference via [employer]    As the provider I attest to compliance with applicable laws and regulations related to telemedicine.    [facility] Primary Care: Integrated Behavioral Health  January 5th, 2020      Behavioral Health Clinician Progress Note    Patient Name: Naty Pérez           Service Type:  Individual      Service Location:   Face to Face in Home / Community     Session Start Time: 4:11 pm  Session End Time: 4:58:pm      Session Length: 38 - 52      Attendees: Client    Visit Activities (Refresh list every visit): [facility] Only    Diagnostic Assessment Date: 8/25/2020  Treatment Plan Review Date: 4/5/2021  See Flowsheets for today's PHQ-9 and ODILON-7 results  Previous PHQ-9:   PHQ-9 SCORE 10/20/2020 12/1/2020 1/5/2021   PHQ-9 Total Score MyChart 9 (Mild depression) 10 (Moderate depression) 3 (Minimal depression)   PHQ-9 Total Score 9 10 3     Previous ODILON-7:   ODILON-7 SCORE 10/20/2020 12/1/2020 1/5/2021   Total Score 7 (mild anxiety) 8 (mild anxiety) 3 (minimal anxiety)   Total Score 7 8 3       CHARLES LEVEL:  CHARLES Score (Last Two) 2/6/2020   CHARLES Raw Score 28   Activation Score 50   CHARLES Level 2       DATA  Extended Session (60+ minutes): No  Interactive Complexity: No  Crisis: No  [facility] Patient: No    Treatment Objective(s) Addressed in This Session:  Target Behavior(s):  PMS    Anxiety: will experience a reduction in anxiety, will develop more effective coping skills to manage anxiety symptoms, will develop healthy cognitive patterns and beliefs and will increase ability to function adaptively    Current Stressors / Issues:    Reviewed safety- pt denied any SI, plans or intentions.   Pt reports doing better. Has been focusing on self-care more. Was able to get a lot of things done. Talked about her going back to classes in February and her feelings about this. Talked about continuing to do her selfcare. Reviewed safety with Patient. Patient denies any current SI,plans or intentions.          Progress on Treatment Objective(s) / Homework:  Minimal progress - PREPARATION (Decided to change - considering how); Intervened by negotiating a change plan and determining options / strategies for behavior change, identifying triggers, exploring social supports, and working towards setting a date to begin behavior change    Motivational Interviewing    MI Intervention: Expressed Empathy/Understanding, Supported Autonomy, Collaboration, Evocation, Permission to raise concern or advise, Open-ended questions and Reflections: simple and complex     Change Talk Expressed by the Patient: Desire to change Ability to change Reasons to change Need to change Committment to change Activation    Provider Response to Change Talk: E - Evoked more info from patient about behavior change, A - Affirmed patient's thoughts, decisions, or attempts at behavior change, R - Reflected patient's change talk and S - Summarized patient's change talk statements      Situation        Automatic Thoughts  Cognitive Distortions      Feelings        Behavior        Questioning Thoughts            Also provided psychoeducation about behavioral health condition, symptoms, and treatment options    Care Plan review completed: Yes    Medication Review:  No current psychiatric medications prescribed    Medication  Compliance:  NA    Changes in Health Issues:   None reported    Chemical Use Review:   Substance Use: Chemical use reviewed, no active concerns identified      Tobacco Use: No current tobacco use.      Assessment: Current Emotional / Mental Status (status of significant symptoms):  Risk status (Self / Other harm or suicidal ideation)  Patient denies a history of suicidal ideation, suicide attempts, self-injurious behavior, homicidal ideation, homicidal behavior and and other safety concerns  Patient denies current fears or concerns for personal safety.  Patient denies current or recent suicidal ideation or behaviors.  Patient denies current or recent homicidal ideation or behaviors.  Patient denies current or recent self injurious behavior or ideation.  Patient denies other safety concerns.  A safety and risk management plan has not been developed at this time, however patient was encouraged to call [place] [phone] should there be a change in any of these risk factors.    Appearance:   Appropriate   Eye Contact:   Good   Psychomotor Behavior: Normal   Attitude:   Cooperative   Orientation:   All  Speech   Rate / Production: Normal    Volume:  Normal   Mood:    Normal  Affect:    Appropriate   Thought Content:  Clear   Thought Form:  Coherent  Logical   Insight:    Good     Diagnoses:  1. ODILON (generalized anxiety disorder)        Collateral Reports Completed:  Not Applicable    Plan: (Homework, other):  Patient was given information about behavioral services and encouraged to schedule a follow up appointment with the clinic [facility] in 2 weeks.  She was also given Cognitive Behavioral Therapy skills to practice when experiencing anxiety.  CD Recommendations: No indications of CD issues.  XENIA Carrero, [facility]      ______________________________________________________________________    Integrated Primary Care Behavioral Health Treatment Plan    Patient's Name: Naty Pérez  YOB: 1995    Date:  1/5/2021    DSM-V Diagnoses: 300.02 (F41.1) Generalized Anxiety Disorder  Psychosocial / Contextual Factors: job and relationships  WHODAS:   WHODAS 2.0 Total Score 8/25/2020   Total Score 21   Total Score MyChart 21         Referral / Collaboration:  Referral to another professional/service is not indicated at this time..    Anticipated number of session or this episode of care: 6-8      MeasurableTreatment Goal(s) related to diagnosis / functional impairment(s)  Goal 1: Patient will increase coping skills to lower anxiety    I will know I've met my goal when less anxious.      Objective #A (Patient Action)    Patient will use cognitive strategies identified in therapy to challenge anxious thoughts.  Status: Continued - Date(s):1/5/2021     Intervention(s)  [facility] will teach emotional recognition/identification. CBT skills.    Objective #B  Patient will use thought-stopping strategy daily to reduce intrusive thoughts.  Status: Continued - Date(s):1/5/2021     Intervention(s)  [facility] will assign homework STOP TECHnique.    Patient has reviewed and agreed to the above plan.      XENIA Adan  January 5, 2021

## 2021-01-06 ASSESSMENT — ANXIETY QUESTIONNAIRES: GAD7 TOTAL SCORE: 3

## 2021-01-06 ASSESSMENT — PATIENT HEALTH QUESTIONNAIRE - PHQ9: SUM OF ALL RESPONSES TO PHQ QUESTIONS 1-9: 3

## 2021-01-19 ENCOUNTER — VIRTUAL VISIT (OUTPATIENT)
Dept: BEHAVIORAL HEALTH | Facility: CLINIC | Age: 26
End: 2021-01-19
Payer: COMMERCIAL

## 2021-01-19 DIAGNOSIS — F41.1 GAD (GENERALIZED ANXIETY DISORDER): Primary | ICD-10-CM

## 2021-01-19 PROCEDURE — 90837 PSYTX W PT 60 MINUTES: CPT | Mod: GT | Performed by: MARRIAGE & FAMILY THERAPIST

## 2021-01-19 NOTE — PROGRESS NOTES
Telemedicine Visit: The patient's condition can be safely assessed and treated via synchronous audio and visual telemedicine encounter.      Reason for Telemedicine Visit: Services only offered telehealth    Originating Site (Patient Location): Patient's home    Distant Site (Provider Location): [facility]    Consent:  The patient/guardian has verbally consented to: the potential risks and benefits of telemedicine (video visit) versus in person care; bill my insurance or make self-payment for services provided; and responsibility for payment of non-covered services.     Mode of Communication:  Video Conference via [employer]    As the provider I attest to compliance with applicable laws and regulations related to telemedicine.    [facility] Primary Care: Integrated Behavioral Health  January 5th, 2020      Behavioral Health Clinician Progress Note    Patient Name: Naty Pérez           Service Type:  Individual      Service Location:   Face to Face in Home / Community     Session Start Time: 4:00 pm  Session End Time: 4:58:pm      Session Length: 53 - 60      Attendees: Client    Visit Activities (Refresh list every visit): [facility] Only    Diagnostic Assessment Date: 8/25/2020  Treatment Plan Review Date: 4/5/2021  See Flowsheets for today's PHQ-9 and ODILON-7 results  Previous PHQ-9:   PHQ-9 SCORE 10/20/2020 12/1/2020 1/5/2021   PHQ-9 Total Score MyChart 9 (Mild depression) 10 (Moderate depression) 3 (Minimal depression)   PHQ-9 Total Score 9 10 3     Previous ODILON-7:   ODILON-7 SCORE 10/20/2020 12/1/2020 1/5/2021   Total Score 7 (mild anxiety) 8 (mild anxiety) 3 (minimal anxiety)   Total Score 7 8 3       CHARLES LEVEL:  CHARLES Score (Last Two) 2/6/2020   CHARLES Raw Score 28   Activation Score 50   CHARLES Level 2       DATA  Extended Session (60+ minutes): No  Interactive Complexity: No  Crisis: No  [facility] Patient: No    Treatment Objective(s) Addressed in This Session:  Target Behavior(s):  PMS    Anxiety: will experience a reduction in anxiety, will develop more effective coping skills to manage anxiety symptoms, will develop healthy cognitive patterns and beliefs and will increase ability to function adaptively    Current Stressors / Issues:    Reviewed safety- pt denied any SI, plans or intentions.   Processed when feeling anxious her fixation on her problems and tries to solve them. Talked about things out of control. Talked about sometimes acknowledging the feelings and setting with the comfortable.      Progress on Treatment Objective(s) / Homework:  Minimal progress - PREPARATION (Decided to change - considering how); Intervened by negotiating a change plan and determining options / strategies for behavior change, identifying triggers, exploring social supports, and working towards setting a date to begin behavior change    Motivational Interviewing    MI Intervention: Expressed Empathy/Understanding, Supported Autonomy, Collaboration, Evocation, Permission to raise concern or advise, Open-ended questions and Reflections: simple and complex     Change Talk Expressed by the Patient: Desire to change Ability to change Reasons to change Need to change Committment to change Activation    Provider Response to Change Talk: E - Evoked more info from patient about behavior change, A - Affirmed patient's thoughts, decisions, or attempts at behavior change, R - Reflected patient's change talk and S - Summarized patient's change talk statements      Situation        Automatic Thoughts  Cognitive Distortions      Feelings        Behavior        Questioning Thoughts            Also provided psychoeducation about behavioral health condition, symptoms, and treatment options    Care Plan review completed: Yes    Medication Review:  No current psychiatric medications prescribed    Medication Compliance:  NA    Changes in Health Issues:   None reported    Chemical Use Review:   Substance Use: Chemical use  reviewed, no active concerns identified      Tobacco Use: No current tobacco use.      Assessment: Current Emotional / Mental Status (status of significant symptoms):  Risk status (Self / Other harm or suicidal ideation)  Patient denies a history of suicidal ideation, suicide attempts, self-injurious behavior, homicidal ideation, homicidal behavior and and other safety concerns  Patient denies current fears or concerns for personal safety.  Patient denies current or recent suicidal ideation or behaviors.  Patient denies current or recent homicidal ideation or behaviors.  Patient denies current or recent self injurious behavior or ideation.  Patient denies other safety concerns.  A safety and risk management plan has not been developed at this time, however patient was encouraged to call [place] [phone] should there be a change in any of these risk factors.    Appearance:   Appropriate   Eye Contact:   Good   Psychomotor Behavior: Normal   Attitude:   Cooperative   Orientation:   All  Speech   Rate / Production: Normal    Volume:  Normal   Mood:    Normal  Affect:    Appropriate   Thought Content:  Clear   Thought Form:  Coherent  Logical   Insight:    Good     Diagnoses:  1. ODILON (generalized anxiety disorder)        Collateral Reports Completed:  Not Applicable    Plan: (Homework, other):  Patient was given information about behavioral services and encouraged to schedule a follow up appointment with the clinic [facility] in 2 weeks.  She was also given Cognitive Behavioral Therapy skills to practice when experiencing anxiety.  CD Recommendations: No indications of CD issues.  XENIA Carrero, [facility]      ______________________________________________________________________    Integrated Primary Care Behavioral Health Treatment Plan    Patient's Name: Naty Pérez  YOB: 1995    Date: 1/5/2021    DSM-V Diagnoses: 300.02 (F41.1) Generalized Anxiety Disorder  Psychosocial / Contextual Factors: job and  relationships  WHODAS:   WHODAS 2.0 Total Score 8/25/2020   Total Score 21   Total Score MyChart 21         Referral / Collaboration:  Referral to another professional/service is not indicated at this time..    Anticipated number of session or this episode of care: 6-8      MeasurableTreatment Goal(s) related to diagnosis / functional impairment(s)  Goal 1: Patient will increase coping skills to lower anxiety    I will know I've met my goal when less anxious.      Objective #A (Patient Action)    Patient will use cognitive strategies identified in therapy to challenge anxious thoughts.  Status: Continued - Date(s):1/5/2021     Intervention(s)  [facility] will teach emotional recognition/identification. CBT skills.    Objective #B  Patient will use thought-stopping strategy daily to reduce intrusive thoughts.  Status: Continued - Date(s):1/5/2021     Intervention(s)  [facility] will assign homework STOP TECHnique.    Patient has reviewed and agreed to the above plan.      XENIA Adan  January 5, 2021

## 2021-02-01 DIAGNOSIS — N94.3 PMS (PREMENSTRUAL SYNDROME): ICD-10-CM

## 2021-02-01 RX ORDER — DESOGESTREL AND ETHINYL ESTRADIOL 0.15-0.03
1 KIT ORAL DAILY
Qty: 28 TABLET | Refills: 3 | Status: SHIPPED | OUTPATIENT
Start: 2021-02-01 | End: 2021-06-28

## 2021-02-01 NOTE — TELEPHONE ENCOUNTER
Prescription approved per [facility] Refill Protocol.    Kait JURADO RN

## 2021-02-01 NOTE — TELEPHONE ENCOUNTER
Isibloom 0.15MGG-0.03MG Tablets 28S      Last Written Prescription Date:  10/15/2020  Last Fill Quantity: 28,   # refills: 3  Last Office Visit: 10/15/2020  Future Office visit:    Next 5 appointments (look out 90 days)    Feb 09, 2021  2:30 PM  Office Visit with Treasure Cassidy CNM  [facility] [address]  [address]  [facility] [id number] [phone]         [facility]

## 2021-02-09 ENCOUNTER — OFFICE VISIT (OUTPATIENT)
Dept: OBGYN | Facility: CLINIC | Age: 26
End: 2021-02-09
Payer: COMMERCIAL

## 2021-02-09 ENCOUNTER — VIRTUAL VISIT (OUTPATIENT)
Dept: BEHAVIORAL HEALTH | Facility: CLINIC | Age: 26
End: 2021-02-09
Payer: COMMERCIAL

## 2021-02-09 VITALS — BODY MASS INDEX: 24.54 KG/M2 | SYSTOLIC BLOOD PRESSURE: 110 MMHG | DIASTOLIC BLOOD PRESSURE: 76 MMHG | WEIGHT: 132 LBS

## 2021-02-09 DIAGNOSIS — F41.1 GAD (GENERALIZED ANXIETY DISORDER): Primary | ICD-10-CM

## 2021-02-09 DIAGNOSIS — N94.3 PMS (PREMENSTRUAL SYNDROME): Primary | ICD-10-CM

## 2021-02-09 PROCEDURE — 99213 OFFICE O/P EST LOW 20 MIN: CPT | Performed by: ADVANCED PRACTICE MIDWIFE

## 2021-02-09 PROCEDURE — 90837 PSYTX W PT 60 MINUTES: CPT | Mod: 95 | Performed by: MARRIAGE & FAMILY THERAPIST

## 2021-02-09 RX ORDER — SERTRALINE HYDROCHLORIDE 25 MG/1
25 TABLET, FILM COATED ORAL DAILY
Qty: 30 TABLET | Refills: 4 | Status: SHIPPED | OUTPATIENT
Start: 2021-02-09 | End: 2021-10-19

## 2021-02-09 ASSESSMENT — PATIENT HEALTH QUESTIONNAIRE - PHQ9
SUM OF ALL RESPONSES TO PHQ QUESTIONS 1-9: 10
10. IF YOU CHECKED OFF ANY PROBLEMS, HOW DIFFICULT HAVE THESE PROBLEMS MADE IT FOR YOU TO DO YOUR WORK, TAKE CARE OF THINGS AT HOME, OR GET ALONG WITH OTHER PEOPLE: VERY DIFFICULT
SUM OF ALL RESPONSES TO PHQ QUESTIONS 1-9: 10

## 2021-02-09 ASSESSMENT — ANXIETY QUESTIONNAIRES
7. FEELING AFRAID AS IF SOMETHING AWFUL MIGHT HAPPEN: NOT AT ALL
GAD7 TOTAL SCORE: 5
4. TROUBLE RELAXING: SEVERAL DAYS
3. WORRYING TOO MUCH ABOUT DIFFERENT THINGS: SEVERAL DAYS
5. BEING SO RESTLESS THAT IT IS HARD TO SIT STILL: SEVERAL DAYS
6. BECOMING EASILY ANNOYED OR IRRITABLE: NOT AT ALL
7. FEELING AFRAID AS IF SOMETHING AWFUL MIGHT HAPPEN: NOT AT ALL
GAD7 TOTAL SCORE: 5
2. NOT BEING ABLE TO STOP OR CONTROL WORRYING: SEVERAL DAYS
GAD7 TOTAL SCORE: 5
1. FEELING NERVOUS, ANXIOUS, OR ON EDGE: SEVERAL DAYS

## 2021-02-09 NOTE — NURSING NOTE
"Chief Complaint   Patient presents with     Follow Up     Started on birth control at last visit on 10/15/2020 for her PMS/PMDD and is here for follow up.        Initial /76 (BP Location: Left arm, Cuff Size: Adult Regular)   Wt 59.9 kg (132 lb)   LMP 2021 (Approximate)   Breastfeeding No   BMI 24.54 kg/m   Estimated body mass index is 24.54 kg/m  as calculated from the following:    Height as of 20: 1.562 m (5' 1.5\").    Weight as of this encounter: 59.9 kg (132 lb).  BP completed using cuff size: regular    Questioned patient about current smoking habits.  Pt. has never smoked.          Lobito Yoedr MA             "

## 2021-02-09 NOTE — PROGRESS NOTES
Telemedicine Visit: The patient's condition can be safely assessed and treated via synchronous audio and visual telemedicine encounter.      Reason for Telemedicine Visit: Services only offered telehealth    Originating Site (Patient Location): Patient's home    Distant Site (Provider Location): [facility]    Consent:  The patient/guardian has verbally consented to: the potential risks and benefits of telemedicine (video visit) versus in person care; bill my insurance or make self-payment for services provided; and responsibility for payment of non-covered services.     Mode of Communication:  Video Conference via [employer]    As the provider I attest to compliance with applicable laws and regulations related to telemedicine.    [facility] Primary Care: Integrated Behavioral Health  February 9th, 2021      Behavioral Health Clinician Progress Note    Patient Name: Nayt Pérez           Service Type:  Individual      Service Location:   Face to Face in Home / Community     Session Start Time: 4:00 pm  Session End Time: 4:58:pm      Session Length: 53 - 60      Attendees: Client    Visit Activities (Refresh list every visit): [facility] Only    Diagnostic Assessment Date: 8/25/2020  Treatment Plan Review Date: 4/5/2021  See Flowsheets for today's PHQ-9 and ODILON-7 results  Previous PHQ-9:   PHQ-9 SCORE 12/1/2020 1/5/2021 2/9/2021   PHQ-9 Total Score MyChart 10 (Moderate depression) 3 (Minimal depression) 10 (Moderate depression)   PHQ-9 Total Score 10 3 10     Previous ODILON-7:   ODILON-7 SCORE 12/1/2020 1/5/2021 2/9/2021   Total Score 8 (mild anxiety) 3 (minimal anxiety) 5 (mild anxiety)   Total Score 8 3 5       CHARLES LEVEL:  CHARLES Score (Last Two) 2/6/2020   CHARLES Raw Score 28   Activation Score 50   CHARLES Level 2       DATA  Extended Session (60+ minutes): No  Interactive Complexity: No  Crisis: No  [facility] Patient: No    Treatment Objective(s) Addressed in This Session:  Target  Behavior(s): PMS    Anxiety: will experience a reduction in anxiety, will develop more effective coping skills to manage anxiety symptoms, will develop healthy cognitive patterns and beliefs and will increase ability to function adaptively    Current Stressors / Issues:       Reviewed screenings. Reviewed safety with Patient. Patient denies any current SI,plans or intentions. Feeling overwhelmed with decisions. Talked about what they were and worked on proc/cons. Discussed negative errors she goes to and how to slow them down. Continued to work on CBT skills.       Progress on Treatment Objective(s) / Homework:  Minimal progress - PREPARATION (Decided to change - considering how); Intervened by negotiating a change plan and determining options / strategies for behavior change, identifying triggers, exploring social supports, and working towards setting a date to begin behavior change    Motivational Interviewing    MI Intervention: Expressed Empathy/Understanding, Supported Autonomy, Collaboration, Evocation, Permission to raise concern or advise, Open-ended questions and Reflections: simple and complex     Change Talk Expressed by the Patient: Desire to change Ability to change Reasons to change Need to change Committment to change Activation    Provider Response to Change Talk: E - Evoked more info from patient about behavior change, A - Affirmed patient's thoughts, decisions, or attempts at behavior change, R - Reflected patient's change talk and S - Summarized patient's change talk statements      Situation        Automatic Thoughts  Cognitive Distortions      Feelings        Behavior        Questioning Thoughts            Also provided psychoeducation about behavioral health condition, symptoms, and treatment options    Care Plan review completed: Yes    Medication Review:  No current psychiatric medications prescribed    Medication Compliance:  NA    Changes in Health Issues:   None reported    Chemical Use  Review:   Substance Use: Chemical use reviewed, no active concerns identified      Tobacco Use: No current tobacco use.      Assessment: Current Emotional / Mental Status (status of significant symptoms):  Risk status (Self / Other harm or suicidal ideation)  Patient denies a history of suicidal ideation, suicide attempts, self-injurious behavior, homicidal ideation, homicidal behavior and and other safety concerns  Patient denies current fears or concerns for personal safety.  Patient denies current or recent suicidal ideation or behaviors.  Patient denies current or recent homicidal ideation or behaviors.  Patient denies current or recent self injurious behavior or ideation.  Patient denies other safety concerns.  A safety and risk management plan has not been developed at this time, however patient was encouraged to call [place] [phone] should there be a change in any of these risk factors.    Appearance:   Appropriate   Eye Contact:   Good   Psychomotor Behavior: Normal   Attitude:   Cooperative   Orientation:   All  Speech   Rate / Production: Normal    Volume:  Normal   Mood:    Normal  Affect:    Appropriate   Thought Content:  Clear   Thought Form:  Coherent  Logical   Insight:    Good     Diagnoses:  1. ODILON (generalized anxiety disorder)        Collateral Reports Completed:  Not Applicable    Plan: (Homework, other):  Patient was given information about behavioral services and encouraged to schedule a follow up appointment with the clinic [facility] in 2 weeks.  She was also given Cognitive Behavioral Therapy skills to practice when experiencing anxiety.  CD Recommendations: No indications of CD issues.  XENIA Carrero, [facility]      ______________________________________________________________________    Integrated Primary Care Behavioral Health Treatment Plan    Patient's Name: Naty Pérez  YOB: 1995    Date: 1/5/2021    DSM-V Diagnoses: 300.02 (F41.1) Generalized Anxiety  Disorder  Psychosocial / Contextual Factors: job and relationships  WHODAS:   WHODAS 2.0 Total Score 8/25/2020   Total Score 21   Total Score MyChart 21         Referral / Collaboration:  Referral to another professional/service is not indicated at this time..    Anticipated number of session or this episode of care: 6-8      MeasurableTreatment Goal(s) related to diagnosis / functional impairment(s)  Goal 1: Patient will increase coping skills to lower anxiety    I will know I've met my goal when less anxious.      Objective #A (Patient Action)    Patient will use cognitive strategies identified in therapy to challenge anxious thoughts.  Status: Continued - Date(s):1/5/2021     Intervention(s)  [facility] will teach emotional recognition/identification. CBT skills.    Objective #B  Patient will use thought-stopping strategy daily to reduce intrusive thoughts.  Status: Continued - Date(s):1/5/2021     Intervention(s)  [facility] will assign homework STOP TECHnique.    Patient has reviewed and agreed to the above plan.      XENIA Adan  January 5, 2021

## 2021-02-09 NOTE — PATIENT INSTRUCTIONS
"  Patient Education     Managing PMS: Diet and Nutrition     Nutrients in fresh fruits and vegetables can help you manage PMS.   Maintaining a healthy diet helps your body counter PMS. Certain foods boost serotonin levels and give you the energy to cope with symptoms. Other foods can be avoided to ease symptoms.   About supplements  Ask your healthcare provider about supplements before trying them.  Benefits of a balanced diet  To counter PMS symptoms, maintain a balanced diet. Eat foods from all the food groups: dairy, grains, fruits and vegetables, and protein. When planning meals, know that:     Calcium may ease mood swings, headache, bloating, and irritability. It's found in dairy products such as milk, cheese, and yogurt. Some juices, breads, cereals, and soy products have calcium added (fortified).    Magnesium may relieve bloating and breast tenderness. It's found in many foods, including fresh fruits and vegetables. To help your body get enough magnesium, eat 5 or more servings of a variety of fruits and vegetables a day.    Vitamin B-6 helps the body use serotonin, thereby helping to ease depression. It's found in chicken, fish, potatoes, eggs, and carrots.    Vitamin E may reduce headache and breast tenderness. It's found in nuts such as almonds, peanuts, and hazelnuts. It's also found in green leafy vegetables.  \"Good mood\" foods  Eating foods high in carbohydrates (carbs) and fiber can help you manage PMS. That's because carbs raise serotonin levels. Carbs are also your body's main source of energy. To help keep energy and serotonin levels steady, eat small amounts throughout the day. High-fiber carbs include:     Whole-grain foods. Brown rice, whole-wheat pasta, whole-grain bread, and buckwheat noodles are good choices.    Fresh fruits and vegetables. These are especially fiber-rich when eaten unpeeled.    Beans and legumes. These include kidney beans, peas, and lentils.  Foods to limit  Some foods can " make PMS symptoms worse. Know that:    Salt can cause bloating. Since canned vegetables are often high in salt, buy fresh instead. Flavor with herbs, lemon, or salt-free seasonings.    Sugar is a carb that provides only short bursts of energy. If you crave sugar, choose a food that's also high in fiber, like an unpeeled apple or a bran muffin.    Caffeine can disrupt sleep, which makes symptoms harder to cope with. Caffeine can also cause breast tenderness. Try to limit chocolate and caffeinated drinks, such as coffee or soda.    Alcohol can make you feel depressed and can disrupt sleep. Many kinds of alcohol are also high in sugar. You may try limiting the amount of alcohol you drink.  [employer] last reviewed this educational content on 5/1/2020 2000-2020 [employer]. [address] [place] [address], [place] [address]. All rights reserved. This information is not intended as a substitute for professional medical care. Always follow your healthcare professional's instructions.

## 2021-02-09 NOTE — PROGRESS NOTES
SUBJECTIVE:                                                   Naty Pérez is a 25 year old who presents to clinic today for the following health issue(s):  Patient presents with:  Follow Up: Started on birth control at last visit on 10/15/2020 for her PMS/PMDD and is here for follow up.       HPI:  Naty presents today for follow up of her PMS symptoms. She was started on COCs to help regulate her symptoms. She is happy with regular menses and improvement in acne.  She is also using Nexplanon for birth control. Has found that her symptoms have improved a little, and with the pills, she can anticipate when her symptoms will occur. Her most irritating symptom is fatigue. She notices fatigue on day 10-12. The symptoms last ~ 1 week, then resolve. She has had recent lab work ruling out any other pathology for her symptoms. Patient is wondering if there is a medication that she can take just when she is having symptoms.      Patient's last menstrual period was 2021 (approximate).  Menstrual History: frequency: every 28 days  Patient is sexually active  .  Using Nexplanon for contraception.   Health maintenance updated:  yes  STI infx testing offered:  Declined    Last PHQ-9 score on record =   PHQ-9 SCORE 2021   PHQ-9 Total Score MyChart 3 (Minimal depression)   PHQ-9 Total Score 3     Last GAD7 score on record =   ODILON-7 SCORE 2021   Total Score 3 (minimal anxiety)   Total Score 3         Problem list and histories reviewed & adjusted, as indicated.  Additional history: as documented.    Patient Active Problem List   Diagnosis     Allergy to insects and arachnids     Ingrowing nail     Epistaxis     Lumbosacral spondylosis without myelopathy     Papanicolaou smear of cervix with low grade squamous intraepithelial lesion (LGSIL)     ODILON (generalized anxiety disorder)     No past surgical history on file.   Social History     Tobacco Use     Smoking status: Never Smoker     Smokeless tobacco:  Never Used     Tobacco comment: non smoking home   Substance Use Topics     Alcohol use: No      Problem (# of Occurrences) Relation (Name,Age of Onset)    Cancer (1) Paternal Grandfather    Cancer - colorectal (1) Paternal Grandfather    Cerebrovascular Disease (1) Paternal Grandfather    Colorectal Cancer (1) Father    Diabetes (1) Maternal Grandfather    Hypertension (1) Maternal Grandfather    Multiple myeloma (1) Paternal Uncle    Unknown/Adopted (1) Father       Negative family history of: C.A.D., Breast Cancer, Prostate Cancer, Thyroid Disease            Current Outpatient Medications   Medication Sig     desogestrel-ethinyl estradiol (APRI) 0.15-30 MG-MCG tablet Take 1 tablet by mouth daily     ibuprofen (ADVIL/MOTRIN) 200 MG tablet Take 400 mg by mouth every 4 hours as needed for mild pain     sertraline (ZOLOFT) 25 MG tablet Take 1 tablet (25 mg) by mouth daily     Current Facility-Administered Medications   Medication     etonogestrel (IMPLANON/NEXPLANON) subdermal implant 68 mg     No Known Allergies    ROS:  CONSTITUTIONAL: NEGATIVE for fever, chills, change in weight  GI: NEGATIVE for nausea, abdominal pain, heartburn, or change in bowel habits  : NEGATIVE for unusual urinary or vaginal symptoms. Periods are regular.  NEURO: NEGATIVE for weakness, dizziness or paresthesias  HEME/ALLERGY/IMMUNE: NEGATIVE for bleeding problems  PSYCHIATRIC: NEGATIVE for changes in mood or affect  POSITIVE for fatigue and depression in week 2 of her cycle    OBJECTIVE:     /76 (BP Location: Left arm, Cuff Size: Adult Regular)   Wt 59.9 kg (132 lb)   LMP 01/19/2021 (Approximate)   Breastfeeding No   BMI 24.54 kg/m    Body mass index is 24.54 kg/m .    PHYSICAL EXAM:  Constitutional:  Appearance: Well nourished, well developed alert, in no acute distress  Chest:  Respiratory Effort:  Breathing unlabored.   Neurologic:  Mental Status:  Oriented X3.  Normal strength and tone, sensory exam grossly normal, mentation  intact and speech normal.    Psychiatric:  Mentation appears normal and affect normal/bright.     In-Clinic Test Results:  No results found for this or any previous visit (from the past 24 hour(s)).    ASSESSMENT/PLAN:                                                        ICD-10-CM    1. PMS (premenstrual syndrome)  N94.3 sertraline (ZOLOFT) 25 MG tablet       PLAN:    Will add cyclic antidepressants. Advised patient that she should discontinue either the Nexplanon or the COCs  Patient will consider and let us know.  Will see patient back for follow up in one month. Encouraged patient to call with any questions or concerns.      VALE Harris, CNM

## 2021-02-10 ASSESSMENT — PATIENT HEALTH QUESTIONNAIRE - PHQ9: SUM OF ALL RESPONSES TO PHQ QUESTIONS 1-9: 10

## 2021-02-10 ASSESSMENT — ANXIETY QUESTIONNAIRES: GAD7 TOTAL SCORE: 5

## 2021-02-23 ENCOUNTER — VIRTUAL VISIT (OUTPATIENT)
Dept: BEHAVIORAL HEALTH | Facility: CLINIC | Age: 26
End: 2021-02-23
Payer: COMMERCIAL

## 2021-02-23 DIAGNOSIS — F41.1 GAD (GENERALIZED ANXIETY DISORDER): Primary | ICD-10-CM

## 2021-02-23 PROCEDURE — 90834 PSYTX W PT 45 MINUTES: CPT | Mod: 95 | Performed by: MARRIAGE & FAMILY THERAPIST

## 2021-02-23 NOTE — PROGRESS NOTES
Telemedicine Visit: The patient's condition can be safely assessed and treated via synchronous audio and visual telemedicine encounter.      Reason for Telemedicine Visit: Services only offered telehealth    Originating Site (Patient Location): Patient's home    Distant Site (Provider Location): [facility]    Consent:  The patient/guardian has verbally consented to: the potential risks and benefits of telemedicine (video visit) versus in person care; bill my insurance or make self-payment for services provided; and responsibility for payment of non-covered services.     Mode of Communication:  Video Conference via [employer]    As the provider I attest to compliance with applicable laws and regulations related to telemedicine.    [facility] Primary Care: Integrated Behavioral Health  February 23rd, 2021      Behavioral Health Clinician Progress Note    Patient Name: Naty Pérez           Service Type:  Individual      Service Location:   Face to Face in Home / Community     Session Start Time: 4:05 pm  Session End Time: 4:53:pm      Session Length: 38 - 52      Attendees: Client    Visit Activities (Refresh list every visit): [facility] Only    Diagnostic Assessment Date: 8/25/2020  Treatment Plan Review Date: 4/5/2021  See Flowsheets for today's PHQ-9 and ODILON-7 results  Previous PHQ-9:   PHQ-9 SCORE 12/1/2020 1/5/2021 2/9/2021   PHQ-9 Total Score MyChart 10 (Moderate depression) 3 (Minimal depression) 10 (Moderate depression)   PHQ-9 Total Score 10 3 10     Previous ODILON-7:   ODILON-7 SCORE 12/1/2020 1/5/2021 2/9/2021   Total Score 8 (mild anxiety) 3 (minimal anxiety) 5 (mild anxiety)   Total Score 8 3 5       CHARLES LEVEL:  CHARLES Score (Last Two) 2/6/2020   CHARLES Raw Score 28   Activation Score 50   CHARLES Level 2       DATA  Extended Session (60+ minutes): No  Interactive Complexity: No  Crisis: No  [facility] Patient: No    Treatment Objective(s) Addressed in This Session:  Target  Behavior(s): PMS    Anxiety: will experience a reduction in anxiety, will develop more effective coping skills to manage anxiety symptoms, will develop healthy cognitive patterns and beliefs and will increase ability to function adaptively    Current Stressors / Issues:      Reviewed safety with Patient. Patient denies any current SI,plans or intentions. Processed the loss of her cat. Talked about grief. Pt wants to take a self-care day with all the upcoming things she needs to complete. Processed that and how that is good self-care. Discussed the negative thoughts in her head telling her it is not okay. Processed how far she has come and how 6 years ago she would of never thought about addressing her anxiety.    Progress on Treatment Objective(s) / Homework:  Minimal progress - PREPARATION (Decided to change - considering how); Intervened by negotiating a change plan and determining options / strategies for behavior change, identifying triggers, exploring social supports, and working towards setting a date to begin behavior change    Motivational Interviewing    MI Intervention: Expressed Empathy/Understanding, Supported Autonomy, Collaboration, Evocation, Permission to raise concern or advise, Open-ended questions and Reflections: simple and complex     Change Talk Expressed by the Patient: Desire to change Ability to change Reasons to change Need to change Committment to change Activation    Provider Response to Change Talk: E - Evoked more info from patient about behavior change, A - Affirmed patient's thoughts, decisions, or attempts at behavior change, R - Reflected patient's change talk and S - Summarized patient's change talk statements      Situation        Automatic Thoughts  Cognitive Distortions      Feelings        Behavior        Questioning Thoughts            Also provided psychoeducation about behavioral health condition, symptoms, and treatment options    Care Plan review completed:  Yes    Medication Review:  No current psychiatric medications prescribed    Medication Compliance:  NA    Changes in Health Issues:   None reported    Chemical Use Review:   Substance Use: Chemical use reviewed, no active concerns identified      Tobacco Use: No current tobacco use.      Assessment: Current Emotional / Mental Status (status of significant symptoms):  Risk status (Self / Other harm or suicidal ideation)  Patient denies a history of suicidal ideation, suicide attempts, self-injurious behavior, homicidal ideation, homicidal behavior and and other safety concerns  Patient denies current fears or concerns for personal safety.  Patient denies current or recent suicidal ideation or behaviors.  Patient denies current or recent homicidal ideation or behaviors.  Patient denies current or recent self injurious behavior or ideation.  Patient denies other safety concerns.  A safety and risk management plan has not been developed at this time, however patient was encouraged to call [place] [phone] should there be a change in any of these risk factors.    Appearance:   Appropriate   Eye Contact:   Good   Psychomotor Behavior: Normal   Attitude:   Cooperative   Orientation:   All  Speech   Rate / Production: Normal    Volume:  Normal   Mood:    Normal  Affect:    Appropriate   Thought Content:  Clear   Thought Form:  Coherent  Logical   Insight:    Good     Diagnoses:  1. ODILON (generalized anxiety disorder)        Collateral Reports Completed:  Not Applicable    Plan: (Homework, other):  Patient was given information about behavioral services and encouraged to schedule a follow up appointment with the clinic [facility] in 2 weeks.  She was also given Cognitive Behavioral Therapy skills to practice when experiencing anxiety.  CD Recommendations: No indications of CD issues.  XENIA Carrero, [facility]      ______________________________________________________________________    Integrated Primary Care Behavioral Health  Treatment Plan    Patient's Name: Naty Pérez  YOB: 1995    Date: 1/5/2021    DSM-V Diagnoses: 300.02 (F41.1) Generalized Anxiety Disorder  Psychosocial / Contextual Factors: job and relationships  WHODAS:   WHODAS 2.0 Total Score 8/25/2020   Total Score 21   Total Score MyChart 21         Referral / Collaboration:  Referral to another professional/service is not indicated at this time..    Anticipated number of session or this episode of care: 6-8      MeasurableTreatment Goal(s) related to diagnosis / functional impairment(s)  Goal 1: Patient will increase coping skills to lower anxiety    I will know I've met my goal when less anxious.      Objective #A (Patient Action)    Patient will use cognitive strategies identified in therapy to challenge anxious thoughts.  Status: Continued - Date(s):1/5/2021     Intervention(s)  [facility] will teach emotional recognition/identification. CBT skills.    Objective #B  Patient will use thought-stopping strategy daily to reduce intrusive thoughts.  Status: Continued - Date(s):1/5/2021     Intervention(s)  [facility] will assign homework STOP TECHnique.    Patient has reviewed and agreed to the above plan.      Vanita Negrete, LMFT  January 5, 2021

## 2021-03-09 ENCOUNTER — VIRTUAL VISIT (OUTPATIENT)
Dept: BEHAVIORAL HEALTH | Facility: CLINIC | Age: 26
End: 2021-03-09
Payer: COMMERCIAL

## 2021-03-09 DIAGNOSIS — F41.1 GAD (GENERALIZED ANXIETY DISORDER): Primary | ICD-10-CM

## 2021-03-09 PROCEDURE — 90834 PSYTX W PT 45 MINUTES: CPT | Mod: 95 | Performed by: MARRIAGE & FAMILY THERAPIST

## 2021-03-09 ASSESSMENT — ANXIETY QUESTIONNAIRES
1. FEELING NERVOUS, ANXIOUS, OR ON EDGE: SEVERAL DAYS
6. BECOMING EASILY ANNOYED OR IRRITABLE: SEVERAL DAYS
GAD7 TOTAL SCORE: 6
3. WORRYING TOO MUCH ABOUT DIFFERENT THINGS: SEVERAL DAYS
7. FEELING AFRAID AS IF SOMETHING AWFUL MIGHT HAPPEN: NOT AT ALL
4. TROUBLE RELAXING: SEVERAL DAYS
2. NOT BEING ABLE TO STOP OR CONTROL WORRYING: SEVERAL DAYS
GAD7 TOTAL SCORE: 6
7. FEELING AFRAID AS IF SOMETHING AWFUL MIGHT HAPPEN: NOT AT ALL
5. BEING SO RESTLESS THAT IT IS HARD TO SIT STILL: SEVERAL DAYS
GAD7 TOTAL SCORE: 6

## 2021-03-09 NOTE — PROGRESS NOTES
Telemedicine Visit: The patient's condition can be safely assessed and treated via synchronous audio and visual telemedicine encounter.      Reason for Telemedicine Visit: Services only offered telehealth    Originating Site (Patient Location): Patient's home    Distant Site (Provider Location): Provider Remote Setting    Consent:  The patient/guardian has verbally consented to: the potential risks and benefits of telemedicine (video visit) versus in person care; bill my insurance or make self-payment for services provided; and responsibility for payment of non-covered services.     Mode of Communication:  Video Conference via [employer]    As the provider I attest to compliance with applicable laws and regulations related to telemedicine.    [facility] Primary Care: Integrated Behavioral Health  March 9th, 2021      Behavioral Health Clinician Progress Note    Patient Name: Naty Pérez           Service Type:  Individual      Service Location:   Face to Face in Home / Community     Session Start Time: 4:00 pm  Session End Time: 4:45pm      Session Length: 38 - 52      Attendees: Client    Visit Activities (Refresh list every visit): [facility] Only    Diagnostic Assessment Date: 8/25/2020  Treatment Plan Review Date: 4/5/2021  See Flowsheets for today's PHQ-9 and ODILON-7 results  Previous PHQ-9:   PHQ-9 SCORE 1/5/2021 2/9/2021 3/9/2021   PHQ-9 Total Score MyChart 3 (Minimal depression) 10 (Moderate depression) 7 (Mild depression)   PHQ-9 Total Score 3 10 7     Previous ODILON-7:   ODILON-7 SCORE 1/5/2021 2/9/2021 3/9/2021   Total Score 3 (minimal anxiety) 5 (mild anxiety) 6 (mild anxiety)   Total Score 3 5 6       CHARLES LEVEL:  CHARLES Score (Last Two) 2/6/2020   CHARLES Raw Score 28   Activation Score 50   CHARLSE Level 2       DATA  Extended Session (60+ minutes): No  Interactive Complexity: No  Crisis: No  [facility] Patient: No    Treatment Objective(s) Addressed in This Session:  Target Behavior(s): PMS    Anxiety: will  experience a reduction in anxiety, will develop more effective coping skills to manage anxiety symptoms, will develop healthy cognitive patterns and beliefs and will increase ability to function adaptively    Current Stressors / Issues:      Reviewed safety with Patient. Patient denies any current SI,plans or intentions. Started medication yesterday. Feeling better. Seeing the light with spring coming and school ending.  Processed her brother coming home and setting up boundaries with him. Worked on adding self-care to her schedule ever day to help with the next two weeks being so busy.     Progress on Treatment Objective(s) / Homework:  Minimal progress - PREPARATION (Decided to change - considering how); Intervened by negotiating a change plan and determining options / strategies for behavior change, identifying triggers, exploring social supports, and working towards setting a date to begin behavior change    Motivational Interviewing    MI Intervention: Expressed Empathy/Understanding, Supported Autonomy, Collaboration, Evocation, Permission to raise concern or advise, Open-ended questions and Reflections: simple and complex     Change Talk Expressed by the Patient: Desire to change Ability to change Reasons to change Need to change Committment to change Activation    Provider Response to Change Talk: E - Evoked more info from patient about behavior change, A - Affirmed patient's thoughts, decisions, or attempts at behavior change, R - Reflected patient's change talk and S - Summarized patient's change talk statements      Situation        Automatic Thoughts  Cognitive Distortions      Feelings        Behavior        Questioning Thoughts            Also provided psychoeducation about behavioral health condition, symptoms, and treatment options    Care Plan review completed: Yes    Medication Review:  No current psychiatric medications prescribed    Medication Compliance:  NA    Changes in Health Issues:   None  reported    Chemical Use Review:   Substance Use: Chemical use reviewed, no active concerns identified      Tobacco Use: No current tobacco use.      Assessment: Current Emotional / Mental Status (status of significant symptoms):  Risk status (Self / Other harm or suicidal ideation)  Patient denies a history of suicidal ideation, suicide attempts, self-injurious behavior, homicidal ideation, homicidal behavior and and other safety concerns  Patient denies current fears or concerns for personal safety.  Patient denies current or recent suicidal ideation or behaviors.  Patient denies current or recent homicidal ideation or behaviors.  Patient denies current or recent self injurious behavior or ideation.  Patient denies other safety concerns.  A safety and risk management plan has not been developed at this time, however patient was encouraged to call [place] [phone] should there be a change in any of these risk factors.    Appearance:   Appropriate   Eye Contact:   Good   Psychomotor Behavior: Normal   Attitude:   Cooperative   Orientation:   All  Speech   Rate / Production: Normal    Volume:  Normal   Mood:    Normal  Affect:    Appropriate   Thought Content:  Clear   Thought Form:  Coherent  Logical   Insight:    Good     Diagnoses:  1. ODILON (generalized anxiety disorder)        Collateral Reports Completed:  Not Applicable    Plan: (Homework, other):  Patient was given information about behavioral services and encouraged to schedule a follow up appointment with the clinic [facility] in 2 weeks.  She was also given Cognitive Behavioral Therapy skills to practice when experiencing anxiety.  CD Recommendations: No indications of CD issues.  XENIA Carrero, [facility]      ______________________________________________________________________    Integrated Primary Care Behavioral Health Treatment Plan    Patient's Name: Naty Pérez  YOB: 1995    Date: 1/5/2021    DSM-V Diagnoses: 300.02 (F41.1) Generalized  Anxiety Disorder  Psychosocial / Contextual Factors: job and relationships  WHODAS:   WHODAS 2.0 Total Score 8/25/2020   Total Score 21   Total Score MyChart 21         Referral / Collaboration:  Referral to another professional/service is not indicated at this time..    Anticipated number of session or this episode of care: 6-8      MeasurableTreatment Goal(s) related to diagnosis / functional impairment(s)  Goal 1: Patient will increase coping skills to lower anxiety    I will know I've met my goal when less anxious.      Objective #A (Patient Action)    Patient will use cognitive strategies identified in therapy to challenge anxious thoughts.  Status: Continued - Date(s):1/5/2021     Intervention(s)  [facility] will teach emotional recognition/identification. CBT skills.    Objective #B  Patient will use thought-stopping strategy daily to reduce intrusive thoughts.  Status: Continued - Date(s):1/5/2021     Intervention(s)  [facility] will assign homework STOP TECHnique.    Patient has reviewed and agreed to the above plan.      XENIA Adan  January 5, 2021

## 2021-03-10 ASSESSMENT — PATIENT HEALTH QUESTIONNAIRE - PHQ9: SUM OF ALL RESPONSES TO PHQ QUESTIONS 1-9: 7

## 2021-03-10 ASSESSMENT — ANXIETY QUESTIONNAIRES: GAD7 TOTAL SCORE: 6

## 2021-03-30 ENCOUNTER — VIRTUAL VISIT (OUTPATIENT)
Dept: BEHAVIORAL HEALTH | Facility: CLINIC | Age: 26
End: 2021-03-30
Payer: COMMERCIAL

## 2021-03-30 DIAGNOSIS — F41.1 GAD (GENERALIZED ANXIETY DISORDER): Primary | ICD-10-CM

## 2021-03-30 PROCEDURE — 90837 PSYTX W PT 60 MINUTES: CPT | Mod: 95 | Performed by: MARRIAGE & FAMILY THERAPIST

## 2021-03-30 NOTE — PROGRESS NOTES
Telemedicine Visit: The patient's condition can be safely assessed and treated via synchronous audio and visual telemedicine encounter.      Reason for Telemedicine Visit: Services only offered telehealth    Originating Site (Patient Location): Patient's home    Distant Site (Provider Location): Provider Remote Setting    Consent:  The patient/guardian has verbally consented to: the potential risks and benefits of telemedicine (video visit) versus in person care; bill my insurance or make self-payment for services provided; and responsibility for payment of non-covered services.     Mode of Communication:  Video Conference via [employer]    As the provider I attest to compliance with applicable laws and regulations related to telemedicine.    [facility] Primary Care: Integrated Behavioral Health  March 30th, 2021      Behavioral Health Clinician Progress Note    Patient Name: Naty Pérez           Service Type:  Individual      Service Location:   Face to Face in Home / Community     Session Start Time: 4:00 pm  Session End Time: 4:58pm      Session Length: 53 - 60      Attendees: Client    Visit Activities (Refresh list every visit): [facility] Only    Diagnostic Assessment Date: 8/25/2020  Treatment Plan Review Date: 4/5/2021  See Flowsheets for today's PHQ-9 and ODILON-7 results  Previous PHQ-9:   PHQ-9 SCORE 1/5/2021 2/9/2021 3/9/2021   PHQ-9 Total Score MyChart 3 (Minimal depression) 10 (Moderate depression) 7 (Mild depression)   PHQ-9 Total Score 3 10 7     Previous ODILON-7:   ODILON-7 SCORE 1/5/2021 2/9/2021 3/9/2021   Total Score 3 (minimal anxiety) 5 (mild anxiety) 6 (mild anxiety)   Total Score 3 5 6       CHARLES LEVEL:  CHARLES Score (Last Two) 2/6/2020   CHARLES Raw Score 28   Activation Score 50   CHARLES Level 2       DATA  Extended Session (60+ minutes): No  Interactive Complexity: No  Crisis: No  [facility] Patient: No    Treatment Objective(s) Addressed in This Session:  Target Behavior(s): PMS    Anxiety: will  experience a reduction in anxiety, will develop more effective coping skills to manage anxiety symptoms, will develop healthy cognitive patterns and beliefs and will increase ability to function adaptively    Current Stressors / Issues:      Reviewed safety with Patient. Patient denies any current SI,plans or intentions. Processed the family fight and how it brought up past struggling's in the family. Processed HALT and ways to fight fare. Talked about how setting boundaries is not running away.   Progress on Treatment Objective(s) / Homework:  Minimal progress - PREPARATION (Decided to change - considering how); Intervened by negotiating a change plan and determining options / strategies for behavior change, identifying triggers, exploring social supports, and working towards setting a date to begin behavior change    Motivational Interviewing    MI Intervention: Expressed Empathy/Understanding, Supported Autonomy, Collaboration, Evocation, Permission to raise concern or advise, Open-ended questions and Reflections: simple and complex     Change Talk Expressed by the Patient: Desire to change Ability to change Reasons to change Need to change Committment to change Activation    Provider Response to Change Talk: E - Evoked more info from patient about behavior change, A - Affirmed patient's thoughts, decisions, or attempts at behavior change, R - Reflected patient's change talk and S - Summarized patient's change talk statements      Situation        Automatic Thoughts  Cognitive Distortions      Feelings        Behavior        Questioning Thoughts            Also provided psychoeducation about behavioral health condition, symptoms, and treatment options    Care Plan review completed: Yes    Medication Review:  No current psychiatric medications prescribed    Medication Compliance:  NA    Changes in Health Issues:   None reported    Chemical Use Review:   Substance Use: Chemical use reviewed, no active concerns  identified      Tobacco Use: No current tobacco use.      Assessment: Current Emotional / Mental Status (status of significant symptoms):  Risk status (Self / Other harm or suicidal ideation)  Patient denies a history of suicidal ideation, suicide attempts, self-injurious behavior, homicidal ideation, homicidal behavior and and other safety concerns  Patient denies current fears or concerns for personal safety.  Patient denies current or recent suicidal ideation or behaviors.  Patient denies current or recent homicidal ideation or behaviors.  Patient denies current or recent self injurious behavior or ideation.  Patient denies other safety concerns.  A safety and risk management plan has not been developed at this time, however patient was encouraged to call [place] [phone] should there be a change in any of these risk factors.    Appearance:   Appropriate   Eye Contact:   Good   Psychomotor Behavior: Normal   Attitude:   Cooperative   Orientation:   All  Speech   Rate / Production: Normal    Volume:  Normal   Mood:    Normal  Affect:    Appropriate   Thought Content:  Clear   Thought Form:  Coherent  Logical   Insight:    Good     Diagnoses:  1. ODILON (generalized anxiety disorder)        Collateral Reports Completed:  Not Applicable    Plan: (Homework, other):  Patient was given information about behavioral services and encouraged to schedule a follow up appointment with the clinic [facility] in 2 weeks.  She was also given Cognitive Behavioral Therapy skills to practice when experiencing anxiety.  CD Recommendations: No indications of CD issues.  XENIA Carrero, [facility]      ______________________________________________________________________    Integrated Primary Care Behavioral Health Treatment Plan    Patient's Name: Naty Pérez  YOB: 1995    Date: 1/5/2021    DSM-V Diagnoses: 300.02 (F41.1) Generalized Anxiety Disorder  Psychosocial / Contextual Factors: job and relationships  WHODAS:    WHODAS 2.0 Total Score 8/25/2020   Total Score 21   Total Score MyChart 21         Referral / Collaboration:  Referral to another professional/service is not indicated at this time..    Anticipated number of session or this episode of care: 6-8      MeasurableTreatment Goal(s) related to diagnosis / functional impairment(s)  Goal 1: Patient will increase coping skills to lower anxiety    I will know I've met my goal when less anxious.      Objective #A (Patient Action)    Patient will use cognitive strategies identified in therapy to challenge anxious thoughts.  Status: Continued - Date(s):1/5/2021     Intervention(s)  [facility] will teach emotional recognition/identification. CBT skills.    Objective #B  Patient will use thought-stopping strategy daily to reduce intrusive thoughts.  Status: Continued - Date(s):1/5/2021     Intervention(s)  [facility] will assign homework STOP TECHnique.    Patient has reviewed and agreed to the above plan.      Vanita Negrete, LMFT  January 5, 2021

## 2021-03-31 ENCOUNTER — OFFICE VISIT (OUTPATIENT)
Dept: OBGYN | Facility: CLINIC | Age: 26
End: 2021-03-31
Payer: COMMERCIAL

## 2021-03-31 VITALS — BODY MASS INDEX: 24.54 KG/M2 | WEIGHT: 132 LBS | DIASTOLIC BLOOD PRESSURE: 76 MMHG | SYSTOLIC BLOOD PRESSURE: 110 MMHG

## 2021-03-31 DIAGNOSIS — N94.3 PMS (PREMENSTRUAL SYNDROME): Primary | ICD-10-CM

## 2021-03-31 PROCEDURE — 99213 OFFICE O/P EST LOW 20 MIN: CPT | Performed by: ADVANCED PRACTICE MIDWIFE

## 2021-03-31 RX ORDER — SERTRALINE HYDROCHLORIDE 25 MG/1
25 TABLET, FILM COATED ORAL DAILY
Qty: 30 TABLET | Refills: 3 | Status: SHIPPED | OUTPATIENT
Start: 2021-03-31 | End: 2021-08-02

## 2021-03-31 NOTE — PROGRESS NOTES
SUBJECTIVE:                                                   Naty Pérez is a 25 year old who presents to clinic today for the following health issue(s):  Patient presents with:  Recheck Medication      HPI:  Naty was seen in 2021 for premenstrual syndrome, and was placed on sertraline to take cyclically. She states the medication has been very helpful and would like to continue.     Patient's last menstrual period was 2021.  Menstrual History: frequency: every 28-30 days  Patient is sexually active  .  Using nexplanon for contraception.   Health maintenance updated:  yes  STI infx testing offered:  Declined    Last PHQ-9 score on record =   PHQ-9 SCORE 3/9/2021   PHQ-9 Total Score MyChart 7 (Mild depression)   PHQ-9 Total Score 7     Last GAD7 score on record =   ODILON-7 SCORE 3/9/2021   Total Score 6 (mild anxiety)   Total Score 6         Problem list and histories reviewed & adjusted, as indicated.  Additional history: as documented.    Patient Active Problem List   Diagnosis     Allergy to insects and arachnids     Ingrowing nail     Epistaxis     Lumbosacral spondylosis without myelopathy     Papanicolaou smear of cervix with low grade squamous intraepithelial lesion (LGSIL)     ODILON (generalized anxiety disorder)     No past surgical history on file.   Social History     Tobacco Use     Smoking status: Never Smoker     Smokeless tobacco: Never Used     Tobacco comment: non smoking home   Substance Use Topics     Alcohol use: No      Problem (# of Occurrences) Relation (Name,Age of Onset)    Cancer (1) Paternal Grandfather    Cancer - colorectal (1) Paternal Grandfather    Cerebrovascular Disease (1) Paternal Grandfather    Colorectal Cancer (1) Father    Diabetes (1) Maternal Grandfather    Hypertension (1) Maternal Grandfather    Multiple myeloma (1) Paternal Uncle    Unknown/Adopted (1) Father       Negative family history of: C.A.D., Breast Cancer, Prostate Cancer, Thyroid Disease             Current Outpatient Medications   Medication Sig     sertraline (ZOLOFT) 25 MG tablet Take 1 tablet (25 mg) by mouth daily     sertraline (ZOLOFT) 25 MG tablet Take 1 tablet (25 mg) by mouth daily     desogestrel-ethinyl estradiol (APRI) 0.15-30 MG-MCG tablet Take 1 tablet by mouth daily (Patient not taking: Reported on 3/31/2021)     ibuprofen (ADVIL/MOTRIN) 200 MG tablet Take 400 mg by mouth every 4 hours as needed for mild pain     Current Facility-Administered Medications   Medication     etonogestrel (IMPLANON/NEXPLANON) subdermal implant 68 mg     No Known Allergies    ROS:  CONSTITUTIONAL: NEGATIVE for fever, chills, change in weight  GI: NEGATIVE for nausea, abdominal pain, heartburn, or change in bowel habits  : NEGATIVE for unusual urinary or vaginal symptoms. Periods are regular.  NEURO: NEGATIVE for weakness, dizziness or paresthesias  HEME/ALLERGY/IMMUNE: NEGATIVE for bleeding problems  PSYCHIATRIC: NEGATIVE for changes in mood or affect    OBJECTIVE:     /76 (BP Location: Left arm, Cuff Size: Adult Regular)   Wt 59.9 kg (132 lb)   LMP 03/21/2021   BMI 24.54 kg/m    Body mass index is 24.54 kg/m .    PHYSICAL EXAM:  Constitutional:  Appearance: Well nourished, well developed alert, in no acute distress  Chest:  Respiratory Effort:  Breathing unlabored.   Neurologic:  Mental Status:  Oriented X3.  Normal strength and tone, sensory exam grossly normal, mentation intact and speech normal.    Psychiatric:  Mentation appears normal and affect normal/bright.     In-Clinic Test Results:  No results found for this or any previous visit (from the past 24 hour(s)).    ASSESSMENT/PLAN:                                                        ICD-10-CM    1. PMS (premenstrual syndrome)  N94.3 sertraline (ZOLOFT) 25 MG tablet       PLAN:    Will continue zoloft for next 6 months, and plan to check in with Naty after that point. Encouraged patient to call with any questions or concerns.      Treasure  Khanh, VALE, CNM

## 2021-03-31 NOTE — NURSING NOTE
"Chief Complaint   Patient presents with     Recheck Medication       Initial /76 (BP Location: Left arm, Cuff Size: Adult Regular)   Wt 59.9 kg (132 lb)   LMP 2021   BMI 24.54 kg/m   Estimated body mass index is 24.54 kg/m  as calculated from the following:    Height as of 20: 1.562 m (5' 1.5\").    Weight as of this encounter: 59.9 kg (132 lb).  BP completed using cuff size: regular    Questioned patient about current smoking habits.  Pt. has never smoked.          The following HM Due: NONE  Dinorah Alvarez CMA      "

## 2021-04-13 ENCOUNTER — VIRTUAL VISIT (OUTPATIENT)
Dept: BEHAVIORAL HEALTH | Facility: CLINIC | Age: 26
End: 2021-04-13
Payer: COMMERCIAL

## 2021-04-13 DIAGNOSIS — F41.1 GAD (GENERALIZED ANXIETY DISORDER): Primary | ICD-10-CM

## 2021-04-13 PROCEDURE — 90837 PSYTX W PT 60 MINUTES: CPT | Mod: 95 | Performed by: MARRIAGE & FAMILY THERAPIST

## 2021-04-13 ASSESSMENT — ANXIETY QUESTIONNAIRES
GAD7 TOTAL SCORE: 10
4. TROUBLE RELAXING: MORE THAN HALF THE DAYS
GAD7 TOTAL SCORE: 10
6. BECOMING EASILY ANNOYED OR IRRITABLE: SEVERAL DAYS
7. FEELING AFRAID AS IF SOMETHING AWFUL MIGHT HAPPEN: SEVERAL DAYS
5. BEING SO RESTLESS THAT IT IS HARD TO SIT STILL: SEVERAL DAYS
3. WORRYING TOO MUCH ABOUT DIFFERENT THINGS: MORE THAN HALF THE DAYS
2. NOT BEING ABLE TO STOP OR CONTROL WORRYING: SEVERAL DAYS
7. FEELING AFRAID AS IF SOMETHING AWFUL MIGHT HAPPEN: SEVERAL DAYS
1. FEELING NERVOUS, ANXIOUS, OR ON EDGE: MORE THAN HALF THE DAYS
GAD7 TOTAL SCORE: 10

## 2021-04-13 ASSESSMENT — PATIENT HEALTH QUESTIONNAIRE - PHQ9
SUM OF ALL RESPONSES TO PHQ QUESTIONS 1-9: 8
SUM OF ALL RESPONSES TO PHQ QUESTIONS 1-9: 8
10. IF YOU CHECKED OFF ANY PROBLEMS, HOW DIFFICULT HAVE THESE PROBLEMS MADE IT FOR YOU TO DO YOUR WORK, TAKE CARE OF THINGS AT HOME, OR GET ALONG WITH OTHER PEOPLE: SOMEWHAT DIFFICULT
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
## 2021-04-14 ASSESSMENT — PATIENT HEALTH QUESTIONNAIRE - PHQ9: SUM OF ALL RESPONSES TO PHQ QUESTIONS 1-9: 8

## 2021-04-14 ASSESSMENT — ANXIETY QUESTIONNAIRES: GAD7 TOTAL SCORE: 10

## 2021-04-27 ENCOUNTER — VIRTUAL VISIT (OUTPATIENT)
Dept: BEHAVIORAL HEALTH | Facility: CLINIC | Age: 26
End: 2021-04-27
Payer: COMMERCIAL

## 2021-04-27 DIAGNOSIS — F41.1 GAD (GENERALIZED ANXIETY DISORDER): Primary | ICD-10-CM

## 2021-04-27 DIAGNOSIS — F33.1 MODERATE EPISODE OF RECURRENT MAJOR DEPRESSIVE DISORDER (H): ICD-10-CM

## 2021-04-27 PROCEDURE — 90837 PSYTX W PT 60 MINUTES: CPT | Mod: 95 | Performed by: MARRIAGE & FAMILY THERAPIST

## 2021-04-27 ASSESSMENT — ANXIETY QUESTIONNAIRES
GAD7 TOTAL SCORE: 10
3. WORRYING TOO MUCH ABOUT DIFFERENT THINGS: MORE THAN HALF THE DAYS
5. BEING SO RESTLESS THAT IT IS HARD TO SIT STILL: SEVERAL DAYS
1. FEELING NERVOUS, ANXIOUS, OR ON EDGE: MORE THAN HALF THE DAYS
GAD7 TOTAL SCORE: 10
7. FEELING AFRAID AS IF SOMETHING AWFUL MIGHT HAPPEN: SEVERAL DAYS
GAD7 TOTAL SCORE: 10
2. NOT BEING ABLE TO STOP OR CONTROL WORRYING: MORE THAN HALF THE DAYS
6. BECOMING EASILY ANNOYED OR IRRITABLE: SEVERAL DAYS
4. TROUBLE RELAXING: SEVERAL DAYS
7. FEELING AFRAID AS IF SOMETHING AWFUL MIGHT HAPPEN: SEVERAL DAYS

## 2021-04-27 ASSESSMENT — PATIENT HEALTH QUESTIONNAIRE - PHQ9
SUM OF ALL RESPONSES TO PHQ QUESTIONS 1-9: 11
10. IF YOU CHECKED OFF ANY PROBLEMS, HOW DIFFICULT HAVE THESE PROBLEMS MADE IT FOR YOU TO DO YOUR WORK, TAKE CARE OF THINGS AT HOME, OR GET ALONG WITH OTHER PEOPLE: VERY DIFFICULT
SUM OF ALL RESPONSES TO PHQ QUESTIONS 1-9: 11
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
## 2021-04-28 ASSESSMENT — ANXIETY QUESTIONNAIRES: GAD7 TOTAL SCORE: 10

## 2021-04-28 ASSESSMENT — PATIENT HEALTH QUESTIONNAIRE - PHQ9: SUM OF ALL RESPONSES TO PHQ QUESTIONS 1-9: 11

## 2021-05-11 ENCOUNTER — VIRTUAL VISIT (OUTPATIENT)
Dept: BEHAVIORAL HEALTH | Facility: CLINIC | Age: 26
End: 2021-05-11
Payer: COMMERCIAL

## 2021-05-11 DIAGNOSIS — F33.1 MODERATE EPISODE OF RECURRENT MAJOR DEPRESSIVE DISORDER (H): ICD-10-CM

## 2021-05-11 DIAGNOSIS — F41.1 GAD (GENERALIZED ANXIETY DISORDER): Primary | ICD-10-CM

## 2021-05-11 PROCEDURE — 90837 PSYTX W PT 60 MINUTES: CPT | Mod: 95 | Performed by: MARRIAGE & FAMILY THERAPIST

## 2021-05-11 NOTE — PROGRESS NOTES
Telemedicine Visit: The patient's condition can be safely assessed and treated via synchronous audio and visual telemedicine encounter.      Reason for Telemedicine Visit: Services only offered telehealth    Originating Site (Patient Location): Patient's home    Distant Site (Provider Location): [facility]    Consent:  The patient/guardian has verbally consented to: the potential risks and benefits of telemedicine (video visit) versus in person care; bill my insurance or make self-payment for services provided; and responsibility for payment of non-covered services.     Mode of Communication:  Video Conference via [employer]    As the provider I attest to compliance with applicable laws and regulations related to telemedicine.    [facility] Primary Care: Integrated Behavioral Health  May 11th , 2021      Behavioral Health Clinician Progress Note    Patient Name: Naty Pérez           Service Type:  Individual      Service Location:   Face to Face in Home / Community     Session Start Time: 4:00 pm  Session End Time: 5:01pm      Session Length: 53 - 60      Attendees: Client    Visit Activities (Refresh list every visit): [facility] Only    Diagnostic Assessment Date: 8/25/2020  Treatment Plan Review Date: 6/13/21  See Flowsheets for today's PHQ-9 and ODILON-7 results  Previous PHQ-9:   PHQ-9 SCORE 3/9/2021 4/13/2021 4/27/2021   PHQ-9 Total Score MyChart 7 (Mild depression) 8 (Mild depression) 11 (Moderate depression)   PHQ-9 Total Score 7 8 11     Previous ODILON-7:   ODILON-7 SCORE 3/9/2021 4/13/2021 4/27/2021   Total Score 6 (mild anxiety) 10 (moderate anxiety) 10 (moderate anxiety)   Total Score 6 10 10       CHARLES LEVEL:  CHARLES Score (Last Two) 2/6/2020   CHARLES Raw Score 28   Activation Score 50   CHARLES Level 2       DATA  Extended Session (60+ minutes): No  Interactive Complexity: No  Crisis: No  [facility] Patient: No    Treatment Objective(s) Addressed in This Session:  Target Behavior(s):  "PMS    Anxiety: will experience a reduction in anxiety, will develop more effective coping skills to manage anxiety symptoms, will develop healthy cognitive patterns and beliefs and will increase ability to function adaptively    Current Stressors / Issues:      Reviewed safety with Patient. Patient denies any current SI,plans or intentions. \"No, none at all.\"   Continued to work on communication skills. Worked on I statements and feeling words. Talked about ways to feel validated and heard. Discussed this in different relationships- her mom and with her boyfriend.     Progress on Treatment Objective(s) / Homework:  Minimal progress - PREPARATION (Decided to change - considering how); Intervened by negotiating a change plan and determining options / strategies for behavior change, identifying triggers, exploring social supports, and working towards setting a date to begin behavior change    Motivational Interviewing    MI Intervention: Expressed Empathy/Understanding, Supported Autonomy, Collaboration, Evocation, Permission to raise concern or advise, Open-ended questions and Reflections: simple and complex     Change Talk Expressed by the Patient: Desire to change Ability to change Reasons to change Need to change Committment to change Activation    Provider Response to Change Talk: E - Evoked more info from patient about behavior change, A - Affirmed patient's thoughts, decisions, or attempts at behavior change, R - Reflected patient's change talk and S - Summarized patient's change talk statements      Situation        Automatic Thoughts  Cognitive Distortions      Feelings        Behavior        Questioning Thoughts            Also provided psychoeducation about behavioral health condition, symptoms, and treatment options    Care Plan review completed: Yes    Medication Review:  No current psychiatric medications prescribed    Medication Compliance:  NA    Changes in Health Issues:   None reported    Chemical " Use Review:   Substance Use: Chemical use reviewed, no active concerns identified      Tobacco Use: No current tobacco use.      Assessment: Current Emotional / Mental Status (status of significant symptoms):  Risk status (Self / Other harm or suicidal ideation)  Patient denies a history of suicidal ideation, suicide attempts, self-injurious behavior, homicidal ideation, homicidal behavior and and other safety concerns  Patient denies current fears or concerns for personal safety.  Patient denies current or recent suicidal ideation or behaviors.  Patient denies current or recent homicidal ideation or behaviors.  Patient denies current or recent self injurious behavior or ideation.  Patient denies other safety concerns.  A safety and risk management plan has not been developed at this time, however patient was encouraged to call [place] [phone] should there be a change in any of these risk factors.    Appearance:   Appropriate   Eye Contact:   Good   Psychomotor Behavior: Normal   Attitude:   Cooperative   Orientation:   All  Speech   Rate / Production: Normal    Volume:  Normal   Mood:    Normal  Affect:    Appropriate   Thought Content:  Clear   Thought Form:  Coherent  Logical   Insight:    Good     Diagnoses:  1. ODILON (generalized anxiety disorder)    2. Moderate episode of recurrent major depressive disorder (H)        Collateral Reports Completed:  Not Applicable    Plan: (Homework, other):  Patient was given information about behavioral services and encouraged to schedule a follow up appointment with the clinic [facility] in 2 weeks.  She was also given Cognitive Behavioral Therapy skills to practice when experiencing anxiety.  CD Recommendations: No indications of CD issues.  XENIA Carrero, [facility]      ______________________________________________________________________    Integrated Primary Care Behavioral Health Treatment Plan    Patient's Name: Naty Pérez  YOB: 1995    Date:  4/13/21    DSM-V Diagnoses: 296.32 (F33.1) Major Depressive Disorder, Recurrent Episode, Moderate With anxious distress or 300.02 (F41.1) Generalized Anxiety Disorder  Psychosocial / Contextual Factors: job and relationships  WHODAS:   WHODAS 2.0 Total Score 8/25/2020   Total Score 21   Total Score MyChart 21         Referral / Collaboration:  Referral to another professional/service is not indicated at this time..    Anticipated number of session or this episode of care: ongoing      MeasurableTreatment Goal(s) related to diagnosis / functional impairment(s)  Goal 1: Patient will increase coping skills to lower anxiety    I will know I've met my goal when less anxious.      Objective #A (Patient Action)    Patient will use cognitive strategies identified in therapy to challenge anxious thoughts.  Status: Continued - Date(s): 4/13/21    Intervention(s)  [facility] will teach emotional recognition/identification. CBT skills.    Objective #B  Patient will use cognitive strategies identified in therapy to challenge anxious thoughts.  Status: Continued - Date(s): 4/13/21    Intervention(s)  [facility] will teach about healthy boundaries. Conflict managment with family fair fighting.    Patient has reviewed and agreed to the above plan.      Vanita Negrete, LMFT  April 13th, 2021

## 2021-05-12 ENCOUNTER — MYC MEDICAL ADVICE (OUTPATIENT)
Dept: OBGYN | Facility: CLINIC | Age: 26
End: 2021-05-12

## 2021-06-08 ENCOUNTER — VIRTUAL VISIT (OUTPATIENT)
Dept: BEHAVIORAL HEALTH | Facility: CLINIC | Age: 26
End: 2021-06-08
Payer: COMMERCIAL

## 2021-06-08 DIAGNOSIS — F41.1 GAD (GENERALIZED ANXIETY DISORDER): ICD-10-CM

## 2021-06-08 DIAGNOSIS — F33.1 MODERATE EPISODE OF RECURRENT MAJOR DEPRESSIVE DISORDER (H): Primary | ICD-10-CM

## 2021-06-08 PROCEDURE — 90837 PSYTX W PT 60 MINUTES: CPT | Mod: 95 | Performed by: MARRIAGE & FAMILY THERAPIST

## 2021-06-08 ASSESSMENT — ANXIETY QUESTIONNAIRES
2. NOT BEING ABLE TO STOP OR CONTROL WORRYING: MORE THAN HALF THE DAYS
3. WORRYING TOO MUCH ABOUT DIFFERENT THINGS: MORE THAN HALF THE DAYS
7. FEELING AFRAID AS IF SOMETHING AWFUL MIGHT HAPPEN: SEVERAL DAYS
GAD7 TOTAL SCORE: 11
7. FEELING AFRAID AS IF SOMETHING AWFUL MIGHT HAPPEN: SEVERAL DAYS
GAD7 TOTAL SCORE: 11
1. FEELING NERVOUS, ANXIOUS, OR ON EDGE: SEVERAL DAYS
6. BECOMING EASILY ANNOYED OR IRRITABLE: MORE THAN HALF THE DAYS
5. BEING SO RESTLESS THAT IT IS HARD TO SIT STILL: SEVERAL DAYS
4. TROUBLE RELAXING: MORE THAN HALF THE DAYS
GAD7 TOTAL SCORE: 11

## 2021-06-08 ASSESSMENT — PATIENT HEALTH QUESTIONNAIRE - PHQ9
SUM OF ALL RESPONSES TO PHQ QUESTIONS 1-9: 13
10. IF YOU CHECKED OFF ANY PROBLEMS, HOW DIFFICULT HAVE THESE PROBLEMS MADE IT FOR YOU TO DO YOUR WORK, TAKE CARE OF THINGS AT HOME, OR GET ALONG WITH OTHER PEOPLE: VERY DIFFICULT
SUM OF ALL RESPONSES TO PHQ QUESTIONS 1-9: 13

## 2021-06-08 NOTE — PROGRESS NOTES
Telemedicine Visit: The patient's condition can be safely assessed and treated via synchronous audio and visual telemedicine encounter.      Reason for Telemedicine Visit: Services only offered telehealth    Originating Site (Patient Location): Patient's home    Distant Site (Provider Location): [facility]    Consent:  The patient/guardian has verbally consented to: the potential risks and benefits of telemedicine (video visit) versus in person care; bill my insurance or make self-payment for services provided; and responsibility for payment of non-covered services.     Mode of Communication:  Video Conference via [employer]    As the provider I attest to compliance with applicable laws and regulations related to telemedicine.    [facility] Primary Care: Integrated Behavioral Health  June 8th , 2021      Behavioral Health Clinician Progress Note    Patient Name: Naty Pérez           Service Type:  Individual      Service Location:   Face to Face in Home / Community     Session Start Time: 5:01 pm  Session End Time:6:00 pm      Session Length: 53 - 60      Attendees: Client    Visit Activities (Refresh list every visit): [facility] Only    Diagnostic Assessment Date: 8/25/2020  Treatment Plan Review Date: 6/13/21  See Flowsheets for today's PHQ-9 and ODILON-7 results  Previous PHQ-9:   PHQ-9 SCORE 4/13/2021 4/27/2021 6/8/2021   PHQ-9 Total Score MyChart 8 (Mild depression) 11 (Moderate depression) 13 (Moderate depression)   PHQ-9 Total Score 8 11 13     Previous ODILON-7:   ODILON-7 SCORE 4/13/2021 4/27/2021 6/8/2021   Total Score 10 (moderate anxiety) 10 (moderate anxiety) 11 (moderate anxiety)   Total Score 10 10 11       CHARLES LEVEL:  CHARLES Score (Last Two) 2/6/2020   CHARLES Raw Score 28   Activation Score 50   CHARLES Level 2       DATA  Extended Session (60+ minutes): No  Interactive Complexity: No  Crisis: No  [facility] Patient: No    Treatment Objective(s) Addressed in This Session:  Target  "Behavior(s): PMS    Anxiety: will experience a reduction in anxiety, will develop more effective coping skills to manage anxiety symptoms, will develop healthy cognitive patterns and beliefs and will increase ability to function adaptively    Current Stressors / Issues:  Reviewed safety with Patient. Patient denies any current SI,plans or intentions. \"No, none at all.\"   Processed school just stopping with no warning. Let pt process the sudden stop. Talked about ways to still reach out and have some closure.  Processed her recent job interview and her negative cognitive errors.   Processed family relationship and how she wishes she was closure to her dad's family. Processed her feelings be okay of jealousy and sadness. Discussed how she would like to try to connect with them and be closure.        Progress on Treatment Objective(s) / Homework:  Minimal progress - PREPARATION (Decided to change - considering how); Intervened by negotiating a change plan and determining options / strategies for behavior change, identifying triggers, exploring social supports, and working towards setting a date to begin behavior change    Motivational Interviewing    MI Intervention: Expressed Empathy/Understanding, Supported Autonomy, Collaboration, Evocation, Permission to raise concern or advise, Open-ended questions and Reflections: simple and complex     Change Talk Expressed by the Patient: Desire to change Ability to change Reasons to change Need to change Committment to change Activation    Provider Response to Change Talk: E - Evoked more info from patient about behavior change, A - Affirmed patient's thoughts, decisions, or attempts at behavior change, R - Reflected patient's change talk and S - Summarized patient's change talk statements      Situation        Automatic Thoughts  Cognitive Distortions      Feelings        Behavior        Questioning Thoughts            Also provided psychoeducation about behavioral health " condition, symptoms, and treatment options    Care Plan review completed: Yes    Medication Review:  No current psychiatric medications prescribed    Medication Compliance:  NA    Changes in Health Issues:   None reported    Chemical Use Review:   Substance Use: Chemical use reviewed, no active concerns identified      Tobacco Use: No current tobacco use.      Assessment: Current Emotional / Mental Status (status of significant symptoms):  Risk status (Self / Other harm or suicidal ideation)  Patient denies a history of suicidal ideation, suicide attempts, self-injurious behavior, homicidal ideation, homicidal behavior and and other safety concerns  Patient denies current fears or concerns for personal safety.  Patient denies current or recent suicidal ideation or behaviors.  Patient denies current or recent homicidal ideation or behaviors.  Patient denies current or recent self injurious behavior or ideation.  Patient denies other safety concerns.  A safety and risk management plan has not been developed at this time, however patient was encouraged to call [place] [phone] should there be a change in any of these risk factors.    Appearance:   Appropriate   Eye Contact:   Good   Psychomotor Behavior: Normal   Attitude:   Cooperative   Orientation:   All  Speech   Rate / Production: Normal    Volume:  Normal   Mood:    Normal  Affect:    Appropriate   Thought Content:  Clear   Thought Form:  Coherent  Logical   Insight:    Good     Diagnoses:  1. Moderate episode of recurrent major depressive disorder (H)    2. ODILON (generalized anxiety disorder)        Collateral Reports Completed:  Not Applicable    Plan: (Homework, other):  Patient was given information about behavioral services and encouraged to schedule a follow up appointment with the clinic [facility] in 2 weeks.  She was also given Cognitive Behavioral Therapy skills to practice when experiencing anxiety.  CD Recommendations: No indications of CD issues.  Vanita  XENIA Negrete, [facility]      ______________________________________________________________________    Integrated Primary Care Behavioral Health Treatment Plan    Patient's Name: Naty Pérez  YOB: 1995    Date: 4/13/21    DSM-V Diagnoses: 296.32 (F33.1) Major Depressive Disorder, Recurrent Episode, Moderate With anxious distress or 300.02 (F41.1) Generalized Anxiety Disorder  Psychosocial / Contextual Factors: job and relationships  WHODAS:   WHODAS 2.0 Total Score 8/25/2020   Total Score 21   Total Score MyChart 21         Referral / Collaboration:  Referral to another professional/service is not indicated at this time..    Anticipated number of session or this episode of care: ongoing      MeasurableTreatment Goal(s) related to diagnosis / functional impairment(s)  Goal 1: Patient will increase coping skills to lower anxiety    I will know I've met my goal when less anxious.      Objective #A (Patient Action)    Patient will use cognitive strategies identified in therapy to challenge anxious thoughts.  Status: Continued - Date(s): 4/13/21    Intervention(s)  [facility] will teach emotional recognition/identification. CBT skills.    Objective #B  Patient will use cognitive strategies identified in therapy to challenge anxious thoughts.  Status: Continued - Date(s): 4/13/21    Intervention(s)  [facility] will teach about healthy boundaries. Conflict managment with family fair fighting.    Patient has reviewed and agreed to the above plan.      XENIA Adan  April 13th, 2021

## 2021-06-09 ASSESSMENT — ANXIETY QUESTIONNAIRES: GAD7 TOTAL SCORE: 11

## 2021-06-09 ASSESSMENT — PATIENT HEALTH QUESTIONNAIRE - PHQ9: SUM OF ALL RESPONSES TO PHQ QUESTIONS 1-9: 13

## 2021-06-28 DIAGNOSIS — N94.3 PMS (PREMENSTRUAL SYNDROME): ICD-10-CM

## 2021-06-28 RX ORDER — DESOGESTREL AND ETHINYL ESTRADIOL 0.15-0.03
1 KIT ORAL DAILY
Qty: 28 TABLET | Refills: 3 | Status: SHIPPED | OUTPATIENT
Start: 2021-06-28 | End: 2021-10-19

## 2021-06-28 NOTE — TELEPHONE ENCOUNTER
[email or web address]      Last Written Prescription Date:  2/1/21  Last Fill Quantity: 28,   # refills: 3  Last Office Visit: 3/21/21  Future Office visit:

## 2021-06-29 ENCOUNTER — VIRTUAL VISIT (OUTPATIENT)
Dept: BEHAVIORAL HEALTH | Facility: CLINIC | Age: 26
End: 2021-06-29
Payer: COMMERCIAL

## 2021-06-29 DIAGNOSIS — F41.1 GAD (GENERALIZED ANXIETY DISORDER): ICD-10-CM

## 2021-06-29 DIAGNOSIS — F33.1 MODERATE EPISODE OF RECURRENT MAJOR DEPRESSIVE DISORDER (H): Primary | ICD-10-CM

## 2021-06-29 PROCEDURE — 90837 PSYTX W PT 60 MINUTES: CPT | Mod: 95 | Performed by: MARRIAGE & FAMILY THERAPIST
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
## 2021-07-08 ENCOUNTER — MYC MEDICAL ADVICE (OUTPATIENT)
Dept: OBGYN | Facility: CLINIC | Age: 26
End: 2021-07-08

## 2021-07-20 ENCOUNTER — VIRTUAL VISIT (OUTPATIENT)
Dept: BEHAVIORAL HEALTH | Facility: CLINIC | Age: 26
End: 2021-07-20
Payer: COMMERCIAL

## 2021-07-20 DIAGNOSIS — F41.1 GAD (GENERALIZED ANXIETY DISORDER): Primary | ICD-10-CM

## 2021-07-20 DIAGNOSIS — F33.1 MODERATE EPISODE OF RECURRENT MAJOR DEPRESSIVE DISORDER (H): ICD-10-CM

## 2021-07-20 PROCEDURE — 90837 PSYTX W PT 60 MINUTES: CPT | Mod: GT | Performed by: MARRIAGE & FAMILY THERAPIST

## 2021-07-20 ASSESSMENT — ANXIETY QUESTIONNAIRES
8. IF YOU CHECKED OFF ANY PROBLEMS, HOW DIFFICULT HAVE THESE MADE IT FOR YOU TO DO YOUR WORK, TAKE CARE OF THINGS AT HOME, OR GET ALONG WITH OTHER PEOPLE?: SOMEWHAT DIFFICULT
2. NOT BEING ABLE TO STOP OR CONTROL WORRYING: SEVERAL DAYS
3. WORRYING TOO MUCH ABOUT DIFFERENT THINGS: SEVERAL DAYS
6. BECOMING EASILY ANNOYED OR IRRITABLE: MORE THAN HALF THE DAYS
1. FEELING NERVOUS, ANXIOUS, OR ON EDGE: MORE THAN HALF THE DAYS
5. BEING SO RESTLESS THAT IT IS HARD TO SIT STILL: MORE THAN HALF THE DAYS
GAD7 TOTAL SCORE: 10
GAD7 TOTAL SCORE: 10
7. FEELING AFRAID AS IF SOMETHING AWFUL MIGHT HAPPEN: NOT AT ALL
GAD7 TOTAL SCORE: 10
7. FEELING AFRAID AS IF SOMETHING AWFUL MIGHT HAPPEN: NOT AT ALL
4. TROUBLE RELAXING: MORE THAN HALF THE DAYS

## 2021-07-20 ASSESSMENT — PATIENT HEALTH QUESTIONNAIRE - PHQ9
SUM OF ALL RESPONSES TO PHQ QUESTIONS 1-9: 6
10. IF YOU CHECKED OFF ANY PROBLEMS, HOW DIFFICULT HAVE THESE PROBLEMS MADE IT FOR YOU TO DO YOUR WORK, TAKE CARE OF THINGS AT HOME, OR GET ALONG WITH OTHER PEOPLE: SOMEWHAT DIFFICULT
SUM OF ALL RESPONSES TO PHQ QUESTIONS 1-9: 6

## 2021-07-20 NOTE — PROGRESS NOTES
Telemedicine Visit: The patient's condition can be safely assessed and treated via synchronous audio and visual telemedicine encounter.      Reason for Telemedicine Visit: Services only offered telehealth    Originating Site (Patient Location): Patient's home    Distant Site (Provider Location): [facility]    Consent:  The patient/guardian has verbally consented to: the potential risks and benefits of telemedicine (video visit) versus in person care; bill my insurance or make self-payment for services provided; and responsibility for payment of non-covered services.     Mode of Communication:  Video Conference via [employer]    As the provider I attest to compliance with applicable laws and regulations related to telemedicine.    [facility] Primary Care: Integrated Behavioral Health  July 20th , 2021      Behavioral Health Clinician Progress Note    Patient Name: Naty Pérez           Service Type:  Individual      Service Location:   Face to Face in Home / Community     Session Start Time: 11:00 am  Session End Time: 11:53 am      Session Length: 53 - 60      Attendees: Client    Visit Activities (Refresh list every visit): [facility] Only    Diagnostic Assessment Date: 8/25/2020  Treatment Plan Review Date: 6/13/21  See Flowsheets for today's PHQ-9 and ODILON-7 results  Previous PHQ-9:   PHQ-9 SCORE 4/27/2021 6/8/2021 7/20/2021   PHQ-9 Total Score MyChart 11 (Moderate depression) 13 (Moderate depression) 6 (Mild depression)   PHQ-9 Total Score 11 13 6     Previous ODILON-7:   ODILON-7 SCORE 4/27/2021 6/8/2021 7/20/2021   Total Score 10 (moderate anxiety) 11 (moderate anxiety) 10 (moderate anxiety)   Total Score 10 11 10       CHARLES LEVEL:  CHARLES Score (Last Two) 2/6/2020   CHARLES Raw Score 28   Activation Score 50   CHARLES Level 2       DATA  Extended Session (60+ minutes): No  Interactive Complexity: No  Crisis: No  [facility] Patient: No    Treatment Objective(s) Addressed in This  Session:  Target Behavior(s): PMS    Anxiety: will experience a reduction in anxiety, will develop more effective coping skills to manage anxiety symptoms, will develop healthy cognitive patterns and beliefs and will increase ability to function adaptively    Current Stressors / Issues:    Continued to process family dynamics and her struggles with feeling caught in the middle. Worked on cognitive errors on trying to solve others problems. Continued to work on boundaries.   Reviewed safety with Patient. Patient denies any current SI,plans or intentions.     Progress on Treatment Objective(s) / Homework:  Satisfactory progress - ACTION (Actively working towards change); Intervened by reinforcing change plan / affirming steps taken    Motivational Interviewing    MI Intervention: Expressed Empathy/Understanding, Supported Autonomy, Collaboration, Evocation, Permission to raise concern or advise, Open-ended questions and Reflections: simple and complex     Change Talk Expressed by the Patient: Desire to change Ability to change Reasons to change Need to change Committment to change Activation    Provider Response to Change Talk: E - Evoked more info from patient about behavior change, A - Affirmed patient's thoughts, decisions, or attempts at behavior change, R - Reflected patient's change talk and S - Summarized patient's change talk statements      Situation        Automatic Thoughts  Cognitive Distortions      Feelings        Behavior        Questioning Thoughts            Also provided psychoeducation about behavioral health condition, symptoms, and treatment options    Care Plan review completed: Yes    Medication Review:  No changes to current psychiatric medication(s)    Medication Compliance:  NA    Changes in Health Issues:   None reported    Chemical Use Review:   Substance Use: Chemical use reviewed, no active concerns identified      Tobacco Use: No current tobacco use.      Assessment: Current Emotional /  Mental Status (status of significant symptoms):  Risk status (Self / Other harm or suicidal ideation)  Patient denies a history of suicidal ideation, suicide attempts, self-injurious behavior, homicidal ideation, homicidal behavior and and other safety concerns  Patient denies current fears or concerns for personal safety.  Patient denies current or recent suicidal ideation or behaviors.  Patient denies current or recent homicidal ideation or behaviors.  Patient denies current or recent self injurious behavior or ideation.  Patient denies other safety concerns.  A safety and risk management plan has not been developed at this time, however patient was encouraged to call [place] [phone] should there be a change in any of these risk factors.    Appearance:   Appropriate   Eye Contact:   Good   Psychomotor Behavior: Normal   Attitude:   Cooperative   Orientation:   All  Speech   Rate / Production: Normal    Volume:  Normal   Mood:    Normal  Affect:    Appropriate   Thought Content:  Clear   Thought Form:  Coherent  Logical   Insight:    Good     Diagnoses:  No diagnosis found.    Collateral Reports Completed:  Not Applicable    Plan: (Homework, other):  Patient was given information about behavioral services and encouraged to schedule a follow up appointment with the clinic [facility] in 2 weeks.  She was also given Cognitive Behavioral Therapy skills to practice when experiencing anxiety.  CD Recommendations: No indications of CD issues.  XENIA Carrero, [facility]      ______________________________________________________________________    Integrated Primary Care Behavioral Health Treatment Plan    Patient's Name: Naty Pérez  YOB: 1995    Date: 4/13/21    DSM-V Diagnoses: 296.32 (F33.1) Major Depressive Disorder, Recurrent Episode, Moderate With anxious distress or 300.02 (F41.1) Generalized Anxiety Disorder  Psychosocial / Contextual Factors: job and relationships  WHODAS:   WHODAS 2.0 Total Score  8/25/2020   Total Score 21   Total Score MyChart 21         Referral / Collaboration:  Referral to another professional/service is not indicated at this time..    Anticipated number of session or this episode of care: ongoing      MeasurableTreatment Goal(s) related to diagnosis / functional impairment(s)  Goal 1: Patient will increase coping skills to lower anxiety    I will know I've met my goal when less anxious.      Objective #A (Patient Action)    Patient will use cognitive strategies identified in therapy to challenge anxious thoughts.  Status: Continued - Date(s): 4/13/21    Intervention(s)  [facility] will teach emotional recognition/identification. CBT skills.    Objective #B  Patient will use cognitive strategies identified in therapy to challenge anxious thoughts.  Status: Continued - Date(s): 4/13/21    Intervention(s)  [facility] will teach about healthy boundaries. Conflict managment with family fair fighting.    Patient has reviewed and agreed to the above plan.      Vanita Negrete, LMFT  April 13th, 2021

## 2021-07-21 ASSESSMENT — ANXIETY QUESTIONNAIRES: GAD7 TOTAL SCORE: 10

## 2021-07-21 ASSESSMENT — PATIENT HEALTH QUESTIONNAIRE - PHQ9: SUM OF ALL RESPONSES TO PHQ QUESTIONS 1-9: 6

## 2021-08-02 DIAGNOSIS — N94.3 PMS (PREMENSTRUAL SYNDROME): ICD-10-CM

## 2021-08-02 RX ORDER — SERTRALINE HYDROCHLORIDE 25 MG/1
25 TABLET, FILM COATED ORAL DAILY
Qty: 30 TABLET | Refills: 3 | Status: SHIPPED | OUTPATIENT
Start: 2021-08-02 | End: 2022-03-15

## 2021-08-02 NOTE — TELEPHONE ENCOUNTER
sertraline      Last Written Prescription Date:  3/31/21  Last Fill Quantity: 30,   # refills: 3  Last Office Visit: 3/31/21  Future Office visit:    Next 5 appointments (look out 90 days)    Aug 10, 2021 11:00 AM  [facility] Office Visit with Treasure Cassidy CNM  [facility] [address]  [address]  [facility] [id number] [phone]

## 2021-08-03 ENCOUNTER — VIRTUAL VISIT (OUTPATIENT)
Dept: BEHAVIORAL HEALTH | Facility: CLINIC | Age: 26
End: 2021-08-03
Payer: COMMERCIAL

## 2021-08-03 DIAGNOSIS — F33.1 MODERATE EPISODE OF RECURRENT MAJOR DEPRESSIVE DISORDER (H): ICD-10-CM

## 2021-08-03 DIAGNOSIS — F41.1 GAD (GENERALIZED ANXIETY DISORDER): Primary | ICD-10-CM

## 2021-08-03 PROCEDURE — 90834 PSYTX W PT 45 MINUTES: CPT | Mod: GT | Performed by: MARRIAGE & FAMILY THERAPIST

## 2021-08-03 NOTE — PROGRESS NOTES
Telemedicine Visit: The patient's condition can be safely assessed and treated via synchronous audio and visual telemedicine encounter.      Reason for Telemedicine Visit: Services only offered telehealth    Originating Site (Patient Location): Patient's home    Distant Site (Provider Location): [facility]    Consent:  The patient/guardian has verbally consented to: the potential risks and benefits of telemedicine (video visit) versus in person care; bill my insurance or make self-payment for services provided; and responsibility for payment of non-covered services.     Mode of Communication:  Video Conference via [employer]    As the provider I attest to compliance with applicable laws and regulations related to telemedicine.    [facility] Primary Care: Integrated Behavioral Health  August 3rd , 2021      Behavioral Health Clinician Progress Note    Patient Name: Naty Pérez           Service Type:  Individual      Service Location:   Face to Face in Home / Community     Session Start Time: 9:00 am  Session End Time: 9:48 am      Session Length: 38 - 52      Attendees: Client    Visit Activities (Refresh list every visit): [facility] Only    Diagnostic Assessment Date: 8/25/2020  Treatment Plan Review Date: 11/3/21  See Flowsheets for today's PHQ-9 and ODILON-7 results  Previous PHQ-9:   PHQ-9 SCORE 4/27/2021 6/8/2021 7/20/2021   PHQ-9 Total Score MyChart 11 (Moderate depression) 13 (Moderate depression) 6 (Mild depression)   PHQ-9 Total Score 11 13 6     Previous ODILON-7:   ODILON-7 SCORE 4/27/2021 6/8/2021 7/20/2021   Total Score 10 (moderate anxiety) 11 (moderate anxiety) 10 (moderate anxiety)   Total Score 10 11 10       CHARLES LEVEL:  CHARLES Score (Last Two) 2/6/2020   CHARLES Raw Score 28   Activation Score 50   CHARLES Level 2       DATA  Extended Session (60+ minutes): No  Interactive Complexity: No  Crisis: No  [facility] Patient: No    Treatment Objective(s) Addressed in This  Session:  Target Behavior(s): PMS    Anxiety: will experience a reduction in anxiety, will develop more effective coping skills to manage anxiety symptoms, will develop healthy cognitive patterns and beliefs and will increase ability to function adaptively    Current Stressors / Issues:    Continued process anxiety and having to wait. Discussed what we can control, influence and need to let go. Pt is struggling with sleeping since her office was brought into her bedroom. Discussed creating those boundaries. Worked on ways to help relax before bedtime. With talk to provider about medication and if that is a side effect. Reviewed safety with Patient. Patient denies any current SI,plans or intentions.       Progress on Treatment Objective(s) / Homework:  Satisfactory progress - ACTION (Actively working towards change); Intervened by reinforcing change plan / affirming steps taken    Motivational Interviewing    MI Intervention: Expressed Empathy/Understanding, Supported Autonomy, Collaboration, Evocation, Permission to raise concern or advise, Open-ended questions and Reflections: simple and complex     Change Talk Expressed by the Patient: Desire to change Ability to change Reasons to change Need to change Committment to change Activation    Provider Response to Change Talk: E - Evoked more info from patient about behavior change, A - Affirmed patient's thoughts, decisions, or attempts at behavior change, R - Reflected patient's change talk and S - Summarized patient's change talk statements      Situation        Automatic Thoughts  Cognitive Distortions      Feelings        Behavior        Questioning Thoughts            Also provided psychoeducation about behavioral health condition, symptoms, and treatment options    Care Plan review completed: Yes    Medication Review:  No changes to current psychiatric medication(s)    Medication Compliance:  NA    Changes in Health Issues:   None reported    Chemical Use  Review:   Substance Use: Chemical use reviewed, no active concerns identified      Tobacco Use: No current tobacco use.      Assessment: Current Emotional / Mental Status (status of significant symptoms):  Risk status (Self / Other harm or suicidal ideation)  Patient denies a history of suicidal ideation, suicide attempts, self-injurious behavior, homicidal ideation, homicidal behavior and and other safety concerns  Patient denies current fears or concerns for personal safety.  Patient denies current or recent suicidal ideation or behaviors.  Patient denies current or recent homicidal ideation or behaviors.  Patient denies current or recent self injurious behavior or ideation.  Patient denies other safety concerns.  A safety and risk management plan has not been developed at this time, however patient was encouraged to call [place] [phone] should there be a change in any of these risk factors.    Appearance:   Appropriate   Eye Contact:   Good   Psychomotor Behavior: Normal   Attitude:   Cooperative   Orientation:   All  Speech   Rate / Production: Normal    Volume:  Normal   Mood:    Normal  Affect:    Appropriate   Thought Content:  Clear   Thought Form:  Coherent  Logical   Insight:    Good     Diagnoses:  1. ODILON (generalized anxiety disorder)    2. Moderate episode of recurrent major depressive disorder (H)        Collateral Reports Completed:  Not Applicable    Plan: (Homework, other):  Patient was given information about behavioral services and encouraged to schedule a follow up appointment with the clinic [facility] in 2 weeks.  She was also given Cognitive Behavioral Therapy skills to practice when experiencing anxiety.  CD Recommendations: No indications of CD issues.  XENIA Carrero, [facility]      ______________________________________________________________________    Integrated Primary Care Behavioral Health Treatment Plan    Patient's Name: Naty Pérez  YOB: 1995    Date:  8/3/21    DSM-V Diagnoses: 296.32 (F33.1) Major Depressive Disorder, Recurrent Episode, Moderate With anxious distress or 300.02 (F41.1) Generalized Anxiety Disorder  Psychosocial / Contextual Factors: job and relationships  WHODAS:   WHODAS 2.0 Total Score 8/25/2020   Total Score 21   Total Score MyChart 21         Referral / Collaboration:  Referral to another professional/service is not indicated at this time..    Anticipated number of session or this episode of care: ongoing      MeasurableTreatment Goal(s) related to diagnosis / functional impairment(s)  Goal 1: Patient will increase coping skills to lower anxiety    I will know I've met my goal when less anxious.      Objective #A (Patient Action)    Patient will use cognitive strategies identified in therapy to challenge anxious thoughts.  Status: Continued - Date(s): 8/3/21    Intervention(s)  [facility] will teach emotional recognition/identification. CBT skills.    Objective #B  Patient will use cognitive strategies identified in therapy to challenge anxious thoughts.  Status: Continued - Date(s): 8/3/21    Intervention(s)  [facility] will teach about healthy boundaries. Conflict managment with family fair fighting.    Patient has reviewed and agreed to the above plan.      Vanita Negrete, LMFT  August 3rd, 2021

## 2021-08-10 ENCOUNTER — OFFICE VISIT (OUTPATIENT)
Dept: OBGYN | Facility: CLINIC | Age: 26
End: 2021-08-10
Payer: COMMERCIAL

## 2021-08-10 VITALS — BODY MASS INDEX: 23.61 KG/M2 | WEIGHT: 127 LBS | SYSTOLIC BLOOD PRESSURE: 108 MMHG | DIASTOLIC BLOOD PRESSURE: 64 MMHG

## 2021-08-10 DIAGNOSIS — N92.6 IRREGULAR BLEEDING: Primary | ICD-10-CM

## 2021-08-10 DIAGNOSIS — F51.01 PRIMARY INSOMNIA: ICD-10-CM

## 2021-08-10 LAB
HCG IFA URINE: NEGATIVE
PROLACTIN SERPL-MCNC: 10 UG/L (ref 3–27)

## 2021-08-10 PROCEDURE — 36415 COLL VENOUS BLD VENIPUNCTURE: CPT | Performed by: ADVANCED PRACTICE MIDWIFE

## 2021-08-10 PROCEDURE — 84703 CHORIONIC GONADOTROPIN ASSAY: CPT | Performed by: ADVANCED PRACTICE MIDWIFE

## 2021-08-10 PROCEDURE — 84443 ASSAY THYROID STIM HORMONE: CPT | Performed by: ADVANCED PRACTICE MIDWIFE

## 2021-08-10 PROCEDURE — 99214 OFFICE O/P EST MOD 30 MIN: CPT | Performed by: ADVANCED PRACTICE MIDWIFE

## 2021-08-10 PROCEDURE — 84146 ASSAY OF PROLACTIN: CPT | Performed by: ADVANCED PRACTICE MIDWIFE

## 2021-08-10 NOTE — PROGRESS NOTES
SUBJECTIVE:                                                   Naty Pérez is a 26 year old who presents to clinic today for the following health issue(s):  Patient presents with:  Abnormal Uterine Bleeding: longer and more frequent periods, also increased back pain       HPI:  Naty presents today for two issues.     1. Patient has noted that for the past 6 months her menses have become longer (7-10 days) and more frequent. Her bleeding has also been more heavy and she is having more cramps and back pain. She has a Nexplanon for birth control, and it is due to be replaced 2022. States that she is a little worried as she has been told in the past that she had ovarian cysts.    2. Patient has noted that although her mood is much improved on sertraline, she continues to feel fatigued. Also, she has started having some insomnia. At times, takes about an hour to fall asleep. She currently takes her sertraline during the day.    Patient's last menstrual period was 2021.  Menstrual History: irregular   Patient is sexually active  .  Using Nexplanon for contraception.   Health maintenance updated:  yes  STI infx testing offered:  Declined    Last PHQ-9 score on record =   PHQ-9 SCORE 2021   PHQ-9 Total Score MyChart 6 (Mild depression)   PHQ-9 Total Score 6     Last GAD7 score on record =   ODILON-7 SCORE 2021   Total Score 10 (moderate anxiety)   Total Score 10         Problem list and histories reviewed & adjusted, as indicated.  Additional history: as documented.    Patient Active Problem List   Diagnosis     Allergy to insects and arachnids     Ingrowing nail     Epistaxis     Lumbosacral spondylosis without myelopathy     Papanicolaou smear of cervix with low grade squamous intraepithelial lesion (LGSIL)     ODILON (generalized anxiety disorder)     Moderate episode of recurrent major depressive disorder (H)     History reviewed. No pertinent surgical history.   Social History     Tobacco Use      Smoking status: Never Smoker     Smokeless tobacco: Never Used     Tobacco comment: non smoking home   Substance Use Topics     Alcohol use: No      Problem (# of Occurrences) Relation (Name,Age of Onset)    Cancer (1) Paternal Grandfather    Cancer - colorectal (1) Paternal Grandfather    Cerebrovascular Disease (1) Paternal Grandfather    Colorectal Cancer (1) Father    Diabetes (1) Maternal Grandfather    Hypertension (1) Maternal Grandfather    Multiple myeloma (1) Paternal Uncle    Unknown/Adopted (1) Father       Negative family history of: C.A.D., Breast Cancer, Prostate Cancer, Thyroid Disease            Current Outpatient Medications   Medication Sig     sertraline (ZOLOFT) 25 MG tablet Take 1 tablet (25 mg) by mouth daily     desogestrel-ethinyl estradiol (APRI) 0.15-30 MG-MCG tablet Take 1 tablet by mouth daily (Patient not taking: Reported on 8/10/2021)     ibuprofen (ADVIL/MOTRIN) 200 MG tablet Take 400 mg by mouth every 4 hours as needed for mild pain (Patient not taking: Reported on 8/10/2021)     sertraline (ZOLOFT) 25 MG tablet Take 1 tablet (25 mg) by mouth daily     Current Facility-Administered Medications   Medication     etonogestrel (IMPLANON/NEXPLANON) subdermal implant 68 mg     No Known Allergies    ROS:  CONSTITUTIONAL: NEGATIVE for fever, chills, change in weight  POSITIVE for insomnia  GI: NEGATIVE for nausea, abdominal pain, heartburn, or change in bowel habits  : NEGATIVE for unusual urinary or vaginal symptoms. Periods are irregular.  NEURO: NEGATIVE for weakness, dizziness or paresthesias  HEME/ALLERGY/IMMUNE: NEGATIVE for bleeding problems  PSYCHIATRIC: NEGATIVE for changes in mood or affect    OBJECTIVE:     /64 (BP Location: Left arm, Cuff Size: Adult Regular)   Wt 57.6 kg (127 lb)   LMP 08/07/2021   BMI 23.61 kg/m    Body mass index is 23.61 kg/m .    PHYSICAL EXAM:  Constitutional:  Appearance: Well nourished, well developed alert, in no acute distress  Chest:   Respiratory Effort:  Breathing unlabored.   Neurologic:  Mental Status:  Oriented X3.  Normal strength and tone, sensory exam grossly normal, mentation intact and speech normal.    Psychiatric:  Mentation appears normal and affect normal/bright.     Pelvic Exam:  Vulva: No external lesions, normal hair distribution, no adenopathy  Vagina: Moist, pink, no abnormal discharge, well rugated, no lesions, moderate amount of vaginal bleeding  Cervix:  smooth, pink, no visible lesions, blood noted at cervical os  Uterus: Normal size, anteverted, non-tender, mobile  Ovaries: No mass, non-tender, mobile  Rectal exam: Defered    In-Clinic Test Results:  Results for orders placed or performed in visit on 08/10/21 (from the past 24 hour(s))   HCG IFA Urine POCT, Enter/Edit Result   Result Value Ref Range    HCG IFA Urine Negative neg       ASSESSMENT/PLAN:                                                          ICD-10-CM    1. Irregular bleeding  N92.6 HCG IFA Urine POCT, Enter/Edit Result     TSH with free T4 reflex     Prolactin     US Pelvic Complete with Transvaginal     TSH with free T4 reflex     Prolactin   2. Primary insomnia  F51.01        PLAN:    1. Discussed with patient that this irregular bleeding is possible with Nexplanon. Labs and ultrasound pending to rule out another reason for the irregular bleeding. Will advise patient of results when available. Encouraged patient to call with any questions or concerns.    2. Advised patient to try to take the sertraline at bedtime as it seems to make her feel fatigued during the day. Reviewed sleep hygiene options. Given written and verbal information. Encouraged patient to call with any questions or concerns.        VALE Harris, CNM

## 2021-08-10 NOTE — PATIENT INSTRUCTIONS
Patient Education     Dysfunctional Uterine Bleeding    Dysfunctional uterine bleeding is also called abnormal uterine bleeding. It's a condition in which bleeding is abnormal and occurs at unexpected times of the month. This happens because of changes in the hormones that help control a woman s menstrual cycle each month.   The bleeding may be heavier or lighter than normal. If you have heavy bleeding often, this can lead to a problem called anemia. With anemia, your red blood cell count is too low. Red blood cells help carry oxygen throughout your body. Severe anemia may cause you to look pale and feel very weak or tired. You might also become short of breath easily.   To treat dysfunctional uterine bleeding, you may take medicines. If these don t help, or if you have other symptoms or have reached menopause, you may need more testing and other treatments. Discuss all of your options with your provider.   Home care  Medicines  If you re prescribed medicines, take them as directed. Some of the more common medicines you may be prescribed include:     Hormone therapy. These include most methods of hormonal birth control such as pills, shots, or a hormone-releasing IUD.    Nonsteroidal anti-inflammatory drugs (NSAIDs), such as ibuprofen    Tranexamic acid. This helps your blood clot.    Iron supplements, if you have anemia     General care    Get plenty of rest if you tire easily. Don't do strenuous exercise.    To help ease pain or cramping that may occur with bleeding, try using a heating pad on the lower belly or back. A warm bath may also help.    Follow-up care  Follow up with your healthcare provider, or as directed.  When to seek medical advice  Call your healthcare provider right away if:    Bleeding becomes heavy (soaking 1 pad or tampon every hour for 3 hours)    Increased abdominal pain    Irregular bleeding gets worse or does not get better even with treatment    Fever of 100.4 F (38 C) or higher, or as  "directed by your provider    Signs of anemia, such as pale skin, extreme fatigue or weakness, or shortness of breath    Dizziness or fainting   [employer] last reviewed this educational content on 7/1/2020 2000-2021 [employer]. All rights reserved. This information is not intended as a substitute for professional medical care. Always follow your healthcare professional's instructions.           Patient Education     Tips for Sleep Hygiene  \"Sleep hygiene\" means having good sleep habits.Follow these tips to sleep better at night:     Get on a schedule. Go to bed and get up at about the same time every day.    Listen to your body. Only try to sleep when you actually feel tired or sleepy.    Be patient. If you haven't been able to get to sleep after about 30 minutes or more, get up and do something calming or boring until you feel sleepy. Then return to bed and try again.    Don't have caffeine (coffee, tea, cola drinks, chocolate and some medicines), alcohol or nicotine (cigarettes). These can make it harder for you to fall asleep and stay asleep.    Use your bed for sleeping only. That means no TV, computer or homework in bed, especially during the evening and before bedtime.    Don't nap during the day. If you must nap, make sure it is for less than 20 minutes.    Create sleep rituals that remind your body it is time to sleep. Examples include breathing exercises, stretching or reading a book.    Avoid all electronic media (smart phone, computer, tablet) within 2 hours of bed time. The \"blue light\" in these devices activates the part of the brain that keeps you awake.    Dim the lights at night.    Get early morning sources of light (walk in the sunshine) to help set sleep patterns at night.    Try a bath or shower before bed. Having a warm bath 1 to 2 hours before bedtime can help you feel sleepy. Hot baths can make you alert, so be mindful of the temperature.    Don't watch the clock. Checking the " "clock during the night can wake you up. It can also lead to negative thoughts such as, \"I will never fall asleep,\" which can increase anxiety and sleeplessness.    Use a sleep diary. Track your sleep schedule to know your sleep patterns and to see where you can improve.    Get regular exercise every day. Try not to do heavy exercise in the 4 hours before bedtime.    Eat a healthy, balanced diet.    Try eating a light, healthy snack before bed, but avoid eating a heavy meal.    Create the right sleeping area. A cool, dark, quiet room is best. If needed, try earplugs, fans and blackout curtains.    Keep your daytime routine the same even if you have a bad night sleep. Avoiding activities the next day can make it harder to sleep.  For informational purposes only. Not to replace the advice of your health care provider.   Copyright   2013 [employer]. All rights reserved. [employer] [id number] - 01/16.           "

## 2021-08-10 NOTE — NURSING NOTE
"Chief Complaint   Patient presents with     Abnormal Uterine Bleeding     longer and more frequent periods, also increased back pain        Initial /64 (BP Location: Left arm, Cuff Size: Adult Regular)   Wt 57.6 kg (127 lb)   LMP 2021   BMI 23.61 kg/m   Estimated body mass index is 23.61 kg/m  as calculated from the following:    Height as of 20: 1.562 m (5' 1.5\").    Weight as of this encounter: 57.6 kg (127 lb).  BP completed using cuff size: regular    Questioned patient about current smoking habits.  Pt. has never smoked.          The following HM Due: NONE    "

## 2021-08-11 LAB — TSH SERPL DL<=0.005 MIU/L-ACNC: 0.96 MU/L (ref 0.4–4)

## 2021-08-17 ENCOUNTER — VIRTUAL VISIT (OUTPATIENT)
Dept: BEHAVIORAL HEALTH | Facility: CLINIC | Age: 26
End: 2021-08-17
Payer: COMMERCIAL

## 2021-08-17 DIAGNOSIS — F33.0 MAJOR DEPRESSIVE DISORDER, RECURRENT EPISODE, MILD (H): ICD-10-CM

## 2021-08-17 DIAGNOSIS — F41.1 GAD (GENERALIZED ANXIETY DISORDER): Primary | ICD-10-CM

## 2021-08-17 PROCEDURE — 90791 PSYCH DIAGNOSTIC EVALUATION: CPT | Mod: GT | Performed by: MARRIAGE & FAMILY THERAPIST

## 2021-08-17 ASSESSMENT — ANXIETY QUESTIONNAIRES
GAD7 TOTAL SCORE: 6
GAD7 TOTAL SCORE: 6
3. WORRYING TOO MUCH ABOUT DIFFERENT THINGS: SEVERAL DAYS
4. TROUBLE RELAXING: SEVERAL DAYS
GAD7 TOTAL SCORE: 6
7. FEELING AFRAID AS IF SOMETHING AWFUL MIGHT HAPPEN: NOT AT ALL
5. BEING SO RESTLESS THAT IT IS HARD TO SIT STILL: SEVERAL DAYS
1. FEELING NERVOUS, ANXIOUS, OR ON EDGE: SEVERAL DAYS
8. IF YOU CHECKED OFF ANY PROBLEMS, HOW DIFFICULT HAVE THESE MADE IT FOR YOU TO DO YOUR WORK, TAKE CARE OF THINGS AT HOME, OR GET ALONG WITH OTHER PEOPLE?: SOMEWHAT DIFFICULT
7. FEELING AFRAID AS IF SOMETHING AWFUL MIGHT HAPPEN: NOT AT ALL
6. BECOMING EASILY ANNOYED OR IRRITABLE: SEVERAL DAYS
2. NOT BEING ABLE TO STOP OR CONTROL WORRYING: SEVERAL DAYS

## 2021-08-17 NOTE — PROGRESS NOTES
[facility] Primary Care: Integrated Behavioral Health  Integrated Behavioral Health Services   Diagnostic Assessment Update      PATIENT'S NAME: Naty Pérez  MRN:   [id number]  :   1995  DATE OF SERVICE: 2021  SERVICE LOCATION: Face to Face in Home / Community  Visit Activities: [facility] Only    Identifying Information:  Patient is a 26 year old year old, , single female.  Patient attended the session alone.        Updates on Presenting Concern(s):  Patient reports the following reason(s) for continuing to receive behavioral services: anxiety.  Patient reported that her symptoms and concerns are stable.  Patient attributed the status of her current symptoms to changes in their life stressors.      Patient stated that her symptoms have resulted in the following functional impairments: health maintenance, management of the household and or completion of tasks, relationship(s), self-care, social interactions and work / vocational responsibilities    Patient reports that other professional(s) are involved in providing support / services.      Standard Screening tools completed, including PHQ9 and GAD7.  See Epic for today's results.  Historical PHQ9:  PHQ-9 SCORE 2021   PHQ-9 Total Score MyChart 13 (Moderate depression) 6 (Mild depression) 6 (Mild depression)   PHQ-9 Total Score 13 6 6     Historical GAD7:  ODILON-7 SCORE 2021   Total Score 11 (moderate anxiety) 10 (moderate anxiety) 6 (mild anxiety)   Total Score 11 10 6       Review of Updates to Patient's Life Situation:  Patient reported no significant changes to their relationships and identified support(s).  Patient identified extensive changes in the stability of their social connections and identified supports. Patient noted that their living situation did not change within the last year.     Patient's employment status did change.  Patient is currently employed full  time and reports @[employer]@ is able to function appropriately at work..     Patient reported no changes or new involvment with the legal system.  There are no ethnic, cultural or Faith factors that may be relevant for therapy.       Mental Health History:  Patient is currently receiving the following services: counseling and primary care behavioral health provider.      Chemical Health History:   Patient is not currently receiving any chemical dependency treatment. Patient reports no problems as a result of their drinking / drug use.      Cage-AID score is: 0   Based on Cage-Aid score and clinical interview there  are not indications of drug or alcohol abuse.      Discussed the general effects of drugs and alcohol on health and well-being.      Significant Losses / Trauma / Abuse / Neglect Issues:  There are no no indications or report of: significant losses, trauma, abuse or neglect.    Issues of possible neglect are not present.      Medical History:   Patient Active Problem List   Diagnosis     Allergy to insects and arachnids     Ingrowing nail     Epistaxis     Lumbosacral spondylosis without myelopathy     Papanicolaou smear of cervix with low grade squamous intraepithelial lesion (LGSIL)     ODILON (generalized anxiety disorder)     Moderate episode of recurrent major depressive disorder (H)       Medication Review:  Current Outpatient Medications   Medication     desogestrel-ethinyl estradiol (APRI) 0.15-30 MG-MCG tablet     ibuprofen (ADVIL/MOTRIN) 200 MG tablet     sertraline (ZOLOFT) 25 MG tablet     sertraline (ZOLOFT) 25 MG tablet     Current Facility-Administered Medications   Medication     etonogestrel (IMPLANON/NEXPLANON) subdermal implant 68 mg       Medication Compliance:  Yes    Patient was provided recommendation to follow-up with physician.      Mental Status Assessment:  Appearance:   Appropriate   Eye Contact:   Good   Psychomotor Behavior: Normal   Attitude:   Cooperative    Orientation:   All  Speech   Rate / Production: Normal    Volume:  Normal   Mood:    Normal  Affect:    Appropriate   Thought Content:  Clear   Thought Form:  Coherent  Logical   Insight:    Good       Safety Assessment:    Patient denies a history of suicidal ideation, suicide attempts, self-injurious behavior, homicidal ideation, homicidal behavior and and other safety concerns  Patient denies current or recent suicidal ideation or behaviors.  Patient denies current or recent homicidal ideation or behaviors.  Patient denies current or recent self injurious behavior or ideation.  Patient denies other safety concerns.  Patient reports there are no firearms in the house  Protective Factors Sense of responsibility to family and Life Satisfaction   Risk Factors Current high stress  [place] Suicide Severity Rating Scale (Short Version)  [place] Suicide Severity Rating (Short Version) 8/17/2021   Over the past 2 weeks have you felt down, depressed, or hopeless? no   Over the past 2 weeks have you had thoughts of killing yourself? no   Have you ever attempted to kill yourself? no     [place] Bernard Suicide Severity Rating Scale (Lifetime/Recent)  [place] Bernard Suicide Severity Rating (Lifetime/Recent) 8/25/2020   1. Wish to be Dead (Lifetime) No   1. Wish to be Dead (Recent) No   2. Non-Specific Active Suicidal Thoughts (Lifetime) No   2. Non-Specific Active Suicidal Thoughts (Recent) No   3. Active Suicidal Ideation with any Methods (Not Plan) Without Intent to Act (Lifetime) No   3. Active Suicidal Ideation with any Methods (Not Plan) Without Intent to Act (Recent) No   4. Active Suicidal Ideation with Some Intent to Act, Without Specific Plan (Lifetime) No   4. Active Suicidal Ideation with Some Intent to Act, Without Specific Plan (Recent) No   5. Active Suicidal Ideation with Specific Plan and Intent (Lifetime) No   5. Active Suicidal Ideation with Specific Plan and Intent (Recent) No   Most Severe Ideation Rating  (Lifetime) NA   Frequency (Lifetime) NA   Duration (Lifetime) NA   Controllability (Lifetime) NA   Protective Factors  (Lifetime) NA   Reasons for Ideation (Lifetime) NA   Most Severe Ideation Rating (Past Month) NA   Frequency (Past Month) NA   Duration (Past Month) NA   Controllability (Past Month) NA   Protective Factors (Past Month) NA   Reasons for Ideation (Past Month) NA   Actual Attempt (Lifetime) No   Actual Attempt (Past 3 Months) No   Has subject engaged in non-suicidal self-injurious behavior? (Lifetime) No   Has subject engaged in non-suicidal self-injurious behavior? (Past 3 Months) No   Interrupted Attempts (Lifetime) No   Interrupted Attempts (Past 3 Months) No   Aborted or Self-Interrupted Attempt (Lifetime) No   Aborted or Self-Interrupted Attempt (Past 3 Months) No   Preparatory Acts or Behavior (Lifetime) No   Preparatory Acts or Behavior (Past 3 Months) No   Most Recent Attempt Actual Lethality Code NA   Most Lethal Attempt Actual Lethality Code NA   Initial/First Attempt Actual Lethality Code NA         Plan for Safety and Risk Management:  A safety and risk management plan has not been developed at this time, however patient was encouraged to call [place] [phone] should there be a change in any of these risk factors.      Patient's Strengths and Limitations:  Patient identified the following strengths or resources that will help her succeed in counseling: commitment to health and well being, friends / good social support, family support, insight, intelligence, positive work environment, motivation, sense of humor, strong social skills and work ethic. Patient identified the following supports: family and friends. Things that may interfere with the patient's success in counseling include:none identified.    Diagnostic Criteria:  A. Excessive anxiety and worry about a number of events or activities (such as work or school performance).   B. The person finds it difficult to control the  worry.  C. Select 3 or more symptoms (required for diagnosis). Only one item is required in children.   - Restlessness or feeling keyed up or on edge.    - Being easily fatigued.    - Difficulty concentrating or mind going blank.    - Irritability.    - Muscle tension.    - Sleep disturbance (difficulty falling or staying asleep, or restless unsatisfying sleep).   D. The focus of the anxiety and worry is not confined to features of an Axis I disorder.  E. The anxiety, worry, or physical symptoms cause clinically significant distress or impairment in social, occupational, or other important areas of functioning.   F. The disturbance is not due to the direct physiological effects of a substance (e.g., a drug of abuse, a medication) or a general medical condition (e.g., hyperthyroidism) and does not occur exclusively during a Mood Disorder, a Psychotic Disorder, or a Pervasive Developmental Disorder.   - Significant weight gainincrease in appetite.    - Decreased sleep.    - Psychomotor activity agitation.    - Fatigue or loss of energy.    - Feelings of worthlessness or inappropriate and excessive guilt.    - Diminished ability to think or concentrate, or indecisiveness.   B) The symptoms cause clinically significant distress or impairment in social, occupational, or other important areas of functioning  C) The episode is not attributable to the physiological effects of a substance or to another medical condition  D) The occurence of major depressive episode is not better explained by other thought / psychotic disorders  E) There has never been a manic episode or hypomanic episode      Functional Status:  Patient's symptoms are causing reduced functional status in the following areas: Activities of Daily Living - family life      DSM5 Diagnoses: (Sustained by DSM5 Criteria Listed Above)  Diagnoses: 296.31 (F33.0) Major Depressive Disorder, Recurrent Episode, Mild With anxious distress  300.02 (F41.1) Generalized  Anxiety Disorder  Psychosocial & Contextual Factors: work, family conflict  WHODAS Score:   WHODAS 2.0 Total Score 8/25/2020 8/17/2021   Total Score 21 29   Total Score MyChart 21 29       See Media section of EPIC medical record for completed WHODAS    Preliminary Treatment Plan:    Treatment will focus on: Depressed Mood - increase coping skills  Anxiety - increase coping skills.    The Following referrals were discussed and initiated: Outpatient Therapy    The patient is receiving treatment / structured support from the following professional(s) / service and treatment. Collaboration will be initiated with: primary care physician.    Referral to another professional/service is not indicated at this time.    A Release of Information is not needed at this time.    Report to child or adult protection services was NA.    XENIA Adan, Behavioral Health Clinician

## 2021-08-18 ASSESSMENT — PATIENT HEALTH QUESTIONNAIRE - PHQ9: SUM OF ALL RESPONSES TO PHQ QUESTIONS 1-9: 6

## 2021-08-18 ASSESSMENT — ANXIETY QUESTIONNAIRES: GAD7 TOTAL SCORE: 6

## 2021-08-20 ENCOUNTER — ANCILLARY PROCEDURE (OUTPATIENT)
Dept: ULTRASOUND IMAGING | Facility: CLINIC | Age: 26
End: 2021-08-20
Attending: ADVANCED PRACTICE MIDWIFE
Payer: COMMERCIAL

## 2021-08-20 DIAGNOSIS — N92.6 IRREGULAR BLEEDING: ICD-10-CM

## 2021-08-20 PROCEDURE — 76830 TRANSVAGINAL US NON-OB: CPT | Performed by: OBSTETRICS & GYNECOLOGY

## 2021-08-20 PROCEDURE — 76856 US EXAM PELVIC COMPLETE: CPT | Performed by: OBSTETRICS & GYNECOLOGY

## 2021-09-13 ENCOUNTER — VIRTUAL VISIT (OUTPATIENT)
Dept: BEHAVIORAL HEALTH | Facility: CLINIC | Age: 26
End: 2021-09-13
Payer: COMMERCIAL

## 2021-09-13 DIAGNOSIS — F33.0 MAJOR DEPRESSIVE DISORDER, RECURRENT EPISODE, MILD (H): ICD-10-CM

## 2021-09-13 DIAGNOSIS — F41.1 GAD (GENERALIZED ANXIETY DISORDER): Primary | ICD-10-CM

## 2021-09-13 PROCEDURE — 90837 PSYTX W PT 60 MINUTES: CPT | Mod: GT | Performed by: MARRIAGE & FAMILY THERAPIST

## 2021-09-13 ASSESSMENT — ANXIETY QUESTIONNAIRES
7. FEELING AFRAID AS IF SOMETHING AWFUL MIGHT HAPPEN: NOT AT ALL
5. BEING SO RESTLESS THAT IT IS HARD TO SIT STILL: SEVERAL DAYS
6. BECOMING EASILY ANNOYED OR IRRITABLE: SEVERAL DAYS
GAD7 TOTAL SCORE: 8
8. IF YOU CHECKED OFF ANY PROBLEMS, HOW DIFFICULT HAVE THESE MADE IT FOR YOU TO DO YOUR WORK, TAKE CARE OF THINGS AT HOME, OR GET ALONG WITH OTHER PEOPLE?: SOMEWHAT DIFFICULT
2. NOT BEING ABLE TO STOP OR CONTROL WORRYING: SEVERAL DAYS
1. FEELING NERVOUS, ANXIOUS, OR ON EDGE: MORE THAN HALF THE DAYS
GAD7 TOTAL SCORE: 8
3. WORRYING TOO MUCH ABOUT DIFFERENT THINGS: SEVERAL DAYS
7. FEELING AFRAID AS IF SOMETHING AWFUL MIGHT HAPPEN: NOT AT ALL
GAD7 TOTAL SCORE: 8
4. TROUBLE RELAXING: MORE THAN HALF THE DAYS

## 2021-09-13 ASSESSMENT — PATIENT HEALTH QUESTIONNAIRE - PHQ9
SUM OF ALL RESPONSES TO PHQ QUESTIONS 1-9: 8
10. IF YOU CHECKED OFF ANY PROBLEMS, HOW DIFFICULT HAVE THESE PROBLEMS MADE IT FOR YOU TO DO YOUR WORK, TAKE CARE OF THINGS AT HOME, OR GET ALONG WITH OTHER PEOPLE: SOMEWHAT DIFFICULT
SUM OF ALL RESPONSES TO PHQ QUESTIONS 1-9: 8

## 2021-09-13 NOTE — PROGRESS NOTES
Telemedicine Visit: The patient's condition can be safely assessed and treated via synchronous audio and visual telemedicine encounter.      Reason for Telemedicine Visit: Services only offered telehealth    Originating Site (Patient Location): Patient's home    Distant Site (Provider Location): [facility]    Consent:  The patient/guardian has verbally consented to: the potential risks and benefits of telemedicine (video visit) versus in person care; bill my insurance or make self-payment for services provided; and responsibility for payment of non-covered services.     Mode of Communication:  Video Conference via [employer]    As the provider I attest to compliance with applicable laws and regulations related to telemedicine.    [facility] Primary Care: Integrated Behavioral Health  September 13th, 2021      Behavioral Health Clinician Progress Note    Patient Name: Naty Pérez           Service Type:  Individual      Service Location:   Face to Face in Home / Community     Session Start Time:  4:31 am  Session End Time: 5:37 pm      Session Length: 53 - 60      Attendees: Client    Visit Activities (Refresh list every visit): [facility] Only    Diagnostic Assessment Date: 8/17/21  Treatment Plan Review Date: 11/3/21  See Flowsheets for today's PHQ-9 and ODILON-7 results  Previous PHQ-9:   PHQ-9 SCORE 7/20/2021 8/17/2021 9/13/2021   PHQ-9 Total Score MyChart 6 (Mild depression) 6 (Mild depression) 8 (Mild depression)   PHQ-9 Total Score 6 6 8     Previous ODILON-7:   ODILON-7 SCORE 7/20/2021 8/17/2021 9/13/2021   Total Score 10 (moderate anxiety) 6 (mild anxiety) 8 (mild anxiety)   Total Score 10 6 8       CHARLES LEVEL:  CHARLES Score (Last Two) 2/6/2020   CHARLES Raw Score 28   Activation Score 50   CHARLES Level 2       DATA  Extended Session (60+ minutes): No  Interactive Complexity: No  Crisis: No  [facility] Patient: No    Treatment Objective(s) Addressed in This Session:  Target Behavior(s):  PMS    Anxiety: will experience a reduction in anxiety, will develop more effective coping skills to manage anxiety symptoms, will develop healthy cognitive patterns and beliefs and will increase ability to function adaptively    Current Stressors / Issues:    Pt is wondering about ADHD. struggle with remembering. I struggle with getting tasks completed and then one day do all of it. Waited until the deadline to complete items. Patient feels like she is hyper fixated on things. Feels like she is easily distracted and feels like she jumps around all over the place. Pt ould like to get tested. Talked about how anxiety could be causing this as well. Referral placed.     Talked about her wanting to help her brother. Processed her own anxiety and other behaviors she can do instead of offering to help him.   Reviewed safety with Patient. Patient denies any current SI,plans or intentions.         Progress on Treatment Objective(s) / Homework:  Satisfactory progress - ACTION (Actively working towards change); Intervened by reinforcing change plan / affirming steps taken    Motivational Interviewing    MI Intervention: Expressed Empathy/Understanding, Supported Autonomy, Collaboration, Evocation, Permission to raise concern or advise, Open-ended questions and Reflections: simple and complex     Change Talk Expressed by the Patient: Desire to change Ability to change Reasons to change Need to change Committment to change Activation    Provider Response to Change Talk: E - Evoked more info from patient about behavior change, A - Affirmed patient's thoughts, decisions, or attempts at behavior change, R - Reflected patient's change talk and S - Summarized patient's change talk statements      Situation        Automatic Thoughts  Cognitive Distortions      Feelings        Behavior        Questioning Thoughts            Also provided psychoeducation about behavioral health condition, symptoms, and treatment options    Care Plan  review completed: Yes    Medication Review:  No changes to current psychiatric medication(s)    Medication Compliance:  NA    Changes in Health Issues:   None reported    Chemical Use Review:   Substance Use: Chemical use reviewed, no active concerns identified      Tobacco Use: No current tobacco use.      Assessment: Current Emotional / Mental Status (status of significant symptoms):  Risk status (Self / Other harm or suicidal ideation)  Patient denies a history of suicidal ideation, suicide attempts, self-injurious behavior, homicidal ideation, homicidal behavior and and other safety concerns  Patient denies current fears or concerns for personal safety.  Patient denies current or recent suicidal ideation or behaviors.  Patient denies current or recent homicidal ideation or behaviors.  Patient denies current or recent self injurious behavior or ideation.  Patient denies other safety concerns.  A safety and risk management plan has not been developed at this time, however patient was encouraged to call [place] [phone] should there be a change in any of these risk factors.    Appearance:   Appropriate   Eye Contact:   Good   Psychomotor Behavior: Normal   Attitude:   Cooperative   Orientation:   All  Speech   Rate / Production: Normal    Volume:  Normal   Mood:    Normal  Affect:    Appropriate   Thought Content:  Clear   Thought Form:  Coherent  Logical   Insight:    Good     Diagnoses:  1. ODILON (generalized anxiety disorder)    2. Major depressive disorder, recurrent episode, mild (H)        Collateral Reports Completed:  Not Applicable    Plan: (Homework, other):  Patient was given information about behavioral services and encouraged to schedule a follow up appointment with the clinic [facility] in 2 weeks.  She was also given Cognitive Behavioral Therapy skills to practice when experiencing anxiety.  CD Recommendations: No indications of CD issues.  XENIA aCrrero,  [facility]      ______________________________________________________________________    Integrated Primary Care Behavioral Health Treatment Plan    Patient's Name: Naty Pérez  YOB: 1995    Date: 8/3/21    DSM-V Diagnoses: 296.32 (F33.1) Major Depressive Disorder, Recurrent Episode, Moderate With anxious distress or 300.02 (F41.1) Generalized Anxiety Disorder  Psychosocial / Contextual Factors: job and relationships  WHODAS:   WHODAS 2.0 Total Score 8/25/2020 8/17/2021   Total Score 21 29   Total Score MyChart 21 29         Referral / Collaboration:  The following referral(s) will be initiated: ADHD testing.    Anticipated number of session or this episode of care: ongoing      MeasurableTreatment Goal(s) related to diagnosis / functional impairment(s)  Goal 1: Patient will increase coping skills to lower anxiety    I will know I've met my goal when less anxious.      Objective #A (Patient Action)    Patient will use cognitive strategies identified in therapy to challenge anxious thoughts.  Status: Continued - Date(s): 8/3/21    Intervention(s)  [facility] will teach emotional recognition/identification. CBT skills.    Objective #B  Patient will use cognitive strategies identified in therapy to challenge anxious thoughts.  Status: Continued - Date(s): 8/3/21    Intervention(s)  [facility] will teach about healthy boundaries. Conflict managment with family fair fighting.    Patient has reviewed and agreed to the above plan.      Vanita Negrete, LMFT  August 3rd, 2021

## 2021-09-14 ASSESSMENT — PATIENT HEALTH QUESTIONNAIRE - PHQ9: SUM OF ALL RESPONSES TO PHQ QUESTIONS 1-9: 8

## 2021-09-14 ASSESSMENT — ANXIETY QUESTIONNAIRES: GAD7 TOTAL SCORE: 8

## 2021-09-19 ENCOUNTER — HEALTH MAINTENANCE LETTER (OUTPATIENT)
Age: 26
End: 2021-09-19

## 2021-09-27 ENCOUNTER — VIRTUAL VISIT (OUTPATIENT)
Dept: BEHAVIORAL HEALTH | Facility: CLINIC | Age: 26
End: 2021-09-27
Payer: COMMERCIAL

## 2021-09-27 DIAGNOSIS — F33.0 MAJOR DEPRESSIVE DISORDER, RECURRENT EPISODE, MILD (H): ICD-10-CM

## 2021-09-27 DIAGNOSIS — F41.1 GAD (GENERALIZED ANXIETY DISORDER): Primary | ICD-10-CM

## 2021-09-27 PROCEDURE — 90837 PSYTX W PT 60 MINUTES: CPT | Mod: GT | Performed by: MARRIAGE & FAMILY THERAPIST

## 2021-09-27 NOTE — PROGRESS NOTES
Telemedicine Visit: The patient's condition can be safely assessed and treated via synchronous audio and visual telemedicine encounter.      Reason for Telemedicine Visit: Services only offered telehealth    Originating Site (Patient Location): Patient's home    Distant Site (Provider Location): [facility]    Consent:  The patient/guardian has verbally consented to: the potential risks and benefits of telemedicine (video visit) versus in person care; bill my insurance or make self-payment for services provided; and responsibility for payment of non-covered services.     Mode of Communication:  Video Conference via [employer]    As the provider I attest to compliance with applicable laws and regulations related to telemedicine.    [facility] Primary Care: Integrated Behavioral Health  September 27th, 2021      Behavioral Health Clinician Progress Note    Patient Name: Naty Pérez           Service Type:  Individual      Service Location:   Face to Face in Home / Community     Session Start Time:  4:12 pm  Session End Time: 5;30 pm      Session Length: 53 - 60      Attendees: Client    Visit Activities (Refresh list every visit): [facility] Only    Diagnostic Assessment Date: 8/17/21  Treatment Plan Review Date: 11/3/21  See Flowsheets for today's PHQ-9 and ODILON-7 results  Previous PHQ-9:   PHQ-9 SCORE 7/20/2021 8/17/2021 9/13/2021   PHQ-9 Total Score MyChart 6 (Mild depression) 6 (Mild depression) 8 (Mild depression)   PHQ-9 Total Score 6 6 8     Previous ODILON-7:   ODILON-7 SCORE 7/20/2021 8/17/2021 9/13/2021   Total Score 10 (moderate anxiety) 6 (mild anxiety) 8 (mild anxiety)   Total Score 10 6 8       CHARLES LEVEL:  CHARLES Score (Last Two) 2/6/2020   CHARLES Raw Score 28   Activation Score 50   CHARLES Level 2       DATA  Extended Session (60+ minutes): No  Interactive Complexity: No  Crisis: No  [facility] Patient: No    Treatment Objective(s) Addressed in This Session:  Target Behavior(s):  PMS    Anxiety: will experience a reduction in anxiety, will develop more effective coping skills to manage anxiety symptoms, will develop healthy cognitive patterns and beliefs and will increase ability to function adaptively    Current Stressors / Issues:    Talked about stress vs anxiety and how they are different. Discussed how stress is normal and anxiety can be when we are problem solving.  Discussed how sometimes with anxiety there is nothing to problem solve.  Discussed social norms about relationships with parents and how that is playing into her guilt about how she thinks things should be.     Reviewed safety with Patient. Patient denies any current SI,plans or intentions.       Progress on Treatment Objective(s) / Homework:  Satisfactory progress - ACTION (Actively working towards change); Intervened by reinforcing change plan / affirming steps taken    Motivational Interviewing    MI Intervention: Expressed Empathy/Understanding, Supported Autonomy, Collaboration, Evocation, Permission to raise concern or advise, Open-ended questions and Reflections: simple and complex     Change Talk Expressed by the Patient: Desire to change Ability to change Reasons to change Need to change Committment to change Activation    Provider Response to Change Talk: E - Evoked more info from patient about behavior change, A - Affirmed patient's thoughts, decisions, or attempts at behavior change, R - Reflected patient's change talk and S - Summarized patient's change talk statements      Situation        Automatic Thoughts  Cognitive Distortions      Feelings        Behavior        Questioning Thoughts            Also provided psychoeducation about behavioral health condition, symptoms, and treatment options    Care Plan review completed: Yes    Medication Review:  No changes to current psychiatric medication(s)    Medication Compliance:  NA    Changes in Health Issues:   None reported    Chemical Use Review:   Substance  Use: Chemical use reviewed, no active concerns identified      Tobacco Use: No current tobacco use.      Assessment: Current Emotional / Mental Status (status of significant symptoms):  Risk status (Self / Other harm or suicidal ideation)  Patient denies a history of suicidal ideation, suicide attempts, self-injurious behavior, homicidal ideation, homicidal behavior and and other safety concerns  Patient denies current fears or concerns for personal safety.  Patient denies current or recent suicidal ideation or behaviors.  Patient denies current or recent homicidal ideation or behaviors.  Patient denies current or recent self injurious behavior or ideation.  Patient denies other safety concerns.  A safety and risk management plan has not been developed at this time, however patient was encouraged to call [place] [phone] should there be a change in any of these risk factors.    Appearance:   Appropriate   Eye Contact:   Good   Psychomotor Behavior: Normal   Attitude:   Cooperative   Orientation:   All  Speech   Rate / Production: Normal    Volume:  Normal   Mood:    Normal  Affect:    Appropriate   Thought Content:  Clear   Thought Form:  Coherent  Logical   Insight:    Good     Diagnoses:  1. ODILON (generalized anxiety disorder)    2. Major depressive disorder, recurrent episode, mild (H)        Collateral Reports Completed:  Not Applicable    Plan: (Homework, other):  Patient was given information about behavioral services and encouraged to schedule a follow up appointment with the clinic [facility] in 2 weeks.  She was also given Cognitive Behavioral Therapy skills to practice when experiencing anxiety.  CD Recommendations: No indications of CD issues.  XENIA Carrero, [facility]      ______________________________________________________________________    Integrated Primary Care Behavioral Health Treatment Plan    Patient's Name: Naty Pérez  YOB: 1995    Date: 8/3/21    DSM-V Diagnoses: 296.32  (F33.1) Major Depressive Disorder, Recurrent Episode, Moderate With anxious distress or 300.02 (F41.1) Generalized Anxiety Disorder  Psychosocial / Contextual Factors: job and relationships  WHODAS:   WHODAS 2.0 Total Score 8/25/2020 8/17/2021   Total Score 21 29   Total Score MyChart 21 29         Referral / Collaboration:  The following referral(s) will be initiated: ADHD testing.    Anticipated number of session or this episode of care: ongoing      MeasurableTreatment Goal(s) related to diagnosis / functional impairment(s)  Goal 1: Patient will increase coping skills to lower anxiety    I will know I've met my goal when less anxious.      Objective #A (Patient Action)    Patient will use cognitive strategies identified in therapy to challenge anxious thoughts.  Status: Continued - Date(s): 8/3/21    Intervention(s)  [facility] will teach emotional recognition/identification. CBT skills.    Objective #B  Patient will use cognitive strategies identified in therapy to challenge anxious thoughts.  Status: Continued - Date(s): 8/3/21    Intervention(s)  [facility] will teach about healthy boundaries. Conflict managment with family fair fighting.    Patient has reviewed and agreed to the above plan.      Vanita Negrete, LMFT  August 3rd, 2021

## 2021-10-19 ENCOUNTER — OFFICE VISIT (OUTPATIENT)
Dept: PEDIATRICS | Facility: CLINIC | Age: 26
End: 2021-10-19
Payer: COMMERCIAL

## 2021-10-19 VITALS
WEIGHT: 127.7 LBS | OXYGEN SATURATION: 100 % | RESPIRATION RATE: 20 BRPM | DIASTOLIC BLOOD PRESSURE: 66 MMHG | HEART RATE: 86 BPM | BODY MASS INDEX: 24.11 KG/M2 | TEMPERATURE: 98.9 F | HEIGHT: 61 IN | SYSTOLIC BLOOD PRESSURE: 104 MMHG

## 2021-10-19 DIAGNOSIS — Z00.00 ROUTINE GENERAL MEDICAL EXAMINATION AT A HEALTH CARE FACILITY: Primary | ICD-10-CM

## 2021-10-19 PROCEDURE — 90686 IIV4 VACC NO PRSV 0.5 ML IM: CPT | Performed by: INTERNAL MEDICINE

## 2021-10-19 PROCEDURE — 99395 PREV VISIT EST AGE 18-39: CPT | Mod: 25 | Performed by: INTERNAL MEDICINE

## 2021-10-19 PROCEDURE — 90471 IMMUNIZATION ADMIN: CPT | Performed by: INTERNAL MEDICINE

## 2021-10-19 ASSESSMENT — MIFFLIN-ST. JEOR: SCORE: 1253.23
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
## 2021-10-25 ENCOUNTER — VIRTUAL VISIT (OUTPATIENT)
Dept: BEHAVIORAL HEALTH | Facility: CLINIC | Age: 26
End: 2021-10-25
Payer: COMMERCIAL

## 2021-10-25 DIAGNOSIS — F33.0 MAJOR DEPRESSIVE DISORDER, RECURRENT EPISODE, MILD (H): Primary | ICD-10-CM

## 2021-10-25 DIAGNOSIS — F41.1 GAD (GENERALIZED ANXIETY DISORDER): ICD-10-CM

## 2021-10-25 PROCEDURE — 90837 PSYTX W PT 60 MINUTES: CPT | Mod: GT | Performed by: MARRIAGE & FAMILY THERAPIST

## 2021-10-25 ASSESSMENT — ANXIETY QUESTIONNAIRES
1. FEELING NERVOUS, ANXIOUS, OR ON EDGE: SEVERAL DAYS
2. NOT BEING ABLE TO STOP OR CONTROL WORRYING: SEVERAL DAYS
4. TROUBLE RELAXING: MORE THAN HALF THE DAYS
6. BECOMING EASILY ANNOYED OR IRRITABLE: SEVERAL DAYS
GAD7 TOTAL SCORE: 8
7. FEELING AFRAID AS IF SOMETHING AWFUL MIGHT HAPPEN: SEVERAL DAYS
GAD7 TOTAL SCORE: 8
8. IF YOU CHECKED OFF ANY PROBLEMS, HOW DIFFICULT HAVE THESE MADE IT FOR YOU TO DO YOUR WORK, TAKE CARE OF THINGS AT HOME, OR GET ALONG WITH OTHER PEOPLE?: SOMEWHAT DIFFICULT
5. BEING SO RESTLESS THAT IT IS HARD TO SIT STILL: SEVERAL DAYS
7. FEELING AFRAID AS IF SOMETHING AWFUL MIGHT HAPPEN: SEVERAL DAYS
3. WORRYING TOO MUCH ABOUT DIFFERENT THINGS: SEVERAL DAYS
GAD7 TOTAL SCORE: 8

## 2021-10-25 NOTE — PROGRESS NOTES
Telemedicine Visit: The patient's condition can be safely assessed and treated via synchronous audio and visual telemedicine encounter.      Reason for Telemedicine Visit: Services only offered telehealth    Originating Site (Patient Location): Patient's home    Distant Site (Provider Location): [facility]    Consent:  The patient/guardian has verbally consented to: the potential risks and benefits of telemedicine (video visit) versus in person care; bill my insurance or make self-payment for services provided; and responsibility for payment of non-covered services.     Mode of Communication:  Video Conference via [employer]    As the provider I attest to compliance with applicable laws and regulations related to telemedicine.    [facility] Primary Care: Integrated Behavioral Health  October 25th, 2021      Behavioral Health Clinician Progress Note    Patient Name: Naty Pérez           Service Type:  Individual      Service Location:   Face to Face in Home / Community     Session Start Time:  4:32 pm  Session End Time: 5;30 pm      Session Length: 53 - 60      Attendees: Client    Visit Activities (Refresh list every visit): [facility] Only    Diagnostic Assessment Date: 8/17/21  Treatment Plan Review Date: 11/3/21  See Flowsheets for today's PHQ-9 and ODILON-7 results  Previous PHQ-9:   PHQ-9 SCORE 8/17/2021 9/13/2021 10/25/2021   PHQ-9 Total Score MyChart 6 (Mild depression) 8 (Mild depression) 6 (Mild depression)   PHQ-9 Total Score 6 8 6     Previous ODILON-7:   ODILON-7 SCORE 8/17/2021 9/13/2021 10/25/2021   Total Score 6 (mild anxiety) 8 (mild anxiety) 8 (mild anxiety)   Total Score 6 8 8       CHARLES LEVEL:  CHARLES Score (Last Two) 2/6/2020   CHARLES Raw Score 28   Activation Score 50   CHARLES Level 2       DATA  Extended Session (60+ minutes): No  Interactive Complexity: No  Crisis: No  [facility] Patient: No    Treatment Objective(s) Addressed in This Session:  Target Behavior(s):  PMS    Anxiety: will experience a reduction in anxiety, will develop more effective coping skills to manage anxiety symptoms, will develop healthy cognitive patterns and beliefs and will increase ability to function adaptively    Current Stressors / Issues:    Processed effective communication with her boyfriend and her father. Discussed asking for what we need and not making assumptions. Feels like things are going well.   Reviewed safety with Patient. Patient denies any current SI,plans or intentions.       Progress on Treatment Objective(s) / Homework:  Satisfactory progress - ACTION (Actively working towards change); Intervened by reinforcing change plan / affirming steps taken    Motivational Interviewing    MI Intervention: Expressed Empathy/Understanding, Supported Autonomy, Collaboration, Evocation, Permission to raise concern or advise, Open-ended questions and Reflections: simple and complex     Change Talk Expressed by the Patient: Desire to change Ability to change Reasons to change Need to change Committment to change Activation    Provider Response to Change Talk: E - Evoked more info from patient about behavior change, A - Affirmed patient's thoughts, decisions, or attempts at behavior change, R - Reflected patient's change talk and S - Summarized patient's change talk statements      Situation        Automatic Thoughts  Cognitive Distortions      Feelings        Behavior        Questioning Thoughts            Also provided psychoeducation about behavioral health condition, symptoms, and treatment options    Care Plan review completed: Yes    Medication Review:  No changes to current psychiatric medication(s)    Medication Compliance:  NA    Changes in Health Issues:   None reported    Chemical Use Review:   Substance Use: Chemical use reviewed, no active concerns identified      Tobacco Use: No current tobacco use.      Assessment: Current Emotional / Mental Status (status of significant  symptoms):  Risk status (Self / Other harm or suicidal ideation)  Patient denies a history of suicidal ideation, suicide attempts, self-injurious behavior, homicidal ideation, homicidal behavior and and other safety concerns  Patient denies current fears or concerns for personal safety.  Patient denies current or recent suicidal ideation or behaviors.  Patient denies current or recent homicidal ideation or behaviors.  Patient denies current or recent self injurious behavior or ideation.  Patient denies other safety concerns.  A safety and risk management plan has not been developed at this time, however patient was encouraged to call [place] [phone] should there be a change in any of these risk factors.    Appearance:   Appropriate   Eye Contact:   Good   Psychomotor Behavior: Normal   Attitude:   Cooperative   Orientation:   All  Speech   Rate / Production: Normal    Volume:  Normal   Mood:    Normal  Affect:    Appropriate   Thought Content:  Clear   Thought Form:  Coherent  Logical   Insight:    Good     Diagnoses:  1. Major depressive disorder, recurrent episode, mild (H)    2. ODILON (generalized anxiety disorder)        Collateral Reports Completed:  Not Applicable    Plan: (Homework, other):  Patient was given information about behavioral services and encouraged to schedule a follow up appointment with the clinic [facility] in 2 weeks.  She was also given Cognitive Behavioral Therapy skills to practice when experiencing anxiety.  CD Recommendations: No indications of CD issues.  XENIA Carrero, [facility]      ______________________________________________________________________    Integrated Primary Care Behavioral Health Treatment Plan    Patient's Name: Naty Pérez  YOB: 1995    Date: 8/3/21    DSM-V Diagnoses: 296.32 (F33.1) Major Depressive Disorder, Recurrent Episode, Moderate With anxious distress or 300.02 (F41.1) Generalized Anxiety Disorder  Psychosocial / Contextual Factors: job and  relationships  WHODAS:   WHODAS 2.0 Total Score 8/25/2020 8/17/2021   Total Score 21 29   Total Score MyChart 21 29         Referral / Collaboration:  The following referral(s) will be initiated: ADHD testing.    Anticipated number of session or this episode of care: ongoing      MeasurableTreatment Goal(s) related to diagnosis / functional impairment(s)  Goal 1: Patient will increase coping skills to lower anxiety    I will know I've met my goal when less anxious.      Objective #A (Patient Action)    Patient will use cognitive strategies identified in therapy to challenge anxious thoughts.  Status: Continued - Date(s): 8/3/21    Intervention(s)  [facility] will teach emotional recognition/identification. CBT skills.    Objective #B  Patient will use cognitive strategies identified in therapy to challenge anxious thoughts.  Status: Continued - Date(s): 8/3/21    Intervention(s)  [facility] will teach about healthy boundaries. Conflict managment with family fair fighting.    Patient has reviewed and agreed to the above plan.      Vanita Negrete, LMFT  August 3rd, 2021

## 2021-10-26 ASSESSMENT — ANXIETY QUESTIONNAIRES: GAD7 TOTAL SCORE: 8

## 2021-10-26 ASSESSMENT — PATIENT HEALTH QUESTIONNAIRE - PHQ9: SUM OF ALL RESPONSES TO PHQ QUESTIONS 1-9: 6

## 2021-11-08 ENCOUNTER — VIRTUAL VISIT (OUTPATIENT)
Dept: BEHAVIORAL HEALTH | Facility: CLINIC | Age: 26
End: 2021-11-08
Payer: COMMERCIAL

## 2021-11-08 DIAGNOSIS — F33.0 MAJOR DEPRESSIVE DISORDER, RECURRENT EPISODE, MILD (H): ICD-10-CM

## 2021-11-08 DIAGNOSIS — F41.1 GAD (GENERALIZED ANXIETY DISORDER): Primary | ICD-10-CM

## 2021-11-08 PROCEDURE — 90837 PSYTX W PT 60 MINUTES: CPT | Mod: GT | Performed by: MARRIAGE & FAMILY THERAPIST

## 2021-11-08 NOTE — PROGRESS NOTES
Telemedicine Visit: The patient's condition can be safely assessed and treated via synchronous audio and visual telemedicine encounter.      Reason for Telemedicine Visit: Services only offered telehealth    Originating Site (Patient Location): Patient's home    Distant Site (Provider Location): [facility]    Consent:  The patient/guardian has verbally consented to: the potential risks and benefits of telemedicine (video visit) versus in person care; bill my insurance or make self-payment for services provided; and responsibility for payment of non-covered services.     Mode of Communication:  Video Conference via [employer]    As the provider I attest to compliance with applicable laws and regulations related to telemedicine.    [facility] Primary Care: Integrated Behavioral Health  November 8th, 2021      Behavioral Health Clinician Progress Note    Patient Name: Naty Pérez           Service Type:  Individual      Service Location:   Face to Face in Home / Community     Session Start Time:  4:32 pm  Session End Time: 5:27 pm      Session Length: 53 - 60      Attendees: Client    Visit Activities (Refresh list every visit): [facility] Only    Diagnostic Assessment Date: 8/17/21  Treatment Plan Review Date: 2/8/22  See Flowsheets for today's PHQ-9 and ODILON-7 results  Previous PHQ-9:   PHQ-9 SCORE 8/17/2021 9/13/2021 10/25/2021   PHQ-9 Total Score MyChart 6 (Mild depression) 8 (Mild depression) 6 (Mild depression)   PHQ-9 Total Score 6 8 6     Previous ODILON-7:   ODILON-7 SCORE 8/17/2021 9/13/2021 10/25/2021   Total Score 6 (mild anxiety) 8 (mild anxiety) 8 (mild anxiety)   Total Score 6 8 8       CHARLES LEVEL:  CHARLES Score (Last Two) 2/6/2020   CHARLES Raw Score 28   Activation Score 50   CHARLES Level 2       DATA  Extended Session (60+ minutes): No  Interactive Complexity: No  Crisis: No  [facility] Patient: No    Treatment Objective(s) Addressed in This Session:  Target Behavior(s):  PMS    Anxiety: will experience a reduction in anxiety, will develop more effective coping skills to manage anxiety symptoms, will develop healthy cognitive patterns and beliefs and will increase ability to function adaptively    Current Stressors / Issues:    Continued to process communication styles and how her cognitive errors play into her avoidence. Talked about slowing things down and not projecting her feelings on others. Reviewed safety with Patient. Patient denies any current SI,plans or intentions.       Progress on Treatment Objective(s) / Homework:  Satisfactory progress - ACTION (Actively working towards change); Intervened by reinforcing change plan / affirming steps taken    Motivational Interviewing    MI Intervention: Expressed Empathy/Understanding, Supported Autonomy, Collaboration, Evocation, Permission to raise concern or advise, Open-ended questions and Reflections: simple and complex     Change Talk Expressed by the Patient: Desire to change Ability to change Reasons to change Need to change Committment to change Activation    Provider Response to Change Talk: E - Evoked more info from patient about behavior change, A - Affirmed patient's thoughts, decisions, or attempts at behavior change, R - Reflected patient's change talk and S - Summarized patient's change talk statements      Situation        Automatic Thoughts  Cognitive Distortions      Feelings        Behavior        Questioning Thoughts            Also provided psychoeducation about behavioral health condition, symptoms, and treatment options    Care Plan review completed: Yes    Medication Review:  No changes to current psychiatric medication(s)    Medication Compliance:  NA    Changes in Health Issues:   None reported    Chemical Use Review:   Substance Use: Chemical use reviewed, no active concerns identified      Tobacco Use: No current tobacco use.      Assessment: Current Emotional / Mental Status (status of significant  symptoms):  Risk status (Self / Other harm or suicidal ideation)  Patient denies a history of suicidal ideation, suicide attempts, self-injurious behavior, homicidal ideation, homicidal behavior and and other safety concerns  Patient denies current fears or concerns for personal safety.  Patient denies current or recent suicidal ideation or behaviors.  Patient denies current or recent homicidal ideation or behaviors.  Patient denies current or recent self injurious behavior or ideation.  Patient denies other safety concerns.  A safety and risk management plan has not been developed at this time, however patient was encouraged to call [place] [phone] should there be a change in any of these risk factors.    Appearance:   Appropriate   Eye Contact:   Good   Psychomotor Behavior: Normal   Attitude:   Cooperative   Orientation:   All  Speech   Rate / Production: Normal    Volume:  Normal   Mood:    Normal  Affect:    Appropriate   Thought Content:  Clear   Thought Form:  Coherent  Logical   Insight:    Good     Diagnoses:  1. ODILON (generalized anxiety disorder)    2. Major depressive disorder, recurrent episode, mild (H)        Collateral Reports Completed:  Not Applicable    Plan: (Homework, other):  Patient was given information about behavioral services and encouraged to schedule a follow up appointment with the clinic [facility] in 2 weeks.  She was also given Cognitive Behavioral Therapy skills to practice when experiencing anxiety.  CD Recommendations: No indications of CD issues.  XENIA Carerro, [facility]      ______________________________________________________________________    Integrated Primary Care Behavioral Health Treatment Plan    Patient's Name: Naty Pérez  YOB: 1995    Date: 11/8/21    DSM-V Diagnoses: 296.32 (F33.1) Major Depressive Disorder, Recurrent Episode, Moderate With anxious distress or 300.02 (F41.1) Generalized Anxiety Disorder  Psychosocial / Contextual Factors: job and  relationships  WHODAS:   WHODAS 2.0 Total Score 8/25/2020 8/17/2021   Total Score 21 29   Total Score MyChart 21 29         Referral / Collaboration:  The following referral(s) will be initiated: ADHD testing.    Anticipated number of session or this episode of care: ongoing      MeasurableTreatment Goal(s) related to diagnosis / functional impairment(s)  Goal 1: Patient will increase coping skills to lower anxiety    I will know I've met my goal when less anxious.      Objective #A (Patient Action)    Patient will use cognitive strategies identified in therapy to challenge anxious thoughts.  Status: Continued - Date(s): 11/8/21    Intervention(s)  [facility] will teach emotional recognition/identification. CBT skills.    Objective #B  Patient will use cognitive strategies identified in therapy to challenge anxious thoughts.  Status: Continued - Date(s): 11/8/21    Intervention(s)  [facility] will teach about healthy boundaries. Conflict managment with family fair fighting.    Patient has reviewed and agreed to the above plan.      Vanita Negrete, LMFT  November 11th, 2021

## 2021-11-29 ENCOUNTER — VIRTUAL VISIT (OUTPATIENT)
Dept: BEHAVIORAL HEALTH | Facility: CLINIC | Age: 26
End: 2021-11-29
Payer: COMMERCIAL

## 2021-11-29 DIAGNOSIS — F41.1 GAD (GENERALIZED ANXIETY DISORDER): Primary | ICD-10-CM

## 2021-11-29 DIAGNOSIS — F33.0 MAJOR DEPRESSIVE DISORDER, RECURRENT EPISODE, MILD (H): ICD-10-CM

## 2021-11-29 PROCEDURE — 90837 PSYTX W PT 60 MINUTES: CPT | Mod: 95 | Performed by: MARRIAGE & FAMILY THERAPIST

## 2021-11-29 ASSESSMENT — ANXIETY QUESTIONNAIRES
7. FEELING AFRAID AS IF SOMETHING AWFUL MIGHT HAPPEN: NOT AT ALL
8. IF YOU CHECKED OFF ANY PROBLEMS, HOW DIFFICULT HAVE THESE MADE IT FOR YOU TO DO YOUR WORK, TAKE CARE OF THINGS AT HOME, OR GET ALONG WITH OTHER PEOPLE?: SOMEWHAT DIFFICULT
3. WORRYING TOO MUCH ABOUT DIFFERENT THINGS: NOT AT ALL
6. BECOMING EASILY ANNOYED OR IRRITABLE: SEVERAL DAYS
7. FEELING AFRAID AS IF SOMETHING AWFUL MIGHT HAPPEN: NOT AT ALL
5. BEING SO RESTLESS THAT IT IS HARD TO SIT STILL: SEVERAL DAYS
GAD7 TOTAL SCORE: 4
2. NOT BEING ABLE TO STOP OR CONTROL WORRYING: NOT AT ALL
GAD7 TOTAL SCORE: 4
1. FEELING NERVOUS, ANXIOUS, OR ON EDGE: SEVERAL DAYS
4. TROUBLE RELAXING: SEVERAL DAYS
GAD7 TOTAL SCORE: 4

## 2021-11-29 ASSESSMENT — PATIENT HEALTH QUESTIONNAIRE - PHQ9
SUM OF ALL RESPONSES TO PHQ QUESTIONS 1-9: 11
10. IF YOU CHECKED OFF ANY PROBLEMS, HOW DIFFICULT HAVE THESE PROBLEMS MADE IT FOR YOU TO DO YOUR WORK, TAKE CARE OF THINGS AT HOME, OR GET ALONG WITH OTHER PEOPLE: SOMEWHAT DIFFICULT
SUM OF ALL RESPONSES TO PHQ QUESTIONS 1-9: 11

## 2021-11-29 NOTE — PROGRESS NOTES
Telemedicine Visit: The patient's condition can be safely assessed and treated via synchronous audio and visual telemedicine encounter.      Reason for Telemedicine Visit: Services only offered telehealth    Originating Site (Patient Location): Patient's home    Distant Site (Provider Location): [facility]    Consent:  The patient/guardian has verbally consented to: the potential risks and benefits of telemedicine (video visit) versus in person care; bill my insurance or make self-payment for services provided; and responsibility for payment of non-covered services.     Mode of Communication:  Video Conference via [employer]    As the provider I attest to compliance with applicable laws and regulations related to telemedicine.    [facility] Primary Care: Integrated Behavioral Health  November 29th, 2021      Behavioral Health Clinician Progress Note    Patient Name: Naty Pérez           Service Type:  Individual      Service Location:   Face to Face in Home / Community     Session Start Time:  4:32 pm  Session End Time: 5:30 pm      Session Length: 53 - 60      Attendees: Client    Visit Activities (Refresh list every visit): [facility] Only    Diagnostic Assessment Date: 8/17/21  Treatment Plan Review Date: 2/8/22  See Flowsheets for today's PHQ-9 and ODILON-7 results  Previous PHQ-9:   PHQ-9 SCORE 9/13/2021 10/25/2021 11/29/2021   PHQ-9 Total Score MyChart 8 (Mild depression) 6 (Mild depression) 11 (Moderate depression)   PHQ-9 Total Score 8 6 11     Previous ODILON-7:   ODILON-7 SCORE 9/13/2021 10/25/2021 11/29/2021   Total Score 8 (mild anxiety) 8 (mild anxiety) 4 (minimal anxiety)   Total Score 8 8 4       CHARLES LEVEL:  CHARLES Score (Last Two) 2/6/2020   CHARLES Raw Score 28   Activation Score 50   CHARLES Level 2       DATA  Extended Session (60+ minutes): No  Interactive Complexity: No  Crisis: No  [facility] Patient: No    Treatment Objective(s) Addressed in This Session:  Target Behavior(s):  PMS    Anxiety: will experience a reduction in anxiety, will develop more effective coping skills to manage anxiety symptoms, will develop healthy cognitive patterns and beliefs and will increase ability to function adaptively    Current Stressors / Issues:    Pt reports that her depression has been worse. I know my period is coming. Forgot her medicine one day during the move into her new apartment.   Continued to talk about having conversations and how making assumptions makes it harder. Talked about how she feels like it is not okay. Reviewed safety with Patient. Patient denies any current SI,plans or intentions.       Progress on Treatment Objective(s) / Homework:  Satisfactory progress - ACTION (Actively working towards change); Intervened by reinforcing change plan / affirming steps taken    Motivational Interviewing    MI Intervention: Expressed Empathy/Understanding, Supported Autonomy, Collaboration, Evocation, Permission to raise concern or advise, Open-ended questions and Reflections: simple and complex     Change Talk Expressed by the Patient: Desire to change Ability to change Reasons to change Need to change Committment to change Activation    Provider Response to Change Talk: E - Evoked more info from patient about behavior change, A - Affirmed patient's thoughts, decisions, or attempts at behavior change, R - Reflected patient's change talk and S - Summarized patient's change talk statements      Situation        Automatic Thoughts  Cognitive Distortions      Feelings        Behavior        Questioning Thoughts            Also provided psychoeducation about behavioral health condition, symptoms, and treatment options    Care Plan review completed: Yes    Medication Review:  No changes to current psychiatric medication(s)    Medication Compliance:  NA    Changes in Health Issues:   None reported    Chemical Use Review:   Substance Use: Chemical use reviewed, no active concerns identified      Tobacco  Use: No current tobacco use.      Assessment: Current Emotional / Mental Status (status of significant symptoms):  Risk status (Self / Other harm or suicidal ideation)  Patient denies a history of suicidal ideation, suicide attempts, self-injurious behavior, homicidal ideation, homicidal behavior and and other safety concerns  Patient denies current fears or concerns for personal safety.  Patient denies current or recent suicidal ideation or behaviors.  Patient denies current or recent homicidal ideation or behaviors.  Patient denies current or recent self injurious behavior or ideation.  Patient denies other safety concerns.  A safety and risk management plan has not been developed at this time, however patient was encouraged to call [place] [phone] should there be a change in any of these risk factors.    Appearance:   Appropriate   Eye Contact:   Good   Psychomotor Behavior: Normal   Attitude:   Cooperative   Orientation:   All  Speech   Rate / Production: Normal    Volume:  Normal   Mood:    Normal  Affect:    Appropriate   Thought Content:  Clear   Thought Form:  Coherent  Logical   Insight:    Good     Diagnoses:  1. ODILON (generalized anxiety disorder)    2. Major depressive disorder, recurrent episode, mild (H)        Collateral Reports Completed:  Not Applicable    Plan: (Homework, other):  Patient was given information about behavioral services and encouraged to schedule a follow up appointment with the clinic [facility] in 2 weeks.  She was also given Cognitive Behavioral Therapy skills to practice when experiencing anxiety.  CD Recommendations: No indications of CD issues.  XENIA Carrero, [facility]      ______________________________________________________________________    Integrated Primary Care Behavioral Health Treatment Plan    Patient's Name: Naty Pérez  YOB: 1995    Date: 11/8/21    DSM-V Diagnoses: 296.32 (F33.1) Major Depressive Disorder, Recurrent Episode, Moderate With anxious  distress or 300.02 (F41.1) Generalized Anxiety Disorder  Psychosocial / Contextual Factors: job and relationships  WHODAS:   WHODAS 2.0 Total Score 8/25/2020 8/17/2021   Total Score 21 29   Total Score MyChart 21 29         Referral / Collaboration:  The following referral(s) will be initiated: ADHD testing.    Anticipated number of session or this episode of care: ongoing      MeasurableTreatment Goal(s) related to diagnosis / functional impairment(s)  Goal 1: Patient will increase coping skills to lower anxiety    I will know I've met my goal when less anxious.      Objective #A (Patient Action)    Patient will use cognitive strategies identified in therapy to challenge anxious thoughts.  Status: Continued - Date(s): 11/8/21    Intervention(s)  [facility] will teach emotional recognition/identification. CBT skills.    Objective #B  Patient will use cognitive strategies identified in therapy to challenge anxious thoughts.  Status: Continued - Date(s): 11/8/21    Intervention(s)  [facility] will teach about healthy boundaries. Conflict managment with family fair fighting.    Patient has reviewed and agreed to the above plan.      Vanita Negrete, LMFT  November 11th, 2021

## 2021-11-30 ASSESSMENT — ANXIETY QUESTIONNAIRES: GAD7 TOTAL SCORE: 4

## 2021-11-30 ASSESSMENT — PATIENT HEALTH QUESTIONNAIRE - PHQ9: SUM OF ALL RESPONSES TO PHQ QUESTIONS 1-9: 11

## 2021-12-20 ENCOUNTER — VIRTUAL VISIT (OUTPATIENT)
Dept: BEHAVIORAL HEALTH | Facility: CLINIC | Age: 26
End: 2021-12-20
Payer: COMMERCIAL

## 2021-12-20 DIAGNOSIS — F33.0 MAJOR DEPRESSIVE DISORDER, RECURRENT EPISODE, MILD (H): ICD-10-CM

## 2021-12-20 DIAGNOSIS — F41.1 GAD (GENERALIZED ANXIETY DISORDER): Primary | ICD-10-CM

## 2021-12-20 PROCEDURE — 90837 PSYTX W PT 60 MINUTES: CPT | Mod: 95 | Performed by: MARRIAGE & FAMILY THERAPIST

## 2021-12-20 NOTE — PROGRESS NOTES
Telemedicine Visit: The patient's condition can be safely assessed and treated via synchronous audio and visual telemedicine encounter.      Reason for Telemedicine Visit: Services only offered telehealth    Originating Site (Patient Location): Patient's home    Distant Site (Provider Location): [facility]    Consent:  The patient/guardian has verbally consented to: the potential risks and benefits of telemedicine (video visit) versus in person care; bill my insurance or make self-payment for services provided; and responsibility for payment of non-covered services.     Mode of Communication:  Video Conference via [employer]    As the provider I attest to compliance with applicable laws and regulations related to telemedicine.    [facility] Primary Care: Integrated Behavioral Health  December 20th, 2021      Behavioral Health Clinician Progress Note    Patient Name: Naty Pérez           Service Type:  Individual      Service Location:   Face to Face in Home / Community     Session Start Time:  4:30 pm  Session End Time: 5:30 pm      Session Length: 53 - 60      Attendees: Client    Visit Activities (Refresh list every visit): [facility] Only    Diagnostic Assessment Date: 8/17/21  Treatment Plan Review Date: 2/8/22  See Flowsheets for today's PHQ-9 and ODILON-7 results  Previous PHQ-9:   PHQ-9 SCORE 9/13/2021 10/25/2021 11/29/2021   PHQ-9 Total Score MyChart 8 (Mild depression) 6 (Mild depression) 11 (Moderate depression)   PHQ-9 Total Score 8 6 11     Previous ODILON-7:   ODILON-7 SCORE 9/13/2021 10/25/2021 11/29/2021   Total Score 8 (mild anxiety) 8 (mild anxiety) 4 (minimal anxiety)   Total Score 8 8 4       CHARLES LEVEL:  CHARLES Score (Last Two) 2/6/2020   CHARLES Raw Score 28   Activation Score 50   CHARLES Level 2       DATA  Extended Session (60+ minutes): No  Interactive Complexity: No  Crisis: No  [facility] Patient: No    Treatment Objective(s) Addressed in This Session:  Target Behavior(s):  PMS    Anxiety: will experience a reduction in anxiety, will develop more effective coping skills to manage anxiety symptoms, will develop healthy cognitive patterns and beliefs and will increase ability to function adaptively    Current Stressors / Issues:    Continued to work on communication and setting boundaries with her partner. Discussed content vs process.   Reviewed safety with Patient. Patient denies any current SI,plans or intentions.       Progress on Treatment Objective(s) / Homework:  Satisfactory progress - ACTION (Actively working towards change); Intervened by reinforcing change plan / affirming steps taken    Motivational Interviewing    MI Intervention: Expressed Empathy/Understanding, Supported Autonomy, Collaboration, Evocation, Permission to raise concern or advise, Open-ended questions and Reflections: simple and complex     Change Talk Expressed by the Patient: Desire to change Ability to change Reasons to change Need to change Committment to change Activation    Provider Response to Change Talk: E - Evoked more info from patient about behavior change, A - Affirmed patient's thoughts, decisions, or attempts at behavior change, R - Reflected patient's change talk and S - Summarized patient's change talk statements      Situation        Automatic Thoughts  Cognitive Distortions      Feelings        Behavior        Questioning Thoughts            Also provided psychoeducation about behavioral health condition, symptoms, and treatment options    Care Plan review completed: Yes    Medication Review:  No changes to current psychiatric medication(s)    Medication Compliance:  NA    Changes in Health Issues:   None reported    Chemical Use Review:   Substance Use: Chemical use reviewed, no active concerns identified      Tobacco Use: No current tobacco use.      Assessment: Current Emotional / Mental Status (status of significant symptoms):  Risk status (Self / Other harm or suicidal  ideation)  Patient denies a history of suicidal ideation, suicide attempts, self-injurious behavior, homicidal ideation, homicidal behavior and and other safety concerns  Patient denies current fears or concerns for personal safety.  Patient denies current or recent suicidal ideation or behaviors.  Patient denies current or recent homicidal ideation or behaviors.  Patient denies current or recent self injurious behavior or ideation.  Patient denies other safety concerns.  A safety and risk management plan has not been developed at this time, however patient was encouraged to call [place] [phone] should there be a change in any of these risk factors.    Appearance:   Appropriate   Eye Contact:   Good   Psychomotor Behavior: Normal   Attitude:   Cooperative   Orientation:   All  Speech   Rate / Production: Normal    Volume:  Normal   Mood:    Normal  Affect:    Appropriate   Thought Content:  Clear   Thought Form:  Coherent  Logical   Insight:    Good     Diagnoses:  1. ODILON (generalized anxiety disorder)    2. Major depressive disorder, recurrent episode, mild (H)        Collateral Reports Completed:  Not Applicable    Plan: (Homework, other):  Patient was given information about behavioral services and encouraged to schedule a follow up appointment with the clinic [facility] in 2 weeks.  She was also given Cognitive Behavioral Therapy skills to practice when experiencing anxiety.  CD Recommendations: No indications of CD issues.  XENIA Carrero, [facility]      ______________________________________________________________________    Integrated Primary Care Behavioral Health Treatment Plan    Patient's Name: Naty Pérez  YOB: 1995    Date: 11/8/21    DSM-V Diagnoses: 296.32 (F33.1) Major Depressive Disorder, Recurrent Episode, Moderate With anxious distress or 300.02 (F41.1) Generalized Anxiety Disorder  Psychosocial / Contextual Factors: job and relationships  WHODAS:   WHODAS 2.0 Total Score 8/25/2020  8/17/2021   Total Score 21 29   Total Score MyChart 21 29         Referral / Collaboration:  The following referral(s) will be initiated: ADHD testing.    Anticipated number of session or this episode of care: ongoing      MeasurableTreatment Goal(s) related to diagnosis / functional impairment(s)  Goal 1: Patient will increase coping skills to lower anxiety    I will know I've met my goal when less anxious.      Objective #A (Patient Action)    Patient will use cognitive strategies identified in therapy to challenge anxious thoughts.  Status: Continued - Date(s): 11/8/21    Intervention(s)  [facility] will teach emotional recognition/identification. CBT skills.    Objective #B  Patient will use cognitive strategies identified in therapy to challenge anxious thoughts.  Status: Continued - Date(s): 11/8/21    Intervention(s)  [facility] will teach about healthy boundaries. Conflict managment with family fair fighting.    Patient has reviewed and agreed to the above plan.      Vanita Negrete, LMFT  November 11th, 2021

## 2021-12-21 ENCOUNTER — OFFICE VISIT (OUTPATIENT)
Dept: FAMILY MEDICINE | Facility: CLINIC | Age: 26
End: 2021-12-21
Payer: COMMERCIAL

## 2021-12-21 VITALS
HEART RATE: 82 BPM | WEIGHT: 127 LBS | RESPIRATION RATE: 16 BRPM | DIASTOLIC BLOOD PRESSURE: 67 MMHG | HEIGHT: 62 IN | BODY MASS INDEX: 23.37 KG/M2 | TEMPERATURE: 98.6 F | SYSTOLIC BLOOD PRESSURE: 93 MMHG

## 2021-12-21 DIAGNOSIS — F33.0 MAJOR DEPRESSIVE DISORDER, RECURRENT EPISODE, MILD (H): ICD-10-CM

## 2021-12-21 DIAGNOSIS — R22.32 NODULE OF FINGER OF LEFT HAND: Primary | ICD-10-CM

## 2021-12-21 PROCEDURE — 91306 COVID-19,PF,MODERNA (18+ YRS BOOSTER .25ML): CPT | Performed by: FAMILY MEDICINE

## 2021-12-21 PROCEDURE — 0064A COVID-19,PF,MODERNA (18+ YRS BOOSTER .25ML): CPT | Performed by: FAMILY MEDICINE

## 2021-12-21 PROCEDURE — 99213 OFFICE O/P EST LOW 20 MIN: CPT | Mod: 25 | Performed by: FAMILY MEDICINE

## 2021-12-21 ASSESSMENT — MIFFLIN-ST. JEOR: SCORE: 1262.57

## 2021-12-21 NOTE — PROGRESS NOTES
"OFFICE VISIT - FAMILY MEDICINE     ASSESSMENT AND PLAN       ICD-10-CM    1. Nodule of finger of left hand  R22.32    2. Major depressive disorder, recurrent episode, mild (H)  F33.0    Small nodular lesion left middle finger, management discussed with the patient included   Referral to see hand specialist, she preferred to give it sometime, she will let us know if it does not improve in the next few  few weeks, will place referral to see hand specialist.  Mild depression appears stable with current management including medication, counseling.       CHIEF COMPLAINT   lump on left middle finger       HPI   Naty Pérez is a 26 year old female.  No Patient Care Coordination Note on file.    She is here today with a small bump lesion at the left middle finger Palmar proximal aspect. Minimally tender to touch, has been there for the past few days, has had it about a year ago and it got better on its own. No difficulty moving her fingers.  Mild depression, stable with medication and counseling. She is nonsuicidal today.  While patient is here she would like to get her COVID-19 booster shot      Review of Systems As per HPI, otherwise negative.    OBJECTIVE   BP 93/67 (BP Location: Left arm, Patient Position: Sitting, Cuff Size: Adult Regular)   Pulse 82   Temp 98.6  F (37  C) (Oral)   Resp 16   Ht 1.58 m (5' 2.21\")   Wt 57.6 kg (127 lb)   BMI 23.08 kg/m    Physical Exam  Constitutional:       Appearance: Normal appearance.   HENT:      Head: Normocephalic and atraumatic.   Cardiovascular:      Rate and Rhythm: Normal rate and regular rhythm.   Pulmonary:      Effort: Pulmonary effort is normal.      Breath sounds: Normal breath sounds.   Musculoskeletal:         General: No swelling.        Hands:       Cervical back: Normal range of motion.   Neurological:      General: No focal deficit present.      Mental Status: She is alert.   Psychiatric:         Behavior: Behavior normal.         Thought Content: Thought " content normal.         Judgment: Judgment normal.         PFSH     Family History   Problem Relation Age of Onset     Diabetes Maternal Grandfather      Hypertension Maternal Grandfather      Cancer Paternal Grandfather      Cerebrovascular Disease Paternal Grandfather      Cancer - colorectal Paternal Grandfather      Unknown/Adopted Father      Colorectal Cancer Father      Colon Cancer Father      Multiple myeloma Paternal Uncle      C.A.D. No family hx of      Breast Cancer No family hx of      Prostate Cancer No family hx of      Thyroid Disease No family hx of      Social History     Socioeconomic History     Marital status: Single     Spouse name: Not on file     Number of children: Not on file     Years of education: Not on file     Highest education level: Not on file   Occupational History     Employer: [employer]UD[employer]     Comment: grad spring 2015   Tobacco Use     Smoking status: Never Smoker     Smokeless tobacco: Never Used     Tobacco comment: non smoking home   Substance and Sexual Activity     Alcohol use: No     Drug use: No     Sexual activity: Yes     Partners: Male     Birth control/protection: Implant   Other Topics Concern     Parent/sibling w/ CABG, MI or angioplasty before 65F 55M? No   Social History Narrative     Not on file     Social Determinants of Health     Financial Resource Strain: Not on file   Food Insecurity: Not on file   Transportation Needs: Not on file   Physical Activity: Not on file   Stress: Not on file   Social Connections: Not on file   Intimate Partner Violence: Not on file   Housing Stability: Not on file       PMS   [unfilled]  No past surgical history on file.    RESULTS/CONSULTS (Lab/Rad)   No results found for this or any previous visit (from the past 168 hour(s)).  US Pelvic Complete with Transvaginal  [facility]  ULTRASOUND - PELVIC GYN- Transabdominal and Transvaginal     Referring MD: Treasure Cassidy  CNM     ===================================     CLINICAL INFORMATION     Indications for ultrasound:   Bleeding/Menses - Metrorrhagia (irregular menses)     LMP: 08/07/2021 Hormones: contraceptive implant     Measurements:  Uterus:  5.5 x 3.8 x 3.8 cm     Position is retroverted.  Contour is smooth/regular.     Endo cav: 3.4 mm       Smooth/regular/wnl     Right ovary: 3.6 x 3.2 x 2.2 cm  Wnl  Left ovary:   3.4 x 2.2 x 1.4 cm Wnl     Cul de sac: no free fluid     ===================================    Complete pelvic ultrasound using realtime transabdominal and transvaginal   scanning.  Bladder appears normal.    Normal appearing uterus. No fibroids seen.   Normal endometrial cavity.  Normal appearing bilateral ovaries.   No free fluid in the cul de sac.     ================================    1. Normal pelvic ultrasound    Macrina Weiss MD [facility]  Obstetrics and Gynecology  [facility]     [unfilled]    HEALTH MAINTENANCE / SCREENING   [unfilled], [unfilled],[unfilled]  Immunization History   Administered Date(s) Administered     COVID-19,PF,Moderna 02/26/2021, 03/26/2021     COVID-19,PF,Moderna Booster 12/21/2021     HEPA 07/10/2008, 02/27/2009     HPV 07/10/2008, 09/12/2008, 02/27/2009     HepB 03/01/1996, 04/07/1996, 09/09/1996     Historical DTP/aP 1993, 1993, 01/18/1994, 10/11/1994, 07/17/1998     Influenza (IIV3) PF 10/20/2010, 12/20/2013     Influenza (intradermal) 01/17/2013     Influenza Vaccine IM > 6 months Valent IIV4 (Alfuria,Fluzone) 11/24/2015, 02/06/2020, 12/08/2020, 10/19/2021     Influenza Vaccine, 6+MO IM (QUADRIVALENT W/PRESERVATIVES) 11/05/2013     MMR 10/11/1994, 06/13/2005     Meningococcal (Menactra ) 07/10/2008     Poliovirus, inactivated (IPV) 1993, 1993, 10/11/1994, 07/17/1998, 11/05/2013     TD (ADULT, 7+) 06/13/2005     TDAP Vaccine (Adacel) 02/19/2016     TDAP Vaccine (Boostrix) 08/21/2008     Tdap (Adacel,Boostrix) 08/21/2008      Typhoid IM 04/26/2011     Typhoid Oral 12/20/2013     Varicella Pt Report Hx of Varicella/Chicken Pox 07/01/1994     Yellow Fever 04/26/2011     Health Maintenance   Topic     COVID-19 Vaccine (3 - Booster for Moderna series)     ANNUAL REVIEW OF HM ORDERS      PHQ-9      PREVENTIVE CARE VISIT      PAP      DTAP/TDAP/TD IMMUNIZATION (9 - Td or Tdap)     ADVANCE CARE PLANNING      HEPATITIS C SCREENING      HIV SCREENING      DEPRESSION ACTION PLAN      INFLUENZA VACCINE      IPV IMMUNIZATION      HEPATITIS B IMMUNIZATION      Pneumococcal Vaccine: Pediatrics (0 to 5 Years) and At-Risk Patients (6 to 64 Years)      MENINGITIS IMMUNIZATION      Review of external notes as documented elsewhere in note  20 minutes spent on the date of the encounter doing chart review, review of outside records, review of test results, interpretation of tests, patient visit and documentation          Filomena Dias MD  [facility]   This transcription uses voice recognition software, which may contain typographical errors.

## 2022-01-10 ENCOUNTER — VIRTUAL VISIT (OUTPATIENT)
Dept: BEHAVIORAL HEALTH | Facility: CLINIC | Age: 27
End: 2022-01-10
Payer: COMMERCIAL

## 2022-01-10 DIAGNOSIS — F41.1 GAD (GENERALIZED ANXIETY DISORDER): Primary | ICD-10-CM

## 2022-01-10 DIAGNOSIS — F33.0 MAJOR DEPRESSIVE DISORDER, RECURRENT EPISODE, MILD (H): ICD-10-CM

## 2022-01-10 PROCEDURE — 90837 PSYTX W PT 60 MINUTES: CPT | Mod: GT | Performed by: MARRIAGE & FAMILY THERAPIST

## 2022-01-10 ASSESSMENT — PATIENT HEALTH QUESTIONNAIRE - PHQ9
SUM OF ALL RESPONSES TO PHQ QUESTIONS 1-9: 7
10. IF YOU CHECKED OFF ANY PROBLEMS, HOW DIFFICULT HAVE THESE PROBLEMS MADE IT FOR YOU TO DO YOUR WORK, TAKE CARE OF THINGS AT HOME, OR GET ALONG WITH OTHER PEOPLE: SOMEWHAT DIFFICULT
SUM OF ALL RESPONSES TO PHQ QUESTIONS 1-9: 7

## 2022-01-10 NOTE — PROGRESS NOTES
Telemedicine Visit: The patient's condition can be safely assessed and treated via synchronous audio and visual telemedicine encounter.      Reason for Telemedicine Visit: Services only offered telehealth    Originating Site (Patient Location): Patient's home    Distant Site (Provider Location): [facility]    Consent:  The patient/guardian has verbally consented to: the potential risks and benefits of telemedicine (video visit) versus in person care; bill my insurance or make self-payment for services provided; and responsibility for payment of non-covered services.     Mode of Communication:  Video Conference via [employer]    As the provider I attest to compliance with applicable laws and regulations related to telemedicine.    [facility] Primary Care: Integrated Behavioral Health  January 10, 2022      Behavioral Health Clinician Progress Note    Patient Name: Naty Pérez           Service Type:  Individual      Service Location:   Face to Face in Home / Community     Session Start Time:  4:34 pm  Session End Time: 5:30 pm      Session Length: 53 - 60      Attendees: Client    Visit Activities (Refresh list every visit): [facility] [place] Only    Diagnostic Assessment Date: 8/17/21  Treatment Plan Review Date: 2/8/22  See Flowsheets for today's PHQ-9 and ODILON-7 results  Previous PHQ-9:   PHQ-9 SCORE 10/25/2021 11/29/2021 1/10/2022   PHQ-9 Total Score MyChart 6 (Mild depression) 11 (Moderate depression) 7 (Mild depression)   PHQ-9 Total Score 6 11 7     Previous ODILON-7:   ODILON-7 SCORE 9/13/2021 10/25/2021 11/29/2021   Total Score 8 (mild anxiety) 8 (mild anxiety) 4 (minimal anxiety)   Total Score 8 8 4       CHARLES LEVEL:  CHARLES Score (Last Two) 2/6/2020   CHARLES Raw Score 28   Activation Score 50   CHARLES Level 2       DATA  Extended Session (60+ minutes): No  Interactive Complexity: No  Crisis: No  [facility] Patient: No    Treatment Objective(s) Addressed in This Session:  Target Behavior(s):  PMS    Anxiety: will experience a reduction in anxiety, will develop more effective coping skills to manage anxiety symptoms, will develop healthy cognitive patterns and beliefs and will increase ability to function adaptively    Current Stressors / Issues:    Discussed pt's lack of motivation worked on a list to prioritize  items that need to be completed to reduce stress. Reviewed safety with Patient. Patient denies any current SI,plans or intentions.     Progress on Treatment Objective(s) / Homework:  Satisfactory progress - ACTION (Actively working towards change); Intervened by reinforcing change plan / affirming steps taken    Motivational Interviewing    MI Intervention: Expressed Empathy/Understanding, Supported Autonomy, Collaboration, Evocation, Permission to raise concern or advise, Open-ended questions and Reflections: simple and complex     Change Talk Expressed by the Patient: Desire to change Ability to change Reasons to change Need to change Committment to change Activation    Provider Response to Change Talk: E - Evoked more info from patient about behavior change, A - Affirmed patient's thoughts, decisions, or attempts at behavior change, R - Reflected patient's change talk and S - Summarized patient's change talk statements      Situation        Automatic Thoughts  Cognitive Distortions      Feelings        Behavior        Questioning Thoughts            Also provided psychoeducation about behavioral health condition, symptoms, and treatment options    Care Plan review completed: Yes    Medication Review:  No changes to current psychiatric medication(s)    Medication Compliance:  NA    Changes in Health Issues:   None reported    Chemical Use Review:   Substance Use: Chemical use reviewed, no active concerns identified      Tobacco Use: No current tobacco use.      Assessment: Current Emotional / Mental Status (status of significant symptoms):  Risk status (Self / Other harm or suicidal  ideation)  Patient denies a history of suicidal ideation, suicide attempts, self-injurious behavior, homicidal ideation, homicidal behavior and and other safety concerns  Patient denies current fears or concerns for personal safety.  Patient denies current or recent suicidal ideation or behaviors.  Patient denies current or recent homicidal ideation or behaviors.  Patient denies current or recent self injurious behavior or ideation.  Patient denies other safety concerns.  A safety and risk management plan has not been developed at this time, however patient was encouraged to call [place] [phone] should there be a change in any of these risk factors.    Appearance:   Appropriate   Eye Contact:   Good   Psychomotor Behavior: Normal   Attitude:   Cooperative   Orientation:   All  Speech   Rate / Production: Normal    Volume:  Normal   Mood:    Normal  Affect:    Appropriate   Thought Content:  Clear   Thought Form:  Coherent  Logical   Insight:    Good     Diagnoses:  1. ODILON (generalized anxiety disorder)    2. Major depressive disorder, recurrent episode, mild (H)        Collateral Reports Completed:  Not Applicable    Plan: (Homework, other):  Patient was given information about behavioral services and encouraged to schedule a follow up appointment with the clinic [facility] in 2 weeks.  She was also given Cognitive Behavioral Therapy skills to practice when experiencing anxiety.  CD Recommendations: No indications of CD issues.  XENIA Carrero, [facility]      ______________________________________________________________________    Integrated Primary Care Behavioral Health Treatment Plan    Patient's Name: Naty Pérez  YOB: 1995    Date: 11/8/21    DSM-V Diagnoses: 296.32 (F33.1) Major Depressive Disorder, Recurrent Episode, Moderate With anxious distress or 300.02 (F41.1) Generalized Anxiety Disorder  Psychosocial / Contextual Factors: job and relationships  WHODAS:   WHODAS 2.0 Total Score 8/25/2020  8/17/2021   Total Score 21 29   Total Score MyChart 21 29         Referral / Collaboration:  The following referral(s) will be initiated: ADHD testing.    Anticipated number of session or this episode of care: ongoing      MeasurableTreatment Goal(s) related to diagnosis / functional impairment(s)  Goal 1: Patient will increase coping skills to lower anxiety    I will know I've met my goal when less anxious.      Objective #A (Patient Action)    Patient will use cognitive strategies identified in therapy to challenge anxious thoughts.  Status: Continued - Date(s): 11/8/21    Intervention(s)  [facility] will teach emotional recognition/identification. CBT skills.    Objective #B  Patient will use cognitive strategies identified in therapy to challenge anxious thoughts.  Status: Continued - Date(s): 11/8/21    Intervention(s)  [facility] will teach about healthy boundaries. Conflict managment with family fair fighting.    Patient has reviewed and agreed to the above plan.      Vanita Negrete, LMFT  November 11th, 2021

## 2022-01-11 ASSESSMENT — PATIENT HEALTH QUESTIONNAIRE - PHQ9: SUM OF ALL RESPONSES TO PHQ QUESTIONS 1-9: 7

## 2022-01-15 ENCOUNTER — MYC MEDICAL ADVICE (OUTPATIENT)
Dept: OBGYN | Facility: CLINIC | Age: 27
End: 2022-01-15
Payer: COMMERCIAL

## 2022-01-15 DIAGNOSIS — N94.3 PMS (PREMENSTRUAL SYNDROME): Primary | ICD-10-CM

## 2022-01-17 RX ORDER — SERTRALINE HYDROCHLORIDE 25 MG/1
25 TABLET, FILM COATED ORAL DAILY
Qty: 90 TABLET | Refills: 4 | Status: SHIPPED | OUTPATIENT
Start: 2022-01-17 | End: 2022-08-08

## 2022-01-24 ENCOUNTER — VIRTUAL VISIT (OUTPATIENT)
Dept: BEHAVIORAL HEALTH | Facility: CLINIC | Age: 27
End: 2022-01-24
Payer: COMMERCIAL

## 2022-01-24 DIAGNOSIS — F41.1 GAD (GENERALIZED ANXIETY DISORDER): Primary | ICD-10-CM

## 2022-01-24 DIAGNOSIS — F33.0 MAJOR DEPRESSIVE DISORDER, RECURRENT EPISODE, MILD (H): ICD-10-CM

## 2022-01-24 PROCEDURE — 90837 PSYTX W PT 60 MINUTES: CPT | Mod: GT | Performed by: MARRIAGE & FAMILY THERAPIST

## 2022-01-24 ASSESSMENT — PATIENT HEALTH QUESTIONNAIRE - PHQ9
SUM OF ALL RESPONSES TO PHQ QUESTIONS 1-9: 7
10. IF YOU CHECKED OFF ANY PROBLEMS, HOW DIFFICULT HAVE THESE PROBLEMS MADE IT FOR YOU TO DO YOUR WORK, TAKE CARE OF THINGS AT HOME, OR GET ALONG WITH OTHER PEOPLE: NOT DIFFICULT AT ALL
SUM OF ALL RESPONSES TO PHQ QUESTIONS 1-9: 7

## 2022-01-24 NOTE — PROGRESS NOTES
Telemedicine Visit: The patient's condition can be safely assessed and treated via synchronous audio and visual telemedicine encounter.      Reason for Telemedicine Visit: Services only offered telehealth    Originating Site (Patient Location): Patient's home    Distant Site (Provider Location): [facility]    Consent:  The patient/guardian has verbally consented to: the potential risks and benefits of telemedicine (video visit) versus in person care; bill my insurance or make self-payment for services provided; and responsibility for payment of non-covered services.     Mode of Communication:  Video Conference via [employer]    As the provider I attest to compliance with applicable laws and regulations related to telemedicine.    [facility] Primary Care: Integrated Behavioral Health  January 24, 2022      Behavioral Health Clinician Progress Note    Patient Name: Naty Pérez           Service Type:  Individual      Service Location:   Face to Face in Home / Community     Session Start Time:  4:30 pm  Session End Time: 4:27 pm      Session Length: 53 - 60      Attendees: Client    Visit Activities (Refresh list every visit): [facility] [place] Only    Diagnostic Assessment Date: 8/17/21  Treatment Plan Review Date: 2/8/22  See Flowsheets for today's PHQ-9 and ODILON-7 results  Previous PHQ-9:   PHQ-9 SCORE 11/29/2021 1/10/2022 1/24/2022   PHQ-9 Total Score MyChart 11 (Moderate depression) 7 (Mild depression) 7 (Mild depression)   PHQ-9 Total Score 11 7 7     Previous ODILON-7:   ODILON-7 SCORE 9/13/2021 10/25/2021 11/29/2021   Total Score 8 (mild anxiety) 8 (mild anxiety) 4 (minimal anxiety)   Total Score 8 8 4       CHARLES LEVEL:  CHARLES Score (Last Two) 2/6/2020   CHARLES Raw Score 28   Activation Score 50   CHARLES Level 2       DATA  Extended Session (60+ minutes): No  Interactive Complexity: No  Crisis: No  [facility] Patient: No    Treatment Objective(s) Addressed in This Session:  Target Behavior(s):  PMS    Anxiety: will experience a reduction in anxiety, will develop more effective coping skills to manage anxiety symptoms, will develop healthy cognitive patterns and beliefs and will increase ability to function adaptively    Current Stressors / Issues:    Pt reports doing better. Feeling less stress. Continues to work on ways to be more organized and self-care. Talked about meal planning and using this to work on her relationship as well. Reviewed safety with Patient. Patient denies any current SI,plans or intentions.       Progress on Treatment Objective(s) / Homework:  Satisfactory progress - ACTION (Actively working towards change); Intervened by reinforcing change plan / affirming steps taken    Motivational Interviewing    MI Intervention: Expressed Empathy/Understanding, Supported Autonomy, Collaboration, Evocation, Permission to raise concern or advise, Open-ended questions and Reflections: simple and complex     Change Talk Expressed by the Patient: Desire to change Ability to change Reasons to change Need to change Committment to change Activation    Provider Response to Change Talk: E - Evoked more info from patient about behavior change, A - Affirmed patient's thoughts, decisions, or attempts at behavior change, R - Reflected patient's change talk and S - Summarized patient's change talk statements      Situation        Automatic Thoughts  Cognitive Distortions      Feelings        Behavior        Questioning Thoughts            Also provided psychoeducation about behavioral health condition, symptoms, and treatment options    Care Plan review completed: Yes    Medication Review:  No changes to current psychiatric medication(s)    Medication Compliance:  NA    Changes in Health Issues:   None reported    Chemical Use Review:   Substance Use: Chemical use reviewed, no active concerns identified      Tobacco Use: No current tobacco use.      Assessment: Current Emotional / Mental Status (status of  significant symptoms):  Risk status (Self / Other harm or suicidal ideation)  Patient denies a history of suicidal ideation, suicide attempts, self-injurious behavior, homicidal ideation, homicidal behavior and and other safety concerns  Patient denies current fears or concerns for personal safety.  Patient denies current or recent suicidal ideation or behaviors.  Patient denies current or recent homicidal ideation or behaviors.  Patient denies current or recent self injurious behavior or ideation.  Patient denies other safety concerns.  A safety and risk management plan has not been developed at this time, however patient was encouraged to call [place] [phone] should there be a change in any of these risk factors.    Appearance:   Appropriate   Eye Contact:   Good   Psychomotor Behavior: Normal   Attitude:   Cooperative   Orientation:   All  Speech   Rate / Production: Normal    Volume:  Normal   Mood:    Normal  Affect:    Appropriate   Thought Content:  Clear   Thought Form:  Coherent  Logical   Insight:    Good     Diagnoses:  1. ODILON (generalized anxiety disorder)    2. Major depressive disorder, recurrent episode, mild (H)        Collateral Reports Completed:  Not Applicable    Plan: (Homework, other):  Patient was given information about behavioral services and encouraged to schedule a follow up appointment with the clinic [facility] in 2 weeks.  She was also given Cognitive Behavioral Therapy skills to practice when experiencing anxiety.  CD Recommendations: No indications of CD issues.  XENIA Carrero, [facility]      ______________________________________________________________________    Integrated Primary Care Behavioral Health Treatment Plan    Patient's Name: Naty Pérez  YOB: 1995    Date: 11/8/21    DSM-V Diagnoses: 296.32 (F33.1) Major Depressive Disorder, Recurrent Episode, Moderate With anxious distress or 300.02 (F41.1) Generalized Anxiety Disorder  Psychosocial / Contextual  Factors: job and relationships  WHODAS:   WHODAS 2.0 Total Score 8/25/2020 8/17/2021   Total Score 21 29   Total Score MyChart 21 29         Referral / Collaboration:  The following referral(s) will be initiated: ADHD testing.    Anticipated number of session or this episode of care: ongoing      MeasurableTreatment Goal(s) related to diagnosis / functional impairment(s)  Goal 1: Patient will increase coping skills to lower anxiety    I will know I've met my goal when less anxious.      Objective #A (Patient Action)    Patient will use cognitive strategies identified in therapy to challenge anxious thoughts.  Status: Continued - Date(s): 11/8/21    Intervention(s)  [facility] will teach emotional recognition/identification. CBT skills.    Objective #B  Patient will use cognitive strategies identified in therapy to challenge anxious thoughts.  Status: Continued - Date(s): 11/8/21    Intervention(s)  [facility] will teach about healthy boundaries. Conflict managment with family fair fighting.    Patient has reviewed and agreed to the above plan.      Vanita Negrete, LMFT  November 11th, 2021

## 2022-01-25 ASSESSMENT — PATIENT HEALTH QUESTIONNAIRE - PHQ9: SUM OF ALL RESPONSES TO PHQ QUESTIONS 1-9: 7

## 2022-02-07 ENCOUNTER — VIRTUAL VISIT (OUTPATIENT)
Dept: PSYCHOLOGY | Facility: CLINIC | Age: 27
End: 2022-02-07
Payer: COMMERCIAL

## 2022-02-07 DIAGNOSIS — F41.1 GAD (GENERALIZED ANXIETY DISORDER): Primary | ICD-10-CM

## 2022-02-07 DIAGNOSIS — F33.1 MODERATE EPISODE OF RECURRENT MAJOR DEPRESSIVE DISORDER (H): ICD-10-CM

## 2022-02-07 PROCEDURE — 90791 PSYCH DIAGNOSTIC EVALUATION: CPT | Mod: GT | Performed by: PSYCHOLOGIST

## 2022-02-07 ASSESSMENT — ANXIETY QUESTIONNAIRES
6. BECOMING EASILY ANNOYED OR IRRITABLE: SEVERAL DAYS
7. FEELING AFRAID AS IF SOMETHING AWFUL MIGHT HAPPEN: NOT AT ALL
3. WORRYING TOO MUCH ABOUT DIFFERENT THINGS: NEARLY EVERY DAY
GAD7 TOTAL SCORE: 10
5. BEING SO RESTLESS THAT IT IS HARD TO SIT STILL: SEVERAL DAYS
2. NOT BEING ABLE TO STOP OR CONTROL WORRYING: SEVERAL DAYS
IF YOU CHECKED OFF ANY PROBLEMS ON THIS QUESTIONNAIRE, HOW DIFFICULT HAVE THESE PROBLEMS MADE IT FOR YOU TO DO YOUR WORK, TAKE CARE OF THINGS AT HOME, OR GET ALONG WITH OTHER PEOPLE: SOMEWHAT DIFFICULT
1. FEELING NERVOUS, ANXIOUS, OR ON EDGE: MORE THAN HALF THE DAYS

## 2022-02-07 ASSESSMENT — COLUMBIA-SUICIDE SEVERITY RATING SCALE - C-SSRS
1. IN THE PAST MONTH, HAVE YOU WISHED YOU WERE DEAD OR WISHED YOU COULD GO TO SLEEP AND NOT WAKE UP?: NO
1. IN THE PAST MONTH, HAVE YOU WISHED YOU WERE DEAD OR WISHED YOU COULD GO TO SLEEP AND NOT WAKE UP?: NO
2. HAVE YOU ACTUALLY HAD ANY THOUGHTS OF KILLING YOURSELF LIFETIME?: NO
2. HAVE YOU ACTUALLY HAD ANY THOUGHTS OF KILLING YOURSELF?: NO

## 2022-02-07 ASSESSMENT — PATIENT HEALTH QUESTIONNAIRE - PHQ9
5. POOR APPETITE OR OVEREATING: MORE THAN HALF THE DAYS
SUM OF ALL RESPONSES TO PHQ QUESTIONS 1-9: 8
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
## 2022-02-08 ENCOUNTER — DOCUMENTATION ONLY (OUTPATIENT)
Dept: PSYCHOLOGY | Facility: CLINIC | Age: 27
End: 2022-02-08
Payer: COMMERCIAL

## 2022-02-08 ASSESSMENT — ANXIETY QUESTIONNAIRES: GAD7 TOTAL SCORE: 10

## 2022-02-08 NOTE — PROGRESS NOTES
Name: Naty Pérez  MRN: [id number]  : 1995    Client completed the [place] Multiphasic Personality Inventory-2 (MMPI-2), a self-report personality inventory, as part of their evaluation. Validity scales indicate that the client responded in an open and consistent manner, resulting in a valid profile. While overreporting is possible, it is also likely that the Client has limited resources for coping with the stresses and demands of daily life. The following results should be interpreted with caution and in light of other information. Their profile reflects a high level of general emotional distress. Individuals with similar profiles have a highly general disposition toward negative emotionality or a vulnerability to anxiety, apprehension, dread, discomfort, distress, uncertainty, and tension. They are likely nervous and experience sleep disturbance and have issues with attention and concentration. They may feel stressed out and vulnerable to upset by disappointment and decisions that don t work out. Individuals with similar profiles present as anxious, tense and overwhelmed with a sense of exasperation with both themselves and others. They can feel resentful and irritable and they might have a quickness to  lose it.  They have a low threshold for boredom, are intolerant of frustration, are excitable and overreactive, and seemingly unable to modulate impulses. They also experience problems with memory, and concentration. They likely experience a loss of interest that defeats the completion - even the initiation - of mental and behavioral projects. Individuals with similar profiles are likely troubled by a lack of cognitive control, with ruminations and preoccupations they know to be undirected, fruitless and largely irrational. They likely have concerns about their general health status and physical functioning and have a tendency to develop physical symptoms in response to stress. They report impediments to  performance in the workplace including fatigue, apathy, feeling overwhelmed, indecisive and distractibility. They likely present as disorganized with poor concentration and judgment. Individuals with similar profiles experience overly busy but massively inefficient cognitive activity. They likely experience obsessiveness and are preoccupied with details. They are likely overwhelmed by the endless supply of considerations brought to bear in decision making.

## 2022-02-14 ENCOUNTER — VIRTUAL VISIT (OUTPATIENT)
Dept: PSYCHOLOGY | Facility: CLINIC | Age: 27
End: 2022-02-14
Payer: COMMERCIAL

## 2022-02-14 DIAGNOSIS — F33.1 MODERATE EPISODE OF RECURRENT MAJOR DEPRESSIVE DISORDER (H): Primary | ICD-10-CM

## 2022-02-14 DIAGNOSIS — F41.1 GAD (GENERALIZED ANXIETY DISORDER): ICD-10-CM

## 2022-02-14 PROCEDURE — 90834 PSYTX W PT 45 MINUTES: CPT | Mod: GT | Performed by: PSYCHOLOGIST

## 2022-02-14 ASSESSMENT — PATIENT HEALTH QUESTIONNAIRE - PHQ9
10. IF YOU CHECKED OFF ANY PROBLEMS, HOW DIFFICULT HAVE THESE PROBLEMS MADE IT FOR YOU TO DO YOUR WORK, TAKE CARE OF THINGS AT HOME, OR GET ALONG WITH OTHER PEOPLE: VERY DIFFICULT
SUM OF ALL RESPONSES TO PHQ QUESTIONS 1-9: 10
SUM OF ALL RESPONSES TO PHQ QUESTIONS 1-9: 10

## 2022-02-14 ASSESSMENT — ANXIETY QUESTIONNAIRES
5. BEING SO RESTLESS THAT IT IS HARD TO SIT STILL: SEVERAL DAYS
1. FEELING NERVOUS, ANXIOUS, OR ON EDGE: MORE THAN HALF THE DAYS
4. TROUBLE RELAXING: SEVERAL DAYS
6. BECOMING EASILY ANNOYED OR IRRITABLE: MORE THAN HALF THE DAYS
GAD7 TOTAL SCORE: 10
2. NOT BEING ABLE TO STOP OR CONTROL WORRYING: SEVERAL DAYS
GAD7 TOTAL SCORE: 10
7. FEELING AFRAID AS IF SOMETHING AWFUL MIGHT HAPPEN: SEVERAL DAYS
7. FEELING AFRAID AS IF SOMETHING AWFUL MIGHT HAPPEN: SEVERAL DAYS
3. WORRYING TOO MUCH ABOUT DIFFERENT THINGS: MORE THAN HALF THE DAYS
GAD7 TOTAL SCORE: 10

## 2022-02-15 ASSESSMENT — ANXIETY QUESTIONNAIRES: GAD7 TOTAL SCORE: 10

## 2022-02-15 ASSESSMENT — PATIENT HEALTH QUESTIONNAIRE - PHQ9: SUM OF ALL RESPONSES TO PHQ QUESTIONS 1-9: 10

## 2022-02-21 ENCOUNTER — VIRTUAL VISIT (OUTPATIENT)
Dept: BEHAVIORAL HEALTH | Facility: CLINIC | Age: 27
End: 2022-02-21
Payer: COMMERCIAL

## 2022-02-21 DIAGNOSIS — F33.1 MODERATE EPISODE OF RECURRENT MAJOR DEPRESSIVE DISORDER (H): Primary | ICD-10-CM

## 2022-02-21 DIAGNOSIS — F41.1 GAD (GENERALIZED ANXIETY DISORDER): ICD-10-CM

## 2022-02-21 PROCEDURE — 90837 PSYTX W PT 60 MINUTES: CPT | Mod: GT | Performed by: MARRIAGE & FAMILY THERAPIST

## 2022-02-21 ASSESSMENT — PATIENT HEALTH QUESTIONNAIRE - PHQ9
SUM OF ALL RESPONSES TO PHQ QUESTIONS 1-9: 10
SUM OF ALL RESPONSES TO PHQ QUESTIONS 1-9: 10
10. IF YOU CHECKED OFF ANY PROBLEMS, HOW DIFFICULT HAVE THESE PROBLEMS MADE IT FOR YOU TO DO YOUR WORK, TAKE CARE OF THINGS AT HOME, OR GET ALONG WITH OTHER PEOPLE: SOMEWHAT DIFFICULT

## 2022-02-21 NOTE — PROGRESS NOTES
Telemedicine Visit: The patient's condition can be safely assessed and treated via synchronous audio and visual telemedicine encounter.      Reason for Telemedicine Visit: Services only offered telehealth    Originating Site (Patient Location): Patient's home    Distant Site (Provider Location): [facility]    Consent:  The patient/guardian has verbally consented to: the potential risks and benefits of telemedicine (video visit) versus in person care; bill my insurance or make self-payment for services provided; and responsibility for payment of non-covered services.     Mode of Communication:  Video Conference via [employer]    As the provider I attest to compliance with applicable laws and regulations related to telemedicine.    [facility] Primary Care: Integrated Behavioral Health  February 21st, 2022      Behavioral Health Clinician Progress Note    Patient Name: Naty Pérez           Service Type:  Individual      Service Location:   Face to Face in Home / Community     Session Start Time:  12:31 pm  Session End Time: 1:30 pm      Session Length: 53 - 60      Attendees: Client    Visit Activities (Refresh list every visit): [facility] Only    Diagnostic Assessment Date: 2/7/22 Roxanna Casas PhD, LP  Treatment Plan Review Date: 2/8/22  See Flowsheets for today's PHQ-9 and ODILON-7 results  Previous PHQ-9:   PHQ-9 SCORE 2/7/2022 2/14/2022 2/21/2022   PHQ-9 Total Score MyChart - 10 (Moderate depression) 10 (Moderate depression)   PHQ-9 Total Score 8 10 10     Previous ODILON-7:   ODILON-7 SCORE 11/29/2021 2/7/2022 2/14/2022   Total Score 4 (minimal anxiety) - 10 (moderate anxiety)   Total Score 4 10 10       CHARLES LEVEL:  CHARLES Score (Last Two) 2/6/2020   CHARLES Raw Score 28   Activation Score 50   CHARLES Level 2       DATA  Extended Session (60+ minutes): No  Interactive Complexity: No  Crisis: No  [facility] Patient: No    Treatment Objective(s) Addressed in This Session:  Target Behavior(s):  PMS    Anxiety: will experience a reduction in anxiety, will develop more effective coping skills to manage anxiety symptoms, will develop healthy cognitive patterns and beliefs and will increase ability to function adaptively    Current Stressors / Issues:    Processed the last couple of weeks and patients stress at work.  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 Talked about mirroring looking at the positives and how change is hard. Disuccsed making a worrying box about things she can't control.  Reviewed safety with Patient. Patient denies any current SI,plans or intentions.       Progress on Treatment Objective(s) / Homework:  Satisfactory progress - ACTION (Actively working towards change); Intervened by reinforcing change plan / affirming steps taken    Motivational Interviewing    MI Intervention: Expressed Empathy/Understanding, Supported Autonomy, Collaboration, Evocation, Permission to raise concern or advise, Open-ended questions and Reflections: simple and complex     Change Talk Expressed by the Patient: Desire to change Ability to change Reasons to change Need to change Committment to change Activation    Provider Response to Change Talk: E - Evoked more info from patient about behavior change, A - Affirmed patient's thoughts, decisions, or attempts at behavior change, R - Reflected patient's change talk and S - Summarized patient's change talk statements      Situation        Automatic Thoughts  Cognitive Distortions      Feelings        Behavior        Questioning Thoughts            Also provided psychoeducation about behavioral health condition, symptoms, and treatment options    Care Plan review completed: Yes    Medication Review:  No changes to current psychiatric medication(s)    Medication Compliance:  NA    Changes in Health Issues:   None reported    Chemical Use Review:   Substance Use: Chemical use reviewed, no active  concerns identified      Tobacco Use: No current tobacco use.      Assessment: Current Emotional / Mental Status (status of significant symptoms):  Risk status (Self / Other harm or suicidal ideation)  Patient denies a history of suicidal ideation, suicide attempts, self-injurious behavior, homicidal ideation, homicidal behavior and and other safety concerns  Patient denies current fears or concerns for personal safety.  Patient denies current or recent suicidal ideation or behaviors.  Patient denies current or recent homicidal ideation or behaviors.  Patient denies current or recent self injurious behavior or ideation.  Patient denies other safety concerns.  A safety and risk management plan has not been developed at this time, however patient was encouraged to call [place] [phone] should there be a change in any of these risk factors.    Appearance:   Appropriate   Eye Contact:   Good   Psychomotor Behavior: Normal   Attitude:   Cooperative   Orientation:   All  Speech   Rate / Production: Normal    Volume:  Normal   Mood:    Normal  Affect:    Appropriate   Thought Content:  Clear   Thought Form:  Coherent  Logical   Insight:    Good     Diagnoses:  1. Moderate episode of recurrent major depressive disorder (H)    2. ODILON (generalized anxiety disorder)        Collateral Reports Completed:  Not Applicable    Plan: (Homework, other):  Patient was given information about behavioral services and encouraged to schedule a follow up appointment with the clinic [facility] in 2 weeks.  She was also given Cognitive Behavioral Therapy skills to practice when experiencing anxiety.  CD Recommendations: No indications of CD issues.  XENIA Carrero, [facility]      ______________________________________________________________________    Integrated Primary Care Behavioral Health Treatment Plan    Patient's Name: Naty Pérez  YOB: 1995    Date: 11/8/21    DSM-V Diagnoses: 296.32 (F33.1) Major Depressive Disorder,  Recurrent Episode, Moderate With anxious distress or 300.02 (F41.1) Generalized Anxiety Disorder  Psychosocial / Contextual Factors: job and relationships  WHODAS:   WHODAS 2.0 Total Score 8/25/2020 8/17/2021   Total Score 21 29   Total Score MyChart 21 29         Referral / Collaboration:  The following referral(s) will be initiated: ADHD testing.    Anticipated number of session or this episode of care: ongoing      MeasurableTreatment Goal(s) related to diagnosis / functional impairment(s)  Goal 1: Patient will increase coping skills to lower anxiety    I will know I've met my goal when less anxious.      Objective #A (Patient Action)    Patient will use cognitive strategies identified in therapy to challenge anxious thoughts.  Status: Continued - Date(s): 11/8/21    Intervention(s)  [facility] [place] will teach emotional recognition/identification. CBT skills.    Objective #B  Patient will use cognitive strategies identified in therapy to challenge anxious thoughts.  Status: Continued - Date(s): 11/8/21    Intervention(s)  [facility] [place] will teach about healthy boundaries. Conflict managment with family fair fighting.    Patient has reviewed and agreed to the above plan.      Vanita Negrete, LMFT  November 11th, 2021

## 2022-02-22 ENCOUNTER — DOCUMENTATION ONLY (OUTPATIENT)
Dept: PSYCHOLOGY | Facility: CLINIC | Age: 27
End: 2022-02-22
Payer: COMMERCIAL

## 2022-02-22 ASSESSMENT — PATIENT HEALTH QUESTIONNAIRE - PHQ9: SUM OF ALL RESPONSES TO PHQ QUESTIONS 1-9: 10

## 2022-02-22 NOTE — PROGRESS NOTES
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  The longest period of employment has been 13 years (). Client has not been terminated from a place of employment.     Results of testing were indicative of ADHD. Rating scales suggest the Client is reporting symptoms of inattention that have been present since childhood. Client s report during the clinical interview was also suggestive of childhood symptoms. The collateral report (partner) was commensurate with Client s self-report and was suggestive of current symptoms of inattention. While Client s mother s report of childhood symptoms was discrepant (she did not identify symptoms), Client s description during the clinical interview was suggestive of such difficulties in childhood (e.g., distractibility, forgetfulness, task completion). Rating scales also suggested the client is likely experiencing significant deficits in executive functioning that are likely due to ADHD. Impairment is reported in more than one setting (e.g., school, home, work). Personality testing was positive for anxiety and problems with memory, and concentration. Self-report measures suggest Client experiences moderate anxiety and moderate depression at this time. Based on the results of clinical interview and psychological testing, the " client currently meets criteria for diagnoses of Attention-Deficit/Hyperactivity Disorder, Predominantly Inattentive Presentation; Generalized Anxiety Disorder; and Major Depressive Disorder, Recurrent, Moderate. Client will be provided with the results of testing, diagnosis, and recommendations in her last appointment.      DSM5 Diagnoses: (Sustained by DSM5 Criteria Listed Above)    Attention-Deficit/Hyperactivity Disorder, Predominantly Inattentive Presentation (F90.0)    Generalized Anxiety Disorder (F41.1)    Major Depressive Disorder, Recurrent, Moderate (F33.1)    Psychosocial & Contextual Factors: work stress     Recommendations:    1. Schedule an appointment with your physician or psychiatrist to discuss a medication evaluation. Medication can be very helpful in treating the symptoms of depression, anxiety and ADHD. It will be important that each condition is treated, as treatment for one without the other may lead to an increase in symptoms (e.g., treating ADHD, but not anxiety, can lead to increased anxiety).    2. Access resources through websites, books, and articles such as those provided in the Coping with ADHD handout.    3. Consider working with an ADHD  or individual therapist to learn skills to assist with symptom management, as well as ways to improve relationships, etc., that may have been impacted by your symptoms.    4. Individual therapy is recommended. Therapies focused on identifying and challenging problematic thought and behavior patterns while increasing the use of healthy coping skills has been found to be effective in treating anxiety and depression. It will be important to set goals in this therapy and work actively toward achieving short-term successes that lead to the completion of each goal. Action-oriented therapies, such as CBT and ACT are particularly recommended for the treatment of chronic anxiety and depression.    5. The use of behavioral strategies such as  diaphragmatic breathing, guided imagery, and mindfulness is often helpful in the management of anxiety symptoms.    6. You are welcome to schedule a follow-up appointment with me in about 6 weeks to review symptoms, treatment involvement, and struggles and/or successes.       Roxanna Casas, Ph.D.,   Licensed Psychologist

## 2022-02-22 NOTE — PROGRESS NOTES
"Client Name: Naty Pérez    MRN: [id number]  : 1995    Zaki Adult ADHD Rating Scale-IV: Self and Other Reports (BAARS-IV)  The BAARS-IV assesses for symptoms of ADHD that are experienced in one's daily life. This assessment measure includes self and collateral rating scales designed to provide information regarding current and childhood symptoms of ADHD including inattention, hyperactivity, and impulsivity. Self-report scores are reported as percentiles. Scores at the 76th-83rd percentile are considered marginal, scores at the 84th-92nd percentile are considered borderline, scores at the 93rd-95th percentile are considered mild, scores at the 96th-98th percentile are considered moderate, and those at the 99th percentile are considered severe. Collateral or \"other\" rating scales are reported as number of symptoms observed in comparison to those reported by the client. Norms and percentile scores are not available for collateral reports.      Current Symptoms Scale--Self Report:   Client completed the self-report inventory of current symptoms. The results indicate that the client's Total ADHD Score was 68 which places her in the 99th percentile for overall ADHD symptoms. In addition, the client endorsed the following occur \"often\" or \"very often\": 9/9 (99th percentile) Inattention symptoms, 9/9 (99th percentile) Hyperactivity/Impulsivity symptoms, and 9/9 (99th percentile) Sluggish Cognitive Tempo symptoms. Client indicated that the reported symptoms have resulted in impaired functioning in school, home, work, and social relationships. Overall, the results suggest the client is reporting severe symptoms of inattention and severe symptoms of hyperactivity/impulsivity at this time.      Current Symptoms Scale--Other Report:  Client's partner completed the collateral report inventory of current symptoms. Based on the collateral contact's observation of symptoms, the client demonstrates the following \"often\" or " "\"very often\": 5/9 Inattention symptoms, 3/5 Hyperactivity symptoms, 4/4 Impulsivity symptoms, and 5/9 Sluggish Cognitive Tempo symptoms. The client's Total ADHD Score was 53. The collateral contact indicated the client demonstrates impaired functioning in school, work, home and social relationships. The collateral- and self-report scores are similar and suggest Client experiences symptoms of inattention and hyperactivity/impulsivity at this time.      Childhood Symptoms Scale--Self-Report:  Client completed the self-report inventory of childhood symptoms. The results indicate that the client's Total ADHD Score was 60 which places her in the 99th percentile for overall ADHD symptoms in childhood. In addition, the client endorsed having experienced the following \"often\" or \"very often\": 7/9 (96th percentile) Inattention symptoms and 9/9 (99th percentile) Hyperactivity-Impulsivity symptoms. Client indicated that the reported symptoms resulted in impaired functioning in school and home. Overall, the results suggest the Client reported experiencing moderate symptoms of inattention and severe symptoms of inattention as a child.     Childhood Symptoms Scale--Other Report:  Client's mother completed the collateral report inventory of childhood symptoms. Based on the collateral contact's recollection of client's childhood symptoms, the client demonstrated the following \"often\" or \"very often\": 1/9 Inattention symptoms and 0/9 Hyperactivity-Impulsivity symptoms. The client's Total ADHD Score was 27. The collateral-report and self-report scores are discrepant. Her mother did not note symptoms of ADHD in childhood.       Zaki Functional Impairment Scale: Self and Other Reports (BFIS)  The BFIS is used to assess an individuals' psychosocial impairment in major life/daily activities that may be due to a mental health disorder. This assessment measure includes self and collateral rating scales. Self-report scores are reported " "as percentiles. Scores at the 76th-83rd percentile are considered marginal, scores at the 84th-92nd percentile are considered borderline, scores at the 93rd-95th percentile are considered mild, scores at the 96th-98th percentile are considered moderate, and those at the 99th percentile are considered severe. Collateral or \"other\" rating scales are reported as number of symptoms observed in comparison to those reported by the client. Norms and percentile scores are not available for collateral reports.      Results indicate the client identified impairment (scores at or greater than 93rd percentile) in the following areas: community activities, driving, self-care routines and health maintenance. The client's Mean Impairment Score was 5.57 (93rd percentile) indicating the client is reporting mild impairment in functioning across domains. Client s mother completed the collateral report scale for this measure. Her scores were discrepant. His scores were considerably lower (Mean Impairment Score of 2.6). She only noted impairment in the area of: driving.       Zaki Deficits in Executive Functioning Scale (BDEFS)  The BDEFS is a measure used for evaluating dimensions of adult executive functioning in daily life. This assessment measure includes self and collateral rating scales. Self-report scores are reported as percentiles. Scores at the 76th-83rd percentile are considered marginal, scores at the 84th-92nd percentile are considered borderline, scores at the 93rd-95th percentile are considered mild, scores at the 96th-98th percentile are considered moderate, and those at the 99th percentile are considered severe. Collateral or \"other\" rating scales are reported as number of symptoms observed in comparison to those reported by the client. Norms and percentile scores are not available for collateral reports.      Results indicate the client's Total Executive Functioning Score was 291 (99th percentile). The " ADHD-Executive Functioning Index score was 37 (99th percentile). The These scores suggest the client has severe deficits in executive functioning. She endorsed the following: self-management to time (severe); self-organization/problem-solving (severe); self-restraint (severe); self-motivation (moderate); and self-regulation of emotions (severe). Client's partner completed the collateral rating scale, which indicated similar results. His scores were somewhat lower. He noted deficits in all areas.    Generalized Anxiety Disorder Questionnaire (ODILON-7)  This questionnaire is designed to screen for anxiety in adults. Based on the client's score of 13, she is reporting moderate symptoms of anxiety at this time. Client identified the following symptoms of anxiety: feeling nervous, anxious or on edge; not being able to stop or control worrying, worrying too much about different things, trouble relaxing, being so restless it s hard to sit still and becoming easily annoyed or irritable and feeling afraid as if something awful might happen.    Patient Health Questionnaire- 9 (PHQ-9)   This questionnaire is designed to screen for depression in adults. Based on the client's score of 10, she is reporting moderate symptoms of depression at this time. Symptoms endorsed include: little interest or pleasure in doing things; feeling down/depressed/hopeless; trouble falling asleep, staying asleep, or sleeping too much; feeling tired or having little energy; feeling bad about self, poor concentration and feeling fidgety/restless.

## 2022-02-24 ENCOUNTER — VIRTUAL VISIT (OUTPATIENT)
Dept: PSYCHOLOGY | Facility: CLINIC | Age: 27
End: 2022-02-24
Payer: COMMERCIAL

## 2022-02-24 DIAGNOSIS — F33.1 MODERATE EPISODE OF RECURRENT MAJOR DEPRESSIVE DISORDER (H): Primary | ICD-10-CM

## 2022-02-24 DIAGNOSIS — F41.1 GAD (GENERALIZED ANXIETY DISORDER): ICD-10-CM

## 2022-02-24 DIAGNOSIS — F90.0 ATTENTION DEFICIT HYPERACTIVITY DISORDER, INATTENTIVE TYPE: ICD-10-CM

## 2022-02-24 PROCEDURE — 96131 PSYCL TST EVAL PHYS/QHP EA: CPT | Mod: GT | Performed by: PSYCHOLOGIST

## 2022-02-24 PROCEDURE — 96130 PSYCL TST EVAL PHYS/QHP 1ST: CPT | Mod: GT | Performed by: PSYCHOLOGIST

## 2022-02-24 NOTE — PROGRESS NOTES
Client Name: Naty Pérez Date: 2/24/22    Service Type: Individual (ADHD Evaluation feedback session)     Session Start Time: 9:15 Session End Time: 9:35      Session Length: 20 minutes      Session #: (feedback)     Attendees: Client attended alone    Telemedicine Visit: The patient's condition can be safely assessed and treated via synchronous audio and visual telemedicine encounter.      Reason for Telemedicine Visit: Services only offered telehealth    Originating Site (Patient Location): Patient's place of employment    Distant Site (Provider Location): Provider Remote Setting- Home Office    Consent:  The patient/guardian has verbally consented to: the potential risks and benefits of telemedicine (video visit) versus in person care; bill my insurance or make self-payment for services provided; and responsibility for payment of non-covered services.     Mode of Communication:  Video Conference via [facility]    As the provider I attest to compliance with applicable laws and regulations related to telemedicine.          DATA        Treatment Objective(s) Addressed in This Session:   Provided feedback on ADHD evaluation. Reviewed test results in depth and answered client's questions. Client diagnosed with Attention-Deficit/Hyperactivity Disorder, Predominantly Inattentive Presentation; Generalized Anxiety Disorder and Major Depressive Disorder, Recurrent, Moderate. Reviewed ADHD symptom management handout. This provider also completed full written report of evaluation, including integration of testing data, summary, and recommendations. Please see Documentation Only dated 2/22/22.     Progress on / Status of Treatment Objective(s) / Homework:   Completed      Intervention:  ADHD Evaluation feedback; Reviewed report (can be found in Documentation Only encounter dated 2/22/22); Client was appreciative of the feedback and expressed understanding of the diagnoses.          ASSESSMENT: Current Emotional / Mental  Status (status of significant symptoms):  Risk status (Self / Other harm or suicidal ideation)  Client denies current fears or concerns for personal safety.  Client denies current or recent suicidal ideation or behaviors.  Client denies current or recent homicidal ideation or behaviors.  Client denies current or recent self-injurious behavior or ideation.  Client denies other safety concerns.  A safety and risk management plan has not been developed at this time, however client was given the after-hours number / 911 should there be a change in any of these risk factors.     Appearance: Appropriate   Eye Contact: Good   Psychomotor Behavior: Normal   Attitude: Cooperative   Orientation: All  Speech  Rate / Production: Normal   Volume: Normal   Mood: Normal  Affect: Appropriate   Thought Content: Clear   Thought Form: Coherent Logical   Insight: Good      Medication Review:  Client is prescribed Zoloft by PCP     Medication Compliance:  Yes     Changes in Health Issues:  None reported     Chemical Use Review:  Substance Use: Chemical use reviewed, no active concerns identified      Tobacco Use: No current tobacco use.      Collateral Reports Completed:  Routed note to Care Team Member(s)     PLAN: (Homework, other)     Recommendations:    1. Schedule an appointment with your physician or psychiatrist to discuss a medication evaluation. Medication can be very helpful in treating the symptoms of depression, anxiety and ADHD. It will be important that each condition is treated, as treatment for one without the other may lead to an increase in symptoms (e.g., treating ADHD, but not anxiety, can lead to increased anxiety).     2. Access resources through websites, books, and articles such as those provided in the Coping with ADHD handout.     3. Consider working with an ADHD  or individual therapist to learn skills to assist with symptom management, as well as ways to improve relationships, etc., that may have been  impacted by your symptoms.     4. Individual therapy is recommended. Therapies focused on identifying and challenging problematic thought and behavior patterns while increasing the use of healthy coping skills has been found to be effective in treating anxiety and depression. It will be important to set goals in this therapy and work actively toward achieving short-term successes that lead to the completion of each goal. Action-oriented therapies, such as CBT and ACT are particularly recommended for the treatment of chronic anxiety and depression.     5. The use of behavioral strategies such as diaphragmatic breathing, guided imagery, and mindfulness is often helpful in the management of anxiety symptoms.     6. You are welcome to schedule a follow-up appointment with me in about 6 weeks to review symptoms, treatment involvement, and struggles and/or successes.        Roxanna Casas, Ph.D.,   Licensed Psychologist    Psychological Testing   Billing/Services Summary       Testing Evaluation Services Base: 15927  (1st 60 mins) Add-on: 94338  (each addtl 60 mins)   Record Review and Clarify Referral Question   (12:40/1:00), (2/7/22) 20 minutes   Integration/Report Generation   (11:00/12:00), (2/8/22) - MMPI-2  (12:00/1:00), (2/22/22) - Zaki Scales  (3:00/4:00), (2/22/22) - Report Writing 60 minutes  60 minutes  60 minutes     Interactive Feedback Session  (9:15/9:35) (2/24/22) 20 minutes   Total Time: 220 minutes (3 hours, 40 minutes)    Total Units: 1 3           Diagnosis(es): (ICD-10)  Attention-Deficit/Hyperactivity Disorder, Predominantly Inattentive Presentation (F90.0)  Generalized Anxiety Disorder (F41.1)  Major Depressive Disorder, Recurrent, Moderate (F33.1)

## 2022-03-14 ENCOUNTER — VIRTUAL VISIT (OUTPATIENT)
Dept: BEHAVIORAL HEALTH | Facility: CLINIC | Age: 27
End: 2022-03-14
Payer: COMMERCIAL

## 2022-03-14 DIAGNOSIS — F98.8 ATTENTION DEFICIT DISORDER WITHOUT HYPERACTIVITY: ICD-10-CM

## 2022-03-14 DIAGNOSIS — F33.0 MAJOR DEPRESSIVE DISORDER, RECURRENT EPISODE, MILD (H): Primary | ICD-10-CM

## 2022-03-14 DIAGNOSIS — F41.1 GAD (GENERALIZED ANXIETY DISORDER): ICD-10-CM

## 2022-03-14 PROCEDURE — 90837 PSYTX W PT 60 MINUTES: CPT | Mod: GT | Performed by: MARRIAGE & FAMILY THERAPIST

## 2022-03-14 ASSESSMENT — ANXIETY QUESTIONNAIRES
3. WORRYING TOO MUCH ABOUT DIFFERENT THINGS: SEVERAL DAYS
4. TROUBLE RELAXING: SEVERAL DAYS
GAD7 TOTAL SCORE: 7
GAD7 TOTAL SCORE: 7
5. BEING SO RESTLESS THAT IT IS HARD TO SIT STILL: SEVERAL DAYS
7. FEELING AFRAID AS IF SOMETHING AWFUL MIGHT HAPPEN: SEVERAL DAYS
2. NOT BEING ABLE TO STOP OR CONTROL WORRYING: SEVERAL DAYS
6. BECOMING EASILY ANNOYED OR IRRITABLE: SEVERAL DAYS
GAD7 TOTAL SCORE: 7
1. FEELING NERVOUS, ANXIOUS, OR ON EDGE: SEVERAL DAYS
7. FEELING AFRAID AS IF SOMETHING AWFUL MIGHT HAPPEN: SEVERAL DAYS

## 2022-03-14 NOTE — PROGRESS NOTES
[facility]: Integrated Behavioral Health  March 14, 2022      Behavioral Health Clinician Progress Note    Patient Name: Naty Pérez           Service Type:  Individual      Service Location:   Face to Face in Home / Community     Session Start Time: 4:33pm  Session End Time: 5:30pm      Session Length: 53 - 60      Attendees: Client     Service Modality:  Video Visit:      Provider verified identity through the following two step process.  Patient provided:  Patient is known previously to provider    Telemedicine Visit: The patient's condition can be safely assessed and treated via synchronous audio and visual telemedicine encounter.      Reason for Telemedicine Visit: Services only offered telehealth    Originating Site (Patient Location): Patient's home    Distant Site (Provider Location): [facility] Outpatient Setting: [place]    Consent:  The patient/guardian has verbally consented to: the potential risks and benefits of telemedicine (video visit) versus in person care; bill my insurance or make self-payment for services provided; and responsibility for payment of non-covered services.     Patient would like the video invitation sent by:  My Chart    Mode of Communication:  Video Conference via [employer]    As the provider I attest to compliance with applicable laws and regulations related to telemedicine.    Visit Activities (Refresh list every visit): [facility] Only    Diagnostic Assessment Date: 2/7/22 Roxanna Casas PhD, LP  Treatment Plan Review Date:6/14/22  See Flowsheets for today's PHQ-9 and ODILON-7 results  Previous PHQ-9:   PHQ-9 SCORE 2/14/2022 2/21/2022 3/14/2022   PHQ-9 Total Score MyChart 10 (Moderate depression) 10 (Moderate depression) 7 (Mild depression)   PHQ-9 Total Score 10 10 7     Previous ODILON-7:   ODILON-7 SCORE 2/7/2022 2/14/2022 3/14/2022   Total Score - 10 (moderate anxiety) 7 (mild anxiety)   Total Score 10 10 7       CHARLES LEVEL:  CHARLES Score  (Last Two) 2/6/2020   CHARLES Raw Score 28   Activation Score 50   CHARLES Level 2       DATA  Extended Session (60+ minutes): No  Interactive Complexity: No  Crisis: No  [facility] Patient: No    Treatment Objective(s) Addressed in This Session:  Target Behavior(s): mood    Depressed Mood: Increase interest, engagement, and pleasure in doing things  Decrease frequency and intensity of feeling down, depressed, hopeless  Improve quantity and quality of night time sleep / decrease daytime naps  Feel less tired and more energy during the day   Improve diet, appetite, mindful eating, and / or meal planning  Identify negative self-talk and behaviors: challenge core beliefs, myths, and actions  Improve concentration, focus, and mindfulness in daily activities   Discuss motivation / ambivalence about taking anti-depressant / mood stabilizer medication(s)  Discuss motivation / ambivalence about a referral to specialty mental health services (Therapy, Day Tx, PHP)  Anxiety: will experience a reduction in anxiety, will develop more effective coping skills to manage anxiety symptoms, will develop healthy cognitive patterns and beliefs and will increase ability to function adaptively  Attention Problems: will develop coping skills to effectively manage attention issues    Current Stressors / Issues:  Reviewed ADHD testing with pt and her dx.   Worked on cognitive errors about keeping secrets. Reviewed safety with Patient. Patient denies any current SI,plans or intentions.       COMMON COGNITIVE ERRORS     We all have patterns of thinking, and this may impact our emotional state and behavior. Sometimes our patterns are less than accurate. These are cognitive errors or cognitive distortions, and they typically fall into certain categories. Learning to recognize our own cognitive errors increases our ability to ignore the negative thought or actively change it, which enables us to intentionally change our emotions and our behaviors. The  following is a list of the most common cognitive distortions:     1. All-or-Nothing Thinking Putting experiences in one of two categories Examples: 1) People are all good or all bad. 2) Projects are perfect or failures. 3) I am a sinner, or I am a saint.   2. Overgeneralizing Believing that something will always happen because it happened once Examples: 1) I will never be able to make friends at a party because I once made an awkward statement to someone, and they didn t want to be my friend. 2) I will never be able to speak in public because I once had a panic attack before giving a speech.   3. Discounting the Positive Deciding that if a good thing happens, it must not be important or doesn t count Examples: 1) I passed the exam this time, but it was a fluke. 2) I didn t have a panic attack today, but it s only because I was too busy to be worried.   4. Jumping to Conclusions Deciding how to respond to a situation without having all the information Examples: 1) The man/woman I am interested in never called me back because he thinks I m stupid. 2) That person cut me off in traffic because he/she is a jerk!   5. Mind Reading Believing that you know how someone else is feeling or what they are thinking without any evidence Examples: 1) I know she hates my guts. 2) That person thinks I m a loser.   6. Fortunetelling Believing that you can predict a future outcome, while ignoring other alternatives Examples: 1) I m going to fail this test. 2) I m going to have a panic attack if I go out in public.   7. Magnifying (Catastrophizing) or Minimizing Distorting the importance of positive and negative events Examples: 1) I said the wrong thing so I will never have a boyfriend/girlfriend. 2) My nose is so big that no one will ever love me. 3) It doesn t matter if I m smart because I will never be attractive, athletic, popular, rich, etc. 4) Making a mountain out of a molehill.    8. Emotional Reasoning Believing something to  be true because it feels true. Examples: 1) I am a failure because I feel like a failure. 2) I am worthless because I feel worthless.   9.  Should-y  Thinking Telling yourself you should, should not, or should have done something when it is more accurate to say that you would have preferred or wished you had or had not done something Examples: 1) I should be perfect. 2) I should never make mistakes. 3) I should not be anxious. 4) I should have done something to help.   10. Labeling (or Mis-Labeling) Using a label to describe a behavior or error Examples: 1) He s a bad person (instead of  He made a mistake when he lied. ) 2) I m stupid (instead of  I didn t study for my test, and I failed it. )   11. Personalization Taking blame for some negative event even though you were not responsible, you could not have known to do differently, there were extenuating circumstances, or other people were involved. Examples: 1) It s my fault he hits me. 2) My mother is unhappy because of me.     Cognitive Therapy    Primary Care Cognitive Therapy    Noticing and Questioning Thoughts that Don t Work    The idea behind the following script is to let people know they can manage distress differently by questioning their thoughts and how their thoughts are working or not working for them.       Often when people get stressed/anxious/depressed their minds will tell them all kinds of things that can get them more stressed/anxious/depressed than they would like to be.    You can t stop your mind from talking to you, that s its job;  But you can get really good at noticing what your mind is telling you .. stepping back from those thoughts and  asking yourself some questions about how useful or helpful those thoughts are.      The idea here is to have you get really good at choosing how do you want to respond to a situation and your thoughts instead of just reacting.     Questioning your thoughts gives you the opportunity to respond in a  manner consistent with your values and how you want to represent yourself to other.      This can allow you to turn the volume down on any distressing responses you might be having.  The idea is not that you will think happy or positive thought that will make everything better, but instead be able to look at your initial thinking and determine if it is helpful, useful and/or accurate and how would you need to think in order to change how you feel and/or live your life in a way consistent with your values.      Questioning you thoughts is a skill that you can learn and get better at with practice.  What I would encourage you to do before you go to bed tonight is look at the 18 groups of questions and statements here and pick a few that really jump off the page at you as being good to ask or tell yourself when you are getting more stressed/anxious/depressed and or not living your life in a satisfactory way.  By doing this you can start to be more aware of your unhelpful thinking, interrupt its effect on you and start to change your thinking so it works for you instead of against you.          How to Question Stressful/Anxious/Depressed Thinking  ______________________________________________________________________________     1. Am I upsetting myself unnecessarily?  How can I see this another way?     2. Is my thinking working for or against me?  How could I view this in a less    upsetting way?     3. What am I demanding must happen?  What do I want or prefer, rather than need?     4. Am I making something too terrible?  Is it really that awful?  What would be so     terrible about that?     5. Am I labeling a person?  What is the action that I don t like?     6. What s untrue about my thoughts?  How can I stick to the facts?  What s the proof    for what I am thinking or believing about this?     7. Am I using extreme, black-and-white language?  What less extreme words might     be more accurate?     8. Am I  fortune telling or mind reading in a way that gets me upset or unhappy?      What are the odds (percent chance -- e.g., there is a 5% chance...) that it will      really turn out the way I m thinking or imagining?     9. What are my options in this situation?  How would I like to respond?     10. Create more moderate, helpful, or realistic statements to replace the upsetting    ones.    11.   Have I had any experiences that show that this thought might not be completely true?    12.   If my best friend or someone I loved had this thought, what would I tell them?    13. If my best friend or someone I loved knew I was thinking this thought, what would they say to me?  What evidence would they point out to me that would suggest that my thought is not completely true?    14. Are there strengths in me or positives in the situation that I am ignoring?  Am I underestimating my ability to cope with unfortunate circumstances?    15. When I am not feeling this way, do I think about this situation any differently? How?    16. Have I been in this type of situation before? What happened?  What have I learned from prior experiences that could help me now?    17. Five years from now, if I look back on this situation, will I look at it any differently?  Will I focus on any different part of my experience?    18. Am I blaming myself for something over which I do not have complete control?      Progress on Treatment Objective(s) / Homework:  Minimal progress - ACTION (Actively working towards change); Intervened by reinforcing change plan / affirming steps taken    Motivational Interviewing    MI Intervention: Expressed Empathy/Understanding, Supported Autonomy, Collaboration, Evocation, Permission to raise concern or advise, Open-ended questions and Reflections: simple and complex     Change Talk Expressed by the Patient: Desire to change Ability to change Reasons to change Need to change Committment to change Activation Taking  steps    Provider Response to Change Talk: E - Evoked more info from patient about behavior change, A - Affirmed patient's thoughts, decisions, or attempts at behavior change, R - Reflected patient's change talk and S - Summarized patient's change talk statements      Situation      I bought extra cake and think others are going to  me   Automatic Thoughts  Cognitive Distortions   Future telling, mind reading   Feelings   anxiety     Behavior   avoid     Questioning Thoughts   Would it be a big deal.          Also provided psychoeducation about behavioral health condition, symptoms, and treatment options    Care Plan review completed: Yes    Medication Review:  No changes to current psychiatric medication(s)    Medication Compliance:  Yes    Changes in Health Issues:   None reported    Chemical Use Review:   Substance Use: Chemical use reviewed, no active concerns identified      Tobacco Use: No current tobacco use.      Assessment: Current Emotional / Mental Status (status of significant symptoms):  Risk status (Self / Other harm or suicidal ideation)  Patient denies a history of suicidal ideation, suicide attempts, self-injurious behavior, homicidal ideation, homicidal behavior and and other safety concerns  Patient denies current fears or concerns for personal safety.  Patient denies current or recent suicidal ideation or behaviors.  Patient denies current or recent homicidal ideation or behaviors.  Patient denies current or recent self injurious behavior or ideation.  Patient denies other safety concerns.  A safety and risk management plan has not been developed at this time, however patient was encouraged to call [place] [phone] should there be a change in any of these risk factors.    Appearance:   Appropriate   Eye Contact:   Good   Psychomotor Behavior: Normal   Attitude:   Cooperative   Orientation:   All  Speech   Rate / Production: Normal    Volume:  Normal    Mood:    Normal  Affect:    Appropriate   Thought Content:  Clear   Thought Form:  Coherent  Logical   Insight:    Good     Diagnoses:  1. Major depressive disorder, recurrent episode, mild (H)    2. ODILON (generalized anxiety disorder)    3. Attention deficit disorder without hyperactivity        Collateral Reports Completed:  Not Applicable    Plan: (Homework, other):  Patient was given information about behavioral services and encouraged to schedule a follow up appointment with the clinic [facility] in 2 weeks.  She was also given information about mental health symptoms and treatment options  and Cognitive Behavioral Therapy skills to practice when experiencing anxiety, depression and ADHD.  CD Recommendations: No indications of CD issues.  XENIA Carrero, [facility]      ______________________________________________________________________                                              Individual Treatment Plan    Patient's Name: Naty Pérez  YOB: 1995    Date of Creation: 3/14/22  Date Treatment Plan Last Reviewed/Revised: 3/14/22    DSM5 Diagnoses: Attention-Deficit/Hyperactivity Disorder  314.00 (F90.0) Predominantly inattentive presentation, 296.31 (F33.0) Major Depressive Disorder, Recurrent Episode, Mild With anxious distress or 300.02 (F41.1) Generalized Anxiety Disorder  Psychosocial / Contextual Factors: relationships, work  PROMIS (reviewed every 90 days): PROMIS-10    In general, would you say your health is: (P) Good    In general, would you say your quality of life is: (P) Very good    In general, how would you rate your physical health? (P) Good    In general, how would you rate your mental health, including your mood and your ability to think? (P) Good    In general, how would you rate your satisfaction with your social activities and relationships? (P) Good    In general, please rate how well you carry out your usual social activities and roles. (This includes activities at home, at  work and in your community, and responsibilities as a parent, child, spouse, employee, friend, etc.) (P) Good    To what extent are you able to carry out your everyday physical activities such as walking, climbing stairs, carrying groceries, or moving a chair? (P) A little    In the past 7 days,  How often have you been bothered by emotional problems such as feeling anxious, depressed or irritable? (P) Sometimes  How would you rate your fatigue on average? (P) Severe  How would you rate your pain on average? (P) 1    PROMIS GLOBAL SCORES    Mental health question re-calculation - no clinical value - (P) 3  Physical health question re-calculation - no clinical value - (P) 2  Pain question re-calculation - no clinical value - (P) 4  Global Mental Health Score - (P) 13  Global Physical Health Score - (P) 11  PROMIS TOTAL - SUBSCORES - (P) 24      [id number] [employer] PROMIS [employer]ATIVE [employer] VERSION 1.1      Referral / Collaboration:  Referral to another professional/service is not indicated at this time..    Anticipated number of session for this episode of care: ongoing  Anticipation frequency of session: Every other week  Anticipated Duration of each session: 38-52 minutes  Treatment plan will be reviewed in 90 days or when goals have been changed.       MeasurableTreatment Goal(s) related to diagnosis / functional impairment(s)  Goal 1: Patient will experience a reduction in depressive symptoms along with a corresponding increase in positive emotion and life satisfaction.    I will know I've met my goal when I am less worried and mood is more .      Objective #A (Patient Action)    Patient will Increase interest, engagement, and pleasure in doing things.  Status: Continued - Date(s): 3/14/22    Intervention(s)  Therapist will help patient identify pleasant and mastery oriented events that elicit positive, relaxed mood.    Objective #B  Patient will Decrease frequency and intensity of feeling  "down, depressed, hopeless.  Status: Continued - Date(s): 3/14/22    Intervention(s)  Therapist will introduce patient to cognitive-behavioral and acceptance and commitment therapy topics aimed to help reduce depression and anxiety    Objective #C  Patient will Identify negative self-talk and behaviors: challenge core beliefs, myths, and actions  Improve concentration, focus, and mindfulness in daily activities .  Status: Continued - Date(s): 3/14/22    Intervention(s)  Therapist will help patient identify and manage negative self-talk and automatic thoughts; introduce patient to cognitive distortions; help patient develop cognitive diffusion techniques      Goal 2: Patient will experience a reduction in anxious symptoms, along with a corresponding increase in relaxed emotional states and life satisfaction.    I will know I've met my goal when more focused and less worried.      Objective #A (Patient Action)  Patient will use cognitive-behavioral and thought diffusion strategies identified in therapy to challenge anxious thoughts.    Status: Continued - Date(s): 3/14/22    Intervention(s)  Therapist will utilize CBT and ACT ideas to help patient challenge anxious thoughts and reduce intensity/duration of anxious distress    Objective #B  Patient will use \"worry time\" each day for 15 minutes of scheduled worry and then defer obsessive or anxious thinking until the next structured worry time.    Status: Continued - Date(s): 3/14/22    Intervention(s)  Therapist will teach patient how to effectively utilize worry time and/or thought logs/journals each day and incorporate more relaxation behaviors into their routine.    Objective #C  Patient will identify the stressors which contribute to feelings of anxiety  Patient will increase engagement in adaptive coping skills and recreational activities, such as exercise and healthy socialization, to manage distress.  Status: Continued - Date(s): " 3/14/22    Intervention(s)  Therapist will help patient identify triggers/situational factors that contribute to anxiety and behavioral skills aimed to manage anxious distress.        Other Possible Therapeutic Intervention(s):    Psycho-education regarding mental health diagnoses and treatment options    Supportive Therapy    Provide affirmations, reflections, and establish working rapport    Emphasize and reflect on strength of therapeutic relationship    Skills training    Explore skills useful to client in current situation.    Skills include assertiveness, communication, conflict management, problem-solving, relaxation, etc.    Solution-Focused Therapy    Explore patterns in patient's relationships and discuss options for new behaviors.    Explore patterns in patient's actions and choices and discuss options for new behaviors.    Cognitive-behavioral Therapy    Discuss common cognitive distortions, identify them in patient's life.    Explore ways to challenge, replace, and act against these cognitions.    Acceptance and Commitment Therapy    Explore and identify important values in patient's life.    Discuss ways to commit to behavioral activation around these values.    Psychodynamic psychotherapy    Discuss patient's emotional dynamics and issues and how they impact behaviors.    Explore patient's history of relationships and how they impact present behaviors.    Explore how to work with and make changes in these schemas and patterns.    Narrative Therapy    Explore the patient's story of his/her life from his/her perspective.    Explore alternate ways of understanding their experience, identifying exceptions, developing new themes.    Interpersonal Psychotherapy    Explore patterns in relationships that are effective or ineffective at helping patient reach their goals, find satisfying experience.    Discuss new patterns or behaviors to engage in for improved social functioning.    Behavioral  Activation    Discuss steps patient can take to become more involved in meaningful activity.    Identify barriers to these activities and explore possible solutions.    Mindfulness-Based Strategies    Discuss skills based on development and application of mindfulness.    Skills drawn from compassion-focused therapy, dialectical behavior therapy, mindfulness-based stress reduction, mindfulness-based cognitive therapy, etc.      Patient has reviewed and agreed to the above plan.      XENIA Carrero   Behavioral Health Clinician   [facility]    March 14, 2022

## 2022-03-15 ENCOUNTER — VIRTUAL VISIT (OUTPATIENT)
Dept: FAMILY MEDICINE | Facility: CLINIC | Age: 27
End: 2022-03-15
Payer: COMMERCIAL

## 2022-03-15 DIAGNOSIS — F98.8 ATTENTION DEFICIT DISORDER WITHOUT HYPERACTIVITY: Primary | ICD-10-CM

## 2022-03-15 PROCEDURE — 99214 OFFICE O/P EST MOD 30 MIN: CPT | Mod: GT | Performed by: FAMILY MEDICINE

## 2022-03-15 ASSESSMENT — PATIENT HEALTH QUESTIONNAIRE - PHQ9: SUM OF ALL RESPONSES TO PHQ QUESTIONS 1-9: 7

## 2022-03-15 ASSESSMENT — ANXIETY QUESTIONNAIRES: GAD7 TOTAL SCORE: 7

## 2022-03-15 NOTE — PROGRESS NOTES
"Naty is a 26 year old who is being evaluated via a billable video visit.      How would you like to obtain your AVS? MyChart  If the video visit is dropped, the invitation should be resent by: Text to cell phone: [phone]  Will anyone else be joining your video visit? No    Video Start Time: 3:20 PM    Assessment & Plan     Attention deficit disorder without hyperactivity  \"Started this patient about options for ADHD treatment including stimulants and nonstimulant options.  We have gone over common side effects.  Let the patient know she needed to establish care in person prior to starting stimulant because we wanted to make sure her blood pressure and pulse were in the normal range.  The patient lives in [place] and is planning on establishing care at the [facility]      30 minutes spent on the date of the encounter doing chart review, history and exam, documentation and further activities per the note         No follow-ups on file.    Sharon Walters DO  [facility]    Subjective   Naty is a 26 year old who presents for the following health issues     History of Present Illness       Reason for visit:  I just got diagnosed with ADHD and have questions about medication  Symptom onset:  More than a month  Symptoms include:  Forgetfulness, impulse control, attention/concentration  Symptom intensity:  Moderate  Symptom progression:  Staying the same  Had these symptoms before:  Yes  Has tried/received treatment for these symptoms:  No  What makes it worse:  PMS  What makes it better:  LIANA    She eats 2-3 servings of fruits and vegetables daily.She consumes 1 sweetened beverage(s) daily.She exercises with enough effort to increase her heart rate 10 to 19 minutes per day.  She exercises with enough effort to increase her heart rate 3 or less days per week. She is missing 1 dose(s) of medications per week.  She is not taking prescribed medications regularly due to remembering " to take.         Review of Systems   Constitutional, HEENT, cardiovascular, pulmonary, gi and gu systems are negative, except as otherwise noted.      Objective           Vitals:  No vitals were obtained today due to virtual visit.    Physical Exam   GENERAL: Healthy, alert and no distress  EYES: Eyes grossly normal to inspection.  No discharge or erythema, or obvious scleral/conjunctival abnormalities.  RESP: No audible wheeze, cough, or visible cyanosis.  No visible retractions or increased work of breathing.    SKIN: Visible skin clear. No significant rash, abnormal pigmentation or lesions.  NEURO: Cranial nerves grossly intact.  Mentation and speech appropriate for age.  PSYCH: Mentation appears normal, affect normal/bright, judgement and insight intact, normal speech and appearance well-groomed.            Video-Visit Details    Type of service:  Video Visit    Video End Time:3:40 PM    Originating Location (pt. Location): Home    Distant Location (provider location):  [facility]     Platform used for Video Visit: Walltik

## 2022-03-30 ENCOUNTER — OFFICE VISIT (OUTPATIENT)
Dept: URGENT CARE | Facility: URGENT CARE | Age: 27
End: 2022-03-30
Payer: COMMERCIAL

## 2022-03-30 VITALS
SYSTOLIC BLOOD PRESSURE: 107 MMHG | DIASTOLIC BLOOD PRESSURE: 70 MMHG | WEIGHT: 127 LBS | BODY MASS INDEX: 23.08 KG/M2 | HEART RATE: 100 BPM | OXYGEN SATURATION: 98 % | TEMPERATURE: 98.6 F

## 2022-03-30 DIAGNOSIS — Z20.822 EXPOSURE TO COVID-19 VIRUS: ICD-10-CM

## 2022-03-30 DIAGNOSIS — R07.0 THROAT PAIN: Primary | ICD-10-CM

## 2022-03-30 LAB
DEPRECATED S PYO AG THROAT QL EIA: NEGATIVE
GROUP A STREP BY PCR: NOT DETECTED
SARS-COV-2 RNA RESP QL NAA+PROBE: NEGATIVE

## 2022-03-30 PROCEDURE — U0005 INFEC AGEN DETEC AMPLI PROBE: HCPCS | Performed by: PHYSICIAN ASSISTANT

## 2022-03-30 PROCEDURE — 99213 OFFICE O/P EST LOW 20 MIN: CPT | Performed by: PHYSICIAN ASSISTANT

## 2022-03-30 PROCEDURE — 87651 STREP A DNA AMP PROBE: CPT | Performed by: PHYSICIAN ASSISTANT

## 2022-03-30 PROCEDURE — U0003 INFECTIOUS AGENT DETECTION BY NUCLEIC ACID (DNA OR RNA); SEVERE ACUTE RESPIRATORY SYNDROME CORONAVIRUS 2 (SARS-COV-2) (CORONAVIRUS DISEASE [COVID-19]), AMPLIFIED PROBE TECHNIQUE, MAKING USE OF HIGH THROUGHPUT TECHNOLOGIES AS DESCRIBED BY CMS-2020-01-R: HCPCS | Performed by: PHYSICIAN ASSISTANT

## 2022-03-30 ASSESSMENT — ENCOUNTER SYMPTOMS
FEVER: 0
SORE THROAT: 1
ABDOMINAL PAIN: 0
COUGH: 0

## 2022-03-30 NOTE — PROGRESS NOTES
Assessment & Plan:        ICD-10-CM    1. Throat pain  R07.0 Streptococcus A Rapid Screen w/Reflex to PCR - Clinic Collect     Group A Streptococcus PCR Throat Swab     Symptomatic; Yes; 3/29/2022 COVID-19 Virus (Coronavirus) by PCR Nose   2. Exposure to COVID-19 virus  Z20.822 Symptomatic; Yes; 3/29/2022 COVID-19 Virus (Coronavirus) by PCR Nose         Plan/Clinical Decision Making:    Patient here with acute ST.   Has risk of exposure.   Obtain Covid PCR, RST negative, PCR pending.     Rest, fluids, Tylenol or ibuprofen if needed.       At the end of the encounter, I discussed results, diagnosis, medications. Discussed red flags for immediate return to clinic/ER, as well as indications for follow up if no improvement. Patient understood and agreed to plan. Patient was stable for discharge.        Leonila Gregory PA-C on 3/30/2022 at 11:23 AM          Subjective:     HPI:    Naty is a 26 year old female who presents to clinic today for the following health issues:  Chief Complaint   Patient presents with     Urgent Care     Pharyngitis     c/o sore throat for 1 day     HPI    Started feeling tired on Monday.   Then last night ST, no fever.   No nasal congestion, no cough.   Negative at home rapid covid last night and this morning.   Works as teacher. Mother had positive covid test recently.   Patient saw her Saturday.     Last Saturday upset stomach, diarrhea.  Now resolved.     History obtained from the patient.    Review of Systems   Constitutional: Negative for fever.   HENT: Positive for sore throat. Negative for congestion.    Respiratory: Negative for cough.    Gastrointestinal: Negative for abdominal pain.         Patient Active Problem List   Diagnosis     Allergy to insects and arachnids     Ingrowing nail     Epistaxis     Lumbosacral spondylosis without myelopathy     Papanicolaou smear of cervix with low grade squamous intraepithelial lesion (LGSIL)     ODILON (generalized anxiety disorder)      Moderate episode of recurrent major depressive disorder (H)     Major depressive disorder, recurrent episode, mild (H)     Attention deficit disorder without hyperactivity        Past Medical History:   Diagnosis Date     Lumbosacral spondylosis without myelopathy      Papanicolaou smear of cervix with low grade squamous intraepithelial lesion (LGSIL) 06/02/15    Age 19 years, Dx pap in 12 months       Social History     Tobacco Use     Smoking status: Never Smoker     Smokeless tobacco: Never Used     Tobacco comment: non smoking home   Substance Use Topics     Alcohol use: No             Objective:     Vitals:    03/30/22 1106   BP: 107/70   Pulse: 100   Temp: 98.6  F (37  C)   TempSrc: Tympanic   SpO2: 98%   Weight: 57.6 kg (127 lb)         Physical Exam   EXAM:   Pleasant, alert, appropriate appearance. NAD.  Head Exam: Normocephalic, atraumatic.  Eye Exam:   non icteric/injection.    Ear Exam: TMs grey without bulging. Normal canals.  Normal pinna.  Nose Exam: Normal external nose.    OroPharynx Exam:  Moist mucous membranes. positive erythema, pharynx without exudate or hypertrophy.  Neck/Thyroid Exam:  No LAD.  No nodules or enlargement.  Chest/Respiratory Exam: CTAB.  Cardiovascular Exam: RRR. No murmur or rubs.      Results:  Results for orders placed or performed in visit on 03/30/22   Streptococcus A Rapid Screen w/Reflex to PCR - Clinic Collect     Status: Normal    Specimen: Throat; Swab   Result Value Ref Range    Group A Strep antigen Negative Negative

## 2022-04-11 ENCOUNTER — VIRTUAL VISIT (OUTPATIENT)
Dept: BEHAVIORAL HEALTH | Facility: CLINIC | Age: 27
End: 2022-04-11
Payer: COMMERCIAL

## 2022-04-11 DIAGNOSIS — F98.8 ATTENTION DEFICIT DISORDER WITHOUT HYPERACTIVITY: ICD-10-CM

## 2022-04-11 DIAGNOSIS — F33.0 MAJOR DEPRESSIVE DISORDER, RECURRENT EPISODE, MILD (H): ICD-10-CM

## 2022-04-11 DIAGNOSIS — F41.1 GAD (GENERALIZED ANXIETY DISORDER): Primary | ICD-10-CM

## 2022-04-11 PROCEDURE — 90837 PSYTX W PT 60 MINUTES: CPT | Mod: GT | Performed by: MARRIAGE & FAMILY THERAPIST

## 2022-04-11 NOTE — PROGRESS NOTES
[facility] Primary Care: Integrated Behavioral Health  April 11th, 2022      Behavioral Health Clinician Progress Note    Patient Name: Naty Pérez           Service Type:  Individual      Service Location:   Face to Face in Home / Community     Session Start Time: 4:32pm  Session End Time: 5:30pm      Session Length: 53 - 60      Attendees: Client     Service Modality:  Video Visit:      Provider verified identity through the following two step process.  Patient provided:  Patient is known previously to provider    Telemedicine Visit: The patient's condition can be safely assessed and treated via synchronous audio and visual telemedicine encounter.      Reason for Telemedicine Visit: Services only offered telehealth    Originating Site (Patient Location): Patient's home    Distant Site (Provider Location): [facility] Setting: [place]    Consent:  The patient/guardian has verbally consented to: the potential risks and benefits of telemedicine (video visit) versus in person care; bill my insurance or make self-payment for services provided; and responsibility for payment of non-covered services.     Patient would like the video invitation sent by:  My Chart    Mode of Communication:  Video Conference via [employer]well    As the provider I attest to compliance with applicable laws and regulations related to telemedicine.    Visit Activities (Refresh list every visit): [facility] [place] Only    Diagnostic Assessment Date: 2/7/22 Roxanna Casas PhD, LP  Treatment Plan Review Date:6/14/22  See Flowsheets for today's PHQ-9 and ODILON-7 results  Previous PHQ-9:   PHQ-9 SCORE 2/14/2022 2/21/2022 3/14/2022   PHQ-9 Total Score MyChart 10 (Moderate depression) 10 (Moderate depression) 7 (Mild depression)   PHQ-9 Total Score 10 10 7     Previous ODILON-7:   ODILON-7 SCORE 2/7/2022 2/14/2022 3/14/2022   Total Score - 10 (moderate anxiety) 7 (mild anxiety)   Total Score 10 10 7       CHARLES LEVEL:  CHARLES Score  (Last Two) 2/6/2020   CHARLES Raw Score 28   Activation Score 50   CHARLES Level 2       DATA  Extended Session (60+ minutes): No  Interactive Complexity: No  Crisis: No  [facility] Patient: No    Treatment Objective(s) Addressed in This Session:  Target Behavior(s): mood    Depressed Mood: Increase interest, engagement, and pleasure in doing things  Decrease frequency and intensity of feeling down, depressed, hopeless  Improve quantity and quality of night time sleep / decrease daytime naps  Feel less tired and more energy during the day   Improve diet, appetite, mindful eating, and / or meal planning  Identify negative self-talk and behaviors: challenge core beliefs, myths, and actions  Improve concentration, focus, and mindfulness in daily activities   Discuss motivation / ambivalence about taking anti-depressant / mood stabilizer medication(s)  Discuss motivation / ambivalence about a referral to specialty mental health services (Therapy, Day Tx, PHP)  Anxiety: will experience a reduction in anxiety, will develop more effective coping skills to manage anxiety symptoms, will develop healthy cognitive patterns and beliefs and will increase ability to function adaptively  Attention Problems: will develop coping skills to effectively manage attention issues    Current Stressors / Issues:    Continued to discuss pt's ADHD dx and next steps. Processed her relationship with her mother and worked on setting boundaries. Discussed areas that pt would like to work on- Not keeping secrets, not taking on others fear, not being so avoidant around important topics, not wanting approval from others and angry around having PMD. Reviewed safety with Patient. Patient denies any current SI,plans or intentions.       Progress on Treatment Objective(s) / Homework:  Minimal progress - ACTION (Actively working towards change); Intervened by reinforcing change plan / affirming steps taken    Motivational Interviewing    MI Intervention: Expressed  Empathy/Understanding, Supported Autonomy, Collaboration, Evocation, Permission to raise concern or advise, Open-ended questions and Reflections: simple and complex     Change Talk Expressed by the Patient: Desire to change Ability to change Reasons to change Need to change Committment to change Activation Taking steps    Provider Response to Change Talk: E - Evoked more info from patient about behavior change, A - Affirmed patient's thoughts, decisions, or attempts at behavior change, R - Reflected patient's change talk and S - Summarized patient's change talk statements      Situation      I bought extra cake and think others are going to  me   Automatic Thoughts  Cognitive Distortions   Future telling, mind reading   Feelings   anxiety     Behavior   avoid     Questioning Thoughts   Would it be a big deal.          Also provided psychoeducation about behavioral health condition, symptoms, and treatment options    Care Plan review completed: Yes    Medication Review:  No changes to current psychiatric medication(s)    Medication Compliance:  Yes    Changes in Health Issues:   None reported    Chemical Use Review:   Substance Use: Chemical use reviewed, no active concerns identified      Tobacco Use: No current tobacco use.      Assessment: Current Emotional / Mental Status (status of significant symptoms):  Risk status (Self / Other harm or suicidal ideation)  Patient denies a history of suicidal ideation, suicide attempts, self-injurious behavior, homicidal ideation, homicidal behavior and and other safety concerns  Patient denies current fears or concerns for personal safety.  Patient denies current or recent suicidal ideation or behaviors.  Patient denies current or recent homicidal ideation or behaviors.  Patient denies current or recent self injurious behavior or ideation.  Patient denies other safety concerns.  A safety and risk management plan has not been developed at this time, however patient was  encouraged to call [place] [phone] should there be a change in any of these risk factors.    Appearance:   Appropriate   Eye Contact:   Good   Psychomotor Behavior: Normal   Attitude:   Cooperative   Orientation:   All  Speech   Rate / Production: Normal    Volume:  Normal   Mood:    Normal  Affect:    Appropriate   Thought Content:  Clear   Thought Form:  Coherent  Logical   Insight:    Good     Diagnoses:  1. ODILON (generalized anxiety disorder)    2. Attention deficit disorder without hyperactivity    3. Major depressive disorder, recurrent episode, mild (H)        Collateral Reports Completed:  Not Applicable    Plan: (Homework, other):  Patient was given information about behavioral services and encouraged to schedule a follow up appointment with the clinic [facility] in 2 weeks.  She was also given information about mental health symptoms and treatment options  and Cognitive Behavioral Therapy skills to practice when experiencing anxiety, depression and ADHD.  CD Recommendations: No indications of CD issues.  XENIA Carrero, [facility]      ______________________________________________________________________                                              Individual Treatment Plan    Patient's Name: Naty Pérez  YOB: 1995    Date of Creation: 3/14/22  Date Treatment Plan Last Reviewed/Revised: 3/14/22    DSM5 Diagnoses: Attention-Deficit/Hyperactivity Disorder  314.00 (F90.0) Predominantly inattentive presentation, 296.31 (F33.0) Major Depressive Disorder, Recurrent Episode, Mild With anxious distress or 300.02 (F41.1) Generalized Anxiety Disorder  Psychosocial / Contextual Factors: relationships, work  PROMIS (reviewed every 90 days): PROMIS-10    In general, would you say your health is: (P) Good    In general, would you say your quality of life is: (P) Very good    In general, how would you rate your physical health? (P) Good    In general, how would you rate your mental health, including your  mood and your ability to think? (P) Good    In general, how would you rate your satisfaction with your social activities and relationships? (P) Good    In general, please rate how well you carry out your usual social activities and roles. (This includes activities at home, at work and in your community, and responsibilities as a parent, child, spouse, employee, friend, etc.) (P) Good    To what extent are you able to carry out your everyday physical activities such as walking, climbing stairs, carrying groceries, or moving a chair? (P) A little    In the past 7 days,  How often have you been bothered by emotional problems such as feeling anxious, depressed or irritable? (P) Sometimes  How would you rate your fatigue on average? (P) Severe  How would you rate your pain on average? (P) 1    PROMIS GLOBAL SCORES    Mental health question re-calculation - no clinical value - (P) 3  Physical health question re-calculation - no clinical value - (P) 2  Pain question re-calculation - no clinical value - (P) 4  Global Mental Health Score - (P) 13  Global Physical Health Score - (P) 11  PROMIS TOTAL - SUBSCORES - (P) 24      [id number] PROMIS HEALTH ORGANIZATION AND PROMIS COOPERATIVE GROUP VERSION 1.1      Referral / Collaboration:  Referral to another professional/service is not indicated at this time..    Anticipated number of session for this episode of care: ongoing  Anticipation frequency of session: Every other week  Anticipated Duration of each session: 38-52 minutes  Treatment plan will be reviewed in 90 days or when goals have been changed.       MeasurableTreatment Goal(s) related to diagnosis / functional impairment(s)  Goal 1: Patient will experience a reduction in depressive symptoms along with a corresponding increase in positive emotion and life satisfaction.    I will know I've met my goal when I am less worried and mood is more .      Objective #A (Patient Action)    Patient will Increase interest,  "engagement, and pleasure in doing things.  Status: Continued - Date(s): 3/14/22    Intervention(s)  Therapist will help patient identify pleasant and mastery oriented events that elicit positive, relaxed mood.    Objective #B  Patient will Decrease frequency and intensity of feeling down, depressed, hopeless.  Status: Continued - Date(s): 3/14/22    Intervention(s)  Therapist will introduce patient to cognitive-behavioral and acceptance and commitment therapy topics aimed to help reduce depression and anxiety    Objective #C  Patient will Identify negative self-talk and behaviors: challenge core beliefs, myths, and actions  Improve concentration, focus, and mindfulness in daily activities .  Status: Continued - Date(s): 3/14/22    Intervention(s)  Therapist will help patient identify and manage negative self-talk and automatic thoughts; introduce patient to cognitive distortions; help patient develop cognitive diffusion techniques      Goal 2: Patient will experience a reduction in anxious symptoms, along with a corresponding increase in relaxed emotional states and life satisfaction.    I will know I've met my goal when more focused and less worried.      Objective #A (Patient Action)  Patient will use cognitive-behavioral and thought diffusion strategies identified in therapy to challenge anxious thoughts.    Status: Continued - Date(s): 3/14/22    Intervention(s)  Therapist will utilize CBT and ACT ideas to help patient challenge anxious thoughts and reduce intensity/duration of anxious distress    Objective #B  Patient will use \"worry time\" each day for 15 minutes of scheduled worry and then defer obsessive or anxious thinking until the next structured worry time.    Status: Continued - Date(s): 3/14/22    Intervention(s)  Therapist will teach patient how to effectively utilize worry time and/or thought logs/journals each day and incorporate more relaxation behaviors into their routine.    Objective #C  Patient " will identify the stressors which contribute to feelings of anxiety  Patient will increase engagement in adaptive coping skills and recreational activities, such as exercise and healthy socialization, to manage distress.  Status: Continued - Date(s): 3/14/22    Intervention(s)  Therapist will help patient identify triggers/situational factors that contribute to anxiety and behavioral skills aimed to manage anxious distress.        Other Possible Therapeutic Intervention(s):    Psycho-education regarding mental health diagnoses and treatment options    Supportive Therapy    Provide affirmations, reflections, and establish working rapport    Emphasize and reflect on strength of therapeutic relationship    Skills training    Explore skills useful to client in current situation.    Skills include assertiveness, communication, conflict management, problem-solving, relaxation, etc.    Solution-Focused Therapy    Explore patterns in patient's relationships and discuss options for new behaviors.    Explore patterns in patient's actions and choices and discuss options for new behaviors.    Cognitive-behavioral Therapy    Discuss common cognitive distortions, identify them in patient's life.    Explore ways to challenge, replace, and act against these cognitions.    Acceptance and Commitment Therapy    Explore and identify important values in patient's life.    Discuss ways to commit to behavioral activation around these values.    Psychodynamic psychotherapy    Discuss patient's emotional dynamics and issues and how they impact behaviors.    Explore patient's history of relationships and how they impact present behaviors.    Explore how to work with and make changes in these schemas and patterns.    Narrative Therapy    Explore the patient's story of his/her life from his/her perspective.    Explore alternate ways of understanding their experience, identifying exceptions, developing new themes.    Interpersonal  Psychotherapy    Explore patterns in relationships that are effective or ineffective at helping patient reach their goals, find satisfying experience.    Discuss new patterns or behaviors to engage in for improved social functioning.    Behavioral Activation    Discuss steps patient can take to become more involved in meaningful activity.    Identify barriers to these activities and explore possible solutions.    Mindfulness-Based Strategies    Discuss skills based on development and application of mindfulness.    Skills drawn from compassion-focused therapy, dialectical behavior therapy, mindfulness-based stress reduction, mindfulness-based cognitive therapy, etc.      Patient has reviewed and agreed to the above plan.      XENIA Carrero   Behavioral Health Clinician   [facility]    March 14, 2022

## 2022-04-25 ENCOUNTER — VIRTUAL VISIT (OUTPATIENT)
Dept: BEHAVIORAL HEALTH | Facility: CLINIC | Age: 27
End: 2022-04-25
Payer: COMMERCIAL

## 2022-04-25 DIAGNOSIS — F41.1 GAD (GENERALIZED ANXIETY DISORDER): Primary | ICD-10-CM

## 2022-04-25 DIAGNOSIS — F33.0 MAJOR DEPRESSIVE DISORDER, RECURRENT EPISODE, MILD (H): ICD-10-CM

## 2022-04-25 PROCEDURE — 90837 PSYTX W PT 60 MINUTES: CPT | Mod: GT | Performed by: MARRIAGE & FAMILY THERAPIST

## 2022-04-25 NOTE — PROGRESS NOTES
[facility] Primary Care: Integrated Behavioral Health  April 25th, 2022    Behavioral Health Clinician Progress Note    Patient Name: Naty Pérez           Service Type:  Individual      Service Location:   Face to Face in Home / Community     Session Start Time: 4:32pm  Session End Time: 5:30pm      Session Length: 53 - 60      Attendees: Client     Service Modality:  Video Visit:      Provider verified identity through the following two step process.  Patient provided:  Patient is known previously to provider    Telemedicine Visit: The patient's condition can be safely assessed and treated via synchronous audio and visual telemedicine encounter.      Reason for Telemedicine Visit: Services only offered telehealth    Originating Site (Patient Location): Patient's home    Distant Site (Provider Location): [facility] Outpatient Setting: [place]    Consent:  The patient/guardian has verbally consented to: the potential risks and benefits of telemedicine (video visit) versus in person care; bill my insurance or make self-payment for services provided; and responsibility for payment of non-covered services.     Patient would like the video invitation sent by:  My Chart    Mode of Communication:  Video Conference via [employer]    As the provider I attest to compliance with applicable laws and regulations related to telemedicine.    Visit Activities (Refresh list every visit): [facility] [place] Only    Diagnostic Assessment Date: 2/7/22 Roxanna Casas PhD, LP  Treatment Plan Review Date:6/14/22  See Flowsheets for today's PHQ-9 and ODILON-7 results  Previous PHQ-9:   PHQ-9 SCORE 2/14/2022 2/21/2022 3/14/2022   PHQ-9 Total Score MyChart 10 (Moderate depression) 10 (Moderate depression) 7 (Mild depression)   PHQ-9 Total Score 10 10 7     Previous ODILON-7:   ODILON-7 SCORE 2/7/2022 2/14/2022 3/14/2022   Total Score - 10 (moderate anxiety) 7 (mild anxiety)   Total Score 10 10 7       CHARLES LEVEL:  CHARLES Score  (Last Two) 2/6/2020   CHARLES Raw Score 28   Activation Score 50   CHARLES Level 2       DATA  Extended Session (60+ minutes): No  Interactive Complexity: No  Crisis: No  [facility] Patient: No    Treatment Objective(s) Addressed in This Session:  Target Behavior(s): mood    Depressed Mood: Increase interest, engagement, and pleasure in doing things  Decrease frequency and intensity of feeling down, depressed, hopeless  Improve quantity and quality of night time sleep / decrease daytime naps  Feel less tired and more energy during the day   Improve diet, appetite, mindful eating, and / or meal planning  Identify negative self-talk and behaviors: challenge core beliefs, myths, and actions  Improve concentration, focus, and mindfulness in daily activities   Discuss motivation / ambivalence about taking anti-depressant / mood stabilizer medication(s)  Discuss motivation / ambivalence about a referral to specialty mental health services (Therapy, Day Tx, PHP)  Anxiety: will experience a reduction in anxiety, will develop more effective coping skills to manage anxiety symptoms, will develop healthy cognitive patterns and beliefs and will increase ability to function adaptively  Attention Problems: will develop coping skills to effectively manage attention issues    Current Stressors / Issues:    Processed with pt her avoidence around conversations with her partner. Discussed cognitive errors she is telling herself to make it hard to have these talks. Discussed effective ways to communicate and using HALT.  Reviewed safety with Patient. Patient denies any current SI,plans or intentions.           Progress on Treatment Objective(s) / Homework:  Minimal progress - ACTION (Actively working towards change); Intervened by reinforcing change plan / affirming steps taken    Motivational Interviewing    MI Intervention: Expressed Empathy/Understanding, Supported Autonomy, Collaboration, Evocation, Permission to raise concern or advise,  Open-ended questions and Reflections: simple and complex     Change Talk Expressed by the Patient: Desire to change Ability to change Reasons to change Need to change Committment to change Activation Taking steps    Provider Response to Change Talk: E - Evoked more info from patient about behavior change, A - Affirmed patient's thoughts, decisions, or attempts at behavior change, R - Reflected patient's change talk and S - Summarized patient's change talk statements      Situation      I bought extra cake and think others are going to  me   Automatic Thoughts  Cognitive Distortions   Future telling, mind reading   Feelings   anxiety     Behavior   avoid     Questioning Thoughts   Would it be a big deal.          Also provided psychoeducation about behavioral health condition, symptoms, and treatment options    Care Plan review completed: Yes    Medication Review:  No changes to current psychiatric medication(s)    Medication Compliance:  Yes    Changes in Health Issues:   None reported    Chemical Use Review:   Substance Use: Chemical use reviewed, no active concerns identified      Tobacco Use: No current tobacco use.      Assessment: Current Emotional / Mental Status (status of significant symptoms):  Risk status (Self / Other harm or suicidal ideation)  Patient denies a history of suicidal ideation, suicide attempts, self-injurious behavior, homicidal ideation, homicidal behavior and and other safety concerns  Patient denies current fears or concerns for personal safety.  Patient denies current or recent suicidal ideation or behaviors.  Patient denies current or recent homicidal ideation or behaviors.  Patient denies current or recent self injurious behavior or ideation.  Patient denies other safety concerns.  A safety and risk management plan has not been developed at this time, however patient was encouraged to call [facility] should there be a change in any of these risk  factors.    Appearance:   Appropriate   Eye Contact:   Good   Psychomotor Behavior: Normal   Attitude:   Cooperative   Orientation:   All  Speech   Rate / Production: Normal    Volume:  Normal   Mood:    Normal  Affect:    Appropriate   Thought Content:  Clear   Thought Form:  Coherent  Logical   Insight:    Good     Diagnoses:  1. ODILON (generalized anxiety disorder)    2. Major depressive disorder, recurrent episode, mild (H)        Collateral Reports Completed:  Not Applicable    Plan: (Homework, other):  Patient was given information about behavioral services and encouraged to schedule a follow up appointment with the clinic [facility] in 2 weeks.  She was also given information about mental health symptoms and treatment options  and Cognitive Behavioral Therapy skills to practice when experiencing anxiety, depression and ADHD.  CD Recommendations: No indications of CD issues.  XENIA Carrero, [facility]      ______________________________________________________________________                                              Individual Treatment Plan    Patient's Name: Naty Pérez  YOB: 1995    Date of Creation: 3/14/22  Date Treatment Plan Last Reviewed/Revised: 3/14/22    DSM5 Diagnoses: Attention-Deficit/Hyperactivity Disorder  314.00 (F90.0) Predominantly inattentive presentation, 296.31 (F33.0) Major Depressive Disorder, Recurrent Episode, Mild With anxious distress or 300.02 (F41.1) Generalized Anxiety Disorder  Psychosocial / Contextual Factors: relationships, work  PROMIS (reviewed every 90 days): PROMIS-10    In general, would you say your health is: (P) Good    In general, would you say your quality of life is: (P) Very good    In general, how would you rate your physical health? (P) Good    In general, how would you rate your mental health, including your mood and your ability to think? (P) Good    In general, how would you rate your satisfaction with your social activities and relationships?  (P) Good    In general, please rate how well you carry out your usual social activities and roles. (This includes activities at home, at work and in your community, and responsibilities as a parent, child, spouse, employee, friend, etc.) (P) Good    To what extent are you able to carry out your everyday physical activities such as walking, climbing stairs, carrying groceries, or moving a chair? (P) A little    In the past 7 days,  How often have you been bothered by emotional problems such as feeling anxious, depressed or irritable? (P) Sometimes  How would you rate your fatigue on average? (P) Severe  How would you rate your pain on average? (P) 1    PROMIS GLOBAL SCORES    Mental health question re-calculation - no clinical value - (P) 3  Physical health question re-calculation - no clinical value - (P) 2  Pain question re-calculation - no clinical value - (P) 4  Global Mental Health Score - (P) 13  Global Physical Health Score - (P) 11  PROMIS TOTAL - SUBSCORES - (P) 24      [id number] [employer] HEALTH ORGANIZATION AND PROMIS COOPERATIVE GROUP VERSION 1.1      Referral / Collaboration:  Referral to another professional/service is not indicated at this time..    Anticipated number of session for this episode of care: ongoing  Anticipation frequency of session: Every other week  Anticipated Duration of each session: 38-52 minutes  Treatment plan will be reviewed in 90 days or when goals have been changed.       MeasurableTreatment Goal(s) related to diagnosis / functional impairment(s)  Goal 1: Patient will experience a reduction in depressive symptoms along with a corresponding increase in positive emotion and life satisfaction.    I will know I've met my goal when I am less worried and mood is more .      Objective #A (Patient Action)    Patient will Increase interest, engagement, and pleasure in doing things.  Status: Continued - Date(s): 3/14/22    Intervention(s)  Therapist will help patient identify pleasant and  "mastery oriented events that elicit positive, relaxed mood.    Objective #B  Patient will Decrease frequency and intensity of feeling down, depressed, hopeless.  Status: Continued - Date(s): 3/14/22    Intervention(s)  Therapist will introduce patient to cognitive-behavioral and acceptance and commitment therapy topics aimed to help reduce depression and anxiety    Objective #C  Patient will Identify negative self-talk and behaviors: challenge core beliefs, myths, and actions  Improve concentration, focus, and mindfulness in daily activities .  Status: Continued - Date(s): 3/14/22    Intervention(s)  Therapist will help patient identify and manage negative self-talk and automatic thoughts; introduce patient to cognitive distortions; help patient develop cognitive diffusion techniques      Goal 2: Patient will experience a reduction in anxious symptoms, along with a corresponding increase in relaxed emotional states and life satisfaction.    I will know I've met my goal when more focused and less worried.      Objective #A (Patient Action)  Patient will use cognitive-behavioral and thought diffusion strategies identified in therapy to challenge anxious thoughts.    Status: Continued - Date(s): 3/14/22    Intervention(s)  Therapist will utilize CBT and ACT ideas to help patient challenge anxious thoughts and reduce intensity/duration of anxious distress    Objective #B  Patient will use \"worry time\" each day for 15 minutes of scheduled worry and then defer obsessive or anxious thinking until the next structured worry time.    Status: Continued - Date(s): 3/14/22    Intervention(s)  Therapist will teach patient how to effectively utilize worry time and/or thought logs/journals each day and incorporate more relaxation behaviors into their routine.    Objective #C  Patient will identify the stressors which contribute to feelings of anxiety  Patient will increase engagement in adaptive coping skills and recreational " activities, such as exercise and healthy socialization, to manage distress.  Status: Continued - Date(s): 3/14/22    Intervention(s)  Therapist will help patient identify triggers/situational factors that contribute to anxiety and behavioral skills aimed to manage anxious distress.        Other Possible Therapeutic Intervention(s):    Psycho-education regarding mental health diagnoses and treatment options    Supportive Therapy    Provide affirmations, reflections, and establish working rapport    Emphasize and reflect on strength of therapeutic relationship    Skills training    Explore skills useful to client in current situation.    Skills include assertiveness, communication, conflict management, problem-solving, relaxation, etc.    Solution-Focused Therapy    Explore patterns in patient's relationships and discuss options for new behaviors.    Explore patterns in patient's actions and choices and discuss options for new behaviors.    Cognitive-behavioral Therapy    Discuss common cognitive distortions, identify them in patient's life.    Explore ways to challenge, replace, and act against these cognitions.    Acceptance and Commitment Therapy    Explore and identify important values in patient's life.    Discuss ways to commit to behavioral activation around these values.    Psychodynamic psychotherapy    Discuss patient's emotional dynamics and issues and how they impact behaviors.    Explore patient's history of relationships and how they impact present behaviors.    Explore how to work with and make changes in these schemas and patterns.    Narrative Therapy    Explore the patient's story of his/her life from his/her perspective.    Explore alternate ways of understanding their experience, identifying exceptions, developing new themes.    Interpersonal Psychotherapy    Explore patterns in relationships that are effective or ineffective at helping patient reach their goals, find satisfying  experience.    Discuss new patterns or behaviors to engage in for improved social functioning.    Behavioral Activation    Discuss steps patient can take to become more involved in meaningful activity.    Identify barriers to these activities and explore possible solutions.    Mindfulness-Based Strategies    Discuss skills based on development and application of mindfulness.    Skills drawn from compassion-focused therapy, dialectical behavior therapy, mindfulness-based stress reduction, mindfulness-based cognitive therapy, etc.      Patient has reviewed and agreed to the above plan.      XENIA Carrero   Behavioral Health Clinician   [facility]    March 14, 2022

## 2022-05-10 ENCOUNTER — OFFICE VISIT (OUTPATIENT)
Dept: FAMILY MEDICINE | Facility: CLINIC | Age: 27
End: 2022-05-10
Payer: COMMERCIAL

## 2022-05-10 VITALS
TEMPERATURE: 98.1 F | DIASTOLIC BLOOD PRESSURE: 64 MMHG | HEART RATE: 70 BPM | SYSTOLIC BLOOD PRESSURE: 100 MMHG | OXYGEN SATURATION: 100 %

## 2022-05-10 DIAGNOSIS — F90.0 ATTENTION-DEFICIT HYPERACTIVITY DISORDER, PREDOMINANTLY INATTENTIVE TYPE: Primary | ICD-10-CM

## 2022-05-10 PROCEDURE — 99214 OFFICE O/P EST MOD 30 MIN: CPT | Performed by: NURSE PRACTITIONER

## 2022-05-10 RX ORDER — DEXTROAMPHETAMINE SACCHARATE, AMPHETAMINE ASPARTATE MONOHYDRATE, DEXTROAMPHETAMINE SULFATE AND AMPHETAMINE SULFATE 2.5; 2.5; 2.5; 2.5 MG/1; MG/1; MG/1; MG/1
10 CAPSULE, EXTENDED RELEASE ORAL DAILY
Qty: 30 CAPSULE | Refills: 0 | Status: SHIPPED | OUTPATIENT
Start: 2022-05-10 | End: 2022-06-09

## 2022-05-10 RX ORDER — DEXTROAMPHETAMINE SACCHARATE, AMPHETAMINE ASPARTATE MONOHYDRATE, DEXTROAMPHETAMINE SULFATE AND AMPHETAMINE SULFATE 2.5; 2.5; 2.5; 2.5 MG/1; MG/1; MG/1; MG/1
10 CAPSULE, EXTENDED RELEASE ORAL DAILY
Qty: 30 CAPSULE | Refills: 0 | Status: SHIPPED | OUTPATIENT
Start: 2022-06-10 | End: 2022-07-10

## 2022-05-10 RX ORDER — DEXTROAMPHETAMINE SACCHARATE, AMPHETAMINE ASPARTATE MONOHYDRATE, DEXTROAMPHETAMINE SULFATE AND AMPHETAMINE SULFATE 2.5; 2.5; 2.5; 2.5 MG/1; MG/1; MG/1; MG/1
10 CAPSULE, EXTENDED RELEASE ORAL DAILY
Qty: 30 CAPSULE | Refills: 0 | Status: SHIPPED | OUTPATIENT
Start: 2022-07-11 | End: 2022-08-10

## 2022-05-10 NOTE — PROGRESS NOTES
Assessment & Plan     Attention-deficit hyperactivity disorder, predominantly inattentive type  We discussed various treatment options including stimulant and non-stimulant medications.  Overall her anxiety is well controlled.  Considering her symptoms start immediately during the day regardless of other people being around, it seems as though Adderall XR might be the most effective for managing her symptoms.  We did discuss that we may need to add an afternoon dose either an additional extended release or immediate release tablet if she feels her symptoms not well managed.  Could also consider increasing her morning dose.  We completed controlled substance agreement today and discussed the nature of controlled substances.  She understands prescriptions need to be filled at 1 pharmacy and that lost or stolen prescriptions will not be replaced.  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  We also discussed following up every 3 months for refills and scheduling in advance for these appointments.  She should also notify me prior to that if she is having any negative side effects from the medication.  If so we can consider nonstimulant options, especially if anxiety increases.  - amphetamine-dextroamphetamine (ADDERALL XR) 10 MG 24 hr capsule; Take 1 capsule (10 mg) by mouth daily  - amphetamine-dextroamphetamine (ADDERALL XR) 10 MG 24 hr capsule; Take 1 capsule (10 mg) by mouth daily  - amphetamine-dextroamphetamine (ADDERALL XR) 10 MG 24 hr capsule; Take 1 capsule (10 mg) by mouth daily                 Return in 3 months (on 8/10/2022) for ADHD MED RECHECK (3 MONTHS).    VALE [facility]    Casey Alfonso is a 26 year old who presents for the following health issues     History of Present Illness       Reason for visit:  Recent ADHD diagnosis, want to discuss  medication  Symptom onset:  More than a month  Symptoms include:  Forgetfulness, inattentive, distracted, emotional, impulsive, etc  Symptom intensity:  Moderate  Symptom progression:  Staying the same  Had these symptoms before:  Yes  Has tried/received treatment for these symptoms:  No  What makes it worse:  Bad PMDD/PMS times makes the ADHD worse  What makes it better:  Less stress?    She eats 0-1 servings of fruits and vegetables daily.She consumes 1 sweetened beverage(s) daily.She exercises with enough effort to increase her heart rate 10 to 19 minutes per day.  She exercises with enough effort to increase her heart rate 4 days per week. She is missing 1 dose(s) of medications per week.  She is not taking prescribed medications regularly due to remembering to take.     Was a little nervous about a stimulant medication.       Diagnosed with ODILON.  Depression is more cycle related to PMDD.      Depressive symptoms have always been the bigger thing.   Started Zoloft last spring.  Once depressive symptoms were managed, ADHD symptoms came out more.      Biggest struggle is focusing or attention.  Easily distracted, remembering things.  Works as a - gets distracted about tasks.  Will go in early, sometimes can get tasks done, other times hard to concentrate.  End of the day feels very tired.        Lives alone- has flexibility to complete tasks outside of work    Review of Systems   Constitutional, HEENT, cardiovascular, pulmonary, gi and gu systems are negative, except as otherwise noted.      Objective    There were no vitals taken for this visit.  There is no height or weight on file to calculate BMI.  Physical Exam

## 2022-05-10 NOTE — LETTER
[facility]  05/10/22  Patient: Naty Pérez  YOB: 1995  Medical Record Number: [id number]                                                                                  Non-Opioid Controlled Substance Agreement    This is an agreement between you and your provider regarding safe and appropriate use of controlled substances prescribed by your care team. Controlled substances are?medicines that can cause physical and mental dependence (abuse).     There are strict laws about having and using these medicines. We here at [facility] are  committed to working with you in your efforts to get better. To support you in this work, we'll help you schedule regular office appointments for medicine refills. If we must cancel or change your appointment for any reason, we'll make sure you have enough medicine to last until your next appointment.     As a Provider, I will:     Listen carefully to your concerns while treating you with respect.     Recommend a treatment plan that I believe is in your best interest and may involve therapies other than medicine.      Talk with you often about the possible benefits and the risk of harm of any medicine that we prescribe for you.    Assess the safety of this medicine and check how well it works.      Provide a plan on how to taper (discontinue or go off) using this medicine if the decision is made to stop its use.      ::  As a Patient, I understand controlled substances:       Are prescribed by my care provider to help me function or work and manage my condition(s).?    Are strong medicines and can cause serious side effects.       Need to be taken exactly as prescribed.?Combining controlled substances with certain medicines or chemicals (such as illegal drugs, alcohol, sedatives, sleeping pills, and benzodiazepines) can be dangerous or even fatal.? If I stop taking my medicines suddenly, I may have severe withdrawal symptoms.     The  risks, benefits, and side effects of these medicine(s) were explained to me. I agree that:    1. I will take part in other treatments as advised by my care team. This may be psychiatry or counseling, physical therapy, behavioral therapy, group treatment or a referral to specialist.    2. I will keep all my appointments and understand this is part of the monitoring of controlled substances.?My care team may require an office visit for EVERY controlled substance refill. If I miss appointments or don t follow instructions, my care team may stop my medicine    3. I will take my medicines as prescribed. I will not change the dose or schedule unless my care team tells me to. There will be no refills if I run out early.      4. I may be asked to come to the clinic and complete a urine drug test or complete a pill count. If I don t give a urine sample or participate in a pill count, the care team may stop my medicine.    5. I will only receive controlled substance prescriptions from this clinic. If I am treated by another provider, I will tell them that I am taking controlled substances and that I have a treatment agreement with this provider. I will inform my [facility] care team within one business day if I am given a prescription for any controlled substance by another healthcare provider. My [facility] care team can contact other providers and pharmacists about my use of any medicines.    6. It is up to me to make sure that I don't run out of my medicines on weekends or holidays.?If my care team is willing to refill my prescription without a visit, I must request refills only during office hours. Refills may take up to 3 business days to process. I will use one pharmacy to fill all my controlled substance prescriptions. I will notify the clinic about any changes to my insurance or medicine availability.    7. I am responsible for my prescriptions. If the medicine/prescription is lost, stolen or destroyed,  it will not be replaced.?I also agree not to share controlled substance medicines with anyone.     8. I am aware I should not use any illegal or recreational drugs. I agree not to drink alcohol unless my care team says I can.     9. If I enroll in the [place] Medical Cannabis program, I will tell my care team before my next refill.    10. I will tell my care team right away if I become pregnant, have a new medical problem treated outside of my regular clinic, or have a change in my medicines.     11. I understand that this medicine can affect my thinking, judgment and reaction time.? Alcohol and drugs affect the brain and body, which can affect the safety of my driving. Being under the influence of alcohol or drugs can affect my decision-making, behaviors, personal safety and the safety of others. Driving while impaired (DWI) can occur if a person is driving, operating or in physical control of a car, motorcycle, boat, snowmobile, ATV, motorbike, off-road vehicle or any other motor vehicle ([place] Statute 169A.20). I understand the risk if I choose to drive or operate any vehicle or machinery.    I understand that if I do not follow any of the conditions above, my prescriptions or treatment may be stopped or changed.   I agree that my provider, clinic care team and pharmacy may work with any city, state or federal law enforcement agency that investigates the misuse, sale or other diversion of my controlled medicine. I will allow my provider to discuss my care with, or share a copy of, this agreement with any other treating provider, pharmacy or emergency room where I receive care.     I have read this agreement and have asked questions about anything I did not understand.    ________________________________________________________  Patient Signature - Naty Pérez     ___________________                   Date     ________________________________________________________  Provider Signature - VALE Kennedy CNP        ___________________                   Date     ________________________________________________________  Witness Signature (required if provider not present while patient signing)          ___________________                   Date

## 2022-05-25 ENCOUNTER — VIRTUAL VISIT (OUTPATIENT)
Dept: BEHAVIORAL HEALTH | Facility: CLINIC | Age: 27
End: 2022-05-25
Payer: COMMERCIAL

## 2022-05-25 DIAGNOSIS — F98.8 ATTENTION DEFICIT DISORDER WITHOUT HYPERACTIVITY: Primary | ICD-10-CM

## 2022-05-25 DIAGNOSIS — F41.1 GAD (GENERALIZED ANXIETY DISORDER): ICD-10-CM

## 2022-05-25 DIAGNOSIS — F33.0 MAJOR DEPRESSIVE DISORDER, RECURRENT EPISODE, MILD (H): ICD-10-CM

## 2022-05-25 PROCEDURE — 90837 PSYTX W PT 60 MINUTES: CPT | Mod: GT | Performed by: MARRIAGE & FAMILY THERAPIST

## 2022-05-25 ASSESSMENT — ANXIETY QUESTIONNAIRES
7. FEELING AFRAID AS IF SOMETHING AWFUL MIGHT HAPPEN: SEVERAL DAYS
GAD7 TOTAL SCORE: 8
1. FEELING NERVOUS, ANXIOUS, OR ON EDGE: SEVERAL DAYS
2. NOT BEING ABLE TO STOP OR CONTROL WORRYING: SEVERAL DAYS
4. TROUBLE RELAXING: SEVERAL DAYS
GAD7 TOTAL SCORE: 8
5. BEING SO RESTLESS THAT IT IS HARD TO SIT STILL: SEVERAL DAYS
3. WORRYING TOO MUCH ABOUT DIFFERENT THINGS: SEVERAL DAYS
GAD7 TOTAL SCORE: 8
7. FEELING AFRAID AS IF SOMETHING AWFUL MIGHT HAPPEN: SEVERAL DAYS
6. BECOMING EASILY ANNOYED OR IRRITABLE: MORE THAN HALF THE DAYS
8. IF YOU CHECKED OFF ANY PROBLEMS, HOW DIFFICULT HAVE THESE MADE IT FOR YOU TO DO YOUR WORK, TAKE CARE OF THINGS AT HOME, OR GET ALONG WITH OTHER PEOPLE?: SOMEWHAT DIFFICULT

## 2022-05-25 ASSESSMENT — PATIENT HEALTH QUESTIONNAIRE - PHQ9
SUM OF ALL RESPONSES TO PHQ QUESTIONS 1-9: 7
SUM OF ALL RESPONSES TO PHQ QUESTIONS 1-9: 7
10. IF YOU CHECKED OFF ANY PROBLEMS, HOW DIFFICULT HAVE THESE PROBLEMS MADE IT FOR YOU TO DO YOUR WORK, TAKE CARE OF THINGS AT HOME, OR GET ALONG WITH OTHER PEOPLE: SOMEWHAT DIFFICULT

## 2022-05-25 NOTE — PROGRESS NOTES
[facility]: Integrated Behavioral Health  May 25th, 2022    Behavioral Health Clinician Progress Note    Patient Name: Naty Pérez           Service Type:  Individual      Service Location:   Face to Face in Home / Community     Session Start Time: 3:32pm  Session End Time: 4:30pm      Session Length: 53 - 60      Attendees: Client     Service Modality:  Video Visit:      Provider verified identity through the following two step process.  Patient provided:  Patient is known previously to provider    Telemedicine Visit: The patient's condition can be safely assessed and treated via synchronous audio and visual telemedicine encounter.      Reason for Telemedicine Visit: Services only offered telehealth    Originating Site (Patient Location): Patient's home    Distant Site (Provider Location): [facility] Outpatient Setting: [place]    Consent:  The patient/guardian has verbally consented to: the potential risks and benefits of telemedicine (video visit) versus in person care; bill my insurance or make self-payment for services provided; and responsibility for payment of non-covered services.     Patient would like the video invitation sent by:  My Chart    Mode of Communication:  Video Conference via [employer]    As the provider I attest to compliance with applicable laws and regulations related to telemedicine.    Visit Activities (Refresh list every visit): [facility] Only    Diagnostic Assessment Date: 2/7/22 Roxanna Casas PhD, LP  Treatment Plan Review Date:6/14/22  See Flowsheets for today's PHQ-9 and ODILON-7 results  Previous PHQ-9:   PHQ-9 SCORE 2/21/2022 3/14/2022 5/25/2022   PHQ-9 Total Score MyChart 10 (Moderate depression) 7 (Mild depression) 7 (Mild depression)   PHQ-9 Total Score 10 7 7     Previous ODILON-7:   ODILON-7 SCORE 2/14/2022 3/14/2022 5/25/2022   Total Score 10 (moderate anxiety) 7 (mild anxiety) 8 (mild anxiety)   Total Score 10 7 8       CHARLES LEVEL:  CHARLES  Score (Last Two) 2/6/2020   CHARLES Raw Score 28   Activation Score 50   CHARLES Level 2       DATA  Extended Session (60+ minutes): No  Interactive Complexity: No  Crisis: No  [facility] Patient: No    Treatment Objective(s) Addressed in This Session:  Target Behavior(s): mood    Depressed Mood: Increase interest, engagement, and pleasure in doing things  Decrease frequency and intensity of feeling down, depressed, hopeless  Improve quantity and quality of night time sleep / decrease daytime naps  Feel less tired and more energy during the day   Improve diet, appetite, mindful eating, and / or meal planning  Identify negative self-talk and behaviors: challenge core beliefs, myths, and actions  Improve concentration, focus, and mindfulness in daily activities   Discuss motivation / ambivalence about taking anti-depressant / mood stabilizer medication(s)  Discuss motivation / ambivalence about a referral to specialty mental health services (Therapy, [facility], [facility])  Anxiety: will experience a reduction in anxiety, will develop more effective coping skills to manage anxiety symptoms, will develop healthy cognitive patterns and beliefs and will increase ability to function adaptively  Attention Problems: will develop coping skills to effectively manage attention issues    Current Stressors / Issues:    Let patient process the [place] shooting and being a . Talked about Vicarious trauma. Continued to work on communication skills around family boundaries. Reviewed safety with Patient. Patient denies any current SI,plans or intentions.     Progress on Treatment Objective(s) / Homework:  Minimal progress - ACTION (Actively working towards change); Intervened by reinforcing change plan / affirming steps taken    Motivational Interviewing    MI Intervention: Expressed Empathy/Understanding, Supported Autonomy, Collaboration, Evocation, Permission to raise concern or advise, Open-ended questions and Reflections: simple and  complex     Change Talk Expressed by the Patient: Desire to change Ability to change Reasons to change Need to change Committment to change Activation Taking steps    Provider Response to Change Talk: E - Evoked more info from patient about behavior change, A - Affirmed patient's thoughts, decisions, or attempts at behavior change, R - Reflected patient's change talk and S - Summarized patient's change talk statements      Situation      I bought extra cake and think others are going to  me   Automatic Thoughts  Cognitive Distortions   Future telling, mind reading   Feelings   anxiety     Behavior   avoid     Questioning Thoughts   Would it be a big deal.          Also provided psychoeducation about behavioral health condition, symptoms, and treatment options    Care Plan review completed: Yes    Medication Review:  No changes to current psychiatric medication(s)    Medication Compliance:  Yes    Changes in Health Issues:   None reported    Chemical Use Review:   Substance Use: Chemical use reviewed, no active concerns identified      Tobacco Use: No current tobacco use.      Assessment: Current Emotional / Mental Status (status of significant symptoms):  Risk status (Self / Other harm or suicidal ideation)  Patient denies a history of suicidal ideation, suicide attempts, self-injurious behavior, homicidal ideation, homicidal behavior and and other safety concerns  Patient denies current fears or concerns for personal safety.  Patient denies current or recent suicidal ideation or behaviors.  Patient denies current or recent homicidal ideation or behaviors.  Patient denies current or recent self injurious behavior or ideation.  Patient denies other safety concerns.  A safety and risk management plan has not been developed at this time, however patient was encouraged to call [facility] / [facility] should there be a change in any of these risk factors.    Appearance:   Appropriate   Eye Contact:   Good   Psychomotor  Behavior: Normal   Attitude:   Cooperative   Orientation:   All  Speech   Rate / Production: Normal    Volume:  Normal   Mood:    Normal  Affect:    Appropriate   Thought Content:  Clear   Thought Form:  Coherent  Logical   Insight:    Good     Diagnoses:  1. Attention deficit disorder without hyperactivity    2. ODILON (generalized anxiety disorder)    3. Major depressive disorder, recurrent episode, mild (H)        Collateral Reports Completed:  Not Applicable    Plan: (Homework, other):  Patient was given information about behavioral services and encouraged to schedule a follow up appointment with the clinic [facility] in 2 weeks.  She was also given information about mental health symptoms and treatment options  and Cognitive Behavioral Therapy skills to practice when experiencing anxiety, depression and ADHD.  CD Recommendations: No indications of CD issues.  XENIA Carrero, [facility]      ______________________________________________________________________                                              Individual Treatment Plan    Patient's Name: Naty Pérez  YOB: 1995    Date of Creation: 3/14/22  Date Treatment Plan Last Reviewed/Revised: 3/14/22    DSM5 Diagnoses: Attention-Deficit/Hyperactivity Disorder  314.00 (F90.0) Predominantly inattentive presentation, 296.31 (F33.0) Major Depressive Disorder, Recurrent Episode, Mild With anxious distress or 300.02 (F41.1) Generalized Anxiety Disorder  Psychosocial / Contextual Factors: relationships, work  PROMIS (reviewed every 90 days): PROMIS-10    In general, would you say your health is: (P) Good    In general, would you say your quality of life is: (P) Very good    In general, how would you rate your physical health? (P) Good    In general, how would you rate your mental health, including your mood and your ability to think? (P) Good    In general, how would you rate your satisfaction with your social activities and relationships? (P) Good    In  general, please rate how well you carry out your usual social activities and roles. (This includes activities at home, at work and in your community, and responsibilities as a parent, child, spouse, employee, friend, etc.) (P) Good    To what extent are you able to carry out your everyday physical activities such as walking, climbing stairs, carrying groceries, or moving a chair? (P) A little    In the past 7 days,  How often have you been bothered by emotional problems such as feeling anxious, depressed or irritable? (P) Sometimes  How would you rate your fatigue on average? (P) Severe  How would you rate your pain on average? (P) 1    PROMIS GLOBAL SCORES    Mental health question re-calculation - no clinical value - (P) 3  Physical health question re-calculation - no clinical value - (P) 2  Pain question re-calculation - no clinical value - (P) 4  Global Mental Health Score - (P) 13  Global Physical Health Score - (P) 11  PROMIS TOTAL - SUBSCORES - (P) 24      3052-2459 PROMIS HEALTH ORGANIZATION AND PROMIS COOPERATIVE GROUP VERSION 1.1      Referral / Collaboration:  Referral to another professional/service is not indicated at this time..    Anticipated number of session for this episode of care: ongoing  Anticipation frequency of session: Every other week  Anticipated Duration of each session: 38-52 minutes  Treatment plan will be reviewed in 90 days or when goals have been changed.       MeasurableTreatment Goal(s) related to diagnosis / functional impairment(s)  Goal 1: Patient will experience a reduction in depressive symptoms along with a corresponding increase in positive emotion and life satisfaction.    I will know I've met my goal when I am less worried and mood is more .      Objective #A (Patient Action)    Patient will Increase interest, engagement, and pleasure in doing things.  Status: Continued - Date(s): 3/14/22    Intervention(s)  Therapist will help patient identify pleasant and mastery  "oriented events that elicit positive, relaxed mood.    Objective #B  Patient will Decrease frequency and intensity of feeling down, depressed, hopeless.  Status: Continued - Date(s): 3/14/22    Intervention(s)  Therapist will introduce patient to cognitive-behavioral and acceptance and commitment therapy topics aimed to help reduce depression and anxiety    Objective #C  Patient will Identify negative self-talk and behaviors: challenge core beliefs, myths, and actions  Improve concentration, focus, and mindfulness in daily activities .  Status: Continued - Date(s): 3/14/22    Intervention(s)  Therapist will help patient identify and manage negative self-talk and automatic thoughts; introduce patient to cognitive distortions; help patient develop cognitive diffusion techniques      Goal 2: Patient will experience a reduction in anxious symptoms, along with a corresponding increase in relaxed emotional states and life satisfaction.    I will know I've met my goal when more focused and less worried.      Objective #A (Patient Action)  Patient will use cognitive-behavioral and thought diffusion strategies identified in therapy to challenge anxious thoughts.    Status: Continued - Date(s): 3/14/22    Intervention(s)  Therapist will utilize CBT and ACT ideas to help patient challenge anxious thoughts and reduce intensity/duration of anxious distress    Objective #B  Patient will use \"worry time\" each day for 15 minutes of scheduled worry and then defer obsessive or anxious thinking until the next structured worry time.    Status: Continued - Date(s): 3/14/22    Intervention(s)  Therapist will teach patient how to effectively utilize worry time and/or thought logs/journals each day and incorporate more relaxation behaviors into their routine.    Objective #C  Patient will identify the stressors which contribute to feelings of anxiety  Patient will increase engagement in adaptive coping skills and recreational activities, " such as exercise and healthy socialization, to manage distress.  Status: Continued - Date(s): 3/14/22    Intervention(s)  Therapist will help patient identify triggers/situational factors that contribute to anxiety and behavioral skills aimed to manage anxious distress.        Other Possible Therapeutic Intervention(s):    Psycho-education regarding mental health diagnoses and treatment options    Supportive Therapy    Provide affirmations, reflections, and establish working rapport    Emphasize and reflect on strength of therapeutic relationship    Skills training    Explore skills useful to client in current situation.    Skills include assertiveness, communication, conflict management, problem-solving, relaxation, etc.    Solution-Focused Therapy    Explore patterns in patient's relationships and discuss options for new behaviors.    Explore patterns in patient's actions and choices and discuss options for new behaviors.    Cognitive-behavioral Therapy    Discuss common cognitive distortions, identify them in patient's life.    Explore ways to challenge, replace, and act against these cognitions.    Acceptance and Commitment Therapy    Explore and identify important values in patient's life.    Discuss ways to commit to behavioral activation around these values.    Psychodynamic psychotherapy    Discuss patient's emotional dynamics and issues and how they impact behaviors.    Explore patient's history of relationships and how they impact present behaviors.    Explore how to work with and make changes in these schemas and patterns.    Narrative Therapy    Explore the patient's story of his/her life from his/her perspective.    Explore alternate ways of understanding their experience, identifying exceptions, developing new themes.    Interpersonal Psychotherapy    Explore patterns in relationships that are effective or ineffective at helping patient reach their goals, find satisfying experience.    Discuss new  patterns or behaviors to engage in for improved social functioning.    Behavioral Activation    Discuss steps patient can take to become more involved in meaningful activity.    Identify barriers to these activities and explore possible solutions.    Mindfulness-Based Strategies    Discuss skills based on development and application of mindfulness.    Skills drawn from compassion-focused therapy, dialectical behavior therapy, mindfulness-based stress reduction, mindfulness-based cognitive therapy, etc.      Patient has reviewed and agreed to the above plan.      XENIA Carrero   Behavioral Health Clinician   [facility]    March 14, 2022

## 2022-06-13 ENCOUNTER — MYC REFILL (OUTPATIENT)
Dept: FAMILY MEDICINE | Facility: CLINIC | Age: 27
End: 2022-06-13
Payer: COMMERCIAL

## 2022-06-13 DIAGNOSIS — F90.0 ATTENTION-DEFICIT HYPERACTIVITY DISORDER, PREDOMINANTLY INATTENTIVE TYPE: ICD-10-CM

## 2022-06-15 ENCOUNTER — MYC REFILL (OUTPATIENT)
Dept: FAMILY MEDICINE | Facility: CLINIC | Age: 27
End: 2022-06-15
Payer: COMMERCIAL

## 2022-06-15 DIAGNOSIS — F90.0 ATTENTION-DEFICIT HYPERACTIVITY DISORDER, PREDOMINANTLY INATTENTIVE TYPE: ICD-10-CM

## 2022-06-16 RX ORDER — DEXTROAMPHETAMINE SACCHARATE, AMPHETAMINE ASPARTATE MONOHYDRATE, DEXTROAMPHETAMINE SULFATE AND AMPHETAMINE SULFATE 2.5; 2.5; 2.5; 2.5 MG/1; MG/1; MG/1; MG/1
10 CAPSULE, EXTENDED RELEASE ORAL DAILY
Qty: 30 CAPSULE | Refills: 0 | OUTPATIENT
Start: 2022-06-16

## 2022-06-17 RX ORDER — DEXTROAMPHETAMINE SACCHARATE, AMPHETAMINE ASPARTATE MONOHYDRATE, DEXTROAMPHETAMINE SULFATE AND AMPHETAMINE SULFATE 2.5; 2.5; 2.5; 2.5 MG/1; MG/1; MG/1; MG/1
10 CAPSULE, EXTENDED RELEASE ORAL DAILY
Qty: 30 CAPSULE | Refills: 0 | OUTPATIENT
Start: 2022-06-17

## 2022-06-20 ENCOUNTER — VIRTUAL VISIT (OUTPATIENT)
Dept: BEHAVIORAL HEALTH | Facility: CLINIC | Age: 27
End: 2022-06-20
Payer: COMMERCIAL

## 2022-06-20 DIAGNOSIS — F98.8 ATTENTION DEFICIT DISORDER WITHOUT HYPERACTIVITY: ICD-10-CM

## 2022-06-20 DIAGNOSIS — F33.1 MODERATE EPISODE OF RECURRENT MAJOR DEPRESSIVE DISORDER (H): ICD-10-CM

## 2022-06-20 DIAGNOSIS — F41.1 GAD (GENERALIZED ANXIETY DISORDER): Primary | ICD-10-CM

## 2022-06-20 PROCEDURE — 90837 PSYTX W PT 60 MINUTES: CPT | Mod: GT | Performed by: MARRIAGE & FAMILY THERAPIST

## 2022-06-20 ASSESSMENT — PATIENT HEALTH QUESTIONNAIRE - PHQ9
10. IF YOU CHECKED OFF ANY PROBLEMS, HOW DIFFICULT HAVE THESE PROBLEMS MADE IT FOR YOU TO DO YOUR WORK, TAKE CARE OF THINGS AT HOME, OR GET ALONG WITH OTHER PEOPLE: SOMEWHAT DIFFICULT
SUM OF ALL RESPONSES TO PHQ QUESTIONS 1-9: 8
SUM OF ALL RESPONSES TO PHQ QUESTIONS 1-9: 8

## 2022-06-20 NOTE — PROGRESS NOTES
[facility] Primary Care: Integrated Behavioral Health  June 20th, 2022    Behavioral Health Clinician Progress Note    Patient Name: Naty Pérez           Service Type:  Individual      Service Location:   Face to Face in Home / Community     Session Start Time: 4:30pm  Session End Time: 5:30pm      Session Length: 53 - 60      Attendees: Client     Service Modality:  Video Visit:      Provider verified identity through the following two step process.  Patient provided:  Patient is known previously to provider    Telemedicine Visit: The patient's condition can be safely assessed and treated via synchronous audio and visual telemedicine encounter.      Reason for Telemedicine Visit: Services only offered telehealth    Originating Site (Patient Location): Patient's home    Distant Site (Provider Location): [facility] Outpatient Setting: [place]    Consent:  The patient/guardian has verbally consented to: the potential risks and benefits of telemedicine (video visit) versus in person care; bill my insurance or make self-payment for services provided; and responsibility for payment of non-covered services.     Patient would like the video invitation sent by:  My Chart    Mode of Communication:  Video Conference via [employer]    As the provider I attest to compliance with applicable laws and regulations related to telemedicine.    Visit Activities (Refresh list every visit): [facility] Only    Diagnostic Assessment Date: 2/7/22 Roxanna Casas PhD, LP  Treatment Plan Review Date:6/14/22  See Flowsheets for today's PHQ-9 and ODILON-7 results  Previous PHQ-9:   PHQ-9 SCORE 3/14/2022 5/25/2022 6/20/2022   PHQ-9 Total Score MyChart 7 (Mild depression) 7 (Mild depression) 8 (Mild depression)   PHQ-9 Total Score 7 7 8     Previous ODILON-7:   ODILON-7 SCORE 2/14/2022 3/14/2022 5/25/2022   Total Score 10 (moderate anxiety) 7 (mild anxiety) 8 (mild anxiety)   Total Score 10 7 8       CHARLES LEVEL:  CHARLES Score  (Last Two) 2/6/2020   CHARLES Raw Score 28   Activation Score 50   CHARLES Level 2       DATA  Extended Session (60+ minutes): No  Interactive Complexity: No  Crisis: No  [facility] Patient: No    Treatment Objective(s) Addressed in This Session:  Target Behavior(s): mood    Depressed Mood: Increase interest, engagement, and pleasure in doing things  Decrease frequency and intensity of feeling down, depressed, hopeless  Improve quantity and quality of night time sleep / decrease daytime naps  Feel less tired and more energy during the day   Improve diet, appetite, mindful eating, and / or meal planning  Identify negative self-talk and behaviors: challenge core beliefs, myths, and actions  Improve concentration, focus, and mindfulness in daily activities   Discuss motivation / ambivalence about taking anti-depressant / mood stabilizer medication(s)  Discuss motivation / ambivalence about a referral to specialty mental health services (Therapy, [facility], [facility])  Anxiety: will experience a reduction in anxiety, will develop more effective coping skills to manage anxiety symptoms, will develop healthy cognitive patterns and beliefs and will increase ability to function adaptively  Attention Problems: will develop coping skills to effectively manage attention issues    Current Stressors / Issues:     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 Has started medication for ADHD and feels better. Processed patients people pleasing tendencies. Continued to work on communication styles. Discussed guilt and shame and how that plays into her feelings.Reviewed safety with Patient. Patient denies any current SI,plans or intentions.     Progress on Treatment Objective(s) / Homework:  Minimal progress - ACTION (Actively working towards change); Intervened by reinforcing change plan / affirming steps taken    Motivational Interviewing    MI Intervention: Expressed Empathy/Understanding, Supported Autonomy, Collaboration, Evocation, Permission to raise concern or  advise, Open-ended questions and Reflections: simple and complex     Change Talk Expressed by the Patient: Desire to change Ability to change Reasons to change Need to change Committment to change Activation Taking steps    Provider Response to Change Talk: E - Evoked more info from patient about behavior change, A - Affirmed patient's thoughts, decisions, or attempts at behavior change, R - Reflected patient's change talk and S - Summarized patient's change talk statements      Situation      I bought extra cake and think others are going to  me   Automatic Thoughts  Cognitive Distortions   Future telling, mind reading   Feelings   anxiety     Behavior   avoid     Questioning Thoughts   Would it be a big deal.          Also provided psychoeducation about behavioral health condition, symptoms, and treatment options    Care Plan review completed: Yes    Medication Review:  Changes to psychiatric medications, see updated Medication List in EPIC.     Medication Compliance:  Yes    Changes in Health Issues:   None reported    Chemical Use Review:   Substance Use: Chemical use reviewed, no active concerns identified      Tobacco Use: No current tobacco use.      Assessment: Current Emotional / Mental Status (status of significant symptoms):  Risk status (Self / Other harm or suicidal ideation)  Patient denies a history of suicidal ideation, suicide attempts, self-injurious behavior, homicidal ideation, homicidal behavior and and other safety concerns  Patient denies current fears or concerns for personal safety.  Patient denies current or recent suicidal ideation or behaviors.  Patient denies current or recent homicidal ideation or behaviors.  Patient denies current or recent self injurious behavior or ideation.  Patient denies other safety concerns.  A safety and risk management plan has not been developed at this time, however patient was encouraged to call [facility] should there be a change in any of  these risk factors.    Appearance:   Appropriate   Eye Contact:   Good   Psychomotor Behavior: Normal   Attitude:   Cooperative   Orientation:   All  Speech   Rate / Production: Normal    Volume:  Normal   Mood:    Normal  Affect:    Appropriate   Thought Content:  Clear   Thought Form:  Coherent  Logical   Insight:    Good     Diagnoses:  1. ODILON (generalized anxiety disorder)    2. Moderate episode of recurrent major depressive disorder (H)    3. Attention deficit disorder without hyperactivity        Collateral Reports Completed:  Not Applicable    Plan: (Homework, other):  Patient was given information about behavioral services and encouraged to schedule a follow up appointment with the clinic [facility] in 2 weeks.  She was also given information about mental health symptoms and treatment options  and Cognitive Behavioral Therapy skills to practice when experiencing anxiety, depression and ADHD.  CD Recommendations: No indications of CD issues.  XENIA Carrero, [facility]      ______________________________________________________________________                                              Individual Treatment Plan    Patient's Name: Naty Pérez  YOB: 1995    Date of Creation: 3/14/22  Date Treatment Plan Last Reviewed/Revised: 6/20/22    DSM5 Diagnoses: Attention-Deficit/Hyperactivity Disorder  314.00 (F90.0) Predominantly inattentive presentation, 296.31 (F33.0) Major Depressive Disorder, Recurrent Episode, Mild With anxious distress or 300.02 (F41.1) Generalized Anxiety Disorder  Psychosocial / Contextual Factors: relationships, work  PROMIS (reviewed every 90 days): PROMIS-10    In general, would you say your health is: (P) Good    In general, would you say your quality of life is: (P) Very good    In general, how would you rate your physical health? (P) Good    In general, how would you rate your mental health, including your mood and your ability to think? (P) Good    In general, how would you  rate your satisfaction with your social activities and relationships? (P) Good    In general, please rate how well you carry out your usual social activities and roles. (This includes activities at home, at work and in your community, and responsibilities as a parent, child, spouse, employee, friend, etc.) (P) Good    To what extent are you able to carry out your everyday physical activities such as walking, climbing stairs, carrying groceries, or moving a chair? (P) A little    In the past 7 days,  How often have you been bothered by emotional problems such as feeling anxious, depressed or irritable? (P) Sometimes  How would you rate your fatigue on average? (P) Severe  How would you rate your pain on average? (P) 1    PROMIS GLOBAL SCORES    Mental health question re-calculation - no clinical value - (P) 3  Physical health question re-calculation - no clinical value - (P) 2  Pain question re-calculation - no clinical value - (P) 4  Global Mental Health Score - (P) 13  Global Physical Health Score - (P) 11  PROMIS TOTAL - SUBSCORES - (P) 24      [id number] PROMIS HEALTH ORGANIZATION AND PROMIS COOPERATIVE GROUP VERSION 1.1      Referral / Collaboration:  Referral to another professional/service is not indicated at this time..    Anticipated number of session for this episode of care: ongoing  Anticipation frequency of session: Every other week  Anticipated Duration of each session: 38-52 minutes  Treatment plan will be reviewed in 90 days or when goals have been changed.       MeasurableTreatment Goal(s) related to diagnosis / functional impairment(s)  Goal 1: Patient will experience a reduction in depressive symptoms along with a corresponding increase in positive emotion and life satisfaction.    I will know I've met my goal when I am less worried and mood is more .      Objective #A (Patient Action)    Patient will Increase interest, engagement, and pleasure in doing things.  Status: Continued - Date(s):  "6/20/22    Intervention(s)  Therapist will help patient identify pleasant and mastery oriented events that elicit positive, relaxed mood.    Objective #B  Patient will Decrease frequency and intensity of feeling down, depressed, hopeless.  Status: Continued - Date(s): 6/20/22    Intervention(s)  Therapist will introduce patient to cognitive-behavioral and acceptance and commitment therapy topics aimed to help reduce depression and anxiety    Objective #C  Patient will Identify negative self-talk and behaviors: challenge core beliefs, myths, and actions  Improve concentration, focus, and mindfulness in daily activities .  Status: Continued - Date(s): 6/20/22    Intervention(s)  Therapist will help patient identify and manage negative self-talk and automatic thoughts; introduce patient to cognitive distortions; help patient develop cognitive diffusion techniques      Goal 2: Patient will experience a reduction in anxious symptoms, along with a corresponding increase in relaxed emotional states and life satisfaction.    I will know I've met my goal when more focused and less worried.      Objective #A (Patient Action)  Patient will use cognitive-behavioral and thought diffusion strategies identified in therapy to challenge anxious thoughts.    Status: Continued - Date(s):6/20/22    Intervention(s)  Therapist will utilize CBT and ACT ideas to help patient challenge anxious thoughts and reduce intensity/duration of anxious distress    Objective #B  Patient will use \"worry time\" each day for 15 minutes of scheduled worry and then defer obsessive or anxious thinking until the next structured worry time.    Status: Continued - Date(s): 6/20/22    Intervention(s)  Therapist will teach patient how to effectively utilize worry time and/or thought logs/journals each day and incorporate more relaxation behaviors into their routine.    Objective #C  Patient will identify the stressors which contribute to feelings of " anxiety  Patient will increase engagement in adaptive coping skills and recreational activities, such as exercise and healthy socialization, to manage distress.  Status: Continued - Date(s): 6/20/22    Intervention(s)  Therapist will help patient identify triggers/situational factors that contribute to anxiety and behavioral skills aimed to manage anxious distress.        Other Possible Therapeutic Intervention(s):    Psycho-education regarding mental health diagnoses and treatment options    Supportive Therapy    Provide affirmations, reflections, and establish working rapport    Emphasize and reflect on strength of therapeutic relationship    Skills training    Explore skills useful to client in current situation.    Skills include assertiveness, communication, conflict management, problem-solving, relaxation, etc.    Solution-Focused Therapy    Explore patterns in patient's relationships and discuss options for new behaviors.    Explore patterns in patient's actions and choices and discuss options for new behaviors.    Cognitive-behavioral Therapy    Discuss common cognitive distortions, identify them in patient's life.    Explore ways to challenge, replace, and act against these cognitions.    Acceptance and Commitment Therapy    Explore and identify important values in patient's life.    Discuss ways to commit to behavioral activation around these values.    Psychodynamic psychotherapy    Discuss patient's emotional dynamics and issues and how they impact behaviors.    Explore patient's history of relationships and how they impact present behaviors.    Explore how to work with and make changes in these schemas and patterns.    Narrative Therapy    Explore the patient's story of his/her life from his/her perspective.    Explore alternate ways of understanding their experience, identifying exceptions, developing new themes.    Interpersonal Psychotherapy    Explore patterns in relationships that are effective or  ineffective at helping patient reach their goals, find satisfying experience.    Discuss new patterns or behaviors to engage in for improved social functioning.    Behavioral Activation    Discuss steps patient can take to become more involved in meaningful activity.    Identify barriers to these activities and explore possible solutions.    Mindfulness-Based Strategies    Discuss skills based on development and application of mindfulness.    Skills drawn from compassion-focused therapy, dialectical behavior therapy, mindfulness-based stress reduction, mindfulness-based cognitive therapy, etc.      Patient has reviewed and agreed to the above plan.      XENIA Carrero   Behavioral Health Clinician   [facility]    June 20th, 2022

## 2022-06-21 NOTE — PROGRESS NOTES
GYN CLINIC VISIT  2022    CC: removal of Nexplanon    HPI:   Naty Pérez is a 26 year old  who presents to clinic today to have her Nexplanon removed. It was inserted in 2019 in her left side arm. She desires Nexplanon removal and reinsertion today because it's due to be replaced.     Reviewed PMH, PSH, social and family history. Changes made in EPIC.    OBJECTIVE:  Vitals:    22 1135   BP: 113/76   Pulse: 97   SpO2: 100%   Weight: 59 kg (130 lb)       Gen: alert, oriented, no distress, very pleasant      Nexplanon Removal and Insertion  Before insertion it was confirmed that Naty Pérez is not pregnant nor has any other contraindication for the use of Nexplanon (no known or suspected pregnancy, no current or past history of thrombosis or thromboembolic disorders, no liver tumors, no undiagnosed abnormal genital bleeding, no known or suspected breast cancer or progestin-sensitive cancer, and no allergic reaction to any components of nexplanon.)        She understands the benefits and risks of Nexplanon.  She is explained the most common adverse reactions reported in clinical trials were change in menstrual bleeding pattern, headache, vaginitis, weight increase, acne, breast pain, abdominal pain, and pharyngitis. The patient has received a copy of the Patient Labeling included in packaging. Consent and been reviewed and completed. Patient has no allergies to the antiseptic and anesthetic to be used during insertion.        The patient lied on her back on the exam table with her non-dominant arm flexed at the elbow and externally rotated with wrist parallel to her ear.     Nexplanon device was palpated in her left side arm.  The area was cleansed with an alcohol swab.  Then lidocaine 1% was injected under the skin at the distal end of the device.  The area was then cleansed with betadine x3. Sterile gloves were donned.  A 2mm incision was made with a scalpel over the distal end of the device,  then the device was grasped with a sterile Megan clamp. The fibrous sheath encasing the Nexplanon was incised with a scalpel, then the device was removed without difficulty, intact. The incision was covered with a band-aid and the arm was wrapped with a pressure dressing. Patient tolerated procedure well.    A single NEXPLANON implant was inserted subdermally in the upper left side arm.     The insertion site was identified by measuring at the inner side of the non-dominant upper arm about 8-10cm above the medial epicondyle of the humerus. Large visible blood vessels were noted and avoided. The insertion site was cleansed with betadine x 3.  The insertion area was anesthetized with 3 mL of  1% lidocaine.  The skin was stretched at insertion site and the nexplanon applicator inserted at 30 degrees.  The position of the implant was confirmed immediately after insertion by palpation.  Bandage placed over implant site.  Naty Pérez was able to palpate the implant herself.  Pressure bandage placed with a sterile guaze to minimize bruising.  The patient was instructed to remove the pressure dressing in 24 hours and place a normal bandage over the insertion site for 3-5 days post-insertion.  The patient tolerated the procedure well.        ASSESSMENT:  Naty Pérez is a 26 year old  who had Nexplanon removed and a new one inserte. today.     PLAN:   1. Encounter for removal and reinsertion of etonogestrel implant  Uncomplicated removal and replacement.  - REMOVE & REINSERT NON-BIODEGRADABLE DRUG DELIVERY IMPLANT      Patient to return to clinic for annual exam, sooner PRN.    Sunitha Taylor MD

## 2022-06-22 ENCOUNTER — OFFICE VISIT (OUTPATIENT)
Dept: OBGYN | Facility: CLINIC | Age: 27
End: 2022-06-22
Payer: COMMERCIAL

## 2022-06-22 VITALS
WEIGHT: 130 LBS | HEART RATE: 97 BPM | OXYGEN SATURATION: 100 % | BODY MASS INDEX: 23.62 KG/M2 | DIASTOLIC BLOOD PRESSURE: 76 MMHG | SYSTOLIC BLOOD PRESSURE: 113 MMHG

## 2022-06-22 DIAGNOSIS — Z30.46 ENCOUNTER FOR REMOVAL AND REINSERTION OF ETONOGESTREL IMPLANT: Primary | ICD-10-CM

## 2022-06-22 DIAGNOSIS — Z30.46 SURVEILLANCE OF PREVIOUSLY PRESCRIBED IMPLANTABLE SUBDERMAL CONTRACEPTIVE: ICD-10-CM

## 2022-06-22 PROCEDURE — 11983 REMOVE/INSERT DRUG IMPLANT: CPT | Performed by: OBSTETRICS & GYNECOLOGY

## 2022-07-20 ENCOUNTER — VIRTUAL VISIT (OUTPATIENT)
Dept: BEHAVIORAL HEALTH | Facility: CLINIC | Age: 27
End: 2022-07-20
Payer: COMMERCIAL

## 2022-07-20 DIAGNOSIS — F33.0 MAJOR DEPRESSIVE DISORDER, RECURRENT EPISODE, MILD (H): ICD-10-CM

## 2022-07-20 DIAGNOSIS — F41.1 GAD (GENERALIZED ANXIETY DISORDER): ICD-10-CM

## 2022-07-20 DIAGNOSIS — F98.8 ATTENTION DEFICIT DISORDER WITHOUT HYPERACTIVITY: Primary | ICD-10-CM

## 2022-07-20 PROCEDURE — 90837 PSYTX W PT 60 MINUTES: CPT | Mod: GT | Performed by: MARRIAGE & FAMILY THERAPIST

## 2022-07-20 ASSESSMENT — ANXIETY QUESTIONNAIRES
3. WORRYING TOO MUCH ABOUT DIFFERENT THINGS: SEVERAL DAYS
6. BECOMING EASILY ANNOYED OR IRRITABLE: SEVERAL DAYS
1. FEELING NERVOUS, ANXIOUS, OR ON EDGE: SEVERAL DAYS
4. TROUBLE RELAXING: SEVERAL DAYS
GAD7 TOTAL SCORE: 6
GAD7 TOTAL SCORE: 6
8. IF YOU CHECKED OFF ANY PROBLEMS, HOW DIFFICULT HAVE THESE MADE IT FOR YOU TO DO YOUR WORK, TAKE CARE OF THINGS AT HOME, OR GET ALONG WITH OTHER PEOPLE?: SOMEWHAT DIFFICULT
GAD7 TOTAL SCORE: 6
7. FEELING AFRAID AS IF SOMETHING AWFUL MIGHT HAPPEN: NOT AT ALL
IF YOU CHECKED OFF ANY PROBLEMS ON THIS QUESTIONNAIRE, HOW DIFFICULT HAVE THESE PROBLEMS MADE IT FOR YOU TO DO YOUR WORK, TAKE CARE OF THINGS AT HOME, OR GET ALONG WITH OTHER PEOPLE: SOMEWHAT DIFFICULT
5. BEING SO RESTLESS THAT IT IS HARD TO SIT STILL: SEVERAL DAYS
7. FEELING AFRAID AS IF SOMETHING AWFUL MIGHT HAPPEN: NOT AT ALL
2. NOT BEING ABLE TO STOP OR CONTROL WORRYING: SEVERAL DAYS

## 2022-07-20 NOTE — PROGRESS NOTES
[facility] Primary Care: Integrated Behavioral Health  July 20th, 2022    Behavioral Health Clinician Progress Note    Patient Name: Naty Pérez           Service Type:  Individual      Service Location:   Face to Face in Home / Community     Session Start Time: 3:33pm  Session End Time:4:27 pm       Session Length: 53 - 60      Attendees: Client     Service Modality:  Video Visit:      Provider verified identity through the following two step process.  Patient provided:  Patient is known previously to provider    Telemedicine Visit: The patient's condition can be safely assessed and treated via synchronous audio and visual telemedicine encounter.      Reason for Telemedicine Visit: Services only offered telehealth    Originating Site (Patient Location): Patient's home    Distant Site (Provider Location): [facility] Outpatient Setting: [place]    Consent:  The patient/guardian has verbally consented to: the potential risks and benefits of telemedicine (video visit) versus in person care; bill my insurance or make self-payment for services provided; and responsibility for payment of non-covered services.     Patient would like the video invitation sent by:  My Chart    Mode of Communication:  Video Conference via [employer]    As the provider I attest to compliance with applicable laws and regulations related to telemedicine.    Visit Activities (Refresh list every visit): [facility] Only    Diagnostic Assessment Date: 2/7/22 Roxanna Casas PhD, LP  Treatment Plan Review Date:10/20/22  See Flowsheets for today's PHQ-9 and ODILON-7 results  Previous PHQ-9:   PHQ-9 SCORE 5/25/2022 6/20/2022 7/20/2022   PHQ-9 Total Score MyChart 7 (Mild depression) 8 (Mild depression) 8 (Mild depression)   PHQ-9 Total Score 7 8 8     Previous ODILON-7:   ODILON-7 SCORE 3/14/2022 5/25/2022 7/20/2022   Total Score 7 (mild anxiety) 8 (mild anxiety) 6 (mild anxiety)   Total Score 7 8 6       CHARLES LEVEL:  CHARLES Score (Last  Two) 2/6/2020   CHARLES Raw Score 28   Activation Score 50   CHARLES Level 2       DATA  Extended Session (60+ minutes): No  Interactive Complexity: No  Crisis: No  [facility] Patient: No    Treatment Objective(s) Addressed in This Session:  Target Behavior(s): mood    Depressed Mood: Increase interest, engagement, and pleasure in doing things  Decrease frequency and intensity of feeling down, depressed, hopeless  Improve quantity and quality of night time sleep / decrease daytime naps  Feel less tired and more energy during the day   Improve diet, appetite, mindful eating, and / or meal planning  Identify negative self-talk and behaviors: challenge core beliefs, myths, and actions  Improve concentration, focus, and mindfulness in daily activities   Discuss motivation / ambivalence about taking anti-depressant / mood stabilizer medication(s)  Discuss motivation / ambivalence about a referral to specialty mental health services (Therapy, [facility], [facility])  Anxiety: will experience a reduction in anxiety, will develop more effective coping skills to manage anxiety symptoms, will develop healthy cognitive patterns and beliefs and will increase ability to function adaptively  Attention Problems: will develop coping skills to effectively manage attention issues    Current Stressors / Issues:    Processed the relationship with her stepsister and family dynamics. Discussed ways to talk to her step-ddad and have hard conversations.Reviewed safety with Patient. Patient denies any current SI,plans or intentions.     Progress on Treatment Objective(s) / Homework:  Minimal progress - ACTION (Actively working towards change); Intervened by reinforcing change plan / affirming steps taken    Motivational Interviewing    MI Intervention: Expressed Empathy/Understanding, Supported Autonomy, Collaboration, Evocation, Permission to raise concern or advise, Open-ended questions and Reflections: simple and complex     Change Talk Expressed by the Patient:  Desire to change Ability to change Reasons to change Need to change Committment to change Activation Taking steps    Provider Response to Change Talk: E - Evoked more info from patient about behavior change, A - Affirmed patient's thoughts, decisions, or attempts at behavior change, R - Reflected patient's change talk and S - Summarized patient's change talk statements      Situation      I bought extra cake and think others are going to  me   Automatic Thoughts  Cognitive Distortions   Future telling, mind reading   Feelings   anxiety     Behavior   avoid     Questioning Thoughts   Would it be a big deal.          Also provided psychoeducation about behavioral health condition, symptoms, and treatment options    Care Plan review completed: Yes    Medication Review:  Changes to psychiatric medications, see updated Medication List in EPIC.     Medication Compliance:  Yes    Changes in Health Issues:   None reported    Chemical Use Review:   Substance Use: Chemical use reviewed, no active concerns identified      Tobacco Use: No current tobacco use.      Assessment: Current Emotional / Mental Status (status of significant symptoms):  Risk status (Self / Other harm or suicidal ideation)  Patient denies a history of suicidal ideation, suicide attempts, self-injurious behavior, homicidal ideation, homicidal behavior and and other safety concerns  Patient denies current fears or concerns for personal safety.  Patient denies current or recent suicidal ideation or behaviors.  Patient denies current or recent homicidal ideation or behaviors.  Patient denies current or recent self injurious behavior or ideation.  Patient denies other safety concerns.  A safety and risk management plan has not been developed at this time, however patient was encouraged to call [facility] should there be a change in any of these risk factors.    Appearance:   Appropriate   Eye Contact:   Good   Psychomotor Behavior: Normal    Attitude:   Cooperative   Orientation:   All  Speech   Rate / Production: Normal    Volume:  Normal   Mood:    Normal  Affect:    Appropriate   Thought Content:  Clear   Thought Form:  Coherent  Logical   Insight:    Good     Diagnoses:  1. Attention deficit disorder without hyperactivity    2. ODILON (generalized anxiety disorder)    3. Major depressive disorder, recurrent episode, mild (H)        Collateral Reports Completed:  Not Applicable    Plan: (Homework, other):  Patient was given information about behavioral services and encouraged to schedule a follow up appointment with the clinic [facility] in 2 weeks.  She was also given information about mental health symptoms and treatment options  and Cognitive Behavioral Therapy skills to practice when experiencing anxiety, depression and ADHD.  CD Recommendations: No indications of CD issues.  XENIA Carrero, [facility]      ______________________________________________________________________                                              Individual Treatment Plan    Patient's Name: Naty Pérez  YOB: 1995    Date of Creation: 3/14/22  Date Treatment Plan Last Reviewed/Revised: 7/20/22    DSM5 Diagnoses: Attention-Deficit/Hyperactivity Disorder  314.00 (F90.0) Predominantly inattentive presentation, 296.31 (F33.0) Major Depressive Disorder, Recurrent Episode, Mild With anxious distress or 300.02 (F41.1) Generalized Anxiety Disorder  Psychosocial / Contextual Factors: relationships, work  PROMIS-10  In general, would you say your health is:: 3  In general, would you say your quality of life is:: 3  In general, how would you rate your physical health?: 3  In general, how would you rate your mental health, including your mood and your ability to think?: 3  In general, how would you rate your satisfaction with your social activities and relationships?: 3  In general, please rate how well you carry out your usual social activities and roles. (This includes  activities at home, at work and in your community, and responsibilities as a parent, child, spouse, employee, friend, etc.): 3  To what extent are you able to carry out your everyday physical activities such as walking, climbing stairs, carrying groceries, or moving a chair?: 5  In the past 7 days, how often have you been bothered by emotional problems such as feeling anxious, depressed, or irritable?: 4  In the past 7 days, how would you rate your fatigue on average?: 3  In the past 7 days, how would you rate your pain on average, where 0 means no pain, and 10 means worst imaginable pain?: 1  Global Mental Health Score: 11  Global Physical Health Score: 15  PROMIS TOTAL - SUBSCORES: 26      Referral / Collaboration:  Referral to another professional/service is not indicated at this time..    Anticipated number of session for this episode of care: ongoing  Anticipation frequency of session: Every other week  Anticipated Duration of each session: 38-52 minutes  Treatment plan will be reviewed in 90 days or when goals have been changed.       MeasurableTreatment Goal(s) related to diagnosis / functional impairment(s)  Goal 1: Patient will experience a reduction in depressive symptoms along with a corresponding increase in positive emotion and life satisfaction.    I will know I've met my goal when I am less worried and mood is more .      Objective #A (Patient Action)    Patient will Increase interest, engagement, and pleasure in doing things.  Status: Continued - Date(s): 7/20/22    Intervention(s)  Therapist will help patient identify pleasant and mastery oriented events that elicit positive, relaxed mood.    Objective #B  Patient will Decrease frequency and intensity of feeling down, depressed, hopeless.  Status: Continued - Date(s): 7/20/22    Intervention(s)  Therapist will introduce patient to cognitive-behavioral and acceptance and commitment therapy topics aimed to help reduce depression and  "anxiety    Objective #C  Patient will Identify negative self-talk and behaviors: challenge core beliefs, myths, and actions  Improve concentration, focus, and mindfulness in daily activities .  Status: Continued - Date(s): 7/20/22    Intervention(s)  Therapist will help patient identify and manage negative self-talk and automatic thoughts; introduce patient to cognitive distortions; help patient develop cognitive diffusion techniques      Goal 2: Patient will experience a reduction in anxious symptoms, along with a corresponding increase in relaxed emotional states and life satisfaction.    I will know I've met my goal when more focused and less worried.      Objective #A (Patient Action)  Patient will use cognitive-behavioral and thought diffusion strategies identified in therapy to challenge anxious thoughts.    Status: Continued - Date(s):7/20/22    Intervention(s)  Therapist will utilize CBT and ACT ideas to help patient challenge anxious thoughts and reduce intensity/duration of anxious distress    Objective #B  Patient will use \"worry time\" each day for 15 minutes of scheduled worry and then defer obsessive or anxious thinking until the next structured worry time.    Status: Continued - Date(s): 7/20/22    Intervention(s)  Therapist will teach patient how to effectively utilize worry time and/or thought logs/journals each day and incorporate more relaxation behaviors into their routine.    Objective #C  Patient will identify the stressors which contribute to feelings of anxiety  Patient will increase engagement in adaptive coping skills and recreational activities, such as exercise and healthy socialization, to manage distress.  Status: Continued - Date(s): 7/20/22    Intervention(s)  Therapist will help patient identify triggers/situational factors that contribute to anxiety and behavioral skills aimed to manage anxious distress.        Other Possible Therapeutic Intervention(s):    Psycho-education regarding " mental health diagnoses and treatment options    Supportive Therapy    Provide affirmations, reflections, and establish working rapport    Emphasize and reflect on strength of therapeutic relationship    Skills training    Explore skills useful to client in current situation.    Skills include assertiveness, communication, conflict management, problem-solving, relaxation, etc.    Solution-Focused Therapy    Explore patterns in patient's relationships and discuss options for new behaviors.    Explore patterns in patient's actions and choices and discuss options for new behaviors.    Cognitive-behavioral Therapy    Discuss common cognitive distortions, identify them in patient's life.    Explore ways to challenge, replace, and act against these cognitions.    Acceptance and Commitment Therapy    Explore and identify important values in patient's life.    Discuss ways to commit to behavioral activation around these values.    Psychodynamic psychotherapy    Discuss patient's emotional dynamics and issues and how they impact behaviors.    Explore patient's history of relationships and how they impact present behaviors.    Explore how to work with and make changes in these schemas and patterns.    Narrative Therapy    Explore the patient's story of his/her life from his/her perspective.    Explore alternate ways of understanding their experience, identifying exceptions, developing new themes.    Interpersonal Psychotherapy    Explore patterns in relationships that are effective or ineffective at helping patient reach their goals, find satisfying experience.    Discuss new patterns or behaviors to engage in for improved social functioning.    Behavioral Activation    Discuss steps patient can take to become more involved in meaningful activity.    Identify barriers to these activities and explore possible solutions.    Mindfulness-Based Strategies    Discuss skills based on development and application of  mindfulness.    Skills drawn from compassion-focused therapy, dialectical behavior therapy, mindfulness-based stress reduction, mindfulness-based cognitive therapy, etc.      Patient has reviewed and agreed to the above plan.      Vanita Negrete JANE   Behavioral Health Clinician   [facility]    July 20th, 2022

## 2022-08-01 ENCOUNTER — VIRTUAL VISIT (OUTPATIENT)
Dept: BEHAVIORAL HEALTH | Facility: CLINIC | Age: 27
End: 2022-08-01
Payer: COMMERCIAL

## 2022-08-01 DIAGNOSIS — F33.0 MAJOR DEPRESSIVE DISORDER, RECURRENT EPISODE, MILD (H): ICD-10-CM

## 2022-08-01 DIAGNOSIS — F98.8 ATTENTION DEFICIT DISORDER WITHOUT HYPERACTIVITY: Primary | ICD-10-CM

## 2022-08-01 DIAGNOSIS — F41.1 GAD (GENERALIZED ANXIETY DISORDER): ICD-10-CM

## 2022-08-01 PROCEDURE — 90837 PSYTX W PT 60 MINUTES: CPT | Mod: GT | Performed by: MARRIAGE & FAMILY THERAPIST

## 2022-08-01 ASSESSMENT — PATIENT HEALTH QUESTIONNAIRE - PHQ9
SUM OF ALL RESPONSES TO PHQ QUESTIONS 1-9: 9
10. IF YOU CHECKED OFF ANY PROBLEMS, HOW DIFFICULT HAVE THESE PROBLEMS MADE IT FOR YOU TO DO YOUR WORK, TAKE CARE OF THINGS AT HOME, OR GET ALONG WITH OTHER PEOPLE: SOMEWHAT DIFFICULT
SUM OF ALL RESPONSES TO PHQ QUESTIONS 1-9: 9

## 2022-08-01 NOTE — PROGRESS NOTES
[facility]: Integrated Behavioral Health  August 1st, 2022    Behavioral Health Clinician Progress Note    Patient Name: Naty Pérez           Service Type:  Individual      Service Location:   Face to Face in Home / Community     Session Start Time: 4:27pm  Session End Time: 5:27pm      Session Length: 53 - 60      Attendees: Client     Service Modality:  Video Visit:      Provider verified identity through the following two step process.  Patient provided:  Patient is known previously to provider    Telemedicine Visit: The patient's condition can be safely assessed and treated via synchronous audio and visual telemedicine encounter.      Reason for Telemedicine Visit: Services only offered telehealth    Originating Site (Patient Location): Patient's home    Distant Site (Provider Location): [facility] Outpatient Setting: [place]    Consent:  The patient/guardian has verbally consented to: the potential risks and benefits of telemedicine (video visit) versus in person care; bill my insurance or make self-payment for services provided; and responsibility for payment of non-covered services.     Patient would like the video invitation sent by:  My Chart    Mode of Communication:  Video Conference via [employer]    As the provider I attest to compliance with applicable laws and regulations related to telemedicine.    Visit Activities (Refresh list every visit): [facility] [place] Only    Diagnostic Assessment Date: 2/7/22 Roxanna Casas PhD, LP  Treatment Plan Review Date:10/20/22  See Flowsheets for today's PHQ-9 and ODILON-7 results  Previous PHQ-9:   PHQ-9 SCORE 6/20/2022 7/20/2022 8/1/2022   PHQ-9 Total Score MyChart 8 (Mild depression) 8 (Mild depression) 9 (Mild depression)   PHQ-9 Total Score 8 8 9     Previous ODILON-7:   ODILON-7 SCORE 3/14/2022 5/25/2022 7/20/2022   Total Score 7 (mild anxiety) 8 (mild anxiety) 6 (mild anxiety)   Total Score 7 8 6       CHARLES LEVEL:  CHARLES Score (Last  Two) 2/6/2020   CHARLES Raw Score 28   Activation Score 50   CHARLES Level 2       DATA  Extended Session (60+ minutes): No  Interactive Complexity: No  Crisis: No  H Patient: No    Treatment Objective(s) Addressed in This Session:  Target Behavior(s): mood    Depressed Mood: Increase interest, engagement, and pleasure in doing things  Decrease frequency and intensity of feeling down, depressed, hopeless  Improve quantity and quality of night time sleep / decrease daytime naps  Feel less tired and more energy during the day   Improve diet, appetite, mindful eating, and / or meal planning  Identify negative self-talk and behaviors: challenge core beliefs, myths, and actions  Improve concentration, focus, and mindfulness in daily activities   Discuss motivation / ambivalence about taking anti-depressant / mood stabilizer medication(s)  Discuss motivation / ambivalence about a referral to specialty mental health services (Therapy, [facility], [facility])  Anxiety: will experience a reduction in anxiety, will develop more effective coping skills to manage anxiety symptoms, will develop healthy cognitive patterns and beliefs and will increase ability to function adaptively  Attention Problems: will develop coping skills to effectively manage attention issues    Current Stressors / Issues:    Processed her discission with her dad around her boundaries and how well it went. Continued to work on self-care and organization to help reduce anxiety with school starting. Reviewed safety with Patient. Patient denies any current SI,plans or intentions.        Progress on Treatment Objective(s) / Homework:  Stable - ACTION (Actively working towards change); Intervened by reinforcing change plan / affirming steps taken    Motivational Interviewing    MI Intervention: Expressed Empathy/Understanding, Supported Autonomy, Collaboration, Evocation, Permission to raise concern or advise, Open-ended questions and Reflections: simple and complex     Change  Talk Expressed by the Patient: Desire to change Ability to change Reasons to change Need to change Committment to change Activation Taking steps    Provider Response to Change Talk: E - Evoked more info from patient about behavior change, A - Affirmed patient's thoughts, decisions, or attempts at behavior change, R - Reflected patient's change talk and S - Summarized patient's change talk statements      Situation      I bought extra cake and think others are going to  me   Automatic Thoughts  Cognitive Distortions   Future telling, mind reading   Feelings   anxiety     Behavior   avoid     Questioning Thoughts   Would it be a big deal.          Also provided psychoeducation about behavioral health condition, symptoms, and treatment options    Care Plan review completed: Yes    Medication Review:  Changes to psychiatric medications, see updated Medication List in EPIC.     Medication Compliance:  Yes    Changes in Health Issues:   None reported    Chemical Use Review:   Substance Use: Chemical use reviewed, no active concerns identified      Tobacco Use: No current tobacco use.      Assessment: Current Emotional / Mental Status (status of significant symptoms):  Risk status (Self / Other harm or suicidal ideation)  Patient denies a history of suicidal ideation, suicide attempts, self-injurious behavior, homicidal ideation, homicidal behavior and and other safety concerns  Patient denies current fears or concerns for personal safety.  Patient denies current or recent suicidal ideation or behaviors.  Patient denies current or recent homicidal ideation or behaviors.  Patient denies current or recent self injurious behavior or ideation.  Patient denies other safety concerns.  A safety and risk management plan has not been developed at this time, however patient was encouraged to call [facility] should there be a change in any of these risk factors.    Appearance:   Appropriate   Eye Contact:   Good    Psychomotor Behavior: Normal   Attitude:   Cooperative   Orientation:   All  Speech   Rate / Production: Normal    Volume:  Normal   Mood:    Normal  Affect:    Appropriate   Thought Content:  Clear   Thought Form:  Coherent  Logical   Insight:    Good     Diagnoses:  1. Attention deficit disorder without hyperactivity    2. ODILON (generalized anxiety disorder)    3. Major depressive disorder, recurrent episode, mild (H)        Collateral Reports Completed:  Not Applicable    Plan: (Homework, other):  Patient was given information about behavioral services and encouraged to schedule a follow up appointment with the clinic [facility] in 2 weeks.  She was also given information about mental health symptoms and treatment options  and Cognitive Behavioral Therapy skills to practice when experiencing anxiety, depression and ADHD.  CD Recommendations: No indications of CD issues.  XENIA Carrero, [facility]      ______________________________________________________________________                                              Individual Treatment Plan    Patient's Name: Naty Pérez  YOB: 1995    Date of Creation: 3/14/22  Date Treatment Plan Last Reviewed/Revised: 7/20/22    DSM5 Diagnoses: Attention-Deficit/Hyperactivity Disorder  314.00 (F90.0) Predominantly inattentive presentation, 296.31 (F33.0) Major Depressive Disorder, Recurrent Episode, Mild With anxious distress or 300.02 (F41.1) Generalized Anxiety Disorder  Psychosocial / Contextual Factors: relationships, work  PROMIS-10  In general, would you say your health is:: 3  In general, would you say your quality of life is:: 3  In general, how would you rate your physical health?: 3  In general, how would you rate your mental health, including your mood and your ability to think?: 3  In general, how would you rate your satisfaction with your social activities and relationships?: 3  In general, please rate how well you carry out your usual social activities  and roles. (This includes activities at home, at work and in your community, and responsibilities as a parent, child, spouse, employee, friend, etc.): 3  To what extent are you able to carry out your everyday physical activities such as walking, climbing stairs, carrying groceries, or moving a chair?: 5  In the past 7 days, how often have you been bothered by emotional problems such as feeling anxious, depressed, or irritable?: 4  In the past 7 days, how would you rate your fatigue on average?: 3  In the past 7 days, how would you rate your pain on average, where 0 means no pain, and 10 means worst imaginable pain?: 1  Global Mental Health Score: 11  Global Physical Health Score: 15  PROMIS TOTAL - SUBSCORES: 26      Referral / Collaboration:  Referral to another professional/service is not indicated at this time..    Anticipated number of session for this episode of care: ongoing  Anticipation frequency of session: Every other week  Anticipated Duration of each session: 38-52 minutes  Treatment plan will be reviewed in 90 days or when goals have been changed.       MeasurableTreatment Goal(s) related to diagnosis / functional impairment(s)  Goal 1: Patient will experience a reduction in depressive symptoms along with a corresponding increase in positive emotion and life satisfaction.    I will know I've met my goal when I am less worried and mood is more .      Objective #A (Patient Action)    Patient will Increase interest, engagement, and pleasure in doing things.  Status: Continued - Date(s): 7/20/22    Intervention(s)  Therapist will help patient identify pleasant and mastery oriented events that elicit positive, relaxed mood.    Objective #B  Patient will Decrease frequency and intensity of feeling down, depressed, hopeless.  Status: Continued - Date(s): 7/20/22    Intervention(s)  Therapist will introduce patient to cognitive-behavioral and acceptance and commitment therapy topics aimed to help reduce  "depression and anxiety    Objective #C  Patient will Identify negative self-talk and behaviors: challenge core beliefs, myths, and actions  Improve concentration, focus, and mindfulness in daily activities .  Status: Continued - Date(s): 7/20/22    Intervention(s)  Therapist will help patient identify and manage negative self-talk and automatic thoughts; introduce patient to cognitive distortions; help patient develop cognitive diffusion techniques      Goal 2: Patient will experience a reduction in anxious symptoms, along with a corresponding increase in relaxed emotional states and life satisfaction.    I will know I've met my goal when more focused and less worried.      Objective #A (Patient Action)  Patient will use cognitive-behavioral and thought diffusion strategies identified in therapy to challenge anxious thoughts.    Status: Continued - Date(s):7/20/22    Intervention(s)  Therapist will utilize CBT and ACT ideas to help patient challenge anxious thoughts and reduce intensity/duration of anxious distress    Objective #B  Patient will use \"worry time\" each day for 15 minutes of scheduled worry and then defer obsessive or anxious thinking until the next structured worry time.    Status: Continued - Date(s): 7/20/22    Intervention(s)  Therapist will teach patient how to effectively utilize worry time and/or thought logs/journals each day and incorporate more relaxation behaviors into their routine.    Objective #C  Patient will identify the stressors which contribute to feelings of anxiety  Patient will increase engagement in adaptive coping skills and recreational activities, such as exercise and healthy socialization, to manage distress.  Status: Continued - Date(s): 7/20/22    Intervention(s)  Therapist will help patient identify triggers/situational factors that contribute to anxiety and behavioral skills aimed to manage anxious distress.        Other Possible Therapeutic " Intervention(s):    Psycho-education regarding mental health diagnoses and treatment options    Supportive Therapy    Provide affirmations, reflections, and establish working rapport    Emphasize and reflect on strength of therapeutic relationship    Skills training    Explore skills useful to client in current situation.    Skills include assertiveness, communication, conflict management, problem-solving, relaxation, etc.    Solution-Focused Therapy    Explore patterns in patient's relationships and discuss options for new behaviors.    Explore patterns in patient's actions and choices and discuss options for new behaviors.    Cognitive-behavioral Therapy    Discuss common cognitive distortions, identify them in patient's life.    Explore ways to challenge, replace, and act against these cognitions.    Acceptance and Commitment Therapy    Explore and identify important values in patient's life.    Discuss ways to commit to behavioral activation around these values.    Psychodynamic psychotherapy    Discuss patient's emotional dynamics and issues and how they impact behaviors.    Explore patient's history of relationships and how they impact present behaviors.    Explore how to work with and make changes in these schemas and patterns.    Narrative Therapy    Explore the patient's story of his/her life from his/her perspective.    Explore alternate ways of understanding their experience, identifying exceptions, developing new themes.    Interpersonal Psychotherapy    Explore patterns in relationships that are effective or ineffective at helping patient reach their goals, find satisfying experience.    Discuss new patterns or behaviors to engage in for improved social functioning.    Behavioral Activation    Discuss steps patient can take to become more involved in meaningful activity.    Identify barriers to these activities and explore possible solutions.    Mindfulness-Based Strategies    Discuss skills based on  development and application of mindfulness.    Skills drawn from compassion-focused therapy, dialectical behavior therapy, mindfulness-based stress reduction, mindfulness-based cognitive therapy, etc.      Patient has reviewed and agreed to the above plan.      Vanita Negrete [facility]   Behavioral Health Clinician   [facility]    July 20th, 2022

## 2022-08-08 ENCOUNTER — OFFICE VISIT (OUTPATIENT)
Dept: FAMILY MEDICINE | Facility: CLINIC | Age: 27
End: 2022-08-08
Payer: COMMERCIAL

## 2022-08-08 VITALS
OXYGEN SATURATION: 100 % | HEART RATE: 83 BPM | WEIGHT: 132 LBS | DIASTOLIC BLOOD PRESSURE: 67 MMHG | BODY MASS INDEX: 23.98 KG/M2 | TEMPERATURE: 98.4 F | SYSTOLIC BLOOD PRESSURE: 101 MMHG | RESPIRATION RATE: 16 BRPM

## 2022-08-08 DIAGNOSIS — R53.83 OTHER FATIGUE: Primary | ICD-10-CM

## 2022-08-08 DIAGNOSIS — N94.3 PMS (PREMENSTRUAL SYNDROME): ICD-10-CM

## 2022-08-08 DIAGNOSIS — F90.0 ATTENTION-DEFICIT HYPERACTIVITY DISORDER, PREDOMINANTLY INATTENTIVE TYPE: ICD-10-CM

## 2022-08-08 LAB — DEPRECATED CALCIDIOL+CALCIFEROL SERPL-MC: 35 UG/L (ref 20–75)

## 2022-08-08 PROCEDURE — 82306 VITAMIN D 25 HYDROXY: CPT | Performed by: NURSE PRACTITIONER

## 2022-08-08 PROCEDURE — 36415 COLL VENOUS BLD VENIPUNCTURE: CPT | Performed by: NURSE PRACTITIONER

## 2022-08-08 PROCEDURE — 99214 OFFICE O/P EST MOD 30 MIN: CPT | Performed by: NURSE PRACTITIONER

## 2022-08-08 RX ORDER — DEXTROAMPHETAMINE SACCHARATE, AMPHETAMINE ASPARTATE MONOHYDRATE, DEXTROAMPHETAMINE SULFATE AND AMPHETAMINE SULFATE 2.5; 2.5; 2.5; 2.5 MG/1; MG/1; MG/1; MG/1
10 CAPSULE, EXTENDED RELEASE ORAL DAILY
Qty: 30 CAPSULE | Refills: 0 | Status: SHIPPED | OUTPATIENT
Start: 2022-09-08 | End: 2022-10-08

## 2022-08-08 RX ORDER — DEXTROAMPHETAMINE SACCHARATE, AMPHETAMINE ASPARTATE MONOHYDRATE, DEXTROAMPHETAMINE SULFATE AND AMPHETAMINE SULFATE 2.5; 2.5; 2.5; 2.5 MG/1; MG/1; MG/1; MG/1
10 CAPSULE, EXTENDED RELEASE ORAL DAILY
Qty: 30 CAPSULE | Refills: 0 | Status: CANCELLED | OUTPATIENT
Start: 2022-08-08

## 2022-08-08 RX ORDER — DEXTROAMPHETAMINE SACCHARATE, AMPHETAMINE ASPARTATE MONOHYDRATE, DEXTROAMPHETAMINE SULFATE AND AMPHETAMINE SULFATE 2.5; 2.5; 2.5; 2.5 MG/1; MG/1; MG/1; MG/1
10 CAPSULE, EXTENDED RELEASE ORAL DAILY
Qty: 30 CAPSULE | Refills: 0 | Status: SHIPPED | OUTPATIENT
Start: 2022-08-08 | End: 2022-09-07

## 2022-08-08 RX ORDER — SERTRALINE HYDROCHLORIDE 25 MG/1
25 TABLET, FILM COATED ORAL DAILY
Qty: 90 TABLET | Refills: 4 | Status: SHIPPED | OUTPATIENT
Start: 2022-08-08 | End: 2022-11-07

## 2022-08-08 RX ORDER — DEXTROAMPHETAMINE SACCHARATE, AMPHETAMINE ASPARTATE MONOHYDRATE, DEXTROAMPHETAMINE SULFATE AND AMPHETAMINE SULFATE 2.5; 2.5; 2.5; 2.5 MG/1; MG/1; MG/1; MG/1
10 CAPSULE, EXTENDED RELEASE ORAL DAILY
Qty: 30 CAPSULE | Refills: 0 | Status: SHIPPED | OUTPATIENT
Start: 2022-10-09 | End: 2022-11-08

## 2022-08-08 NOTE — PROGRESS NOTES
"  Assessment & Plan     Attention-deficit hyperactivity disorder, predominantly inattentive type   Stable, tolerating med, no changes at this time.  - amphetamine-dextroamphetamine (ADDERALL XR) 10 MG 24 hr capsule; Take 1 capsule (10 mg) by mouth daily for 30 days  - amphetamine-dextroamphetamine (ADDERALL XR) 10 MG 24 hr capsule; Take 1 capsule (10 mg) by mouth daily for 30 days  - amphetamine-dextroamphetamine (ADDERALL XR) 10 MG 24 hr capsule; Take 1 capsule (10 mg) by mouth daily for 30 days    PMS (premenstrual syndrome)  - sertraline (ZOLOFT) 25 MG tablet; Take 1 tablet (25 mg) by mouth daily    Other fatigue  Possibly secondary to hormonal changes/nexplanon.  This has been a chronic problem in the past.  We will rule out Vit d deficiency, she was ok watching and waiting to see if symptoms improve. Could consider increasing adderall dose, however, her current dose has been effective at improving focus.    - Vitamin D Deficiency; Future  - Vitamin D Deficiency                 Return in 3 months (on 11/8/2022) for ADHD MED RECHECK (3 MONTHS).    [facility]    Casey Alfonso is a 27 year old, presenting for the following health issues:  Refill Request      History of Present Illness       Reason for visit:  Prescription refill    She eats 0-1 servings of fruits and vegetables daily.She consumes 1 sweetened beverage(s) daily.She exercises with enough effort to increase her heart rate 10 to 19 minutes per day.  She exercises with enough effort to increase her heart rate 4 days per week. She is missing 1 dose(s) of medications per week.       Adderall is nice- almost like \"wearing glasses\", makes everything sharper.    Got a new nexplanon, cycle is off.  Feeling very fatigued, ongoing for many years, but increased now.  Just ended summer school.  Hasn't felt this fatigued since before the sertraline.  Will wake up tired.  Body feels tired, even with the " adderall.      Review of Systems   Constitutional, HEENT, cardiovascular, pulmonary, gi and gu systems are negative, except as otherwise noted.      Objective    Pulse 83   Temp 98.4  F (36.9  C) (Temporal)   Resp 16   Wt 59.9 kg (132 lb)   SpO2 100%   BMI 23.98 kg/m    Body mass index is 23.98 kg/m .  Physical Exam   GENERAL: healthy, alert and no distress  EYES: Eyes grossly normal to inspection, PERRL and conjunctivae and sclerae normal  NECK: no adenopathy, no asymmetry, masses, or scars and thyroid normal to palpation  RESP: no wheezes  MS: no gross musculoskeletal defects noted, no edema  PSYCH: mentation appears normal, affect normal/bright                    .  ..

## 2022-08-22 ENCOUNTER — VIRTUAL VISIT (OUTPATIENT)
Dept: BEHAVIORAL HEALTH | Facility: CLINIC | Age: 27
End: 2022-08-22
Payer: COMMERCIAL

## 2022-08-22 DIAGNOSIS — F41.1 GAD (GENERALIZED ANXIETY DISORDER): ICD-10-CM

## 2022-08-22 DIAGNOSIS — F98.8 ATTENTION DEFICIT DISORDER WITHOUT HYPERACTIVITY: Primary | ICD-10-CM

## 2022-08-22 DIAGNOSIS — F33.0 MAJOR DEPRESSIVE DISORDER, RECURRENT EPISODE, MILD (H): ICD-10-CM

## 2022-08-22 PROCEDURE — 90837 PSYTX W PT 60 MINUTES: CPT | Mod: GT | Performed by: MARRIAGE & FAMILY THERAPIST

## 2022-08-22 ASSESSMENT — ANXIETY QUESTIONNAIRES
5. BEING SO RESTLESS THAT IT IS HARD TO SIT STILL: MORE THAN HALF THE DAYS
6. BECOMING EASILY ANNOYED OR IRRITABLE: MORE THAN HALF THE DAYS
1. FEELING NERVOUS, ANXIOUS, OR ON EDGE: SEVERAL DAYS
IF YOU CHECKED OFF ANY PROBLEMS ON THIS QUESTIONNAIRE, HOW DIFFICULT HAVE THESE PROBLEMS MADE IT FOR YOU TO DO YOUR WORK, TAKE CARE OF THINGS AT HOME, OR GET ALONG WITH OTHER PEOPLE: SOMEWHAT DIFFICULT
2. NOT BEING ABLE TO STOP OR CONTROL WORRYING: SEVERAL DAYS
7. FEELING AFRAID AS IF SOMETHING AWFUL MIGHT HAPPEN: SEVERAL DAYS
4. TROUBLE RELAXING: MORE THAN HALF THE DAYS
GAD7 TOTAL SCORE: 10
8. IF YOU CHECKED OFF ANY PROBLEMS, HOW DIFFICULT HAVE THESE MADE IT FOR YOU TO DO YOUR WORK, TAKE CARE OF THINGS AT HOME, OR GET ALONG WITH OTHER PEOPLE?: SOMEWHAT DIFFICULT
7. FEELING AFRAID AS IF SOMETHING AWFUL MIGHT HAPPEN: SEVERAL DAYS
GAD7 TOTAL SCORE: 10
3. WORRYING TOO MUCH ABOUT DIFFERENT THINGS: SEVERAL DAYS

## 2022-08-22 NOTE — PROGRESS NOTES
[facility] Primary Care: Integrated Behavioral Health  August 22sd, 2022    Behavioral Health Clinician Progress Note    Patient Name: Naty Pérez           Service Type:  Individual      Service Location:   Face to Face in Home / Community     Session Start Time: 4:31pm  Session End Time: 5:25pm      Session Length: 53 - 60      Attendees: Client     Service Modality:  Video Visit:      Provider verified identity through the following two step process.  Patient provided:  Patient is known previously to provider    Telemedicine Visit: The patient's condition can be safely assessed and treated via synchronous audio and visual telemedicine encounter.      Reason for Telemedicine Visit: Services only offered telehealth    Originating Site (Patient Location): Patient's home    Distant Site (Provider Location): [facility] Outpatient Setting: [place]    Consent:  The patient/guardian has verbally consented to: the potential risks and benefits of telemedicine (video visit) versus in person care; bill my insurance or make self-payment for services provided; and responsibility for payment of non-covered services.     Patient would like the video invitation sent by:  My Chart    Mode of Communication:  Video Conference via [employer]    As the provider I attest to compliance with applicable laws and regulations related to telemedicine.    Visit Activities (Refresh list every visit): [facility] Only    Diagnostic Assessment Date: 2/7/22 Roxanna Casas PhD, LP  Treatment Plan Review Date:10/20/22  See Flowsheets for today's PHQ-9 and ODILON-7 results  Previous PHQ-9:   PHQ-9 SCORE 6/20/2022 7/20/2022 8/1/2022   PHQ-9 Total Score MyChart 8 (Mild depression) 8 (Mild depression) 9 (Mild depression)   PHQ-9 Total Score 8 8 9     Previous ODILON-7:   ODILON-7 SCORE 5/25/2022 7/20/2022 8/22/2022   Total Score 8 (mild anxiety) 6 (mild anxiety) 10 (moderate anxiety)   Total Score 8 6 10       CHARLES LEVEL:  CHARLES Score  (Last Two) 2/6/2020   CHARLES Raw Score 28   Activation Score 50   CHARLES Level 2       DATA  Extended Session (60+ minutes): No  Interactive Complexity: No  Crisis: No  [facility] Patient: No    Treatment Objective(s) Addressed in This Session:  Target Behavior(s): mood    Depressed Mood: Increase interest, engagement, and pleasure in doing things  Decrease frequency and intensity of feeling down, depressed, hopeless  Improve quantity and quality of night time sleep / decrease daytime naps  Feel less tired and more energy during the day   Improve diet, appetite, mindful eating, and / or meal planning  Identify negative self-talk and behaviors: challenge core beliefs, myths, and actions  Improve concentration, focus, and mindfulness in daily activities   Discuss motivation / ambivalence about taking anti-depressant / mood stabilizer medication(s)  Discuss motivation / ambivalence about a referral to specialty mental health services (Therapy, [facility], [facility])  Anxiety: will experience a reduction in anxiety, will develop more effective coping skills to manage anxiety symptoms, will develop healthy cognitive patterns and beliefs and will increase ability to function adaptively  Attention Problems: will develop coping skills to effectively manage attention issues    Current Stressors / Issues:    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 Processed self-care and asking for help. Discussed her relationship with her father and ways to communicate while he is helping her. Reviewed safety with Patient. Patient denies any current SI,plans or intentions.       Progress on Treatment Objective(s) / Homework:  Stable - ACTION (Actively working towards change); Intervened by reinforcing change plan / affirming steps taken    Motivational Interviewing    MI Intervention: Expressed Empathy/Understanding, Supported Autonomy, Collaboration, Evocation, Permission to raise concern or advise, Open-ended questions  and Reflections: simple and complex     Change Talk Expressed by the Patient: Desire to change Ability to change Reasons to change Need to change Committment to change Activation Taking steps    Provider Response to Change Talk: E - Evoked more info from patient about behavior change, A - Affirmed patient's thoughts, decisions, or attempts at behavior change, R - Reflected patient's change talk and S - Summarized patient's change talk statements      Situation         Automatic Thoughts  Cognitive Distortions      Feelings        Behavior        Questioning Thoughts            Also provided psychoeducation about behavioral health condition, symptoms, and treatment options    Care Plan review completed: Yes    Medication Review:  Changes to psychiatric medications, see updated Medication List in EPIC.     Medication Compliance:  Yes    Changes in Health Issues:   None reported    Chemical Use Review:   Substance Use: Chemical use reviewed, no active concerns identified      Tobacco Use: No current tobacco use.      Assessment: Current Emotional / Mental Status (status of significant symptoms):  Risk status (Self / Other harm or suicidal ideation)  Patient denies a history of suicidal ideation, suicide attempts, self-injurious behavior, homicidal ideation, homicidal behavior and and other safety concerns  Patient denies current fears or concerns for personal safety.  Patient denies current or recent suicidal ideation or behaviors.  Patient denies current or recent homicidal ideation or behaviors.  Patient denies current or recent self injurious behavior or ideation.  Patient denies other safety concerns.  A safety and risk management plan has not been developed at this time, however patient was encouraged to call [place] [phone] should there be a change in any of these risk factors.    Appearance:   Appropriate   Eye Contact:   Good   Psychomotor Behavior: Normal   Attitude:   Cooperative    Orientation:   All  Speech   Rate / Production: Normal    Volume:  Normal   Mood:    Normal  Affect:    Appropriate   Thought Content:  Clear   Thought Form:  Coherent  Logical   Insight:    Good     Diagnoses:  1. Attention deficit disorder without hyperactivity    2. ODILON (generalized anxiety disorder)    3. Major depressive disorder, recurrent episode, mild (H)        Collateral Reports Completed:  Not Applicable    Plan: (Homework, other):  Patient was given information about behavioral services and encouraged to schedule a follow up appointment with the clinic [facility] in 2 weeks.  She was also given information about mental health symptoms and treatment options  and Cognitive Behavioral Therapy skills to practice when experiencing anxiety, depression and ADHD.  CD Recommendations: No indications of CD issues.  XENIA Carrero, [facility]      ______________________________________________________________________                                              Individual Treatment Plan    Patient's Name: Naty Pérez  YOB: 1995    Date of Creation: 3/14/22  Date Treatment Plan Last Reviewed/Revised: 7/20/22    DSM5 Diagnoses: Attention-Deficit/Hyperactivity Disorder  314.00 (F90.0) Predominantly inattentive presentation, 296.31 (F33.0) Major Depressive Disorder, Recurrent Episode, Mild With anxious distress or 300.02 (F41.1) Generalized Anxiety Disorder  Psychosocial / Contextual Factors: relationships, work  PROMIS-10  In general, would you say your health is:: 3  In general, would you say your quality of life is:: 3  In general, how would you rate your physical health?: 3  In general, how would you rate your mental health, including your mood and your ability to think?: 3  In general, how would you rate your satisfaction with your social activities and relationships?: 3  In general, please rate how well you carry out your usual social activities and roles. (This includes activities at home, at work  and in your community, and responsibilities as a parent, child, spouse, employee, friend, etc.): 3  To what extent are you able to carry out your everyday physical activities such as walking, climbing stairs, carrying groceries, or moving a chair?: 5  In the past 7 days, how often have you been bothered by emotional problems such as feeling anxious, depressed, or irritable?: 4  In the past 7 days, how would you rate your fatigue on average?: 3  In the past 7 days, how would you rate your pain on average, where 0 means no pain, and 10 means worst imaginable pain?: 1  Global Mental Health Score: 11  Global Physical Health Score: 15  PROMIS TOTAL - SUBSCORES: 26      Referral / Collaboration:  Referral to another professional/service is not indicated at this time..    Anticipated number of session for this episode of care: ongoing  Anticipation frequency of session: Every other week  Anticipated Duration of each session: 38-52 minutes  Treatment plan will be reviewed in 90 days or when goals have been changed.       MeasurableTreatment Goal(s) related to diagnosis / functional impairment(s)  Goal 1: Patient will experience a reduction in depressive symptoms along with a corresponding increase in positive emotion and life satisfaction.    I will know I've met my goal when I am less worried and mood is more .      Objective #A (Patient Action)    Patient will Increase interest, engagement, and pleasure in doing things.  Status: Continued - Date(s): 7/20/22    Intervention(s)  Therapist will help patient identify pleasant and mastery oriented events that elicit positive, relaxed mood.    Objective #B  Patient will Decrease frequency and intensity of feeling down, depressed, hopeless.  Status: Continued - Date(s): 7/20/22    Intervention(s)  Therapist will introduce patient to cognitive-behavioral and acceptance and commitment therapy topics aimed to help reduce depression and anxiety    Objective #C  Patient will Identify  "negative self-talk and behaviors: challenge core beliefs, myths, and actions  Improve concentration, focus, and mindfulness in daily activities .  Status: Continued - Date(s): 7/20/22    Intervention(s)  Therapist will help patient identify and manage negative self-talk and automatic thoughts; introduce patient to cognitive distortions; help patient develop cognitive diffusion techniques      Goal 2: Patient will experience a reduction in anxious symptoms, along with a corresponding increase in relaxed emotional states and life satisfaction.    I will know I've met my goal when more focused and less worried.      Objective #A (Patient Action)  Patient will use cognitive-behavioral and thought diffusion strategies identified in therapy to challenge anxious thoughts.    Status: Continued - Date(s):7/20/22    Intervention(s)  Therapist will utilize CBT and ACT ideas to help patient challenge anxious thoughts and reduce intensity/duration of anxious distress    Objective #B  Patient will use \"worry time\" each day for 15 minutes of scheduled worry and then defer obsessive or anxious thinking until the next structured worry time.    Status: Continued - Date(s): 7/20/22    Intervention(s)  Therapist will teach patient how to effectively utilize worry time and/or thought logs/journals each day and incorporate more relaxation behaviors into their routine.    Objective #C  Patient will identify the stressors which contribute to feelings of anxiety  Patient will increase engagement in adaptive coping skills and recreational activities, such as exercise and healthy socialization, to manage distress.  Status: Continued - Date(s): 7/20/22    Intervention(s)  Therapist will help patient identify triggers/situational factors that contribute to anxiety and behavioral skills aimed to manage anxious distress.        Other Possible Therapeutic Intervention(s):    Psycho-education regarding mental health diagnoses and treatment " options    Supportive Therapy    Provide affirmations, reflections, and establish working rapport    Emphasize and reflect on strength of therapeutic relationship    Skills training    Explore skills useful to client in current situation.    Skills include assertiveness, communication, conflict management, problem-solving, relaxation, etc.    Solution-Focused Therapy    Explore patterns in patient's relationships and discuss options for new behaviors.    Explore patterns in patient's actions and choices and discuss options for new behaviors.    Cognitive-behavioral Therapy    Discuss common cognitive distortions, identify them in patient's life.    Explore ways to challenge, replace, and act against these cognitions.    Acceptance and Commitment Therapy    Explore and identify important values in patient's life.    Discuss ways to commit to behavioral activation around these values.    Psychodynamic psychotherapy    Discuss patient's emotional dynamics and issues and how they impact behaviors.    Explore patient's history of relationships and how they impact present behaviors.    Explore how to work with and make changes in these schemas and patterns.    Narrative Therapy    Explore the patient's story of his/her life from his/her perspective.    Explore alternate ways of understanding their experience, identifying exceptions, developing new themes.    Interpersonal Psychotherapy    Explore patterns in relationships that are effective or ineffective at helping patient reach their goals, find satisfying experience.    Discuss new patterns or behaviors to engage in for improved social functioning.    Behavioral Activation    Discuss steps patient can take to become more involved in meaningful activity.    Identify barriers to these activities and explore possible solutions.    Mindfulness-Based Strategies    Discuss skills based on development and application of mindfulness.    Skills drawn from compassion-focused  therapy, dialectical behavior therapy, mindfulness-based stress reduction, mindfulness-based cognitive therapy, etc.      Patient has reviewed and agreed to the above plan.      Vanita Negrete [facility]   Behavioral Health Clinician   [facility]    July 20th, 2022

## 2022-09-19 ENCOUNTER — VIRTUAL VISIT (OUTPATIENT)
Dept: BEHAVIORAL HEALTH | Facility: CLINIC | Age: 27
End: 2022-09-19
Payer: COMMERCIAL

## 2022-09-19 DIAGNOSIS — F33.0 MAJOR DEPRESSIVE DISORDER, RECURRENT EPISODE, MILD (H): ICD-10-CM

## 2022-09-19 DIAGNOSIS — F41.1 GAD (GENERALIZED ANXIETY DISORDER): Primary | ICD-10-CM

## 2022-09-19 PROCEDURE — 90837 PSYTX W PT 60 MINUTES: CPT | Mod: GT | Performed by: MARRIAGE & FAMILY THERAPIST

## 2022-09-19 ASSESSMENT — PATIENT HEALTH QUESTIONNAIRE - PHQ9
SUM OF ALL RESPONSES TO PHQ QUESTIONS 1-9: 9
SUM OF ALL RESPONSES TO PHQ QUESTIONS 1-9: 9
10. IF YOU CHECKED OFF ANY PROBLEMS, HOW DIFFICULT HAVE THESE PROBLEMS MADE IT FOR YOU TO DO YOUR WORK, TAKE CARE OF THINGS AT HOME, OR GET ALONG WITH OTHER PEOPLE: SOMEWHAT DIFFICULT

## 2022-09-19 ASSESSMENT — ANXIETY QUESTIONNAIRES
GAD7 TOTAL SCORE: 11
1. FEELING NERVOUS, ANXIOUS, OR ON EDGE: MORE THAN HALF THE DAYS
IF YOU CHECKED OFF ANY PROBLEMS ON THIS QUESTIONNAIRE, HOW DIFFICULT HAVE THESE PROBLEMS MADE IT FOR YOU TO DO YOUR WORK, TAKE CARE OF THINGS AT HOME, OR GET ALONG WITH OTHER PEOPLE: SOMEWHAT DIFFICULT
3. WORRYING TOO MUCH ABOUT DIFFERENT THINGS: SEVERAL DAYS
7. FEELING AFRAID AS IF SOMETHING AWFUL MIGHT HAPPEN: NOT AT ALL
7. FEELING AFRAID AS IF SOMETHING AWFUL MIGHT HAPPEN: NOT AT ALL
8. IF YOU CHECKED OFF ANY PROBLEMS, HOW DIFFICULT HAVE THESE MADE IT FOR YOU TO DO YOUR WORK, TAKE CARE OF THINGS AT HOME, OR GET ALONG WITH OTHER PEOPLE?: SOMEWHAT DIFFICULT
GAD7 TOTAL SCORE: 11
GAD7 TOTAL SCORE: 11
4. TROUBLE RELAXING: MORE THAN HALF THE DAYS
5. BEING SO RESTLESS THAT IT IS HARD TO SIT STILL: MORE THAN HALF THE DAYS
2. NOT BEING ABLE TO STOP OR CONTROL WORRYING: MORE THAN HALF THE DAYS
6. BECOMING EASILY ANNOYED OR IRRITABLE: MORE THAN HALF THE DAYS

## 2022-09-19 NOTE — PROGRESS NOTES
[facility]: Integrated Behavioral Health  September 19th, 2022    Behavioral Health Clinician Progress Note    Patient Name: Naty Pérez           Service Type:  Individual      Service Location:   Face to Face in Home / Community     Session Start Time: 4:32pm  Session End Time: 5:29pm      Session Length: 53 - 60      Attendees: Client     Service Modality:  Video Visit:      Provider verified identity through the following two step process.  Patient provided:  Patient is known previously to provider    Telemedicine Visit: The patient's condition can be safely assessed and treated via synchronous audio and visual telemedicine encounter.      Reason for Telemedicine Visit: Services only offered telehealth    Originating Site (Patient Location): Patient's home    Distant Site (Provider Location): [facility] Outpatient Setting: [place]    Consent:  The patient/guardian has verbally consented to: the potential risks and benefits of telemedicine (video visit) versus in person care; bill my insurance or make self-payment for services provided; and responsibility for payment of non-covered services.     Patient would like the video invitation sent by:  My Chart    Mode of Communication:  Video Conference via [employer]    As the provider I attest to compliance with applicable laws and regulations related to telemedicine.    Visit Activities (Refresh list every visit): [facility] Only    Diagnostic Assessment Date: 2/7/22 Roxanna Casas PhD, LP  Treatment Plan Review Date:10/20/22  See Flowsheets for today's PHQ-9 and ODILON-7 results  Previous PHQ-9:   PHQ-9 SCORE 7/20/2022 8/1/2022 9/19/2022   PHQ-9 Total Score MyChart 8 (Mild depression) 9 (Mild depression) 9 (Mild depression)   PHQ-9 Total Score 8 9 9     Previous ODILON-7:   ODILON-7 SCORE 7/20/2022 8/22/2022 9/19/2022   Total Score 6 (mild anxiety) 10 (moderate anxiety) 11 (moderate anxiety)   Total Score 6 10 11       CHARLES  LEVEL:  CHARLES Score (Last Two) 2/6/2020   CHARLES Raw Score 28   Activation Score 50   CHARLES Level 2       DATA  Extended Session (60+ minutes): No  Interactive Complexity: No  Crisis: No  [facility] Patient: No    Treatment Objective(s) Addressed in This Session:  Target Behavior(s): mood    Depressed Mood: Increase interest, engagement, and pleasure in doing things  Decrease frequency and intensity of feeling down, depressed, hopeless  Improve quantity and quality of night time sleep / decrease daytime naps  Feel less tired and more energy during the day   Improve diet, appetite, mindful eating, and / or meal planning  Identify negative self-talk and behaviors: challenge core beliefs, myths, and actions  Improve concentration, focus, and mindfulness in daily activities   Discuss motivation / ambivalence about taking anti-depressant / mood stabilizer medication(s)  Discuss motivation / ambivalence about a referral to specialty mental health services (Therapy, Day Tx, PHP)  Anxiety: will experience a reduction in anxiety, will develop more effective coping skills to manage anxiety symptoms, will develop healthy cognitive patterns and beliefs and will increase ability to function adaptively  Attention Problems: will develop coping skills to effectively manage attention issues    Current Stressors / Issues:    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 Worked on ways to self-cope. Talked about getting into a routine, sleeping and eating to help with her class room. Reviewed what she can control influence and/or needs to let go. Reviewed safety with Patient. Patient denies any current SI,plans or intentions.     Progress on Treatment Objective(s) / Homework:  Stable - ACTION (Actively working towards change); Intervened by reinforcing change plan / affirming steps taken    Motivational Interviewing    MI Intervention: Expressed Empathy/Understanding, Supported Autonomy, Collaboration, Evocation, Permission to raise  concern or advise, Open-ended questions and Reflections: simple and complex     Change Talk Expressed by the Patient: Desire to change Ability to change Reasons to change Need to change Committment to change Activation Taking steps    Provider Response to Change Talk: E - Evoked more info from patient about behavior change, A - Affirmed patient's thoughts, decisions, or attempts at behavior change, R - Reflected patient's change talk and S - Summarized patient's change talk statements      Situation         Automatic Thoughts  Cognitive Distortions      Feelings        Behavior        Questioning Thoughts            Also provided psychoeducation about behavioral health condition, symptoms, and treatment options    Care Plan review completed: Yes    Medication Review:  Changes to psychiatric medications, see updated Medication List in EPIC.     Medication Compliance:  Yes    Changes in Health Issues:   None reported    Chemical Use Review:   Substance Use: Chemical use reviewed, no active concerns identified      Tobacco Use: No current tobacco use.      Assessment: Current Emotional / Mental Status (status of significant symptoms):  Risk status (Self / Other harm or suicidal ideation)  Patient denies a history of suicidal ideation, suicide attempts, self-injurious behavior, homicidal ideation, homicidal behavior and and other safety concerns  Patient denies current fears or concerns for personal safety.  Patient denies current or recent suicidal ideation or behaviors.  Patient denies current or recent homicidal ideation or behaviors.  Patient denies current or recent self injurious behavior or ideation.  Patient denies other safety concerns.  A safety and risk management plan has not been developed at this time, however patient was encouraged to call [facility] should there be a change in any of these risk factors.    Appearance:   Appropriate   Eye Contact:   Good   Psychomotor Behavior: Normal    Attitude:   Cooperative   Orientation:   All  Speech   Rate / Production: Normal    Volume:  Normal   Mood:    Normal  Affect:    Appropriate   Thought Content:  Clear   Thought Form:  Coherent  Logical   Insight:    Good     Diagnoses:  1. ODILON (generalized anxiety disorder)    2. Major depressive disorder, recurrent episode, mild (H)        Collateral Reports Completed:  Not Applicable    Plan: (Homework, other):  Patient was given information about behavioral services and encouraged to schedule a follow up appointment with the clinic [facility] in 2 weeks.  She was also given information about mental health symptoms and treatment options  and Cognitive Behavioral Therapy skills to practice when experiencing anxiety, depression and ADHD.  CD Recommendations: No indications of CD issues.  XENIA Carrero, [facility]      ______________________________________________________________________                                              Individual Treatment Plan    Patient's Name: Naty Pérez  YOB: 1995    Date of Creation: 3/14/22  Date Treatment Plan Last Reviewed/Revised: 7/20/22    DSM5 Diagnoses: Attention-Deficit/Hyperactivity Disorder  314.00 (F90.0) Predominantly inattentive presentation, 296.31 (F33.0) Major Depressive Disorder, Recurrent Episode, Mild With anxious distress or 300.02 (F41.1) Generalized Anxiety Disorder  Psychosocial / Contextual Factors: relationships, work  PROMIS-10  In general, would you say your health is:: 3  In general, would you say your quality of life is:: 3  In general, how would you rate your physical health?: 3  In general, how would you rate your mental health, including your mood and your ability to think?: 3  In general, how would you rate your satisfaction with your social activities and relationships?: 3  In general, please rate how well you carry out your usual social activities and roles. (This includes activities at home, at work and in your community, and  responsibilities as a parent, child, spouse, employee, friend, etc.): 3  To what extent are you able to carry out your everyday physical activities such as walking, climbing stairs, carrying groceries, or moving a chair?: 5  In the past 7 days, how often have you been bothered by emotional problems such as feeling anxious, depressed, or irritable?: 4  In the past 7 days, how would you rate your fatigue on average?: 3  In the past 7 days, how would you rate your pain on average, where 0 means no pain, and 10 means worst imaginable pain?: 1  Global Mental Health Score: 11  Global Physical Health Score: 15  PROMIS TOTAL - SUBSCORES: 26      Referral / Collaboration:  Referral to another professional/service is not indicated at this time..    Anticipated number of session for this episode of care: ongoing  Anticipation frequency of session: Every other week  Anticipated Duration of each session: 38-52 minutes  Treatment plan will be reviewed in 90 days or when goals have been changed.       MeasurableTreatment Goal(s) related to diagnosis / functional impairment(s)  Goal 1: Patient will experience a reduction in depressive symptoms along with a corresponding increase in positive emotion and life satisfaction.    I will know I've met my goal when I am less worried and mood is more .      Objective #A (Patient Action)    Patient will Increase interest, engagement, and pleasure in doing things.  Status: Continued - Date(s): 7/20/22    Intervention(s)  Therapist will help patient identify pleasant and mastery oriented events that elicit positive, relaxed mood.    Objective #B  Patient will Decrease frequency and intensity of feeling down, depressed, hopeless.  Status: Continued - Date(s): 7/20/22    Intervention(s)  Therapist will introduce patient to cognitive-behavioral and acceptance and commitment therapy topics aimed to help reduce depression and anxiety    Objective #C  Patient will Identify negative self-talk and  "behaviors: challenge core beliefs, myths, and actions  Improve concentration, focus, and mindfulness in daily activities .  Status: Continued - Date(s): 7/20/22    Intervention(s)  Therapist will help patient identify and manage negative self-talk and automatic thoughts; introduce patient to cognitive distortions; help patient develop cognitive diffusion techniques      Goal 2: Patient will experience a reduction in anxious symptoms, along with a corresponding increase in relaxed emotional states and life satisfaction.    I will know I've met my goal when more focused and less worried.      Objective #A (Patient Action)  Patient will use cognitive-behavioral and thought diffusion strategies identified in therapy to challenge anxious thoughts.    Status: Continued - Date(s):7/20/22    Intervention(s)  Therapist will utilize CBT and ACT ideas to help patient challenge anxious thoughts and reduce intensity/duration of anxious distress    Objective #B  Patient will use \"worry time\" each day for 15 minutes of scheduled worry and then defer obsessive or anxious thinking until the next structured worry time.    Status: Continued - Date(s): 7/20/22    Intervention(s)  Therapist will teach patient how to effectively utilize worry time and/or thought logs/journals each day and incorporate more relaxation behaviors into their routine.    Objective #C  Patient will identify the stressors which contribute to feelings of anxiety  Patient will increase engagement in adaptive coping skills and recreational activities, such as exercise and healthy socialization, to manage distress.  Status: Continued - Date(s): 7/20/22    Intervention(s)  Therapist will help patient identify triggers/situational factors that contribute to anxiety and behavioral skills aimed to manage anxious distress.        Other Possible Therapeutic Intervention(s):    Psycho-education regarding mental health diagnoses and treatment options    Supportive " Therapy    Provide affirmations, reflections, and establish working rapport    Emphasize and reflect on strength of therapeutic relationship    Skills training    Explore skills useful to client in current situation.    Skills include assertiveness, communication, conflict management, problem-solving, relaxation, etc.    Solution-Focused Therapy    Explore patterns in patient's relationships and discuss options for new behaviors.    Explore patterns in patient's actions and choices and discuss options for new behaviors.    Cognitive-behavioral Therapy    Discuss common cognitive distortions, identify them in patient's life.    Explore ways to challenge, replace, and act against these cognitions.    Acceptance and Commitment Therapy    Explore and identify important values in patient's life.    Discuss ways to commit to behavioral activation around these values.    Psychodynamic psychotherapy    Discuss patient's emotional dynamics and issues and how they impact behaviors.    Explore patient's history of relationships and how they impact present behaviors.    Explore how to work with and make changes in these schemas and patterns.    Narrative Therapy    Explore the patient's story of his/her life from his/her perspective.    Explore alternate ways of understanding their experience, identifying exceptions, developing new themes.    Interpersonal Psychotherapy    Explore patterns in relationships that are effective or ineffective at helping patient reach their goals, find satisfying experience.    Discuss new patterns or behaviors to engage in for improved social functioning.    Behavioral Activation    Discuss steps patient can take to become more involved in meaningful activity.    Identify barriers to these activities and explore possible solutions.    Mindfulness-Based Strategies    Discuss skills based on development and application of mindfulness.    Skills drawn from compassion-focused therapy, dialectical  behavior therapy, mindfulness-based stress reduction, mindfulness-based cognitive therapy, etc.      Patient has reviewed and agreed to the above plan.      Vanita Negrete [facility]   Behavioral Health Clinician   [facility]    July 20th, 2022

## 2022-10-10 ENCOUNTER — VIRTUAL VISIT (OUTPATIENT)
Dept: BEHAVIORAL HEALTH | Facility: CLINIC | Age: 27
End: 2022-10-10
Payer: COMMERCIAL

## 2022-10-10 DIAGNOSIS — F98.8 ATTENTION DEFICIT DISORDER WITHOUT HYPERACTIVITY: ICD-10-CM

## 2022-10-10 DIAGNOSIS — F41.1 GAD (GENERALIZED ANXIETY DISORDER): Primary | ICD-10-CM

## 2022-10-10 DIAGNOSIS — F33.0 MAJOR DEPRESSIVE DISORDER, RECURRENT EPISODE, MILD (H): ICD-10-CM

## 2022-10-10 PROCEDURE — 90837 PSYTX W PT 60 MINUTES: CPT | Mod: GT | Performed by: MARRIAGE & FAMILY THERAPIST

## 2022-10-10 ASSESSMENT — ANXIETY QUESTIONNAIRES
GAD7 TOTAL SCORE: 7
5. BEING SO RESTLESS THAT IT IS HARD TO SIT STILL: SEVERAL DAYS
4. TROUBLE RELAXING: SEVERAL DAYS
2. NOT BEING ABLE TO STOP OR CONTROL WORRYING: SEVERAL DAYS
6. BECOMING EASILY ANNOYED OR IRRITABLE: SEVERAL DAYS
GAD7 TOTAL SCORE: 7
1. FEELING NERVOUS, ANXIOUS, OR ON EDGE: SEVERAL DAYS
IF YOU CHECKED OFF ANY PROBLEMS ON THIS QUESTIONNAIRE, HOW DIFFICULT HAVE THESE PROBLEMS MADE IT FOR YOU TO DO YOUR WORK, TAKE CARE OF THINGS AT HOME, OR GET ALONG WITH OTHER PEOPLE: SOMEWHAT DIFFICULT
3. WORRYING TOO MUCH ABOUT DIFFERENT THINGS: SEVERAL DAYS
7. FEELING AFRAID AS IF SOMETHING AWFUL MIGHT HAPPEN: SEVERAL DAYS
7. FEELING AFRAID AS IF SOMETHING AWFUL MIGHT HAPPEN: SEVERAL DAYS
8. IF YOU CHECKED OFF ANY PROBLEMS, HOW DIFFICULT HAVE THESE MADE IT FOR YOU TO DO YOUR WORK, TAKE CARE OF THINGS AT HOME, OR GET ALONG WITH OTHER PEOPLE?: SOMEWHAT DIFFICULT
GAD7 TOTAL SCORE: 7

## 2022-10-10 NOTE — PROGRESS NOTES
[facility] Primary Care: Integrated Behavioral Health  October 10th, 2022    Behavioral Health Clinician Progress Note    Patient Name: Naty Pérez           Service Type:  Individual      Service Location:   Face to Face in Home / Community     Session Start Time: 4:30pm  Session End Time: 5:29pm      Session Length: 53 - 60      Attendees: Client     Service Modality:  Video Visit:      Provider verified identity through the following two step process.  Patient provided:  Patient is known previously to provider    Telemedicine Visit: The patient's condition can be safely assessed and treated via synchronous audio and visual telemedicine encounter.      Reason for Telemedicine Visit: Services only offered telehealth    Originating Site (Patient Location): Patient's home    Distant Site (Provider Location): [facility] Outpatient Setting: [place]    Consent:  The patient/guardian has verbally consented to: the potential risks and benefits of telemedicine (video visit) versus in person care; bill my insurance or make self-payment for services provided; and responsibility for payment of non-covered services.     Patient would like the video invitation sent by:  My Chart    Mode of Communication:  Video Conference via [employer]    As the provider I attest to compliance with applicable laws and regulations related to telemedicine.    Visit Activities (Refresh list every visit): [facility] Only    Diagnostic Assessment Date: 2/7/22 Roxanna Casas PhD, LP  Treatment Plan Review Date:10/20/22  See Flowsheets for today's PHQ-9 and ODILON-7 results  Previous PHQ-9:   PHQ-9 SCORE 8/1/2022 9/19/2022 10/10/2022   PHQ-9 Total Score MyChart 9 (Mild depression) 9 (Mild depression) 7 (Mild depression)   PHQ-9 Total Score 9 9 7     Previous ODILON-7:   ODILON-7 SCORE 8/22/2022 9/19/2022 10/10/2022   Total Score 10 (moderate anxiety) 11 (moderate anxiety) 7 (mild anxiety)   Total Score 10 11 7       CHARLES  LEVEL:  CHARLES Score (Last Two) 2/6/2020   CHARLES Raw Score 28   Activation Score 50   CHARLES Level 2       DATA  Extended Session (60+ minutes): No  Interactive Complexity: No  Crisis: No  [facility] Patient: No    Treatment Objective(s) Addressed in This Session:  Target Behavior(s): mood    Depressed Mood: Increase interest, engagement, and pleasure in doing things  Decrease frequency and intensity of feeling down, depressed, hopeless  Improve quantity and quality of night time sleep / decrease daytime naps  Feel less tired and more energy during the day   Improve diet, appetite, mindful eating, and / or meal planning  Identify negative self-talk and behaviors: challenge core beliefs, myths, and actions  Improve concentration, focus, and mindfulness in daily activities   Discuss motivation / ambivalence about taking anti-depressant / mood stabilizer medication(s)  Discuss motivation / ambivalence about a referral to specialty mental health services (Therapy, Day Tx, PHP)  Anxiety: will experience a reduction in anxiety, will develop more effective coping skills to manage anxiety symptoms, will develop healthy cognitive patterns and beliefs and will increase ability to function adaptively  Attention Problems: will develop coping skills to effectively manage attention issues    Current Stressors / Issues:    Pt reports things going well. Worked on having difficult conversations with her boyfriend before they move in together. Processed being a people pleasure and ho win the past having these conversations have gone well and not bad. Reviewed safety with Patient. Patient denies any current SI,plans or intentions.       Progress on Treatment Objective(s) / Homework:  Stable - ACTION (Actively working towards change); Intervened by reinforcing change plan / affirming steps taken    Motivational Interviewing    MI Intervention: Expressed Empathy/Understanding, Supported Autonomy, Collaboration, Evocation, Permission to raise concern  or advise, Open-ended questions and Reflections: simple and complex     Change Talk Expressed by the Patient: Desire to change Ability to change Reasons to change Need to change Committment to change Activation Taking steps    Provider Response to Change Talk: E - Evoked more info from patient about behavior change, A - Affirmed patient's thoughts, decisions, or attempts at behavior change, R - Reflected patient's change talk and S - Summarized patient's change talk statements      Situation         Automatic Thoughts  Cognitive Distortions      Feelings        Behavior        Questioning Thoughts            Also provided psychoeducation about behavioral health condition, symptoms, and treatment options    Care Plan review completed: Yes    Medication Review:  Changes to psychiatric medications, see updated Medication List in EPIC.     Medication Compliance:  Yes    Changes in Health Issues:   None reported    Chemical Use Review:   Substance Use: Chemical use reviewed, no active concerns identified      Tobacco Use: No current tobacco use.      Assessment: Current Emotional / Mental Status (status of significant symptoms):  Risk status (Self / Other harm or suicidal ideation)  Patient denies a history of suicidal ideation, suicide attempts, self-injurious behavior, homicidal ideation, homicidal behavior and and other safety concerns  Patient denies current fears or concerns for personal safety.  Patient denies current or recent suicidal ideation or behaviors.  Patient denies current or recent homicidal ideation or behaviors.  Patient denies current or recent self injurious behavior or ideation.  Patient denies other safety concerns.  A safety and risk management plan has not been developed at this time, however patient was encouraged to call [place] [phone] should there be a change in any of these risk factors.    Appearance:   Appropriate   Eye Contact:   Good   Psychomotor Behavior: Normal    Attitude:   Cooperative   Orientation:   All  Speech   Rate / Production: Normal    Volume:  Normal   Mood:    Normal  Affect:    Appropriate   Thought Content:  Clear   Thought Form:  Coherent  Logical   Insight:    Good     Diagnoses:  1. ODILON (generalized anxiety disorder)    2. Major depressive disorder, recurrent episode, mild (H)    3. Attention deficit disorder without hyperactivity        Collateral Reports Completed:  Not Applicable    Plan: (Homework, other):  Patient was given information about behavioral services and encouraged to schedule a follow up appointment with the clinic [facility] in 2 weeks.  She was also given information about mental health symptoms and treatment options  and Cognitive Behavioral Therapy skills to practice when experiencing anxiety, depression and ADHD.  CD Recommendations: No indications of CD issues.  XENIA Carrero, [facility]      ______________________________________________________________________                                              Individual Treatment Plan    Patient's Name: Naty Pérez  YOB: 1995    Date of Creation: 3/14/22  Date Treatment Plan Last Reviewed/Revised: 7/20/22    DSM5 Diagnoses: Attention-Deficit/Hyperactivity Disorder  314.00 (F90.0) Predominantly inattentive presentation, 296.31 (F33.0) Major Depressive Disorder, Recurrent Episode, Mild With anxious distress or 300.02 (F41.1) Generalized Anxiety Disorder  Psychosocial / Contextual Factors: relationships, work  PROMIS-10  In general, would you say your health is:: 3  In general, would you say your quality of life is:: 3  In general, how would you rate your physical health?: 3  In general, how would you rate your mental health, including your mood and your ability to think?: 3  In general, how would you rate your satisfaction with your social activities and relationships?: 3  In general, please rate how well you carry out your usual social activities and roles. (This includes  activities at home, at work and in your community, and responsibilities as a parent, child, spouse, employee, friend, etc.): 3  To what extent are you able to carry out your everyday physical activities such as walking, climbing stairs, carrying groceries, or moving a chair?: 5  In the past 7 days, how often have you been bothered by emotional problems such as feeling anxious, depressed, or irritable?: 4  In the past 7 days, how would you rate your fatigue on average?: 3  In the past 7 days, how would you rate your pain on average, where 0 means no pain, and 10 means worst imaginable pain?: 1  Global Mental Health Score: 11  Global Physical Health Score: 15  PROMIS TOTAL - SUBSCORES: 26      Referral / Collaboration:  Referral to another professional/service is not indicated at this time..    Anticipated number of session for this episode of care: ongoing  Anticipation frequency of session: Every other week  Anticipated Duration of each session: 38-52 minutes  Treatment plan will be reviewed in 90 days or when goals have been changed.       MeasurableTreatment Goal(s) related to diagnosis / functional impairment(s)  Goal 1: Patient will experience a reduction in depressive symptoms along with a corresponding increase in positive emotion and life satisfaction.    I will know I've met my goal when I am less worried and mood is more .      Objective #A (Patient Action)    Patient will Increase interest, engagement, and pleasure in doing things.  Status: Continued - Date(s): 7/20/22    Intervention(s)  Therapist will help patient identify pleasant and mastery oriented events that elicit positive, relaxed mood.    Objective #B  Patient will Decrease frequency and intensity of feeling down, depressed, hopeless.  Status: Continued - Date(s): 7/20/22    Intervention(s)  Therapist will introduce patient to cognitive-behavioral and acceptance and commitment therapy topics aimed to help reduce depression and  "anxiety    Objective #C  Patient will Identify negative self-talk and behaviors: challenge core beliefs, myths, and actions  Improve concentration, focus, and mindfulness in daily activities .  Status: Continued - Date(s): 7/20/22    Intervention(s)  Therapist will help patient identify and manage negative self-talk and automatic thoughts; introduce patient to cognitive distortions; help patient develop cognitive diffusion techniques      Goal 2: Patient will experience a reduction in anxious symptoms, along with a corresponding increase in relaxed emotional states and life satisfaction.    I will know I've met my goal when more focused and less worried.      Objective #A (Patient Action)  Patient will use cognitive-behavioral and thought diffusion strategies identified in therapy to challenge anxious thoughts.    Status: Continued - Date(s):7/20/22    Intervention(s)  Therapist will utilize CBT and ACT ideas to help patient challenge anxious thoughts and reduce intensity/duration of anxious distress    Objective #B  Patient will use \"worry time\" each day for 15 minutes of scheduled worry and then defer obsessive or anxious thinking until the next structured worry time.    Status: Continued - Date(s): 7/20/22    Intervention(s)  Therapist will teach patient how to effectively utilize worry time and/or thought logs/journals each day and incorporate more relaxation behaviors into their routine.    Objective #C  Patient will identify the stressors which contribute to feelings of anxiety  Patient will increase engagement in adaptive coping skills and recreational activities, such as exercise and healthy socialization, to manage distress.  Status: Continued - Date(s): 7/20/22    Intervention(s)  Therapist will help patient identify triggers/situational factors that contribute to anxiety and behavioral skills aimed to manage anxious distress.        Other Possible Therapeutic Intervention(s):    Psycho-education regarding " mental health diagnoses and treatment options    Supportive Therapy    Provide affirmations, reflections, and establish working rapport    Emphasize and reflect on strength of therapeutic relationship    Skills training    Explore skills useful to client in current situation.    Skills include assertiveness, communication, conflict management, problem-solving, relaxation, etc.    Solution-Focused Therapy    Explore patterns in patient's relationships and discuss options for new behaviors.    Explore patterns in patient's actions and choices and discuss options for new behaviors.    Cognitive-behavioral Therapy    Discuss common cognitive distortions, identify them in patient's life.    Explore ways to challenge, replace, and act against these cognitions.    Acceptance and Commitment Therapy    Explore and identify important values in patient's life.    Discuss ways to commit to behavioral activation around these values.    Psychodynamic psychotherapy    Discuss patient's emotional dynamics and issues and how they impact behaviors.    Explore patient's history of relationships and how they impact present behaviors.    Explore how to work with and make changes in these schemas and patterns.    Narrative Therapy    Explore the patient's story of his/her life from his/her perspective.    Explore alternate ways of understanding their experience, identifying exceptions, developing new themes.    Interpersonal Psychotherapy    Explore patterns in relationships that are effective or ineffective at helping patient reach their goals, find satisfying experience.    Discuss new patterns or behaviors to engage in for improved social functioning.    Behavioral Activation    Discuss steps patient can take to become more involved in meaningful activity.    Identify barriers to these activities and explore possible solutions.    Mindfulness-Based Strategies    Discuss skills based on development and application of  mindfulness.    Skills drawn from compassion-focused therapy, dialectical behavior therapy, mindfulness-based stress reduction, mindfulness-based cognitive therapy, etc.      Patient has reviewed and agreed to the above plan.      Vanita Negrete JANE   Behavioral Health Clinician   [facility]    July 20th, 2022

## 2022-10-17 ENCOUNTER — LAB (OUTPATIENT)
Dept: FAMILY MEDICINE | Facility: CLINIC | Age: 27
End: 2022-10-17
Attending: FAMILY MEDICINE
Payer: COMMERCIAL

## 2022-10-17 ENCOUNTER — E-VISIT (OUTPATIENT)
Dept: URGENT CARE | Facility: CLINIC | Age: 27
End: 2022-10-17
Payer: COMMERCIAL

## 2022-10-17 DIAGNOSIS — Z20.822 SUSPECTED COVID-19 VIRUS INFECTION: Primary | ICD-10-CM

## 2022-10-17 DIAGNOSIS — Z20.822 SUSPECTED COVID-19 VIRUS INFECTION: ICD-10-CM

## 2022-10-17 LAB
DEPRECATED S PYO AG THROAT QL EIA: NEGATIVE
FLUAV AG SPEC QL IA: NEGATIVE
FLUBV AG SPEC QL IA: NEGATIVE
GROUP A STREP BY PCR: NOT DETECTED
SARS-COV-2 RNA RESP QL NAA+PROBE: NEGATIVE

## 2022-10-17 PROCEDURE — 99421 OL DIG E/M SVC 5-10 MIN: CPT | Mod: CS | Performed by: FAMILY MEDICINE

## 2022-10-17 PROCEDURE — 87651 STREP A DNA AMP PROBE: CPT

## 2022-10-17 PROCEDURE — U0005 INFEC AGEN DETEC AMPLI PROBE: HCPCS

## 2022-10-17 PROCEDURE — U0003 INFECTIOUS AGENT DETECTION BY NUCLEIC ACID (DNA OR RNA); SEVERE ACUTE RESPIRATORY SYNDROME CORONAVIRUS 2 (SARS-COV-2) (CORONAVIRUS DISEASE [COVID-19]), AMPLIFIED PROBE TECHNIQUE, MAKING USE OF HIGH THROUGHPUT TECHNOLOGIES AS DESCRIBED BY CMS-2020-01-R: HCPCS

## 2022-10-17 PROCEDURE — 87804 INFLUENZA ASSAY W/OPTIC: CPT

## 2022-10-17 NOTE — PATIENT INSTRUCTIONS
Naty,    Your symptoms show that you may have coronavirus (COVID-19). This illness can cause fever, cough and trouble breathing. Many people get a mild case and get better on their own. Some people can get very sick.    Because you reported additional symptoms, I would like to also test you for flu and strep.    What should I do?  I have placed orders for these tests.   To schedule: go to your [employer] home page and scroll down to the section that says  You have an appointment that needs to be scheduled  and click the large green button that says  Schedule Now  and follow the steps to find the next available openings.     If you are unable to complete these [employer] scheduling steps, please call [phone] to schedule your testing.     These guidelines are for isolating before returning to work, school or .     For employers, schools and day cares: This is an official notice for this person s medical guidelines for returning in-person.     For health care sites: The CDC gives different isolation and quarantine guidelines for healthcare sites, please check with these sites before arriving.     How do I self-isolate?  You isolate when you have symptoms of COVID or a test shows you have COVID, even if you don t have symptoms.     If you DO have symptoms:  o Stay home and away from others  - For at least 5 days after your symptoms started, AND   - You are fever free for 24 hours (with no medicine that reduces fever), AND  - Your other symptoms are better.  o Wear a mask for 10 full days any time you are around others.    If you DON T have symptoms:  o Stay at home and away from others for at least 5 days after your positive test.  o Wear a mask for 10 full days any time you are around others.    How can I take care of myself?  Over the counter medications may help with your symptoms such as runny or stuffy nose, cough, chills, or fever. Talk to your care team about your options.     Some people are at high  risk of severe illness (for example, you have a weak immune system, you re 65 years or older, or you have certain medical problems). If your risk is high and your symptoms started in the last 5 to 7 days, we strongly recommend for you to get COVID treatment as soon as possible. Paxlovid, Molnupiravir and the monoclonal antibody treatments are proven safe and effective, make you feel better faster, and prevent hospitalization and death.       To schedule an appointment to discuss COVID treatment, request an appointment on [employer] (select  COVID-19 Treatment ) or call [employer] ([phone]), press 7.      Get lots of rest. Drink extra fluids (unless a doctor has told you not to)    Take Tylenol (acetaminophen) or ibuprofen for fever or pain. If you have liver or kidney problems, ask your family doctor if it's okay to take Tylenol or ibuprofen    Take over the counter medications for your symptoms, as directed by your doctor. You may also talk to your pharmacist.      If you have other health problems (like cancer, heart failure, an organ transplant or severe kidney disease): Call your specialty clinic if you don't feel better in the next 2 days.    Know when to call 911. Emergency warning signs include:  o Trouble breathing or shortness of breath  o Pain or pressure in the chest that doesn't go away  o Feeling confused like you haven't felt before, or not being able to wake up  o Bluish-colored lips or face    Where can I get more information?    [facility] - About COVID-19: [email or web address]     CDC - What to Do If You're Sick: https://www.cdc.gov/coronavirus/2019-ncov/if-you-are-sick/index.html     CDC - Quarantine & Isolation: https://www.cdc.gov/coronavirus/2019-ncov/your-health/quarantine-isolation.html     [facility] clinical trials (COVID-19 research studies): [email or web address]    Below are the COVID-19 hotlines at the [facility]  Health ([facility]). Interpreters are available.  o For health questions: Call 305-839-4722 or 1-788.144.7297 (7 a.m. to 7 p.m.)  o For questions about schools and childcare: Call [phone] or 1-720.380.1332 (7 a.m. to 7 p.m.)

## 2022-10-31 ENCOUNTER — VIRTUAL VISIT (OUTPATIENT)
Dept: BEHAVIORAL HEALTH | Facility: CLINIC | Age: 27
End: 2022-10-31
Payer: COMMERCIAL

## 2022-10-31 ENCOUNTER — OFFICE VISIT (OUTPATIENT)
Dept: FAMILY MEDICINE | Facility: CLINIC | Age: 27
End: 2022-10-31
Payer: COMMERCIAL

## 2022-10-31 VITALS
HEIGHT: 62 IN | BODY MASS INDEX: 23.37 KG/M2 | DIASTOLIC BLOOD PRESSURE: 70 MMHG | SYSTOLIC BLOOD PRESSURE: 116 MMHG | RESPIRATION RATE: 12 BRPM | HEART RATE: 78 BPM | OXYGEN SATURATION: 99 % | WEIGHT: 127 LBS | TEMPERATURE: 98.6 F

## 2022-10-31 DIAGNOSIS — F41.1 GAD (GENERALIZED ANXIETY DISORDER): ICD-10-CM

## 2022-10-31 DIAGNOSIS — B35.9 RINGWORM: Primary | ICD-10-CM

## 2022-10-31 DIAGNOSIS — F33.1 MODERATE EPISODE OF RECURRENT MAJOR DEPRESSIVE DISORDER (H): Primary | ICD-10-CM

## 2022-10-31 DIAGNOSIS — F98.8 ATTENTION DEFICIT DISORDER WITHOUT HYPERACTIVITY: ICD-10-CM

## 2022-10-31 PROCEDURE — 99213 OFFICE O/P EST LOW 20 MIN: CPT | Mod: 25 | Performed by: NURSE PRACTITIONER

## 2022-10-31 PROCEDURE — 90471 IMMUNIZATION ADMIN: CPT | Performed by: NURSE PRACTITIONER

## 2022-10-31 PROCEDURE — 0134A COVID-19,PF,MODERNA BIVALENT: CPT | Performed by: NURSE PRACTITIONER

## 2022-10-31 PROCEDURE — 90686 IIV4 VACC NO PRSV 0.5 ML IM: CPT | Performed by: NURSE PRACTITIONER

## 2022-10-31 PROCEDURE — 91313 COVID-19,PF,MODERNA BIVALENT: CPT | Performed by: NURSE PRACTITIONER

## 2022-10-31 PROCEDURE — 90834 PSYTX W PT 45 MINUTES: CPT | Mod: GT | Performed by: MARRIAGE & FAMILY THERAPIST

## 2022-10-31 RX ORDER — KETOCONAZOLE 20 MG/G
CREAM TOPICAL DAILY
Qty: 15 G | Refills: 1 | Status: SHIPPED | OUTPATIENT
Start: 2022-10-31 | End: 2024-05-13

## 2022-10-31 ASSESSMENT — ENCOUNTER SYMPTOMS
GASTROINTESTINAL NEGATIVE: 1
RESPIRATORY NEGATIVE: 1
NEUROLOGICAL NEGATIVE: 1
MUSCULOSKELETAL NEGATIVE: 1
CARDIOVASCULAR NEGATIVE: 1
CONSTITUTIONAL NEGATIVE: 1
PSYCHIATRIC NEGATIVE: 1

## 2022-10-31 NOTE — PROGRESS NOTES
"  Assessment & Plan     Ringworm    - ketoconazole (NIZORAL) 2 % external cream  Dispense: 15 g; Refill: 1    - follow up PRN. AVS discussed. How to use the med was discussed, can be daily or BID, still apply the cream for a few days post clearing.                  Return if symptoms worsen or fail to improve.    VALE Mixon CNP  [facility]    Casey Alfonso is a 27 year old, presenting for the following health issues:  Derm Problem (Inner right side of the thigh, started 09/29/2022)      History of Present Illness       Reason for visit:  Rash  Symptom onset:  More than a month    She eats 2-3 servings of fruits and vegetables daily.She consumes 0 sweetened beverage(s) daily.She exercises with enough effort to increase her heart rate 10 to 19 minutes per day.  She exercises with enough effort to increase her heart rate 3 or less days per week. She is missing 1 dose(s) of medications per week.             Review of Systems   Constitutional: Negative.    HENT: Negative.    Respiratory: Negative.    Cardiovascular: Negative.    Gastrointestinal: Negative.    Genitourinary: Negative.    Musculoskeletal: Negative.    Skin: Positive for rash.   Neurological: Negative.    Psychiatric/Behavioral: Negative.    All other systems reviewed and are negative.           Objective    /70   Pulse 78   Temp 98.6  F (37  C) (Oral)   Resp 12   Ht 1.575 m (5' 2\")   Wt 57.6 kg (127 lb)   SpO2 99%   BMI 23.23 kg/m    Body mass index is 23.23 kg/m .  Physical Exam  Vitals and nursing note reviewed.   Constitutional:       Appearance: Normal appearance. She is normal weight.   Pulmonary:      Effort: Pulmonary effort is normal.   Skin:     General: Skin is warm and dry.      Capillary Refill: Capillary refill takes less than 2 seconds.      Findings: Rash present. No abrasion, abscess, acne, bruising, burn, ecchymosis, erythema, signs of injury, laceration, lesion, petechiae " or wound. Rash is scaling. Rash is not crusting, nodular, papular, purpuric, pustular, urticarial or vesicular.      Nails: There is no clubbing.      Comments: Right inner thigh with two oval shaped, scaling, salmon colored, slight clear center noted.    Neurological:      General: No focal deficit present.      Mental Status: She is alert.   Psychiatric:         Mood and Affect: Mood normal.         Behavior: Behavior normal.         Thought Content: Thought content normal.         Judgment: Judgment normal.          Lab on 10/17/2022   Component Date Value Ref Range Status     SARS CoV2 PCR 10/17/2022 Negative  Negative Final    NEGATIVE: SARS-CoV-2 (COVID-19) RNA not detected, presumed negative.     Group A Strep antigen 10/17/2022 Negative  Negative Final     Influenza A antigen 10/17/2022 Negative  Negative Final     Influenza B antigen 10/17/2022 Negative  Negative Final     Group A strep by PCR 10/17/2022 Not Detected  Not Detected Final

## 2022-10-31 NOTE — PROGRESS NOTES
[facility] Primary Care: Integrated Behavioral Health  October 31st, 2022    Behavioral Health Clinician Progress Note    Patient Name: Naty Pérez           Service Type:  Individual      Service Location:   Face to Face in Home / Community     Session Start Time: 4:36pm  Session End Time: 5:25pm      Session Length: 38 - 52      Attendees: Client     Service Modality:  Video Visit:      Provider verified identity through the following two step process.  Patient provided:  Patient is known previously to provider    Telemedicine Visit: The patient's condition can be safely assessed and treated via synchronous audio and visual telemedicine encounter.      Reason for Telemedicine Visit: Services only offered telehealth    Originating Site (Patient Location): Patient's home    Distant Site (Provider Location): [facility] Setting: [place]    Consent:  The patient/guardian has verbally consented to: the potential risks and benefits of telemedicine (video visit) versus in person care; bill my insurance or make self-payment for services provided; and responsibility for payment of non-covered services.     Patient would like the video invitation sent by:  My Chart    Mode of Communication:  Video Conference via Amwell    As the provider I attest to compliance with applicable laws and regulations related to telemedicine.    Visit Activities (Refresh list every visit): [facility] Only    Diagnostic Assessment Date: 2/7/22 Roxanna Casas PhD, LP  Treatment Plan Review Date:10/20/22  See Flowsheets for today's PHQ-9 and ODILON-7 results  Previous PHQ-9:   PHQ-9 SCORE 8/1/2022 9/19/2022 10/10/2022   PHQ-9 Total Score MyChart 9 (Mild depression) 9 (Mild depression) 7 (Mild depression)   PHQ-9 Total Score 9 9 7     Previous ODILON-7:   ODILON-7 SCORE 8/22/2022 9/19/2022 10/10/2022   Total Score 10 (moderate anxiety) 11 (moderate anxiety) 7 (mild anxiety)   Total Score 10 11 7       CHARLES  LEVEL:  CHARLES Score (Last Two) 2/6/2020   CHARLES Raw Score 28   Activation Score 50   CHARLES Level 2       DATA  Extended Session (60+ minutes): No  Interactive Complexity: No  Crisis: No  [facility] Patient: No    Treatment Objective(s) Addressed in This Session:  Target Behavior(s): mood    Depressed Mood: Increase interest, engagement, and pleasure in doing things  Decrease frequency and intensity of feeling down, depressed, hopeless  Improve quantity and quality of night time sleep / decrease daytime naps  Feel less tired and more energy during the day   Improve diet, appetite, mindful eating, and / or meal planning  Identify negative self-talk and behaviors: challenge core beliefs, myths, and actions  Improve concentration, focus, and mindfulness in daily activities   Discuss motivation / ambivalence about taking anti-depressant / mood stabilizer medication(s)  Discuss motivation / ambivalence about a referral to specialty mental health services (Therapy, Day T[facility], [facility])  Anxiety: will experience a reduction in anxiety, will develop more effective coping skills to manage anxiety symptoms, will develop healthy cognitive patterns and beliefs and will increase ability to function adaptively  Attention Problems: will develop coping skills to effectively manage attention issues    Current Stressors / Issues:  Talked about setting boundaries and keeping them.Disucussed a situation that backfired because she did not follow through. Worked on ways to address feelings with a coworker and worked on effective communication.   Worked on a schedule to keep her on task and using a timer for ADHD. Reviewed safety with Patient. Patient denies any current SI,plans or intentions.       Progress on Treatment Objective(s) / Homework:  Stable - ACTION (Actively working towards change); Intervened by reinforcing change plan / affirming steps taken    Motivational Interviewing    MI Intervention: Expressed Empathy/Understanding, Supported Autonomy,  Collaboration, Evocation, Permission to raise concern or advise, Open-ended questions and Reflections: simple and complex     Change Talk Expressed by the Patient: Desire to change Ability to change Reasons to change Need to change Committment to change Activation Taking steps    Provider Response to Change Talk: E - Evoked more info from patient about behavior change, A - Affirmed patient's thoughts, decisions, or attempts at behavior change, R - Reflected patient's change talk and S - Summarized patient's change talk statements      Situation         Automatic Thoughts  Cognitive Distortions      Feelings        Behavior        Questioning Thoughts            Also provided psychoeducation about behavioral health condition, symptoms, and treatment options    Care Plan review completed: Yes    Medication Review:  Changes to psychiatric medications, see updated Medication List in EPIC.     Medication Compliance:  Yes    Changes in Health Issues:   None reported    Chemical Use Review:   Substance Use: Chemical use reviewed, no active concerns identified      Tobacco Use: No current tobacco use.      Assessment: Current Emotional / Mental Status (status of significant symptoms):  Risk status (Self / Other harm or suicidal ideation)  Patient denies a history of suicidal ideation, suicide attempts, self-injurious behavior, homicidal ideation, homicidal behavior and and other safety concerns  Patient denies current fears or concerns for personal safety.  Patient denies current or recent suicidal ideation or behaviors.  Patient denies current or recent homicidal ideation or behaviors.  Patient denies current or recent self injurious behavior or ideation.  Patient denies other safety concerns.  A safety and risk management plan has not been developed at this time, however patient was encouraged to call [facility] should there be a change in any of these risk factors.    Appearance:   Appropriate   Eye  Contact:   Good   Psychomotor Behavior: Normal   Attitude:   Cooperative   Orientation:   All  Speech   Rate / Production: Normal    Volume:  Normal   Mood:    Normal  Affect:    Appropriate   Thought Content:  Clear   Thought Form:  Coherent  Logical   Insight:    Good     Diagnoses:  1. Moderate episode of recurrent major depressive disorder (H)    2. ODILON (generalized anxiety disorder)    3. Attention deficit disorder without hyperactivity        Collateral Reports Completed:  Not Applicable    Plan: (Homework, other):  Patient was given information about behavioral services and encouraged to schedule a follow up appointment with the clinic [facility] [place] in 2 weeks.  She was also given information about mental health symptoms and treatment options  and Cognitive Behavioral Therapy skills to practice when experiencing anxiety, depression and ADHD.  CD Recommendations: No indications of CD issues.  XENIA Carrero, [facility] [place]      ______________________________________________________________________                                              Individual Treatment Plan    Patient's Name: Naty Pérez  YOB: 1995    Date of Creation: 3/14/22  Date Treatment Plan Last Reviewed/Revised: 10/31/22    DSM5 Diagnoses: Attention-Deficit/Hyperactivity Disorder  314.00 (F90.0) Predominantly inattentive presentation, 296.31 (F33.0) Major Depressive Disorder, Recurrent Episode, Mild With anxious distress or 300.02 (F41.1) Generalized Anxiety Disorder  Psychosocial / Contextual Factors: relationships, work  PROMIS-10  In general, would you say your health is:: 3  In general, would you say your quality of life is:: 3  In general, how would you rate your physical health?: 3  In general, how would you rate your mental health, including your mood and your ability to think?: 3  In general, how would you rate your satisfaction with your social activities and relationships?: 3  In general, please rate how well you carry out your  usual social activities and roles. (This includes activities at home, at work and in your community, and responsibilities as a parent, child, spouse, employee, friend, etc.): 3  To what extent are you able to carry out your everyday physical activities such as walking, climbing stairs, carrying groceries, or moving a chair?: 5  In the past 7 days, how often have you been bothered by emotional problems such as feeling anxious, depressed, or irritable?: 4  In the past 7 days, how would you rate your fatigue on average?: 3  In the past 7 days, how would you rate your pain on average, where 0 means no pain, and 10 means worst imaginable pain?: 1  Global Mental Health Score: 11  Global Physical Health Score: 15  PROMIS TOTAL - SUBSCORES: 26  PROMIS-10    In general, would you say your health is:: 3    In general, would you say your quality of life is:: 3    In general, how would you rate your physical health?: 3    In general, how would you rate your mental health, including your mood and your ability to think?: 3    In general, how would you rate your satisfaction with your social activities and relationships?: 3    In general, please rate how well you carry out your usual social activities and roles. (This includes activities at home, at work and in your community, and responsibilities as a parent, child, spouse, employee, friend, etc.): 3    To what extent are you able to carry out your everyday physical activities such as walking, climbing stairs, carrying groceries, or moving a chair?: 5    In the past 7 days, how often have you been bothered by emotional problems such as feeling anxious, depressed, or irritable?: 4  In the past 7 days, how would you rate your fatigue on average?: 3  In the past 7 days, how would you rate your pain on average, where 0 means no pain, and 10 means worst imaginable pain?: 1    PROMIS GLOBAL SCORES    Mental health question re-calculation - no clinical value - Mental health question  re-calculation - no clinical value: 2  Physical health question re-calculation - no clinical value - Physical health question re-calculation - no clinical value: 3  Pain question re-calculation - no clinical value - Pain question re-calculation - no clinical value: 4  Global Mental Health Score - Global Mental Health Score: 11  Global Physical Health Score - Global Physical Health Score: 15  PROMIS TOTAL - SUBSCORES - PROMIS TOTAL - SUBSCORES: 26      [id number] [facility]IS [facility]OPERATIVE GROUP VERSION 1.1      Referral / Collaboration:  Referral to another professional/service is not indicated at this time..    Anticipated number of session for this episode of care: ongoing  Anticipation frequency of session: Every other week  Anticipated Duration of each session: 38-52 minutes  Treatment plan will be reviewed in 90 days or when goals have been changed.       MeasurableTreatment Goal(s) related to diagnosis / functional impairment(s)  Goal 1: Patient will experience a reduction in depressive symptoms along with a corresponding increase in positive emotion and life satisfaction.    I will know I've met my goal when I am less worried and mood is more .      Objective #A (Patient Action)    Patient will Increase interest, engagement, and pleasure in doing things.  Status: Continued - Date(s): 10/31/22    Intervention(s)  Therapist will help patient identify pleasant and mastery oriented events that elicit positive, relaxed mood.    Objective #B  Patient will Decrease frequency and intensity of feeling down, depressed, hopeless.  Status: Continued - Date(s):10/31/22    Intervention(s)  Therapist will introduce patient to cognitive-behavioral and acceptance and commitment therapy topics aimed to help reduce depression and anxiety    Objective #C  Patient will Identify negative self-talk and behaviors: challenge core beliefs, myths, and actions  Improve concentration, focus, and mindfulness in daily  "activities .  Status: Continued - Date(s): 10/31/22    Intervention(s)  Therapist will help patient identify and manage negative self-talk and automatic thoughts; introduce patient to cognitive distortions; help patient develop cognitive diffusion techniques      Goal 2: Patient will experience a reduction in anxious symptoms, along with a corresponding increase in relaxed emotional states and life satisfaction.    I will know I've met my goal when more focused and less worried.      Objective #A (Patient Action)  Patient will use cognitive-behavioral and thought diffusion strategies identified in therapy to challenge anxious thoughts.    Status: Continued - Date(s):10/31/22    Intervention(s)  Therapist will utilize CBT and ACT ideas to help patient challenge anxious thoughts and reduce intensity/duration of anxious distress    Objective #B  Patient will use \"worry time\" each day for 15 minutes of scheduled worry and then defer obsessive or anxious thinking until the next structured worry time.    Status: Continued - Date(s): 10/31/22    Intervention(s)  Therapist will teach patient how to effectively utilize worry time and/or thought logs/journals each day and incorporate more relaxation behaviors into their routine.    Objective #C  Patient will identify the stressors which contribute to feelings of anxiety  Patient will increase engagement in adaptive coping skills and recreational activities, such as exercise and healthy socialization, to manage distress.  Status: Continued - Date(s):10/31/22    Intervention(s)  Therapist will help patient identify triggers/situational factors that contribute to anxiety and behavioral skills aimed to manage anxious distress.        Other Possible Therapeutic Intervention(s):    Psycho-education regarding mental health diagnoses and treatment options    Supportive Therapy    Provide affirmations, reflections, and establish working rapport    Emphasize and reflect on strength of " therapeutic relationship    Skills training    Explore skills useful to client in current situation.    Skills include assertiveness, communication, conflict management, problem-solving, relaxation, etc.    Solution-Focused Therapy    Explore patterns in patient's relationships and discuss options for new behaviors.    Explore patterns in patient's actions and choices and discuss options for new behaviors.    Cognitive-behavioral Therapy    Discuss common cognitive distortions, identify them in patient's life.    Explore ways to challenge, replace, and act against these cognitions.    Acceptance and Commitment Therapy    Explore and identify important values in patient's life.    Discuss ways to commit to behavioral activation around these values.    Psychodynamic psychotherapy    Discuss patient's emotional dynamics and issues and how they impact behaviors.    Explore patient's history of relationships and how they impact present behaviors.    Explore how to work with and make changes in these schemas and patterns.    Narrative Therapy    Explore the patient's story of his/her life from his/her perspective.    Explore alternate ways of understanding their experience, identifying exceptions, developing new themes.    Interpersonal Psychotherapy    Explore patterns in relationships that are effective or ineffective at helping patient reach their goals, find satisfying experience.    Discuss new patterns or behaviors to engage in for improved social functioning.    Behavioral Activation    Discuss steps patient can take to become more involved in meaningful activity.    Identify barriers to these activities and explore possible solutions.    Mindfulness-Based Strategies    Discuss skills based on development and application of mindfulness.    Skills drawn from compassion-focused therapy, dialectical behavior therapy, mindfulness-based stress reduction, mindfulness-based cognitive therapy, etc.      Patient has  reviewed and agreed to the above plan.      XENIA Carrero   Behavioral Health Clinician   [facility]    October 31st ,2022

## 2022-11-07 ENCOUNTER — VIRTUAL VISIT (OUTPATIENT)
Dept: FAMILY MEDICINE | Facility: CLINIC | Age: 27
End: 2022-11-07
Payer: COMMERCIAL

## 2022-11-07 DIAGNOSIS — N94.3 PMS (PREMENSTRUAL SYNDROME): ICD-10-CM

## 2022-11-07 DIAGNOSIS — F90.0 ATTENTION-DEFICIT HYPERACTIVITY DISORDER, PREDOMINANTLY INATTENTIVE TYPE: Primary | ICD-10-CM

## 2022-11-07 PROCEDURE — 99214 OFFICE O/P EST MOD 30 MIN: CPT | Mod: GT | Performed by: NURSE PRACTITIONER

## 2022-11-07 RX ORDER — DEXTROAMPHETAMINE SACCHARATE, AMPHETAMINE ASPARTATE MONOHYDRATE, DEXTROAMPHETAMINE SULFATE AND AMPHETAMINE SULFATE 2.5; 2.5; 2.5; 2.5 MG/1; MG/1; MG/1; MG/1
10 CAPSULE, EXTENDED RELEASE ORAL DAILY
Qty: 30 CAPSULE | Refills: 0 | Status: SHIPPED | OUTPATIENT
Start: 2023-01-08 | End: 2023-02-07

## 2022-11-07 RX ORDER — MULTIVIT WITH MINERALS/LUTEIN
250 TABLET ORAL DAILY
COMMUNITY

## 2022-11-07 RX ORDER — DEXTROAMPHETAMINE SACCHARATE, AMPHETAMINE ASPARTATE MONOHYDRATE, DEXTROAMPHETAMINE SULFATE AND AMPHETAMINE SULFATE 2.5; 2.5; 2.5; 2.5 MG/1; MG/1; MG/1; MG/1
10 CAPSULE, EXTENDED RELEASE ORAL DAILY
Qty: 30 CAPSULE | Refills: 0 | Status: SHIPPED | OUTPATIENT
Start: 2022-12-08 | End: 2023-01-07

## 2022-11-07 RX ORDER — DEXTROAMPHETAMINE SACCHARATE, AMPHETAMINE ASPARTATE MONOHYDRATE, DEXTROAMPHETAMINE SULFATE AND AMPHETAMINE SULFATE 2.5; 2.5; 2.5; 2.5 MG/1; MG/1; MG/1; MG/1
10 CAPSULE, EXTENDED RELEASE ORAL DAILY
Qty: 30 CAPSULE | Refills: 0 | Status: SHIPPED | OUTPATIENT
Start: 2022-11-07 | End: 2022-12-07

## 2022-11-07 ASSESSMENT — ANXIETY QUESTIONNAIRES
GAD7 TOTAL SCORE: 7
4. TROUBLE RELAXING: SEVERAL DAYS
GAD7 TOTAL SCORE: 7
7. FEELING AFRAID AS IF SOMETHING AWFUL MIGHT HAPPEN: SEVERAL DAYS
8. IF YOU CHECKED OFF ANY PROBLEMS, HOW DIFFICULT HAVE THESE MADE IT FOR YOU TO DO YOUR WORK, TAKE CARE OF THINGS AT HOME, OR GET ALONG WITH OTHER PEOPLE?: SOMEWHAT DIFFICULT
5. BEING SO RESTLESS THAT IT IS HARD TO SIT STILL: SEVERAL DAYS
3. WORRYING TOO MUCH ABOUT DIFFERENT THINGS: SEVERAL DAYS
2. NOT BEING ABLE TO STOP OR CONTROL WORRYING: SEVERAL DAYS
1. FEELING NERVOUS, ANXIOUS, OR ON EDGE: SEVERAL DAYS
7. FEELING AFRAID AS IF SOMETHING AWFUL MIGHT HAPPEN: SEVERAL DAYS
6. BECOMING EASILY ANNOYED OR IRRITABLE: SEVERAL DAYS
IF YOU CHECKED OFF ANY PROBLEMS ON THIS QUESTIONNAIRE, HOW DIFFICULT HAVE THESE PROBLEMS MADE IT FOR YOU TO DO YOUR WORK, TAKE CARE OF THINGS AT HOME, OR GET ALONG WITH OTHER PEOPLE: SOMEWHAT DIFFICULT

## 2022-11-07 ASSESSMENT — PATIENT HEALTH QUESTIONNAIRE - PHQ9
10. IF YOU CHECKED OFF ANY PROBLEMS, HOW DIFFICULT HAVE THESE PROBLEMS MADE IT FOR YOU TO DO YOUR WORK, TAKE CARE OF THINGS AT HOME, OR GET ALONG WITH OTHER PEOPLE: SOMEWHAT DIFFICULT
SUM OF ALL RESPONSES TO PHQ QUESTIONS 1-9: 10
SUM OF ALL RESPONSES TO PHQ QUESTIONS 1-9: 10

## 2022-11-07 NOTE — PROGRESS NOTES
Naty is a 27 year old who is being evaluated via a billable video visit.      How would you like to obtain your AVS? MyChart  If the video visit is dropped, the invitation should be resent by: Send to e-mail at: [email or web address]  Will anyone else be joining your video visit? No        Assessment & Plan     Attention-deficit hyperactivity disorder, predominantly inattentive type  Significant improvement in symptoms and feels current dose is sufficient.  PDMP reviewed.  Refills provided.  - amphetamine-dextroamphetamine (ADDERALL XR) 10 MG 24 hr capsule; Take 1 capsule (10 mg) by mouth daily for 30 days  - amphetamine-dextroamphetamine (ADDERALL XR) 10 MG 24 hr capsule; Take 1 capsule (10 mg) by mouth daily for 30 days  - amphetamine-dextroamphetamine (ADDERALL XR) 10 MG 24 hr capsule; Take 1 capsule (10 mg) by mouth daily for 30 days    PMS (premenstrual syndrome)  Still having significant symptoms, especially week of menstruation.  Will increase Zoloft today and reevaluate in 3 months.  - sertraline (ZOLOFT) 50 MG tablet; Take 1 tablet (50 mg) by mouth daily             No follow-ups on file.    [facility]    Subjective   Naty is a 27 year old accompanied by her , presenting for the following health issues:  A.D.H.D          Medication Followup of Adderall    Taking Medication as prescribed: yes    Side Effects:  None currently but the first 2 weeks yes    Medication Helping Symptoms:  Yes      Things are going ok.  Since school year started has been a little tough.  Has a difficult class.  Will have some fluctuations with PMS and PMDD.  During week of menstrual cycle, feeling very emotional and tired.  Adderall helps with the fatigue.    Adderall has made a huge difference for task management.  Able to sit and do tasks and memory has improved.    Review of Systems   Constitutional, HEENT, cardiovascular, pulmonary, gi and gu systems are negative, except  as otherwise noted.      Objective           Vitals:  No vitals were obtained today due to virtual visit.    Physical Exam   GENERAL: Healthy, alert and no distress  EYES: Eyes grossly normal to inspection.  No discharge or erythema, or obvious scleral/conjunctival abnormalities.  RESP: No audible wheeze, cough, or visible cyanosis.  No visible retractions or increased work of breathing.    SKIN: Visible skin clear. No significant rash, abnormal pigmentation or lesions.  NEURO: Cranial nerves grossly intact.  Mentation and speech appropriate for age.  PSYCH: Mentation appears normal, affect normal/bright, judgement and insight intact, normal speech and appearance well-groomed.                Video-Visit Details    Video Start Time: 1515    Type of service:  Video Visit    Video End Time:1527    Originating Location (pt. Location): Home    Distant Location (provider location):  On-site    Platform used for Video Visit: Letha

## 2022-11-07 NOTE — PROGRESS NOTES
"Naty is a 27 year old who is being evaluated via a billable video visit.      How would you like to obtain your AVS? MyChart  If the video visit is dropped, the invitation should be resent by: Send to e-mail at: [email or web address]  Will anyone else be joining your video visit? No  {If patient encounters technical issues they should call [phone] :289698}        {PROVIDER CHARTING PREFERENCE:679040}    Subjective   Naty is a 27 year old{ACCOMPANIED BY STATEMENT (Optional):868659}, presenting for the following health issues:  A.D.H.D      A.D.H.D    History of Present Illness       Reason for visit:  Rash  Symptom onset:  More than a month    She eats 2-3 servings of fruits and vegetables daily.She consumes 0 sweetened beverage(s) daily.She exercises with enough effort to increase her heart rate 10 to 19 minutes per day.  She exercises with enough effort to increase her heart rate 3 or less days per week. She is missing 1 dose(s) of medications per week.       {SUPERLIST (Optional):676519}  {additonal problems for provider to add (Optional):861988}    Review of Systems   {ROS COMP (Optional):041612}      Objective           Vitals:  No vitals were obtained today due to virtual visit.    Physical Exam   {video visit exam brief selected:687781::\"GENERAL: Healthy, alert and no distress\",\"EYES: Eyes grossly normal to inspection.  No discharge or erythema, or obvious scleral/conjunctival abnormalities.\",\"RESP: No audible wheeze, cough, or visible cyanosis.  No visible retractions or increased work of breathing.  \",\"SKIN: Visible skin clear. No significant rash, abnormal pigmentation or lesions.\",\"NEURO: Cranial nerves grossly intact.  Mentation and speech appropriate for age.\",\"PSYCH: Mentation appears normal, affect normal/bright, judgement and insight intact, normal speech and appearance well-groomed.\"}    {Diagnostic Test Results (Optional):511481}    {AMBULATORY ATTESTATION (Optional):516104}    " "    Video-Visit Details    Video Start Time: {video visit start/end time for provider to select:403596}    Type of service:  Video Visit    Video End Time:{video visit start/end time for provider to select:966977}    Originating Location (pt. Location): {video visit patient location:206358::\"Home\"}    {PROVIDER LOCATION On-site should be selected for visits conducted from your clinic location or adjoining [facility] hospital, academic office, or other nearby [facility] building. Off-site should be selected for all other provider locations, including home:313483}    Distant Location (provider location):  {virtual location provider:748518}    Platform used for Video Visit: {Virtual Visit Platforms:583458::\"[employer]\"}    "

## 2022-11-21 ENCOUNTER — VIRTUAL VISIT (OUTPATIENT)
Dept: BEHAVIORAL HEALTH | Facility: CLINIC | Age: 27
End: 2022-11-21
Payer: COMMERCIAL

## 2022-11-21 DIAGNOSIS — F33.0 MAJOR DEPRESSIVE DISORDER, RECURRENT EPISODE, MILD (H): ICD-10-CM

## 2022-11-21 DIAGNOSIS — F98.8 ATTENTION DEFICIT DISORDER WITHOUT HYPERACTIVITY: Primary | ICD-10-CM

## 2022-11-21 DIAGNOSIS — F41.1 GAD (GENERALIZED ANXIETY DISORDER): ICD-10-CM

## 2022-11-21 PROCEDURE — 90837 PSYTX W PT 60 MINUTES: CPT | Mod: GT | Performed by: MARRIAGE & FAMILY THERAPIST

## 2022-11-21 NOTE — PROGRESS NOTES
[facility] Primary Care: Integrated Behavioral Health  November 21st 2022    Behavioral Health Clinician Progress Note    Patient Name: Naty Pérez           Service Type:  Individual      Service Location:   Face to Face in Home / Community     Session Start Time: 4:28pm  Session End Time: 5:26pm      Session Length: 53 - 60      Attendees: Client     Service Modality:  Video Visit:      Provider verified identity through the following two step process.  Patient provided:  Patient is known previously to provider    Telemedicine Visit: The patient's condition can be safely assessed and treated via synchronous audio and visual telemedicine encounter.      Reason for Telemedicine Visit: Services only offered telehealth    Originating Site (Patient Location): Patient's home    Distant Site (Provider Location): [facility] Outpatient Setting: [place]    Consent:  The patient/guardian has verbally consented to: the potential risks and benefits of telemedicine (video visit) versus in person care; bill my insurance or make self-payment for services provided; and responsibility for payment of non-covered services.     Patient would like the video invitation sent by:  My Chart    Mode of Communication:  Video Conference via [employer]    As the provider I attest to compliance with applicable laws and regulations related to telemedicine.    Visit Activities (Refresh list every visit): [facility] Only    Diagnostic Assessment Date: 2/7/22 Roxanna Casas PhD, LP  Treatment Plan Review Date:1/31/23  See Flowsheets for today's PHQ-9 and ODILON-7 results  Previous PHQ-9:   PHQ-9 SCORE 10/10/2022 11/7/2022 11/21/2022   PHQ-9 Total Score MyChart 7 (Mild depression) 10 (Moderate depression) 9 (Mild depression)   PHQ-9 Total Score 7 10 9     Previous ODILON-7:   ODILON-7 SCORE 9/19/2022 10/10/2022 11/7/2022   Total Score 11 (moderate anxiety) 7 (mild anxiety) 7 (mild anxiety)   Total Score 11 7 7       CHARLES  LEVEL:  CHARLES Score (Last Two) 2/6/2020   CHARLES Raw Score 28   Activation Score 50   CHARLES Level 2       DATA  Extended Session (60+ minutes): No  Interactive Complexity: No  Crisis: No  [facility] Patient: No    Treatment Objective(s) Addressed in This Session:  Target Behavior(s): mood    Depressed Mood: Increase interest, engagement, and pleasure in doing things  Decrease frequency and intensity of feeling down, depressed, hopeless  Improve quantity and quality of night time sleep / decrease daytime naps  Feel less tired and more energy during the day   Improve diet, appetite, mindful eating, and / or meal planning  Identify negative self-talk and behaviors: challenge core beliefs, myths, and actions  Improve concentration, focus, and mindfulness in daily activities   Discuss motivation / ambivalence about taking anti-depressant / mood stabilizer medication(s)  Discuss motivation / ambivalence about a referral to specialty mental health services (Therapy, Day Tx, PHP)  Anxiety: will experience a reduction in anxiety, will develop more effective coping skills to manage anxiety symptoms, will develop healthy cognitive patterns and beliefs and will increase ability to function adaptively  Attention Problems: will develop coping skills to effectively manage attention issues    Current Stressors / Issues:    Pt states she has been stressed over a lot of little things going on, but thinks things are all in and all going better.  She has been working on being more direct at work. 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 Reviewed safety with Patient. Patient denies any current SI,plans or intentions.     Progress on Treatment Objective(s) / Homework:  Stable - ACTION (Actively working towards change); Intervened by reinforcing change plan / affirming steps taken    Motivational Interviewing    MI Intervention: Expressed Empathy/Understanding, Supported Autonomy, Collaboration, Evocation, Permission to raise concern or  advise, Open-ended questions and Reflections: simple and complex     Change Talk Expressed by the Patient: Desire to change Ability to change Reasons to change Need to change Committment to change Activation Taking steps    Provider Response to Change Talk: E - Evoked more info from patient about behavior change, A - Affirmed patient's thoughts, decisions, or attempts at behavior change, R - Reflected patient's change talk and S - Summarized patient's change talk statements      Situation         Automatic Thoughts  Cognitive Distortions      Feelings        Behavior        Questioning Thoughts            Also provided psychoeducation about behavioral health condition, symptoms, and treatment options    Care Plan review completed: Yes    Medication Review:  Changes to psychiatric medications, see updated Medication List in EPIC.     Medication Compliance:  Yes    Changes in Health Issues:   None reported    Chemical Use Review:   Substance Use: Chemical use reviewed, no active concerns identified      Tobacco Use: No current tobacco use.      Assessment: Current Emotional / Mental Status (status of significant symptoms):  Risk status (Self / Other harm or suicidal ideation)  Patient denies a history of suicidal ideation, suicide attempts, self-injurious behavior, homicidal ideation, homicidal behavior and and other safety concerns  Patient denies current fears or concerns for personal safety.  Patient denies current or recent suicidal ideation or behaviors.  Patient denies current or recent homicidal ideation or behaviors.  Patient denies current or recent self injurious behavior or ideation.  Patient denies other safety concerns.  A safety and risk management plan has not been developed at this time, however patient was encouraged to call [facility] should there be a change in any of these risk factors.    Appearance:   Appropriate   Eye Contact:   Good   Psychomotor Behavior: Normal    Attitude:   Cooperative   Orientation:   All  Speech   Rate / Production: Normal    Volume:  Normal   Mood:    Anxious   Affect:    Appropriate   Thought Content:  Clear   Thought Form:  Coherent  Logical   Insight:    Good     Diagnoses:  1. Attention deficit disorder without hyperactivity    2. ODILON (generalized anxiety disorder)    3. Major depressive disorder, recurrent episode, mild (H)        Collateral Reports Completed:  Not Applicable    Plan: (Homework, other):  Patient was given information about behavioral services and encouraged to schedule a follow up appointment with the clinic [facility] [place] in 2 weeks.  She was also given information about mental health symptoms and treatment options  and Cognitive Behavioral Therapy skills to practice when experiencing anxiety, depression and ADHD.  CD Recommendations: No indications of CD issues.  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, 296.31 (F33.0) Major Depressive Disorder, Recurrent Episode, Mild With anxious distress or 300.02 (F41.1) Generalized Anxiety Disorder  Psychosocial / Contextual Factors: relationships, work  PROMIS-10  In general, would you say your health is:: 3  In general, would you say your quality of life is:: 3  In general, how would you rate your physical health?: 3  In general, how would you rate your mental health, including your mood and your ability to think?: 3  In general, how would you rate your satisfaction with your social activities and relationships?: 3  In general, please rate how well you carry out your usual social activities and roles. (This includes  activities at home, at work and in your community, and responsibilities as a parent, child, spouse, employee, friend, etc.): 3  To what extent are you able to carry out your everyday physical activities such as walking, climbing stairs, carrying groceries, or moving a chair?: 5  In the past 7 days, how often have you been bothered by emotional problems such as feeling anxious, depressed, or irritable?: 4  In the past 7 days, how would you rate your fatigue on average?: 3  In the past 7 days, how would you rate your pain on average, where 0 means no pain, and 10 means worst imaginable pain?: 1  Global Mental Health Score: 11  Global Physical Health Score: 15  PROMIS TOTAL - SUBSCORES: 26  PROMIS-10    In general, would you say your health is:: 3    In general, would you say your quality of life is:: 3    In general, how would you rate your physical health?: 3    In general, how would you rate your mental health, including your mood and your ability to think?: 3    In general, how would you rate your satisfaction with your social activities and relationships?: 3    In general, please rate how well you carry out your usual social activities and roles. (This includes activities at home, at work and in your community, and responsibilities as a parent, child, spouse, employee, friend, etc.): 3    To what extent are you able to carry out your everyday physical activities such as walking, climbing stairs, carrying groceries, or moving a chair?: 5    In the past 7 days, how often have you been bothered by emotional problems such as feeling anxious, depressed, or irritable?: 4  In the past 7 days, how would you rate your fatigue on average?: 3  In the past 7 days, how would you rate your pain on average, where 0 means no pain, and 10 means worst imaginable pain?: 1    PROMIS GLOBAL SCORES    Mental health question re-calculation - no clinical value - Mental health question re-calculation - no clinical value: 2  Physical  health question re-calculation - no clinical value - Physical health question re-calculation - no clinical value: 3  Pain question re-calculation - no clinical value - Pain question re-calculation - no clinical value: 4  Global Mental Health Score - Global Mental Health Score: 11  Global Physical Health Score - Global Physical Health Score: 15  PROMIS TOTAL - SUBSCORES - PROMIS TOTAL - SUBSCORES: 26      [id number] [facility] VERSION 1.1      Referral / Collaboration:  Referral to another professional/service is not indicated at this time..    Anticipated number of session for this episode of care: ongoing  Anticipation frequency of session: Every other week  Anticipated Duration of each session: 38-52 minutes  Treatment plan will be reviewed in 90 days or when goals have been changed.       MeasurableTreatment Goal(s) related to diagnosis / functional impairment(s)  Goal 1: Patient will experience a reduction in depressive symptoms along with a corresponding increase in positive emotion and life satisfaction.    I will know I've met my goal when I am less worried and mood is more .      Objective #A (Patient Action)    Patient will Increase interest, engagement, and pleasure in doing things.  Status: Continued - Date(s): 10/31/22    Intervention(s)  Therapist will help patient identify pleasant and mastery oriented events that elicit positive, relaxed mood.    Objective #B  Patient will Decrease frequency and intensity of feeling down, depressed, hopeless.  Status: Continued - Date(s):10/31/22    Intervention(s)  Therapist will introduce patient to cognitive-behavioral and acceptance and commitment therapy topics aimed to help reduce depression and anxiety    Objective #C  Patient will Identify negative self-talk and behaviors: challenge core beliefs, myths, and actions  Improve concentration, focus, and mindfulness in daily activities .  Status: Continued - Date(s):  "10/31/22    Intervention(s)  Therapist will help patient identify and manage negative self-talk and automatic thoughts; introduce patient to cognitive distortions; help patient develop cognitive diffusion techniques      Goal 2: Patient will experience a reduction in anxious symptoms, along with a corresponding increase in relaxed emotional states and life satisfaction.    I will know I've met my goal when more focused and less worried.      Objective #A (Patient Action)  Patient will use cognitive-behavioral and thought diffusion strategies identified in therapy to challenge anxious thoughts.    Status: Continued - Date(s):10/31/22    Intervention(s)  Therapist will utilize CBT and ACT ideas to help patient challenge anxious thoughts and reduce intensity/duration of anxious distress    Objective #B  Patient will use \"worry time\" each day for 15 minutes of scheduled worry and then defer obsessive or anxious thinking until the next structured worry time.    Status: Continued - Date(s): 10/31/22    Intervention(s)  Therapist will teach patient how to effectively utilize worry time and/or thought logs/journals each day and incorporate more relaxation behaviors into their routine.    Objective #C  Patient will identify the stressors which contribute to feelings of anxiety  Patient will increase engagement in adaptive coping skills and recreational activities, such as exercise and healthy socialization, to manage distress.  Status: Continued - Date(s):10/31/22    Intervention(s)  Therapist will help patient identify triggers/situational factors that contribute to anxiety and behavioral skills aimed to manage anxious distress.        Other Possible Therapeutic Intervention(s):    Psycho-education regarding mental health diagnoses and treatment options    Supportive Therapy    Provide affirmations, reflections, and establish working rapport    Emphasize and reflect on strength of therapeutic relationship    Skills " training    Explore skills useful to client in current situation.    Skills include assertiveness, communication, conflict management, problem-solving, relaxation, etc.    Solution-Focused Therapy    Explore patterns in patient's relationships and discuss options for new behaviors.    Explore patterns in patient's actions and choices and discuss options for new behaviors.    Cognitive-behavioral Therapy    Discuss common cognitive distortions, identify them in patient's life.    Explore ways to challenge, replace, and act against these cognitions.    Acceptance and Commitment Therapy    Explore and identify important values in patient's life.    Discuss ways to commit to behavioral activation around these values.    Psychodynamic psychotherapy    Discuss patient's emotional dynamics and issues and how they impact behaviors.    Explore patient's history of relationships and how they impact present behaviors.    Explore how to work with and make changes in these schemas and patterns.    Narrative Therapy    Explore the patient's story of his/her life from his/her perspective.    Explore alternate ways of understanding their experience, identifying exceptions, developing new themes.    Interpersonal Psychotherapy    Explore patterns in relationships that are effective or ineffective at helping patient reach their goals, find satisfying experience.    Discuss new patterns or behaviors to engage in for improved social functioning.    Behavioral Activation    Discuss steps patient can take to become more involved in meaningful activity.    Identify barriers to these activities and explore possible solutions.    Mindfulness-Based Strategies    Discuss skills based on development and application of mindfulness.    Skills drawn from compassion-focused therapy, dialectical behavior therapy, mindfulness-based stress reduction, mindfulness-based cognitive therapy, etc.      Patient has reviewed and agreed to the above  plan.      XENIA Carrero   Behavioral Health Clinician   [facility]    October 31st ,2022

## 2022-12-11 ENCOUNTER — E-VISIT (OUTPATIENT)
Dept: URGENT CARE | Facility: CLINIC | Age: 27
End: 2022-12-11
Payer: COMMERCIAL

## 2022-12-11 DIAGNOSIS — R68.89 FLU-LIKE SYMPTOMS: Primary | ICD-10-CM

## 2022-12-11 PROCEDURE — 99207 PR NON-BILLABLE SERV PER CHARTING: CPT | Performed by: NURSE PRACTITIONER

## 2022-12-12 ENCOUNTER — OFFICE VISIT (OUTPATIENT)
Dept: URGENT CARE | Facility: URGENT CARE | Age: 27
End: 2022-12-12
Payer: COMMERCIAL

## 2022-12-12 VITALS
SYSTOLIC BLOOD PRESSURE: 107 MMHG | HEIGHT: 61 IN | TEMPERATURE: 98.2 F | HEART RATE: 106 BPM | DIASTOLIC BLOOD PRESSURE: 63 MMHG | WEIGHT: 130 LBS | OXYGEN SATURATION: 100 % | BODY MASS INDEX: 24.55 KG/M2

## 2022-12-12 DIAGNOSIS — J10.1 INFLUENZA B: Primary | ICD-10-CM

## 2022-12-12 DIAGNOSIS — R52 ACHES: ICD-10-CM

## 2022-12-12 LAB
FLUAV AG SPEC QL IA: NEGATIVE
FLUBV AG SPEC QL IA: POSITIVE

## 2022-12-12 PROCEDURE — 99213 OFFICE O/P EST LOW 20 MIN: CPT | Performed by: FAMILY MEDICINE

## 2022-12-12 PROCEDURE — 87804 INFLUENZA ASSAY W/OPTIC: CPT | Performed by: FAMILY MEDICINE

## 2022-12-12 RX ORDER — BENZONATATE 100 MG/1
100 CAPSULE ORAL 3 TIMES DAILY PRN
Qty: 21 CAPSULE | Refills: 1 | Status: SHIPPED | OUTPATIENT
Start: 2022-12-12 | End: 2023-11-09

## 2022-12-12 NOTE — PROGRESS NOTES
Assessment & Plan     Aches  - Influenza A/B antigen    Influenza B  - benzonatate (TESSALON) 100 MG capsule  Dispense: 21 capsule; Refill: 1     Tessalon provided to help with cough symptoms. Clear lungs thus will defer lung imaging today. Symptomatic treatment recommended. After discussion benefits/limitations of tamiflu we came to shared decision to defer therapy.   See AVS summary for additional recommendations reviewed with patient during this visit.       Ad Perez MD   [place] [facility]    Subjective     Naty is a 27 year old female who presents to clinic today for the following health issues:  Chief Complaint   Patient presents with     Urgent Care     Respiratory Problems     Fever     Generalized Body Aches     Pt in clinic to have eval for cough, fever and body aches since yesterday.  Negative Covid 12/11/2022.     HPI  Patient presents with 1 day of symptoms of cough, low-grade fever and body aches.  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  No known exposures to COVID.  Asthma.  Flu booster received 10/31/22    Patient Active Problem List    Diagnosis Date Noted     Attention deficit disorder without hyperactivity 03/14/2022     Priority: Medium     Major depressive disorder, recurrent episode, mild (H) 08/17/2021     Priority: Medium     Moderate episode of recurrent major depressive disorder (H) 04/27/2021     Priority: Medium     ODILON (generalized anxiety disorder) 08/25/2020     Priority: Medium     Papanicolaou smear of cervix with low grade squamous intraepithelial lesion (LGSIL) 06/02/2015     Priority: Medium     06/02/15 LSIL, Age 19 years, Dx pap in 12 months  01/05/16 Normal. Repeat 12-18 months  06/07/17 Normal (pt reported). 3 yr pap  8/10/20 NIL, Neg HPV. 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  "arachnids 06/22/2005     Priority: Medium     exaggerated skin reaction to mosquito bites       Ingrowing nail 06/22/2005     Priority: Medium     Epistaxis 06/22/2005     Priority: Medium       Current Outpatient Medications   Medication     amphetamine-dextroamphetamine (ADDERALL XR) 10 MG 24 hr capsule     [START ON 1/8/2023] amphetamine-dextroamphetamine (ADDERALL XR) 10 MG 24 hr capsule     benzonatate (TESSALON) 100 MG capsule     cholecalciferol (VITAMIN D3) 10 mcg (400 units) TABS tablet     etonogestrel (NEXPLANON) 68 MG IMPL     ketoconazole (NIZORAL) 2 % external cream     sertraline (ZOLOFT) 50 MG tablet     vitamin C (ASCORBIC ACID) 250 MG tablet     No current facility-administered medications for this visit.           Objective    /63   Pulse 106   Temp 98.2  F (36.8  C) (Temporal)   Ht 1.549 m (5' 1\")   Wt 59 kg (130 lb)   SpO2 100%   BMI 24.56 kg/m    Physical Exam     CV: HDS  Pulm: non-labored, no wheezing  GEN: occasional violent coughing fits, normal mentation, pleasant, good historian  Throat: 2+ tonsils, no trismus, uvula midline    Results for orders placed or performed in visit on 12/12/22   Influenza A/B antigen     Status: Abnormal    Specimen: Nose; Swab   Result Value Ref Range    Influenza A antigen Negative Negative    Influenza B antigen Positive (A) Negative    Narrative    Test results must be correlated with clinical data. If necessary, results should be confirmed by a molecular assay or viral culture.           The use of [employer] dictation services may have been used to construct the content in this note; any grammatical or spelling errors are non-intentional. Please contact the author of this note directly if you are in need of any clarification.   "

## 2022-12-12 NOTE — PATIENT INSTRUCTIONS
Dear Naty Pérez,    We are sorry you are not feeling well. Based on the responses you provided, it is recommended that you be seen in-person in urgent care so we can better evaluate your symptoms. Please click here to find the nearest urgent care location to you.   You will not be charged for this Visit. Thank you for trusting us with your care.    VALE Quinn CNP

## 2022-12-12 NOTE — PATIENT INSTRUCTIONS
For cough (can take all of them together):   - Dextromethorphan 'DM' (over the counter - can be found in Robitussin/Delsym/generic cough meds)  - Honey: lozenges/cough drops or mix honey in warm water  - Tessalon/Benzonatate (prescription) every 8 hours as needed      24 hours without fever before returning to work    Drink  ounces of water a day to keep hydrated

## 2022-12-22 ENCOUNTER — VIRTUAL VISIT (OUTPATIENT)
Dept: BEHAVIORAL HEALTH | Facility: CLINIC | Age: 27
End: 2022-12-22
Payer: COMMERCIAL

## 2022-12-22 DIAGNOSIS — F41.1 GAD (GENERALIZED ANXIETY DISORDER): ICD-10-CM

## 2022-12-22 DIAGNOSIS — F33.0 MAJOR DEPRESSIVE DISORDER, RECURRENT EPISODE, MILD (H): ICD-10-CM

## 2022-12-22 DIAGNOSIS — F98.8 ATTENTION DEFICIT DISORDER WITHOUT HYPERACTIVITY: Primary | ICD-10-CM

## 2022-12-22 PROCEDURE — 90837 PSYTX W PT 60 MINUTES: CPT | Mod: GT | Performed by: MARRIAGE & FAMILY THERAPIST

## 2022-12-22 ASSESSMENT — PATIENT HEALTH QUESTIONNAIRE - PHQ9
10. IF YOU CHECKED OFF ANY PROBLEMS, HOW DIFFICULT HAVE THESE PROBLEMS MADE IT FOR YOU TO DO YOUR WORK, TAKE CARE OF THINGS AT HOME, OR GET ALONG WITH OTHER PEOPLE: VERY DIFFICULT
SUM OF ALL RESPONSES TO PHQ QUESTIONS 1-9: 12
SUM OF ALL RESPONSES TO PHQ QUESTIONS 1-9: 12

## 2022-12-22 NOTE — PROGRESS NOTES
[facility] Primary Care: Integrated Behavioral Health  December 22, 2022    Behavioral Health Clinician Progress Note    Patient Name: Naty Pérez           Service Type:  Individual      Service Location:   Face to Face in Home / Community     Session Start Time: 9:28 am  Session End Time: 10:27 am      Session Length: 53 - 60      Attendees: Client     Service Modality:  Video Visit:      Provider verified identity through the following two step process.  Patient provided:  Patient is known previously to provider    Telemedicine Visit: The patient's condition can be safely assessed and treated via synchronous audio and visual telemedicine encounter.      Reason for Telemedicine Visit: Services only offered telehealth    Originating Site (Patient Location): Patient's home    Distant Site (Provider Location): [facility] Outpatient Setting: [place]    Consent:  The patient/guardian has verbally consented to: the potential risks and benefits of telemedicine (video visit) versus in person care; bill my insurance or make self-payment for services provided; and responsibility for payment of non-covered services.     Patient would like the video invitation sent by:  My Chart    Mode of Communication:  Video Conference via [employer]    As the provider I attest to compliance with applicable laws and regulations related to telemedicine.    Visit Activities (Refresh list every visit): [facility] Only    Diagnostic Assessment Date: 2/7/22 Roxanna Casas PhD, LP  Treatment Plan Review Date:1/31/23  See Flowsheets for today's PHQ-9 and ODILON-7 results  Previous PHQ-9:   PHQ-9 SCORE 11/7/2022 11/21/2022 12/22/2022   PHQ-9 Total Score MyChart 10 (Moderate depression) 9 (Mild depression) 12 (Moderate depression)   PHQ-9 Total Score 10 9 12     Previous ODILON-7:   ODILON-7 SCORE 9/19/2022 10/10/2022 11/7/2022   Total Score 11 (moderate anxiety) 7 (mild anxiety) 7 (mild anxiety)   Total Score 11 7 7       CHARLES  "LEVEL:  CHARLES Score (Last Two) 2/6/2020   CHARLES Raw Score 28   Activation Score 50   CHARLES Level 2       DATA  Extended Session (60+ minutes): No  Interactive Complexity: No  Crisis: No  [facility] Patient: No    Treatment Objective(s) Addressed in This Session:  Target Behavior(s): mood    Depressed Mood: Increase interest, engagement, and pleasure in doing things  Decrease frequency and intensity of feeling down, depressed, hopeless  Improve quantity and quality of night time sleep / decrease daytime naps  Feel less tired and more energy during the day   Improve diet, appetite, mindful eating, and / or meal planning  Identify negative self-talk and behaviors: challenge core beliefs, myths, and actions  Improve concentration, focus, and mindfulness in daily activities   Discuss motivation / ambivalence about taking anti-depressant / mood stabilizer medication(s)  Discuss motivation / ambivalence about a referral to specialty mental health services (Therapy, Day Tx, PHP)  Anxiety: will experience a reduction in anxiety, will develop more effective coping skills to manage anxiety symptoms, will develop healthy cognitive patterns and beliefs and will increase ability to function adaptively  Attention Problems: will develop coping skills to effectively manage attention issues    Current Stressors / Issues:    Pt has been sick with the flu. She states that is why her numbers are higher. \"I have actually been doing well.\" Process with her buying a house with her boyfriend and working with her on direct but kind communication to help feel heard. Reviewed safety with Patient. Patient denies any current SI,plans or intentions.  Pt has been working on schedules to help with her ADHD. Will move to monthly sesssion.      Progress on Treatment Objective(s) / Homework:  Stable - ACTION (Actively working towards change); Intervened by reinforcing change plan / affirming steps taken    Motivational Interviewing    MI Intervention: Expressed " Empathy/Understanding, Supported Autonomy, Collaboration, Evocation, Permission to raise concern or advise, Open-ended questions and Reflections: simple and complex     Change Talk Expressed by the Patient: Desire to change Ability to change Reasons to change Need to change Committment to change Activation Taking steps    Provider Response to Change Talk: E - Evoked more info from patient about behavior change, A - Affirmed patient's thoughts, decisions, or attempts at behavior change, R - Reflected patient's change talk and S - Summarized patient's change talk statements      Situation         Automatic Thoughts  Cognitive Distortions      Feelings        Behavior        Questioning Thoughts            Also provided psychoeducation about behavioral health condition, symptoms, and treatment options    Care Plan review completed: Yes    Medication Review:  Changes to psychiatric medications, see updated Medication List in EPIC.     Medication Compliance:  Yes    Changes in Health Issues:   None reported    Chemical Use Review:   Substance Use: Chemical use reviewed, no active concerns identified      Tobacco Use: No current tobacco use.      Assessment: Current Emotional / Mental Status (status of significant symptoms):  Risk status (Self / Other harm or suicidal ideation)  Patient denies a history of suicidal ideation, suicide attempts, self-injurious behavior, homicidal ideation, homicidal behavior and and other safety concerns  Patient denies current fears or concerns for personal safety.  Patient denies current or recent suicidal ideation or behaviors.  Patient denies current or recent homicidal ideation or behaviors.  Patient denies current or recent self injurious behavior or ideation.  Patient denies other safety concerns.  A safety and risk management plan has not been developed at this time, however patient was encouraged to call [facility] should there be a change in any of these risk  factors.    Appearance:   Appropriate   Eye Contact:   Good   Psychomotor Behavior: Normal   Attitude:   Cooperative   Orientation:   All  Speech   Rate / Production: Normal    Volume:  Normal   Mood:    Anxious   Affect:    Appropriate   Thought Content:  Clear   Thought Form:  Coherent  Logical   Insight:    Good     Diagnoses:  1. Attention deficit disorder without hyperactivity    2. ODILON (generalized anxiety disorder)    3. Major depressive disorder, recurrent episode, mild (H)        Collateral Reports Completed:  Not Applicable    Plan: (Homework, other):  Patient was given information about behavioral services and encouraged to schedule a follow up appointment with the clinic [facility] in 1 month.  She was also given information about mental health symptoms and treatment options  and Cognitive Behavioral Therapy skills to practice when experiencing anxiety, depression and ADHD.  CD Recommendations: No indications of CD issues.  XENIA Carrero, [facility]      ______________________________________________________________________                                              Individual Treatment Plan    Patient's Name: Naty Pérez  YOB: 1995    Date of Creation: 3/14/22  Date Treatment Plan Last Reviewed/Revised: 10/31/22    DSM5 Diagnoses: Attention-Deficit/Hyperactivity Disorder  314.00 (F90.0) Predominantly inattentive presentation, 296.31 (F33.0) Major Depressive Disorder, Recurrent Episode, Mild With anxious distress or 300.02 (F41.1) Generalized Anxiety Disorder  Psychosocial / Contextual Factors: relationships, work  PROMIS-10  In general, would you say your health is:: 3  In general, would you say your quality of life is:: 3  In general, how would you rate your physical health?: 3  In general, how would you rate your mental health, including your mood and your ability to think?: 3  In general, how would you rate your satisfaction with your social activities and relationships?: 3  In general,  please rate how well you carry out your usual social activities and roles. (This includes activities at home, at work and in your community, and responsibilities as a parent, child, spouse, employee, friend, etc.): 3  To what extent are you able to carry out your everyday physical activities such as walking, climbing stairs, carrying groceries, or moving a chair?: 5  In the past 7 days, how often have you been bothered by emotional problems such as feeling anxious, depressed, or irritable?: 4  In the past 7 days, how would you rate your fatigue on average?: 3  In the past 7 days, how would you rate your pain on average, where 0 means no pain, and 10 means worst imaginable pain?: 1  Global Mental Health Score: 11  Global Physical Health Score: 15  PROMIS TOTAL - SUBSCORES: 26  PROMIS-10    In general, would you say your health is:: 3    In general, would you say your quality of life is:: 3    In general, how would you rate your physical health?: 3    In general, how would you rate your mental health, including your mood and your ability to think?: 3    In general, how would you rate your satisfaction with your social activities and relationships?: 3    In general, please rate how well you carry out your usual social activities and roles. (This includes activities at home, at work and in your community, and responsibilities as a parent, child, spouse, employee, friend, etc.): 3    To what extent are you able to carry out your everyday physical activities such as walking, climbing stairs, carrying groceries, or moving a chair?: 5    In the past 7 days, how often have you been bothered by emotional problems such as feeling anxious, depressed, or irritable?: 4  In the past 7 days, how would you rate your fatigue on average?: 3  In the past 7 days, how would you rate your pain on average, where 0 means no pain, and 10 means worst imaginable pain?: 1    PROMIS GLOBAL SCORES    Mental health question re-calculation - no  clinical value - Mental health question re-calculation - no clinical value: 2  Physical health question re-calculation - no clinical value - Physical health question re-calculation - no clinical value: 3  Pain question re-calculation - no clinical value - Pain question re-calculation - no clinical value: 4  Global Mental Health Score - Global Mental Health Score: 11  Global Physical Health Score - Global Physical Health Score: 15  PROMIS TOTAL - SUBSCORES - PROMIS TOTAL - SUBSCORES: 26      [id number] [facility]AT[facility] VERSION 1.1      Referral / Collaboration:  Referral to another professional/service is not indicated at this time..    Anticipated number of session for this episode of care: ongoing  Anticipation frequency of session: Every other week  Anticipated Duration of each session: 38-52 minutes  Treatment plan will be reviewed in 90 days or when goals have been changed.       MeasurableTreatment Goal(s) related to diagnosis / functional impairment(s)  Goal 1: Patient will experience a reduction in depressive symptoms along with a corresponding increase in positive emotion and life satisfaction.    I will know I've met my goal when I am less worried and mood is more .      Objective #A (Patient Action)    Patient will Increase interest, engagement, and pleasure in doing things.  Status: Continued - Date(s): 10/31/22    Intervention(s)  Therapist will help patient identify pleasant and mastery oriented events that elicit positive, relaxed mood.    Objective #B  Patient will Decrease frequency and intensity of feeling down, depressed, hopeless.  Status: Continued - Date(s):10/31/22    Intervention(s)  Therapist will introduce patient to cognitive-behavioral and acceptance and commitment therapy topics aimed to help reduce depression and anxiety    Objective #C  Patient will Identify negative self-talk and behaviors: challenge core beliefs, myths, and actions  Improve  "concentration, focus, and mindfulness in daily activities .  Status: Continued - Date(s): 10/31/22    Intervention(s)  Therapist will help patient identify and manage negative self-talk and automatic thoughts; introduce patient to cognitive distortions; help patient develop cognitive diffusion techniques      Goal 2: Patient will experience a reduction in anxious symptoms, along with a corresponding increase in relaxed emotional states and life satisfaction.    I will know I've met my goal when more focused and less worried.      Objective #A (Patient Action)  Patient will use cognitive-behavioral and thought diffusion strategies identified in therapy to challenge anxious thoughts.    Status: Continued - Date(s):10/31/22    Intervention(s)  Therapist will utilize CBT and ACT ideas to help patient challenge anxious thoughts and reduce intensity/duration of anxious distress    Objective #B  Patient will use \"worry time\" each day for 15 minutes of scheduled worry and then defer obsessive or anxious thinking until the next structured worry time.    Status: Continued - Date(s): 10/31/22    Intervention(s)  Therapist will teach patient how to effectively utilize worry time and/or thought logs/journals each day and incorporate more relaxation behaviors into their routine.    Objective #C  Patient will identify the stressors which contribute to feelings of anxiety  Patient will increase engagement in adaptive coping skills and recreational activities, such as exercise and healthy socialization, to manage distress.  Status: Continued - Date(s):10/31/22    Intervention(s)  Therapist will help patient identify triggers/situational factors that contribute to anxiety and behavioral skills aimed to manage anxious distress.        Other Possible Therapeutic Intervention(s):    Psycho-education regarding mental health diagnoses and treatment options    Supportive Therapy    Provide affirmations, reflections, and establish working " rapport    Emphasize and reflect on strength of therapeutic relationship    Skills training    Explore skills useful to client in current situation.    Skills include assertiveness, communication, conflict management, problem-solving, relaxation, etc.    Solution-Focused Therapy    Explore patterns in patient's relationships and discuss options for new behaviors.    Explore patterns in patient's actions and choices and discuss options for new behaviors.    Cognitive-behavioral Therapy    Discuss common cognitive distortions, identify them in patient's life.    Explore ways to challenge, replace, and act against these cognitions.    Acceptance and Commitment Therapy    Explore and identify important values in patient's life.    Discuss ways to commit to behavioral activation around these values.    Psychodynamic psychotherapy    Discuss patient's emotional dynamics and issues and how they impact behaviors.    Explore patient's history of relationships and how they impact present behaviors.    Explore how to work with and make changes in these schemas and patterns.    Narrative Therapy    Explore the patient's story of his/her life from his/her perspective.    Explore alternate ways of understanding their experience, identifying exceptions, developing new themes.    Interpersonal Psychotherapy    Explore patterns in relationships that are effective or ineffective at helping patient reach their goals, find satisfying experience.    Discuss new patterns or behaviors to engage in for improved social functioning.    Behavioral Activation    Discuss steps patient can take to become more involved in meaningful activity.    Identify barriers to these activities and explore possible solutions.    Mindfulness-Based Strategies    Discuss skills based on development and application of mindfulness.    Skills drawn from compassion-focused therapy, dialectical behavior therapy, mindfulness-based stress reduction, mindfulness-based  cognitive therapy, etc.      Patient has reviewed and agreed to the above plan.      XENIA Carrero   Behavioral Health Clinician   [facility]    October 31st ,2022

## 2022-12-25 ENCOUNTER — HEALTH MAINTENANCE LETTER (OUTPATIENT)
Age: 27
End: 2022-12-25

## 2023-01-16 ENCOUNTER — VIRTUAL VISIT (OUTPATIENT)
Dept: BEHAVIORAL HEALTH | Facility: CLINIC | Age: 28
End: 2023-01-16
Payer: COMMERCIAL

## 2023-01-16 DIAGNOSIS — F33.0 MAJOR DEPRESSIVE DISORDER, RECURRENT EPISODE, MILD (H): ICD-10-CM

## 2023-01-16 DIAGNOSIS — F98.8 ATTENTION DEFICIT DISORDER WITHOUT HYPERACTIVITY: ICD-10-CM

## 2023-01-16 DIAGNOSIS — F41.1 GAD (GENERALIZED ANXIETY DISORDER): Primary | ICD-10-CM

## 2023-01-16 PROCEDURE — 90837 PSYTX W PT 60 MINUTES: CPT | Mod: GT | Performed by: MARRIAGE & FAMILY THERAPIST

## 2023-01-16 ASSESSMENT — PATIENT HEALTH QUESTIONNAIRE - PHQ9
SUM OF ALL RESPONSES TO PHQ QUESTIONS 1-9: 11
SUM OF ALL RESPONSES TO PHQ QUESTIONS 1-9: 11
10. IF YOU CHECKED OFF ANY PROBLEMS, HOW DIFFICULT HAVE THESE PROBLEMS MADE IT FOR YOU TO DO YOUR WORK, TAKE CARE OF THINGS AT HOME, OR GET ALONG WITH OTHER PEOPLE: VERY DIFFICULT

## 2023-01-16 ASSESSMENT — ANXIETY QUESTIONNAIRES
IF YOU CHECKED OFF ANY PROBLEMS ON THIS QUESTIONNAIRE, HOW DIFFICULT HAVE THESE PROBLEMS MADE IT FOR YOU TO DO YOUR WORK, TAKE CARE OF THINGS AT HOME, OR GET ALONG WITH OTHER PEOPLE: SOMEWHAT DIFFICULT
7. FEELING AFRAID AS IF SOMETHING AWFUL MIGHT HAPPEN: NOT AT ALL
7. FEELING AFRAID AS IF SOMETHING AWFUL MIGHT HAPPEN: NOT AT ALL
4. TROUBLE RELAXING: SEVERAL DAYS
3. WORRYING TOO MUCH ABOUT DIFFERENT THINGS: SEVERAL DAYS
1. FEELING NERVOUS, ANXIOUS, OR ON EDGE: SEVERAL DAYS
GAD7 TOTAL SCORE: 7
5. BEING SO RESTLESS THAT IT IS HARD TO SIT STILL: SEVERAL DAYS
GAD7 TOTAL SCORE: 7
2. NOT BEING ABLE TO STOP OR CONTROL WORRYING: SEVERAL DAYS
GAD7 TOTAL SCORE: 7
6. BECOMING EASILY ANNOYED OR IRRITABLE: MORE THAN HALF THE DAYS
8. IF YOU CHECKED OFF ANY PROBLEMS, HOW DIFFICULT HAVE THESE MADE IT FOR YOU TO DO YOUR WORK, TAKE CARE OF THINGS AT HOME, OR GET ALONG WITH OTHER PEOPLE?: SOMEWHAT DIFFICULT

## 2023-01-16 NOTE — PROGRESS NOTES
[facility] Primary Care: Integrated Behavioral Health  January 16th, 2023    Behavioral Health Clinician Progress Note    Patient Name: Naty Pérez           Service Type:  Individual      Service Location:   Face to Face in Home / Community     Session Start Time: 10:01 am  Session End Time: 11:00am      Session Length: 53 - 60      Attendees: Client     Service Modality:  Video Visit:      Provider verified identity through the following two step process.  Patient provided:  Patient is known previously to provider    Telemedicine Visit: The patient's condition can be safely assessed and treated via synchronous audio and visual telemedicine encounter.      Reason for Telemedicine Visit: Services only offered telehealth    Originating Site (Patient Location): Patient's home    Distant Site (Provider Location): [facility] Outpatient Setting: Interior    Consent:  The patient/guardian has verbally consented to: the potential risks and benefits of telemedicine (video visit) versus in person care; bill my insurance or make self-payment for services provided; and responsibility for payment of non-covered services.     Patient would like the video invitation sent by:  My Chart    Mode of Communication:  Video Conference via [employer]    As the provider I attest to compliance with applicable laws and regulations related to telemedicine.    Visit Activities (Refresh list every visit): [facility] Only    Diagnostic Assessment Date: 2/7/22 Roxanna Casas PhD, LP  Treatment Plan Review Date:1/31/23  See Flowsheets for today's PHQ-9 and ODILON-7 results  Previous PHQ-9:   PHQ-9 SCORE 11/21/2022 12/22/2022 1/16/2023   PHQ-9 Total Score MyChart 9 (Mild depression) 12 (Moderate depression) 11 (Moderate depression)   PHQ-9 Total Score 9 12 11     Previous ODILON-7:   ODILON-7 SCORE 10/10/2022 11/7/2022 1/16/2023   Total Score 7 (mild anxiety) 7 (mild anxiety) 7 (mild anxiety)   Total Score 7 7 7       CHARLES  LEVEL:  CHARLES Score (Last Two) 2/6/2020   CHARLES Raw Score 28   Activation Score 50   CHARLES Level 2       DATA  Extended Session (60+ minutes): No  Interactive Complexity: No  Crisis: No  [facility] Patient: No    Treatment Objective(s) Addressed in This Session:  Target Behavior(s): mood    Depressed Mood: Increase interest, engagement, and pleasure in doing things  Decrease frequency and intensity of feeling down, depressed, hopeless  Improve quantity and quality of night time sleep / decrease daytime naps  Feel less tired and more energy during the day   Improve diet, appetite, mindful eating, and / or meal planning  Identify negative self-talk and behaviors: challenge core beliefs, myths, and actions  Improve concentration, focus, and mindfulness in daily activities   Discuss motivation / ambivalence about taking anti-depressant / mood stabilizer medication(s)  Discuss motivation / ambivalence about a referral to specialty mental health services (Therapy, Day Tx, PHP)  Anxiety: will experience a reduction in anxiety, will develop more effective coping skills to manage anxiety symptoms, will develop healthy cognitive patterns and beliefs and will increase ability to function adaptively  Attention Problems: will develop coping skills to effectively manage attention issues    Current Stressors / Issues:    Processed patient by a house and her mothers reaction. Worked on setting up the boundaries and being clear with her words. Worked on ADHD techniques to help with time management. Worked on setting timers and talking to partner about limits and saying no.     Progress on Treatment Objective(s) / Homework:  Stable - ACTION (Actively working towards change); Intervened by reinforcing change plan / affirming steps taken    Motivational Interviewing    MI Intervention: Expressed Empathy/Understanding, Supported Autonomy, Collaboration, Evocation, Permission to raise concern or advise, Open-ended questions and Reflections: simple  and complex     Change Talk Expressed by the Patient: Desire to change Ability to change Reasons to change Need to change Committment to change Activation Taking steps    Provider Response to Change Talk: E - Evoked more info from patient about behavior change, A - Affirmed patient's thoughts, decisions, or attempts at behavior change, R - Reflected patient's change talk and S - Summarized patient's change talk statements      Situation         Automatic Thoughts  Cognitive Distortions      Feelings        Behavior        Questioning Thoughts            Also provided psychoeducation about behavioral health condition, symptoms, and treatment options    Care Plan review completed: Yes    Medication Review:  Changes to psychiatric medications, see updated Medication List in EPIC.     Medication Compliance:  Yes    Changes in Health Issues:   None reported    Chemical Use Review:   Substance Use: Chemical use reviewed, no active concerns identified      Tobacco Use: No current tobacco use.      Assessment: Current Emotional / Mental Status (status of significant symptoms):  Risk status (Self / Other harm or suicidal ideation)  Patient denies a history of suicidal ideation, suicide attempts, self-injurious behavior, homicidal ideation, homicidal behavior and and other safety concerns  Patient denies current fears or concerns for personal safety.  Patient denies current or recent suicidal ideation or behaviors.  Patient denies current or recent homicidal ideation or behaviors.  Patient denies current or recent self injurious behavior or ideation.  Patient denies other safety concerns.  A safety and risk management plan has not been developed at this time, however patient was encouraged to call [place] [phone] should there be a change in any of these risk factors.    Appearance:   Appropriate   Eye Contact:   Good   Psychomotor Behavior: Normal   Attitude:   Cooperative   Orientation:   All  Speech   Rate /  Production: Normal    Volume:  Normal   Mood:    Anxious   Affect:    Appropriate   Thought Content:  Clear   Thought Form:  Coherent  Logical   Insight:    Good     Diagnoses:  1. ODILON (generalized anxiety disorder)    2. Major depressive disorder, recurrent episode, mild (H)    3. Attention deficit disorder without hyperactivity        Collateral Reports Completed:  Not Applicable    Plan: (Homework, other):  Patient was given information about behavioral services and encouraged to schedule a follow up appointment with the clinic [facility] in 1 month.  She was also given information about mental health symptoms and treatment options  and Cognitive Behavioral Therapy skills to practice when experiencing anxiety, depression and ADHD.  CD Recommendations: No indications of CD issues.  XENIA Carrero, [facility]      ______________________________________________________________________                                              Individual Treatment Plan    Patient's Name: Naty Pérez  YOB: 1995    Date of Creation: 3/14/22  Date Treatment Plan Last Reviewed/Revised: 10/31/22    DSM5 Diagnoses: Attention-Deficit/Hyperactivity Disorder  314.00 (F90.0) Predominantly inattentive presentation, 296.31 (F33.0) Major Depressive Disorder, Recurrent Episode, Mild With anxious distress or 300.02 (F41.1) Generalized Anxiety Disorder  Psychosocial / Contextual Factors: relationships, work  PROMIS-10  In general, would you say your health is:: 3  In general, would you say your quality of life is:: 3  In general, how would you rate your physical health?: 3  In general, how would you rate your mental health, including your mood and your ability to think?: 3  In general, how would you rate your satisfaction with your social activities and relationships?: 3  In general, please rate how well you carry out your usual social activities and roles. (This includes activities at home, at work and in your community, and  responsibilities as a parent, child, spouse, employee, friend, etc.): 3  To what extent are you able to carry out your everyday physical activities such as walking, climbing stairs, carrying groceries, or moving a chair?: 5  In the past 7 days, how often have you been bothered by emotional problems such as feeling anxious, depressed, or irritable?: 4  In the past 7 days, how would you rate your fatigue on average?: 3  In the past 7 days, how would you rate your pain on average, where 0 means no pain, and 10 means worst imaginable pain?: 1  Global Mental Health Score: 11  Global Physical Health Score: 15  PROMIS TOTAL - SUBSCORES: 26  PROMIS-10    In general, would you say your health is:: 3    In general, would you say your quality of life is:: 3    In general, how would you rate your physical health?: 3    In general, how would you rate your mental health, including your mood and your ability to think?: 3    In general, how would you rate your satisfaction with your social activities and relationships?: 3    In general, please rate how well you carry out your usual social activities and roles. (This includes activities at home, at work and in your community, and responsibilities as a parent, child, spouse, employee, friend, etc.): 3    To what extent are you able to carry out your everyday physical activities such as walking, climbing stairs, carrying groceries, or moving a chair?: 5    In the past 7 days, how often have you been bothered by emotional problems such as feeling anxious, depressed, or irritable?: 4  In the past 7 days, how would you rate your fatigue on average?: 3  In the past 7 days, how would you rate your pain on average, where 0 means no pain, and 10 means worst imaginable pain?: 1    PROMIS GLOBAL SCORES    Mental health question re-calculation - no clinical value - Mental health question re-calculation - no clinical value: 2  Physical health question re-calculation - no clinical value -  Physical health question re-calculation - no clinical value: 3  Pain question re-calculation - no clinical value - Pain question re-calculation - no clinical value: 4  Global Mental Health Score - Global Mental Health Score: 11  Global Physical Health Score - Global Physical Health Score: 15  PROMIS TOTAL - SUBSCORES - PROMIS TOTAL - SUBSCORES: 26      [id number] PROMIS HEALTH ORGANIZATION AND PROMIS COOPERATIVE GROUP VERSION 1.1      Referral / Collaboration:  Referral to another professional/service is not indicated at this time..    Anticipated number of session for this episode of care: ongoing  Anticipation frequency of session: Every other week  Anticipated Duration of each session: 38-52 minutes  Treatment plan will be reviewed in 90 days or when goals have been changed.       MeasurableTreatment Goal(s) related to diagnosis / functional impairment(s)  Goal 1: Patient will experience a reduction in depressive symptoms along with a corresponding increase in positive emotion and life satisfaction.    I will know I've met my goal when I am less worried and mood is more .      Objective #A (Patient Action)    Patient will Increase interest, engagement, and pleasure in doing things.  Status: Continued - Date(s): 10/31/22    Intervention(s)  Therapist will help patient identify pleasant and mastery oriented events that elicit positive, relaxed mood.    Objective #B  Patient will Decrease frequency and intensity of feeling down, depressed, hopeless.  Status: Continued - Date(s):10/31/22    Intervention(s)  Therapist will introduce patient to cognitive-behavioral and acceptance and commitment therapy topics aimed to help reduce depression and anxiety    Objective #C  Patient will Identify negative self-talk and behaviors: challenge core beliefs, myths, and actions  Improve concentration, focus, and mindfulness in daily activities .  Status: Continued - Date(s): 10/31/22    Intervention(s)  Therapist will help patient  "identify and manage negative self-talk and automatic thoughts; introduce patient to cognitive distortions; help patient develop cognitive diffusion techniques      Goal 2: Patient will experience a reduction in anxious symptoms, along with a corresponding increase in relaxed emotional states and life satisfaction.    I will know I've met my goal when more focused and less worried.      Objective #A (Patient Action)  Patient will use cognitive-behavioral and thought diffusion strategies identified in therapy to challenge anxious thoughts.    Status: Continued - Date(s):10/31/22    Intervention(s)  Therapist will utilize CBT and ACT ideas to help patient challenge anxious thoughts and reduce intensity/duration of anxious distress    Objective #B  Patient will use \"worry time\" each day for 15 minutes of scheduled worry and then defer obsessive or anxious thinking until the next structured worry time.    Status: Continued - Date(s): 10/31/22    Intervention(s)  Therapist will teach patient how to effectively utilize worry time and/or thought logs/journals each day and incorporate more relaxation behaviors into their routine.    Objective #C  Patient will identify the stressors which contribute to feelings of anxiety  Patient will increase engagement in adaptive coping skills and recreational activities, such as exercise and healthy socialization, to manage distress.  Status: Continued - Date(s):10/31/22    Intervention(s)  Therapist will help patient identify triggers/situational factors that contribute to anxiety and behavioral skills aimed to manage anxious distress.        Other Possible Therapeutic Intervention(s):    Psycho-education regarding mental health diagnoses and treatment options    Supportive Therapy    Provide affirmations, reflections, and establish working rapport    Emphasize and reflect on strength of therapeutic relationship    Skills training    Explore skills useful to client in current " situation.    Skills include assertiveness, communication, conflict management, problem-solving, relaxation, etc.    Solution-Focused Therapy    Explore patterns in patient's relationships and discuss options for new behaviors.    Explore patterns in patient's actions and choices and discuss options for new behaviors.    Cognitive-behavioral Therapy    Discuss common cognitive distortions, identify them in patient's life.    Explore ways to challenge, replace, and act against these cognitions.    Acceptance and Commitment Therapy    Explore and identify important values in patient's life.    Discuss ways to commit to behavioral activation around these values.    Psychodynamic psychotherapy    Discuss patient's emotional dynamics and issues and how they impact behaviors.    Explore patient's history of relationships and how they impact present behaviors.    Explore how to work with and make changes in these schemas and patterns.    Narrative Therapy    Explore the patient's story of his/her life from his/her perspective.    Explore alternate ways of understanding their experience, identifying exceptions, developing new themes.    Interpersonal Psychotherapy    Explore patterns in relationships that are effective or ineffective at helping patient reach their goals, find satisfying experience.    Discuss new patterns or behaviors to engage in for improved social functioning.    Behavioral Activation    Discuss steps patient can take to become more involved in meaningful activity.    Identify barriers to these activities and explore possible solutions.    Mindfulness-Based Strategies    Discuss skills based on development and application of mindfulness.    Skills drawn from compassion-focused therapy, dialectical behavior therapy, mindfulness-based stress reduction, mindfulness-based cognitive therapy, etc.      Patient has reviewed and agreed to the above plan.      Vanita Negrete [facility]   Behavioral Health Clinician    [facility]    October 31st ,2022

## 2023-01-24 ENCOUNTER — MYC MEDICAL ADVICE (OUTPATIENT)
Dept: FAMILY MEDICINE | Facility: CLINIC | Age: 28
End: 2023-01-24
Payer: COMMERCIAL

## 2023-01-26 NOTE — TELEPHONE ENCOUNTER
Pt reports her sertraline prescription changed to 25mg. Per chart review, current med list shows one active sertraline rx for 50mg. Writer called [facility] for clarification, they stated they had 2 open prescriptions on file - one for 25mg and one for 50mg. The 25mg rx was canceled by writer. [facility] staff stated they would fill the 50mg rx. Patient updated via [employer].    Tarah Patel RN  [facility]

## 2023-01-26 NOTE — TELEPHONE ENCOUNTER
Writer responded via [employer]    COBY AntoineN, RN-BC  [facility]

## 2023-01-26 NOTE — TELEPHONE ENCOUNTER
"Shania- see patient's MyChart message:    \"I went to refill last week and there was some change in my insurance (I still need to figure that out) but it led to a switch from Adderall to an off-brand. Since then I ve been feeling off. Like I get these weird headaches. I was wondering if it could be from the switch. I remember getting headaches last year when I was a few days without Adderall between prescriptions and so I thought it might be related. If it is, will it go away in time?\"    Please review and advise.    Thank you,  Tarah Patel RN  [facility]    "

## 2023-01-26 NOTE — TELEPHONE ENCOUNTER
Headaches could  be from adderall change.  There is a shortage, especially of the 20mg.  She may need to call around to find a pharmacy that has the brand formulary and we can send in refills there.

## 2023-02-06 ENCOUNTER — VIRTUAL VISIT (OUTPATIENT)
Dept: FAMILY MEDICINE | Facility: CLINIC | Age: 28
End: 2023-02-06
Payer: COMMERCIAL

## 2023-02-06 DIAGNOSIS — F98.8 ATTENTION DEFICIT DISORDER WITHOUT HYPERACTIVITY: Primary | ICD-10-CM

## 2023-02-06 PROCEDURE — 99213 OFFICE O/P EST LOW 20 MIN: CPT | Mod: GT | Performed by: NURSE PRACTITIONER

## 2023-02-06 RX ORDER — DEXTROAMPHETAMINE SACCHARATE, AMPHETAMINE ASPARTATE MONOHYDRATE, DEXTROAMPHETAMINE SULFATE AND AMPHETAMINE SULFATE 2.5; 2.5; 2.5; 2.5 MG/1; MG/1; MG/1; MG/1
10 CAPSULE, EXTENDED RELEASE ORAL DAILY
Qty: 30 CAPSULE | Refills: 0 | Status: SHIPPED | OUTPATIENT
Start: 2023-03-09 | End: 2023-04-08

## 2023-02-06 RX ORDER — DEXTROAMPHETAMINE SACCHARATE, AMPHETAMINE ASPARTATE MONOHYDRATE, DEXTROAMPHETAMINE SULFATE AND AMPHETAMINE SULFATE 2.5; 2.5; 2.5; 2.5 MG/1; MG/1; MG/1; MG/1
10 CAPSULE, EXTENDED RELEASE ORAL DAILY
Qty: 30 CAPSULE | Refills: 0 | Status: SHIPPED | OUTPATIENT
Start: 2023-04-09 | End: 2023-05-09

## 2023-02-06 RX ORDER — DEXTROAMPHETAMINE SACCHARATE, AMPHETAMINE ASPARTATE MONOHYDRATE, DEXTROAMPHETAMINE SULFATE AND AMPHETAMINE SULFATE 2.5; 2.5; 2.5; 2.5 MG/1; MG/1; MG/1; MG/1
10 CAPSULE, EXTENDED RELEASE ORAL DAILY
Qty: 30 CAPSULE | Refills: 0 | Status: SHIPPED | OUTPATIENT
Start: 2023-02-06 | End: 2023-03-08

## 2023-02-06 NOTE — PROGRESS NOTES
Naty is a 27 year old who is being evaluated via a billable video visit.      How would you like to obtain your AVS? MyChart  If the video visit is dropped, the invitation should be resent by: Send to e-mail at: [email or web address]  Will anyone else be joining your video visit? No          Assessment & Plan     Attention deficit disorder without hyperactivity  Having some issues with the generic formulary not being as effective.  We did discuss trying a different medication, but previous dose was working very well.  She would like to stick with adderall XR for now and will reach out to her insurance as she doesn't believe her plan changed.  If she continues on this formulary due to the shortages, we can possibly try adding an IR dose to see if this improves symptoms.  PDMP reviewed, next visit should be in person preventative to complete CSA and obtain routine labs/vitals.    - amphetamine-dextroamphetamine (ADDERALL XR) 10 MG 24 hr capsule; Take 1 capsule (10 mg) by mouth daily for 30 days  - amphetamine-dextroamphetamine (ADDERALL XR) 10 MG 24 hr capsule; Take 1 capsule (10 mg) by mouth daily for 30 days  - amphetamine-dextroamphetamine (ADDERALL XR) 10 MG 24 hr capsule; Take 1 capsule (10 mg) by mouth daily for 30 days    Prescription drug management             Return in 3 months (on 5/6/2023) for ADHD MED RECHECK (3 MONTHS).   Follow-up Visit   Expected date:  May 06, 2023 (Approximate)      Follow Up Appointment Details:     Follow-up with whom?: Me    Follow-Up for what?: Adult Preventive    How?: In Person                    VALE Kennedy [facility]   Naty is a 27 year old, presenting for the following health issues:  Recheck Medication      History of Present Illness       Reason for visit:  Med follow up    She eats 2-3 servings of fruits and vegetables daily.She consumes 1 sweetened beverage(s) daily.She exercises with enough effort to increase  "her heart rate 20 to 29 minutes per day.  She exercises with enough effort to increase her heart rate 3 or less days per week.   She is taking medications regularly.       Needing adderall refills.  Was told insurance doesn't cover anymore.  Given a generic that is not working well at all.  For the fist couple weeks getting a mild headache, that has subsided.  Doesn't seem to be effective.          Review of Systems   Constitutional, HEENT, cardiovascular, pulmonary, gi and gu systems are negative, except as otherwise noted.      Objective    Vitals - Patient Reported  Weight (Patient Reported): 57.6 kg (127 lb)  Height (Patient Reported): 154.9 cm (5' 1\")  BMI (Based on Pt Reported Ht/Wt): 24  Pain Score: No Pain (0)        Physical Exam   GENERAL: Healthy, alert and no distress  EYES: Eyes grossly normal to inspection.  No discharge or erythema, or obvious scleral/conjunctival abnormalities.  RESP: No audible wheeze, cough, or visible cyanosis.  No visible retractions or increased work of breathing.    SKIN: Visible skin clear. No significant rash, abnormal pigmentation or lesions.  NEURO: Cranial nerves grossly intact.  Mentation and speech appropriate for age.  PSYCH: Mentation appears normal, affect normal/bright, judgement and insight intact, normal speech and appearance well-groomed.                Video-Visit Details    Type of service:  Video Visit     Originating Location (pt. Location): Home    Distant Location (provider location):  On-site  Platform used for Video Visit: Letha    "

## 2023-02-20 ENCOUNTER — VIRTUAL VISIT (OUTPATIENT)
Dept: BEHAVIORAL HEALTH | Facility: CLINIC | Age: 28
End: 2023-02-20
Payer: COMMERCIAL

## 2023-02-20 DIAGNOSIS — F41.1 GAD (GENERALIZED ANXIETY DISORDER): Primary | ICD-10-CM

## 2023-02-20 DIAGNOSIS — F98.8 ATTENTION DEFICIT DISORDER WITHOUT HYPERACTIVITY: ICD-10-CM

## 2023-02-20 DIAGNOSIS — F33.0 MAJOR DEPRESSIVE DISORDER, RECURRENT EPISODE, MILD (H): ICD-10-CM

## 2023-02-20 PROCEDURE — 90837 PSYTX W PT 60 MINUTES: CPT | Mod: VID | Performed by: MARRIAGE & FAMILY THERAPIST

## 2023-02-20 ASSESSMENT — ANXIETY QUESTIONNAIRES
1. FEELING NERVOUS, ANXIOUS, OR ON EDGE: SEVERAL DAYS
7. FEELING AFRAID AS IF SOMETHING AWFUL MIGHT HAPPEN: SEVERAL DAYS
GAD7 TOTAL SCORE: 7
3. WORRYING TOO MUCH ABOUT DIFFERENT THINGS: SEVERAL DAYS
5. BEING SO RESTLESS THAT IT IS HARD TO SIT STILL: SEVERAL DAYS
4. TROUBLE RELAXING: SEVERAL DAYS
7. FEELING AFRAID AS IF SOMETHING AWFUL MIGHT HAPPEN: SEVERAL DAYS
8. IF YOU CHECKED OFF ANY PROBLEMS, HOW DIFFICULT HAVE THESE MADE IT FOR YOU TO DO YOUR WORK, TAKE CARE OF THINGS AT HOME, OR GET ALONG WITH OTHER PEOPLE?: SOMEWHAT DIFFICULT
GAD7 TOTAL SCORE: 7
6. BECOMING EASILY ANNOYED OR IRRITABLE: SEVERAL DAYS
GAD7 TOTAL SCORE: 7
IF YOU CHECKED OFF ANY PROBLEMS ON THIS QUESTIONNAIRE, HOW DIFFICULT HAVE THESE PROBLEMS MADE IT FOR YOU TO DO YOUR WORK, TAKE CARE OF THINGS AT HOME, OR GET ALONG WITH OTHER PEOPLE: SOMEWHAT DIFFICULT
2. NOT BEING ABLE TO STOP OR CONTROL WORRYING: SEVERAL DAYS

## 2023-02-20 NOTE — PROGRESS NOTES
[facility] Primary Care: Integrated Behavioral Health  February 20th, 2023    Behavioral Health Clinician Progress Note    Patient Name: Naty Pérez           Service Type:  Individual      Service Location:   Face to Face in Home / Community     Session Start Time: 10:01 am  Session End Time: 10:54am      Session Length: 53 - 60      Attendees: Client     Service Modality:  Video Visit:      Provider verified identity through the following two step process.  Patient provided:  Patient is known previously to provider    Telemedicine Visit: The patient's condition can be safely assessed and treated via synchronous audio and visual telemedicine encounter.      Reason for Telemedicine Visit: Services only offered telehealth    Originating Site (Patient Location): Patient's home    Distant Site (Provider Location): [facility] Outpatient Setting: [place]    Consent:  The patient/guardian has verbally consented to: the potential risks and benefits of telemedicine (video visit) versus in person care; bill my insurance or make self-payment for services provided; and responsibility for payment of non-covered services.     Patient would like the video invitation sent by:  My Chart    Mode of Communication:  Video Conference via [employer]    As the provider I attest to compliance with applicable laws and regulations related to telemedicine.    Visit Activities (Refresh list every visit): [facility] Only    Diagnostic Assessment Date: 2/7/22 Roxanna Casas PhD, LP  Treatment Plan Review Date:5/20/23  See Flowsheets for today's PHQ-9 and ODILON-7 results  Previous PHQ-9:   PHQ-9 SCORE 12/22/2022 1/16/2023 2/20/2023   PHQ-9 Total Score MyChart 12 (Moderate depression) 11 (Moderate depression) 4 (Minimal depression)   PHQ-9 Total Score 12 11 4     Previous ODILON-7:   ODILON-7 SCORE 11/7/2022 1/16/2023 2/20/2023   Total Score 7 (mild anxiety) 7 (mild anxiety) 7 (mild anxiety)   Total Score 7 7 7       CHARLES  LEVEL:  CHARLES Score (Last Two) 2/6/2020   CHARLES Raw Score 28   Activation Score 50   CHARLES Level 2       DATA  Extended Session (60+ minutes): No  Interactive Complexity: No  Crisis: No  [facility] Patient: No    Treatment Objective(s) Addressed in This Session:  Target Behavior(s): mood    Depressed Mood: Increase interest, engagement, and pleasure in doing things  Decrease frequency and intensity of feeling down, depressed, hopeless  Improve quantity and quality of night time sleep / decrease daytime naps  Feel less tired and more energy during the day   Improve diet, appetite, mindful eating, and / or meal planning  Identify negative self-talk and behaviors: challenge core beliefs, myths, and actions  Improve concentration, focus, and mindfulness in daily activities   Discuss motivation / ambivalence about taking anti-depressant / mood stabilizer medication(s)  Discuss motivation / ambivalence about a referral to specialty mental health services (Therapy, [facility], [facility])  Anxiety: will experience a reduction in anxiety, will develop more effective coping skills to manage anxiety symptoms, will develop healthy cognitive patterns and beliefs and will increase ability to function adaptively  Attention Problems: will develop coping skills to effectively manage attention issues    Current Stressors / Issues:    Processed patients move and ways to work on organization to get tasks complete. Worked on CBT skills around anxiety with all the things going on and changes. Processed slowing things down and thinking rationally vs emotionally.   Reviewed safety with Patient. Patient denies any current SI,plans or intentions.     Progress on Treatment Objective(s) / Homework:  Stable - ACTION (Actively working towards change); Intervened by reinforcing change plan / affirming steps taken    Motivational Interviewing    MI Intervention: Expressed Empathy/Understanding, Supported Autonomy, Collaboration, Evocation, Permission to raise concern or  advise, Open-ended questions and Reflections: simple and complex     Change Talk Expressed by the Patient: Desire to change Ability to change Reasons to change Need to change Committment to change Activation Taking steps    Provider Response to Change Talk: E - Evoked more info from patient about behavior change, A - Affirmed patient's thoughts, decisions, or attempts at behavior change, R - Reflected patient's change talk and S - Summarized patient's change talk statements      Situation         Automatic Thoughts  Cognitive Distortions      Feelings        Behavior        Questioning Thoughts            Also provided psychoeducation about behavioral health condition, symptoms, and treatment options    Care Plan review completed: Yes    Medication Review:  Changes to psychiatric medications, see updated Medication List in EPIC.     Medication Compliance:  Yes    Changes in Health Issues:   None reported    Chemical Use Review:   Substance Use: Chemical use reviewed, no active concerns identified      Tobacco Use: No current tobacco use.      Assessment: Current Emotional / Mental Status (status of significant symptoms):  Risk status (Self / Other harm or suicidal ideation)  Patient denies a history of suicidal ideation, suicide attempts, self-injurious behavior, homicidal ideation, homicidal behavior and and other safety concerns  Patient denies current fears or concerns for personal safety.  Patient denies current or recent suicidal ideation or behaviors.  Patient denies current or recent homicidal ideation or behaviors.  Patient denies current or recent self injurious behavior or ideation.  Patient denies other safety concerns.  A safety and risk management plan has not been developed at this time, however patient was encouraged to call [facility] should there be a change in any of these risk factors.    Appearance:   Appropriate   Eye Contact:   Good   Psychomotor Behavior: Normal    Attitude:   Cooperative   Orientation:   All  Speech   Rate / Production: Normal    Volume:  Normal   Mood:    Anxious   Affect:    Appropriate   Thought Content:  Clear   Thought Form:  Coherent  Logical   Insight:    Good     Diagnoses:  1. ODILON (generalized anxiety disorder)    2. Attention deficit disorder without hyperactivity    3. Major depressive disorder, recurrent episode, mild (H)        Collateral Reports Completed:  Not Applicable    Plan: (Homework, other):  Patient was given information about behavioral services and encouraged to schedule a follow up appointment with the clinic [facility] in 1 month.  She was also given information about mental health symptoms and treatment options  and Cognitive Behavioral Therapy skills to practice when experiencing anxiety, depression and ADHD.  CD Recommendations: No indications of CD issues.  XENIA Carrero, [facility]      ______________________________________________________________________                                              Individual Treatment Plan    Patient's Name: Naty Pérez  YOB: 1995    Date of Creation: 3/14/22  Date Treatment Plan Last Reviewed/Revised: 2.20.23    DSM5 Diagnoses: Attention-Deficit/Hyperactivity Disorder  314.00 (F90.0) Predominantly inattentive presentation, 296.31 (F33.0) Major Depressive Disorder, Recurrent Episode, Mild With anxious distress or 300.02 (F41.1) Generalized Anxiety Disorder  Psychosocial / Contextual Factors: relationships, work  PROMIS-10  In general, would you say your health is:: 3  In general, would you say your quality of life is:: 3  In general, how would you rate your physical health?: 3  In general, how would you rate your mental health, including your mood and your ability to think?: 3  In general, how would you rate your satisfaction with your social activities and relationships?: 3  In general, please rate how well you carry out your usual social activities and roles. (This includes  activities at home, at work and in your community, and responsibilities as a parent, child, spouse, employee, friend, etc.): 3  To what extent are you able to carry out your everyday physical activities such as walking, climbing stairs, carrying groceries, or moving a chair?: 5  In the past 7 days, how often have you been bothered by emotional problems such as feeling anxious, depressed, or irritable?: 4  In the past 7 days, how would you rate your fatigue on average?: 3  In the past 7 days, how would you rate your pain on average, where 0 means no pain, and 10 means worst imaginable pain?: 1  Global Mental Health Score: 11  Global Physical Health Score: 15  PROMIS TOTAL - SUBSCORES: 28        Referral / Collaboration:  Referral to another professional/service is not indicated at this time..    Anticipated number of session for this episode of care: ongoing  Anticipation frequency of session: Every other week  Anticipated Duration of each session: 38-52 minutes  Treatment plan will be reviewed in 90 days or when goals have been changed.       MeasurableTreatment Goal(s) related to diagnosis / functional impairment(s)  Goal 1: Patient will experience a reduction in depressive symptoms along with a corresponding increase in positive emotion and life satisfaction.    I will know I've met my goal when I am less worried and mood is more .      Objective #A (Patient Action)    Patient will Increase interest, engagement, and pleasure in doing things.  Status: Continued - Date(s): 2.20.23    Intervention(s)  Therapist will help patient identify pleasant and mastery oriented events that elicit positive, relaxed mood.    Objective #B  Patient will Decrease frequency and intensity of feeling down, depressed, hopeless.  Status: Continued - Date(s):2.20.23    Intervention(s)  Therapist will introduce patient to cognitive-behavioral and acceptance and commitment therapy topics aimed to help reduce depression and  "anxiety    Objective #C  Patient will Identify negative self-talk and behaviors: challenge core beliefs, myths, and actions  Improve concentration, focus, and mindfulness in daily activities .  Status: Continued - Date(s):2.20.23    Intervention(s)  Therapist will help patient identify and manage negative self-talk and automatic thoughts; introduce patient to cognitive distortions; help patient develop cognitive diffusion techniques      Goal 2: Patient will experience a reduction in anxious symptoms, along with a corresponding increase in relaxed emotional states and life satisfaction.    I will know I've met my goal when more focused and less worried.      Objective #A (Patient Action)  Patient will use cognitive-behavioral and thought diffusion strategies identified in therapy to challenge anxious thoughts.    Status: Continued - Date(s):2.20.23    Intervention(s)  Therapist will utilize CBT and ACT ideas to help patient challenge anxious thoughts and reduce intensity/duration of anxious distress    Objective #B  Patient will use \"worry time\" each day for 15 minutes of scheduled worry and then defer obsessive or anxious thinking until the next structured worry time.    Status: Continued - Date(s): 2.20.23    Intervention(s)  Therapist will teach patient how to effectively utilize worry time and/or thought logs/journals each day and incorporate more relaxation behaviors into their routine.    Objective #C  Patient will identify the stressors which contribute to feelings of anxiety  Patient will increase engagement in adaptive coping skills and recreational activities, such as exercise and healthy socialization, to manage distress.  Status: Continued - Date(s):2.20.23    Intervention(s)  Therapist will help patient identify triggers/situational factors that contribute to anxiety and behavioral skills aimed to manage anxious distress.        Other Possible Therapeutic Intervention(s):    Psycho-education regarding " mental health diagnoses and treatment options    Supportive Therapy    Provide affirmations, reflections, and establish working rapport    Emphasize and reflect on strength of therapeutic relationship    Skills training    Explore skills useful to client in current situation.    Skills include assertiveness, communication, conflict management, problem-solving, relaxation, etc.    Solution-Focused Therapy    Explore patterns in patient's relationships and discuss options for new behaviors.    Explore patterns in patient's actions and choices and discuss options for new behaviors.    Cognitive-behavioral Therapy    Discuss common cognitive distortions, identify them in patient's life.    Explore ways to challenge, replace, and act against these cognitions.    Acceptance and Commitment Therapy    Explore and identify important values in patient's life.    Discuss ways to commit to behavioral activation around these values.    Psychodynamic psychotherapy    Discuss patient's emotional dynamics and issues and how they impact behaviors.    Explore patient's history of relationships and how they impact present behaviors.    Explore how to work with and make changes in these schemas and patterns.    Narrative Therapy    Explore the patient's story of his/her life from his/her perspective.    Explore alternate ways of understanding their experience, identifying exceptions, developing new themes.    Interpersonal Psychotherapy    Explore patterns in relationships that are effective or ineffective at helping patient reach their goals, find satisfying experience.    Discuss new patterns or behaviors to engage in for improved social functioning.    Behavioral Activation    Discuss steps patient can take to become more involved in meaningful activity.    Identify barriers to these activities and explore possible solutions.    Mindfulness-Based Strategies    Discuss skills based on development and application of  mindfulness.    Skills drawn from compassion-focused therapy, dialectical behavior therapy, mindfulness-based stress reduction, mindfulness-based cognitive therapy, etc.      Patient has reviewed and agreed to the above plan.      Vanita Negrete JANE   Behavioral Health Clinician   [facility]    February 20,2023

## 2023-03-13 ENCOUNTER — VIRTUAL VISIT (OUTPATIENT)
Dept: BEHAVIORAL HEALTH | Facility: CLINIC | Age: 28
End: 2023-03-13
Payer: COMMERCIAL

## 2023-03-13 DIAGNOSIS — F33.0 MAJOR DEPRESSIVE DISORDER, RECURRENT EPISODE, MILD (H): ICD-10-CM

## 2023-03-13 DIAGNOSIS — F98.8 ATTENTION DEFICIT DISORDER WITHOUT HYPERACTIVITY: ICD-10-CM

## 2023-03-13 DIAGNOSIS — F41.1 GAD (GENERALIZED ANXIETY DISORDER): Primary | ICD-10-CM

## 2023-03-13 PROCEDURE — 90834 PSYTX W PT 45 MINUTES: CPT | Mod: VID | Performed by: MARRIAGE & FAMILY THERAPIST

## 2023-03-13 NOTE — PROGRESS NOTES
[facility]: Integrated Behavioral Health  March 13th 2023    Behavioral Health Clinician Progress Note    Patient Name: Naty Pérez           Service Type:  Individual      Service Location:   Face to Face in Home / Community     Session Start Time: 2:40pm  Session End Time: 3:30 pm      Session Length: 38 - 52      Attendees: Client     Service Modality:  Video Visit:      Provider verified identity through the following two step process.  Patient provided:  Patient is known previously to provider    Telemedicine Visit: The patient's condition can be safely assessed and treated via synchronous audio and visual telemedicine encounter.      Reason for Telemedicine Visit: Services only offered telehealth    Originating Site (Patient Location): Patient's home    Distant Site (Provider Location): [facility] Outpatient Setting: [place]    Consent:  The patient/guardian has verbally consented to: the potential risks and benefits of telemedicine (video visit) versus in person care; bill my insurance or make self-payment for services provided; and responsibility for payment of non-covered services.     Patient would like the video invitation sent by:  My Chart    Mode of Communication:  Video Conference via [employer]    As the provider I attest to compliance with applicable laws and regulations related to telemedicine.    Visit Activities (Refresh list every visit): [facility] Only    Diagnostic Assessment Date: 2/7/22 Roxanna Casas PhD, LP  Treatment Plan Review Date:5/20/23  See Flowsheets for today's PHQ-9 and ODILON-7 results  Previous PHQ-9:   PHQ-9 SCORE 12/22/2022 1/16/2023 2/20/2023   PHQ-9 Total Score MyChart 12 (Moderate depression) 11 (Moderate depression) 4 (Minimal depression)   PHQ-9 Total Score 12 11 4     Previous ODILON-7:   ODILON-7 SCORE 11/7/2022 1/16/2023 2/20/2023   Total Score 7 (mild anxiety) 7 (mild anxiety) 7 (mild anxiety)   Total Score 7 7 7       CHARLES  LEVEL:  CHARLES Score (Last Two) 2/6/2020   CHARLES Raw Score 28   Activation Score 50   CHARLES Level 2       DATA  Extended Session (60+ minutes): No  Interactive Complexity: No  Crisis: No  [facility] Patient: No    Treatment Objective(s) Addressed in This Session:  Target Behavior(s): mood    Depressed Mood: Increase interest, engagement, and pleasure in doing things  Decrease frequency and intensity of feeling down, depressed, hopeless  Improve quantity and quality of night time sleep / decrease daytime naps  Feel less tired and more energy during the day   Improve diet, appetite, mindful eating, and / or meal planning  Identify negative self-talk and behaviors: challenge core beliefs, myths, and actions  Improve concentration, focus, and mindfulness in daily activities   Discuss motivation / ambivalence about taking anti-depressant / mood stabilizer medication(s)  Discuss motivation / ambivalence about a referral to specialty mental health services (Therapy, [facility], [facility])  Anxiety: will experience a reduction in anxiety, will develop more effective coping skills to manage anxiety symptoms, will develop healthy cognitive patterns and beliefs and will increase ability to function adaptively  Attention Problems: will develop coping skills to effectively manage attention issues    Current Stressors / Issues:    Continued to discuss organization with move. Processed her stress and how normal that this during this time. Processed all the changes and how hard things can be the best things. Discussed how change affects relationships and worked on HALT Reviewed safety with Patient. Patient denies any current SI,plans or intentions.       Progress on Treatment Objective(s) / Homework:  Stable - ACTION (Actively working towards change); Intervened by reinforcing change plan / affirming steps taken    Motivational Interviewing    MI Intervention: Expressed Empathy/Understanding, Supported Autonomy, Collaboration, Evocation, Permission to  raise concern or advise, Open-ended questions and Reflections: simple and complex     Change Talk Expressed by the Patient: Desire to change Ability to change Reasons to change Need to change Committment to change Activation Taking steps    Provider Response to Change Talk: E - Evoked more info from patient about behavior change, A - Affirmed patient's thoughts, decisions, or attempts at behavior change, R - Reflected patient's change talk and S - Summarized patient's change talk statements      Situation         Automatic Thoughts  Cognitive Distortions      Feelings        Behavior        Questioning Thoughts            Also provided psychoeducation about behavioral health condition, symptoms, and treatment options    Care Plan review completed: Yes    Medication Review:  Changes to psychiatric medications, see updated Medication List in EPIC.     Medication Compliance:  Yes    Changes in Health Issues:   None reported    Chemical Use Review:   Substance Use: Chemical use reviewed, no active concerns identified      Tobacco Use: No current tobacco use.      Assessment: Current Emotional / Mental Status (status of significant symptoms):  Risk status (Self / Other harm or suicidal ideation)  Patient denies a history of suicidal ideation, suicide attempts, self-injurious behavior, homicidal ideation, homicidal behavior and and other safety concerns  Patient denies current fears or concerns for personal safety.  Patient denies current or recent suicidal ideation or behaviors.  Patient denies current or recent homicidal ideation or behaviors.  Patient denies current or recent self injurious behavior or ideation.  Patient denies other safety concerns.  A safety and risk management plan has not been developed at this time, however patient was encouraged to call [facility] should there be a change in any of these risk factors.    Appearance:   Appropriate   Eye Contact:   Good   Psychomotor Behavior: Normal    Attitude:   Cooperative   Orientation:   All  Speech   Rate / Production: Normal    Volume:  Normal   Mood:    Anxious   Affect:    Appropriate   Thought Content:  Clear   Thought Form:  Coherent  Logical   Insight:    Good     Diagnoses:  1. ODILON (generalized anxiety disorder)    2. Attention deficit disorder without hyperactivity    3. Major depressive disorder, recurrent episode, mild (H)        Collateral Reports Completed:  Not Applicable    Plan: (Homework, other):  Patient was given information about behavioral services and encouraged to schedule a follow up appointment with the clinic [facility] [place] in 1 month.  She was also given information about mental health symptoms and treatment options  and Cognitive Behavioral Therapy skills to practice when experiencing anxiety, depression and ADHD.  CD Recommendations: No indications of CD issues.  XENIA Carrero, [facility] [place]      ______________________________________________________________________                                              Individual Treatment Plan    Patient's Name: Naty Pérez  YOB: 1995    Date of Creation: 3/14/22  Date Treatment Plan Last Reviewed/Revised: 2.20.23    DSM5 Diagnoses: Attention-Deficit/Hyperactivity Disorder  314.00 (F90.0) Predominantly inattentive presentation, 296.31 (F33.0) Major Depressive Disorder, Recurrent Episode, Mild With anxious distress or 300.02 (F41.1) Generalized Anxiety Disorder  Psychosocial / Contextual Factors: relationships, work  PROMIS-10  In general, would you say your health is:: 3  In general, would you say your quality of life is:: 3  In general, how would you rate your physical health?: 3  In general, how would you rate your mental health, including your mood and your ability to think?: 3  In general, how would you rate your satisfaction with your social activities and relationships?: 3  In general, please rate how well you carry out your usual social activities and roles. (This includes  activities at home, at work and in your community, and responsibilities as a parent, child, spouse, employee, friend, etc.): 3  To what extent are you able to carry out your everyday physical activities such as walking, climbing stairs, carrying groceries, or moving a chair?: 5  In the past 7 days, how often have you been bothered by emotional problems such as feeling anxious, depressed, or irritable?: 4  In the past 7 days, how would you rate your fatigue on average?: 3  In the past 7 days, how would you rate your pain on average, where 0 means no pain, and 10 means worst imaginable pain?: 1  Global Mental Health Score: 11  Global Physical Health Score: 15  PROMIS TOTAL - SUBSCORES: 28        Referral / Collaboration:  Referral to another professional/service is not indicated at this time..    Anticipated number of session for this episode of care: ongoing  Anticipation frequency of session: Every other week  Anticipated Duration of each session: 38-52 minutes  Treatment plan will be reviewed in 90 days or when goals have been changed.       MeasurableTreatment Goal(s) related to diagnosis / functional impairment(s)  Goal 1: Patient will experience a reduction in depressive symptoms along with a corresponding increase in positive emotion and life satisfaction.    I will know I've met my goal when I am less worried and mood is more .      Objective #A (Patient Action)    Patient will Increase interest, engagement, and pleasure in doing things.  Status: Continued - Date(s): 2.20.23    Intervention(s)  Therapist will help patient identify pleasant and mastery oriented events that elicit positive, relaxed mood.    Objective #B  Patient will Decrease frequency and intensity of feeling down, depressed, hopeless.  Status: Continued - Date(s):2.20.23    Intervention(s)  Therapist will introduce patient to cognitive-behavioral and acceptance and commitment therapy topics aimed to help reduce depression and  "anxiety    Objective #C  Patient will Identify negative self-talk and behaviors: challenge core beliefs, myths, and actions  Improve concentration, focus, and mindfulness in daily activities .  Status: Continued - Date(s):2.20.23    Intervention(s)  Therapist will help patient identify and manage negative self-talk and automatic thoughts; introduce patient to cognitive distortions; help patient develop cognitive diffusion techniques      Goal 2: Patient will experience a reduction in anxious symptoms, along with a corresponding increase in relaxed emotional states and life satisfaction.    I will know I've met my goal when more focused and less worried.      Objective #A (Patient Action)  Patient will use cognitive-behavioral and thought diffusion strategies identified in therapy to challenge anxious thoughts.    Status: Continued - Date(s):2.20.23    Intervention(s)  Therapist will utilize CBT and ACT ideas to help patient challenge anxious thoughts and reduce intensity/duration of anxious distress    Objective #B  Patient will use \"worry time\" each day for 15 minutes of scheduled worry and then defer obsessive or anxious thinking until the next structured worry time.    Status: Continued - Date(s): 2.20.23    Intervention(s)  Therapist will teach patient how to effectively utilize worry time and/or thought logs/journals each day and incorporate more relaxation behaviors into their routine.    Objective #C  Patient will identify the stressors which contribute to feelings of anxiety  Patient will increase engagement in adaptive coping skills and recreational activities, such as exercise and healthy socialization, to manage distress.  Status: Continued - Date(s):2.20.23    Intervention(s)  Therapist will help patient identify triggers/situational factors that contribute to anxiety and behavioral skills aimed to manage anxious distress.        Other Possible Therapeutic Intervention(s):    Psycho-education regarding " mental health diagnoses and treatment options    Supportive Therapy    Provide affirmations, reflections, and establish working rapport    Emphasize and reflect on strength of therapeutic relationship    Skills training    Explore skills useful to client in current situation.    Skills include assertiveness, communication, conflict management, problem-solving, relaxation, etc.    Solution-Focused Therapy    Explore patterns in patient's relationships and discuss options for new behaviors.    Explore patterns in patient's actions and choices and discuss options for new behaviors.    Cognitive-behavioral Therapy    Discuss common cognitive distortions, identify them in patient's life.    Explore ways to challenge, replace, and act against these cognitions.    Acceptance and Commitment Therapy    Explore and identify important values in patient's life.    Discuss ways to commit to behavioral activation around these values.    Psychodynamic psychotherapy    Discuss patient's emotional dynamics and issues and how they impact behaviors.    Explore patient's history of relationships and how they impact present behaviors.    Explore how to work with and make changes in these schemas and patterns.    Narrative Therapy    Explore the patient's story of his/her life from his/her perspective.    Explore alternate ways of understanding their experience, identifying exceptions, developing new themes.    Interpersonal Psychotherapy    Explore patterns in relationships that are effective or ineffective at helping patient reach their goals, find satisfying experience.    Discuss new patterns or behaviors to engage in for improved social functioning.    Behavioral Activation    Discuss steps patient can take to become more involved in meaningful activity.    Identify barriers to these activities and explore possible solutions.    Mindfulness-Based Strategies    Discuss skills based on development and application of  mindfulness.    Skills drawn from compassion-focused therapy, dialectical behavior therapy, mindfulness-based stress reduction, mindfulness-based cognitive therapy, etc.      Patient has reviewed and agreed to the above plan.      Vanita Negrete JANE   Behavioral Health Clinician   [facility]    February 20,2023

## 2023-04-03 ENCOUNTER — VIRTUAL VISIT (OUTPATIENT)
Dept: BEHAVIORAL HEALTH | Facility: CLINIC | Age: 28
End: 2023-04-03
Payer: COMMERCIAL

## 2023-04-03 DIAGNOSIS — F33.1 MODERATE EPISODE OF RECURRENT MAJOR DEPRESSIVE DISORDER (H): ICD-10-CM

## 2023-04-03 DIAGNOSIS — F41.1 GAD (GENERALIZED ANXIETY DISORDER): Primary | ICD-10-CM

## 2023-04-03 PROCEDURE — 90837 PSYTX W PT 60 MINUTES: CPT | Mod: VID | Performed by: MARRIAGE & FAMILY THERAPIST

## 2023-04-03 ASSESSMENT — ANXIETY QUESTIONNAIRES
7. FEELING AFRAID AS IF SOMETHING AWFUL MIGHT HAPPEN: SEVERAL DAYS
7. FEELING AFRAID AS IF SOMETHING AWFUL MIGHT HAPPEN: SEVERAL DAYS
IF YOU CHECKED OFF ANY PROBLEMS ON THIS QUESTIONNAIRE, HOW DIFFICULT HAVE THESE PROBLEMS MADE IT FOR YOU TO DO YOUR WORK, TAKE CARE OF THINGS AT HOME, OR GET ALONG WITH OTHER PEOPLE: SOMEWHAT DIFFICULT
2. NOT BEING ABLE TO STOP OR CONTROL WORRYING: MORE THAN HALF THE DAYS
3. WORRYING TOO MUCH ABOUT DIFFERENT THINGS: MORE THAN HALF THE DAYS
6. BECOMING EASILY ANNOYED OR IRRITABLE: SEVERAL DAYS
5. BEING SO RESTLESS THAT IT IS HARD TO SIT STILL: MORE THAN HALF THE DAYS
GAD7 TOTAL SCORE: 12
4. TROUBLE RELAXING: MORE THAN HALF THE DAYS
1. FEELING NERVOUS, ANXIOUS, OR ON EDGE: MORE THAN HALF THE DAYS
GAD7 TOTAL SCORE: 12
8. IF YOU CHECKED OFF ANY PROBLEMS, HOW DIFFICULT HAVE THESE MADE IT FOR YOU TO DO YOUR WORK, TAKE CARE OF THINGS AT HOME, OR GET ALONG WITH OTHER PEOPLE?: SOMEWHAT DIFFICULT
GAD7 TOTAL SCORE: 12

## 2023-04-03 NOTE — PROGRESS NOTES
[facility]: Integrated Behavioral Health  April 3 2023    Behavioral Health Clinician Progress Note    Patient Name: Naty Pérez           Service Type:  Individual      Service Location:   Face to Face in Home / Community     Session Start Time: 10:00 am  Session End Time: 10:59 am      Session Length: 53 - 60      Attendees: Client     Service Modality:  Video Visit:      Provider verified identity through the following two step process.  Patient provided:  Patient is known previously to provider    Telemedicine Visit: The patient's condition can be safely assessed and treated via synchronous audio and visual telemedicine encounter.      Reason for Telemedicine Visit: Services only offered telehealth    Originating Site (Patient Location): Patient's home    Distant Site (Provider Location): [facility] Outpatient Setting: [place]    Consent:  The patient/guardian has verbally consented to: the potential risks and benefits of telemedicine (video visit) versus in person care; bill my insurance or make self-payment for services provided; and responsibility for payment of non-covered services.     Patient would like the video invitation sent by:  My Chart    Mode of Communication:  Video Conference via [employer]    As the provider I attest to compliance with applicable laws and regulations related to telemedicine.    Visit Activities (Refresh list every visit): [facility] Only    Diagnostic Assessment Date: 2/7/22 Roxanna Casas PhD, LP  Treatment Plan Review Date:5/20/23  See Flowsheets for today's PHQ-9 and ODILON-7 results  Previous PHQ-9:       1/16/2023     9:51 AM 2/20/2023    10:00 AM 4/3/2023     9:56 AM   PHQ-9 SCORE   PHQ-9 Total Score MyChart 11 (Moderate depression) 4 (Minimal depression) 10 (Moderate depression)   PHQ-9 Total Score 11 4 10     Previous ODILON-7:       1/16/2023     9:51 AM 2/20/2023    10:01 AM 4/3/2023     9:57 AM   ODILON-7 SCORE   Total Score 7 (mild  anxiety) 7 (mild anxiety) 12 (moderate anxiety)   Total Score 7 7 12       CHARLES LEVEL:      2/6/2020    11:03 AM   CHARLES Score (Last Two)   CHARLES Raw Score 28   Activation Score 50   CHARLES Level 2       DATA  Extended Session (60+ minutes): No  Interactive Complexity: No  Crisis: No  [facility] Patient: No    Treatment Objective(s) Addressed in This Session:  Target Behavior(s): mood    Depressed Mood: Increase interest, engagement, and pleasure in doing things  Decrease frequency and intensity of feeling down, depressed, hopeless  Improve quantity and quality of night time sleep / decrease daytime naps  Feel less tired and more energy during the day   Improve diet, appetite, mindful eating, and / or meal planning  Identify negative self-talk and behaviors: challenge core beliefs, myths, and actions  Improve concentration, focus, and mindfulness in daily activities   Discuss motivation / ambivalence about taking anti-depressant / mood stabilizer medication(s)  Discuss motivation / ambivalence about a referral to specialty mental health services (Therapy, Day Tx, PHP)  Anxiety: will experience a reduction in anxiety, will develop more effective coping skills to manage anxiety symptoms, will develop healthy cognitive patterns and beliefs and will increase ability to function adaptively  Attention Problems: will develop coping skills to effectively manage attention issues    Current Stressors / Issues:    Processed with pt her fear of marriage and what the next steps are for her relationships. Discussed how her relationship and how it is different than her mothers. Reviewed the stress at school and how hard this year has been. Talked about succusses and learning to put boundaries down. No safety/risk reported.       Progress on Treatment Objective(s) / Homework:  Stable - ACTION (Actively working towards change); Intervened by reinforcing change plan / affirming steps taken    Motivational Interviewing    MI Intervention: Expressed  Empathy/Understanding, Supported Autonomy, Collaboration, Evocation, Permission to raise concern or advise, Open-ended questions and Reflections: simple and complex     Change Talk Expressed by the Patient: Desire to change Ability to change Reasons to change Need to change Committment to change Activation Taking steps    Provider Response to Change Talk: E - Evoked more info from patient about behavior change, A - Affirmed patient's thoughts, decisions, or attempts at behavior change, R - Reflected patient's change talk and S - Summarized patient's change talk statements      Situation         Automatic Thoughts  Cognitive Distortions      Feelings        Behavior        Questioning Thoughts            Also provided psychoeducation about behavioral health condition, symptoms, and treatment options    Care Plan review completed: Yes    Medication Review:  Changes to psychiatric medications, see updated Medication List in EPIC.     Medication Compliance:  Yes    Changes in Health Issues:   None reported    Chemical Use Review:   Substance Use: Chemical use reviewed, no active concerns identified      Tobacco Use: No current tobacco use.      Assessment: Current Emotional / Mental Status (status of significant symptoms):  Risk status (Self / Other harm or suicidal ideation)  Patient denies a history of suicidal ideation, suicide attempts, self-injurious behavior, homicidal ideation, homicidal behavior and and other safety concerns  Patient denies current fears or concerns for personal safety.  Patient denies current or recent suicidal ideation or behaviors.  Patient denies current or recent homicidal ideation or behaviors.  Patient denies current or recent self injurious behavior or ideation.  Patient denies other safety concerns.  A safety and risk management plan has not been developed at this time, however patient was encouraged to call [facility] should there be a change in any of these risk  factors.    Appearance:   Appropriate   Eye Contact:   Good   Psychomotor Behavior: Normal   Attitude:   Cooperative   Orientation:   All  Speech   Rate / Production: Normal    Volume:  Normal   Mood:    Anxious   Affect:    Appropriate   Thought Content:  Clear   Thought Form:  Coherent  Logical   Insight:    Good     Diagnoses:  1. ODILON (generalized anxiety disorder)    2. Moderate episode of recurrent major depressive disorder (H)        Collateral Reports Completed:  Not Applicable    Plan: (Homework, other):  Patient was given information about behavioral services and encouraged to schedule a follow up appointment with the clinic [facility] in 1 month.  She was also given information about mental health symptoms and treatment options  and Cognitive Behavioral Therapy skills to practice when experiencing anxiety, depression and ADHD.  CD Recommendations: No indications of CD issues.  XENIA Carrero, [facility]      ______________________________________________________________________                                              Individual Treatment Plan    Patient's Name: Naty Pérez  YOB: 1995    Date of Creation: 3/14/22  Date Treatment Plan Last Reviewed/Revised: 2.20.23    DSM5 Diagnoses: Attention-Deficit/Hyperactivity Disorder  314.00 (F90.0) Predominantly inattentive presentation, 296.31 (F33.0) Major Depressive Disorder, Recurrent Episode, Mild With anxious distress or 300.02 (F41.1) Generalized Anxiety Disorder  Psychosocial / Contextual Factors: relationships, work  PROMIS-10  In general, would you say your health is:: 3  In general, would you say your quality of life is:: 3  In general, how would you rate your physical health?: 3  In general, how would you rate your mental health, including your mood and your ability to think?: 3  In general, how would you rate your satisfaction with your social activities and relationships?: 3  In general, please rate how well you carry out your usual  social activities and roles. (This includes activities at home, at work and in your community, and responsibilities as a parent, child, spouse, employee, friend, etc.): 3  To what extent are you able to carry out your everyday physical activities such as walking, climbing stairs, carrying groceries, or moving a chair?: 5  In the past 7 days, how often have you been bothered by emotional problems such as feeling anxious, depressed, or irritable?: 4  In the past 7 days, how would you rate your fatigue on average?: 3  In the past 7 days, how would you rate your pain on average, where 0 means no pain, and 10 means worst imaginable pain?: 1  Global Mental Health Score: 11  Global Physical Health Score: 15  PROMIS TOTAL - SUBSCORES: 28        Referral / Collaboration:  Referral to another professional/service is not indicated at this time..    Anticipated number of session for this episode of care: ongoing  Anticipation frequency of session: Every other week  Anticipated Duration of each session: 38-52 minutes  Treatment plan will be reviewed in 90 days or when goals have been changed.       MeasurableTreatment Goal(s) related to diagnosis / functional impairment(s)  Goal 1: Patient will experience a reduction in depressive symptoms along with a corresponding increase in positive emotion and life satisfaction.    I will know I've met my goal when I am less worried and mood is more .      Objective #A (Patient Action)    Patient will Increase interest, engagement, and pleasure in doing things.  Status: Continued - Date(s): 2.20.23    Intervention(s)  Therapist will help patient identify pleasant and mastery oriented events that elicit positive, relaxed mood.    Objective #B  Patient will Decrease frequency and intensity of feeling down, depressed, hopeless.  Status: Continued - Date(s):2.20.23    Intervention(s)  Therapist will introduce patient to cognitive-behavioral and acceptance and commitment therapy topics aimed to  "help reduce depression and anxiety    Objective #C  Patient will Identify negative self-talk and behaviors: challenge core beliefs, myths, and actions  Improve concentration, focus, and mindfulness in daily activities .  Status: Continued - Date(s):2.20.23    Intervention(s)  Therapist will help patient identify and manage negative self-talk and automatic thoughts; introduce patient to cognitive distortions; help patient develop cognitive diffusion techniques      Goal 2: Patient will experience a reduction in anxious symptoms, along with a corresponding increase in relaxed emotional states and life satisfaction.    I will know I've met my goal when more focused and less worried.      Objective #A (Patient Action)  Patient will use cognitive-behavioral and thought diffusion strategies identified in therapy to challenge anxious thoughts.    Status: Continued - Date(s):2.20.23    Intervention(s)  Therapist will utilize CBT and ACT ideas to help patient challenge anxious thoughts and reduce intensity/duration of anxious distress    Objective #B  Patient will use \"worry time\" each day for 15 minutes of scheduled worry and then defer obsessive or anxious thinking until the next structured worry time.    Status: Continued - Date(s): 2.20.23    Intervention(s)  Therapist will teach patient how to effectively utilize worry time and/or thought logs/journals each day and incorporate more relaxation behaviors into their routine.    Objective #C  Patient will identify the stressors which contribute to feelings of anxiety  Patient will increase engagement in adaptive coping skills and recreational activities, such as exercise and healthy socialization, to manage distress.  Status: Continued - Date(s):2.20.23    Intervention(s)  Therapist will help patient identify triggers/situational factors that contribute to anxiety and behavioral skills aimed to manage anxious distress.        Other Possible Therapeutic " Intervention(s):    Psycho-education regarding mental health diagnoses and treatment options    Supportive Therapy    Provide affirmations, reflections, and establish working rapport    Emphasize and reflect on strength of therapeutic relationship    Skills training    Explore skills useful to client in current situation.    Skills include assertiveness, communication, conflict management, problem-solving, relaxation, etc.    Solution-Focused Therapy    Explore patterns in patient's relationships and discuss options for new behaviors.    Explore patterns in patient's actions and choices and discuss options for new behaviors.    Cognitive-behavioral Therapy    Discuss common cognitive distortions, identify them in patient's life.    Explore ways to challenge, replace, and act against these cognitions.    Acceptance and Commitment Therapy    Explore and identify important values in patient's life.    Discuss ways to commit to behavioral activation around these values.    Psychodynamic psychotherapy    Discuss patient's emotional dynamics and issues and how they impact behaviors.    Explore patient's history of relationships and how they impact present behaviors.    Explore how to work with and make changes in these schemas and patterns.    Narrative Therapy    Explore the patient's story of his/her life from his/her perspective.    Explore alternate ways of understanding their experience, identifying exceptions, developing new themes.    Interpersonal Psychotherapy    Explore patterns in relationships that are effective or ineffective at helping patient reach their goals, find satisfying experience.    Discuss new patterns or behaviors to engage in for improved social functioning.    Behavioral Activation    Discuss steps patient can take to become more involved in meaningful activity.    Identify barriers to these activities and explore possible solutions.    Mindfulness-Based Strategies    Discuss skills based on  development and application of mindfulness.    Skills drawn from compassion-focused therapy, dialectical behavior therapy, mindfulness-based stress reduction, mindfulness-based cognitive therapy, etc.      Patient has reviewed and agreed to the above plan.      Vanita Negrete [facility]   Behavioral Health Clinician   [facility]    February 20,2023

## 2023-05-04 ENCOUNTER — VIRTUAL VISIT (OUTPATIENT)
Dept: BEHAVIORAL HEALTH | Facility: CLINIC | Age: 28
End: 2023-05-04
Payer: COMMERCIAL

## 2023-05-04 DIAGNOSIS — F98.8 ATTENTION DEFICIT DISORDER WITHOUT HYPERACTIVITY: ICD-10-CM

## 2023-05-04 DIAGNOSIS — F41.1 GAD (GENERALIZED ANXIETY DISORDER): Primary | ICD-10-CM

## 2023-05-04 DIAGNOSIS — F33.0 MAJOR DEPRESSIVE DISORDER, RECURRENT EPISODE, MILD (H): ICD-10-CM

## 2023-05-04 PROCEDURE — 90834 PSYTX W PT 45 MINUTES: CPT | Mod: VID | Performed by: MARRIAGE & FAMILY THERAPIST

## 2023-05-04 ASSESSMENT — PATIENT HEALTH QUESTIONNAIRE - PHQ9
SUM OF ALL RESPONSES TO PHQ QUESTIONS 1-9: 3
10. IF YOU CHECKED OFF ANY PROBLEMS, HOW DIFFICULT HAVE THESE PROBLEMS MADE IT FOR YOU TO DO YOUR WORK, TAKE CARE OF THINGS AT HOME, OR GET ALONG WITH OTHER PEOPLE: SOMEWHAT DIFFICULT
SUM OF ALL RESPONSES TO PHQ QUESTIONS 1-9: 3

## 2023-05-04 NOTE — PROGRESS NOTES
[facility] Primary Care: Integrated Behavioral Health  May 4th 2023    Behavioral Health Clinician Progress Note    Patient Name: Naty Pérez           Service Type:  Individual      Service Location:   Face to Face in Home / Community     Session Start Time: 2:30 pm  Session End Time: 3:09pm      Session Length: 38 - 52      Attendees: Client     Service Modality:  Video Visit:      Provider verified identity through the following two step process.  Patient provided:  Patient is known previously to provider    Telemedicine Visit: The patient's condition can be safely assessed and treated via synchronous audio and visual telemedicine encounter.      Reason for Telemedicine Visit: Services only offered telehealth    Originating Site (Patient Location): Patient's home    Distant Site (Provider Location): [facility] Outpatient Setting: [place]    Consent:  The patient/guardian has verbally consented to: the potential risks and benefits of telemedicine (video visit) versus in person care; bill my insurance or make self-payment for services provided; and responsibility for payment of non-covered services.     Patient would like the video invitation sent by:  My Chart    Mode of Communication:  Video Conference via [employer]    As the provider I attest to compliance with applicable laws and regulations related to telemedicine.    Visit Activities (Refresh list every visit): [facility] Only    Diagnostic Assessment Date: 2/7/22 Roxanna Casas PhD, LP  Treatment Plan Review Date:5/20/23  See Flowsheets for today's PHQ-9 and ODILON-7 results  Previous PHQ-9:       2/20/2023    10:00 AM 4/3/2023     9:56 AM 5/4/2023     2:27 PM   PHQ-9 SCORE   PHQ-9 Total Score MyChart 4 (Minimal depression) 10 (Moderate depression) 3 (Minimal depression)   PHQ-9 Total Score 4 10 3     Previous ODILON-7:       1/16/2023     9:51 AM 2/20/2023    10:01 AM 4/3/2023     9:57 AM   ODILON-7 SCORE   Total Score 7 (mild anxiety)  7 (mild anxiety) 12 (moderate anxiety)   Total Score 7 7 12       CHARLES LEVEL:      2/6/2020    11:03 AM   CHARLES Score (Last Two)   CHARLES Raw Score 28   Activation Score 50   CHARLES Level 2       DATA  Extended Session (60+ minutes): No  Interactive Complexity: No  Crisis: No  [facility] Patient: No    Treatment Objective(s) Addressed in This Session:  Target Behavior(s): mood    Depressed Mood: Increase interest, engagement, and pleasure in doing things  Decrease frequency and intensity of feeling down, depressed, hopeless  Improve quantity and quality of night time sleep / decrease daytime naps  Feel less tired and more energy during the day   Improve diet, appetite, mindful eating, and / or meal planning  Identify negative self-talk and behaviors: challenge core beliefs, myths, and actions  Improve concentration, focus, and mindfulness in daily activities   Discuss motivation / ambivalence about taking anti-depressant / mood stabilizer medication(s)  Discuss motivation / ambivalence about a referral to specialty mental health services (Therapy, Day T[facility], [facility])  Anxiety: will experience a reduction in anxiety, will develop more effective coping skills to manage anxiety symptoms, will develop healthy cognitive patterns and beliefs and will increase ability to function adaptively  Attention Problems: will develop coping skills to effectively manage attention issues    Current Stressors / Issues:    Pt reports that she is doing well. Still struggling with organizing. Talked about ways to use her phone to help with organization and timers. No safety/risk reported. Writer let patient know she is leaving her positions. Pt would still like to have someone to talk to. Set pt up with Karyn Junior for therapy.       Progress on Treatment Objective(s) / Homework:  Stable - ACTION (Actively working towards change); Intervened by reinforcing change plan / affirming steps taken    Motivational Interviewing    MI Intervention: Expressed  Empathy/Understanding, Supported Autonomy, Collaboration, Evocation, Permission to raise concern or advise, Open-ended questions and Reflections: simple and complex     Change Talk Expressed by the Patient: Desire to change Ability to change Reasons to change Need to change Committment to change Activation Taking steps    Provider Response to Change Talk: E - Evoked more info from patient about behavior change, A - Affirmed patient's thoughts, decisions, or attempts at behavior change, R - Reflected patient's change talk and S - Summarized patient's change talk statements      Situation         Automatic Thoughts  Cognitive Distortions      Feelings        Behavior        Questioning Thoughts            Also provided psychoeducation about behavioral health condition, symptoms, and treatment options    Care Plan review completed: Yes    Medication Review:  Changes to psychiatric medications, see updated Medication List in EPIC.     Medication Compliance:  Yes    Changes in Health Issues:   None reported    Chemical Use Review:   Substance Use: Chemical use reviewed, no active concerns identified      Tobacco Use: No current tobacco use.      Assessment: Current Emotional / Mental Status (status of significant symptoms):  Risk status (Self / Other harm or suicidal ideation)  Patient denies a history of suicidal ideation, suicide attempts, self-injurious behavior, homicidal ideation, homicidal behavior and and other safety concerns  Patient denies current fears or concerns for personal safety.  Patient denies current or recent suicidal ideation or behaviors.  Patient denies current or recent homicidal ideation or behaviors.  Patient denies current or recent self injurious behavior or ideation.  Patient denies other safety concerns.  A safety and risk management plan has not been developed at this time, however patient was encouraged to call [facility] should there be a change in any of these risk  factors.    Appearance:   Appropriate   Eye Contact:   Good   Psychomotor Behavior: Normal   Attitude:   Cooperative   Orientation:   All  Speech   Rate / Production: Normal    Volume:  Normal   Mood:    Anxious   Affect:    Appropriate   Thought Content:  Clear   Thought Form:  Coherent  Logical   Insight:    Good     Diagnoses:  1. ODILON (generalized anxiety disorder)    2. Major depressive disorder, recurrent episode, mild (H)    3. Attention deficit disorder without hyperactivity        Collateral Reports Completed:  Not Applicable    Plan: (Homework, other):  Patient was given information about behavioral services and encouraged to schedule a follow up appointment with the clinic [facility] in 1 month.  She was also given information about mental health symptoms and treatment options  and Cognitive Behavioral Therapy skills to practice when experiencing anxiety, depression and ADHD.  CD Recommendations: No indications of CD issues.  XENIA Carrero, [facility]      ______________________________________________________________________                                              Individual Treatment Plan    Patient's Name: Naty Pérez  YOB: 1995    Date of Creation: 3/14/22  Date Treatment Plan Last Reviewed/Revised: 2.20.23    DSM5 Diagnoses: Attention-Deficit/Hyperactivity Disorder  314.00 (F90.0) Predominantly inattentive presentation, 296.31 (F33.0) Major Depressive Disorder, Recurrent Episode, Mild With anxious distress or 300.02 (F41.1) Generalized Anxiety Disorder  Psychosocial / Contextual Factors: relationships, work  PROMIS-10  In general, would you say your health is:: 3  In general, would you say your quality of life is:: 3  In general, how would you rate your physical health?: 3  In general, how would you rate your mental health, including your mood and your ability to think?: 3  In general, how would you rate your satisfaction with your social activities and relationships?: 3  In general,  please rate how well you carry out your usual social activities and roles. (This includes activities at home, at work and in your community, and responsibilities as a parent, child, spouse, employee, friend, etc.): 3  To what extent are you able to carry out your everyday physical activities such as walking, climbing stairs, carrying groceries, or moving a chair?: 5  In the past 7 days, how often have you been bothered by emotional problems such as feeling anxious, depressed, or irritable?: 4  In the past 7 days, how would you rate your fatigue on average?: 3  In the past 7 days, how would you rate your pain on average, where 0 means no pain, and 10 means worst imaginable pain?: 1  Global Mental Health Score: 11  Global Physical Health Score: 15  PROMIS TOTAL - SUBSCORES: 28        Referral / Collaboration:  Referral to another professional/service is not indicated at this time..    Anticipated number of session for this episode of care: ongoing  Anticipation frequency of session: Every other week  Anticipated Duration of each session: 38-52 minutes  Treatment plan will be reviewed in 90 days or when goals have been changed.       MeasurableTreatment Goal(s) related to diagnosis / functional impairment(s)  Goal 1: Patient will experience a reduction in depressive symptoms along with a corresponding increase in positive emotion and life satisfaction.    I will know I've met my goal when I am less worried and mood is more .      Objective #A (Patient Action)    Patient will Increase interest, engagement, and pleasure in doing things.  Status: Continued - Date(s): 2.20.23    Intervention(s)  Therapist will help patient identify pleasant and mastery oriented events that elicit positive, relaxed mood.    Objective #B  Patient will Decrease frequency and intensity of feeling down, depressed, hopeless.  Status: Continued - Date(s):2.20.23    Intervention(s)  Therapist will introduce patient to cognitive-behavioral and  "acceptance and commitment therapy topics aimed to help reduce depression and anxiety    Objective #C  Patient will Identify negative self-talk and behaviors: challenge core beliefs, myths, and actions  Improve concentration, focus, and mindfulness in daily activities .  Status: Continued - Date(s):2.20.23    Intervention(s)  Therapist will help patient identify and manage negative self-talk and automatic thoughts; introduce patient to cognitive distortions; help patient develop cognitive diffusion techniques      Goal 2: Patient will experience a reduction in anxious symptoms, along with a corresponding increase in relaxed emotional states and life satisfaction.    I will know I've met my goal when more focused and less worried.      Objective #A (Patient Action)  Patient will use cognitive-behavioral and thought diffusion strategies identified in therapy to challenge anxious thoughts.    Status: Continued - Date(s):2.20.23    Intervention(s)  Therapist will utilize CBT and ACT ideas to help patient challenge anxious thoughts and reduce intensity/duration of anxious distress    Objective #B  Patient will use \"worry time\" each day for 15 minutes of scheduled worry and then defer obsessive or anxious thinking until the next structured worry time.    Status: Continued - Date(s): 2.20.23    Intervention(s)  Therapist will teach patient how to effectively utilize worry time and/or thought logs/journals each day and incorporate more relaxation behaviors into their routine.    Objective #C  Patient will identify the stressors which contribute to feelings of anxiety  Patient will increase engagement in adaptive coping skills and recreational activities, such as exercise and healthy socialization, to manage distress.  Status: Continued - Date(s):2.20.23    Intervention(s)  Therapist will help patient identify triggers/situational factors that contribute to anxiety and behavioral skills aimed to manage anxious " distress.        Other Possible Therapeutic Intervention(s):    Psycho-education regarding mental health diagnoses and treatment options    Supportive Therapy    Provide affirmations, reflections, and establish working rapport    Emphasize and reflect on strength of therapeutic relationship    Skills training    Explore skills useful to client in current situation.    Skills include assertiveness, communication, conflict management, problem-solving, relaxation, etc.    Solution-Focused Therapy    Explore patterns in patient's relationships and discuss options for new behaviors.    Explore patterns in patient's actions and choices and discuss options for new behaviors.    Cognitive-behavioral Therapy    Discuss common cognitive distortions, identify them in patient's life.    Explore ways to challenge, replace, and act against these cognitions.    Acceptance and Commitment Therapy    Explore and identify important values in patient's life.    Discuss ways to commit to behavioral activation around these values.    Psychodynamic psychotherapy    Discuss patient's emotional dynamics and issues and how they impact behaviors.    Explore patient's history of relationships and how they impact present behaviors.    Explore how to work with and make changes in these schemas and patterns.    Narrative Therapy    Explore the patient's story of his/her life from his/her perspective.    Explore alternate ways of understanding their experience, identifying exceptions, developing new themes.    Interpersonal Psychotherapy    Explore patterns in relationships that are effective or ineffective at helping patient reach their goals, find satisfying experience.    Discuss new patterns or behaviors to engage in for improved social functioning.    Behavioral Activation    Discuss steps patient can take to become more involved in meaningful activity.    Identify barriers to these activities and explore possible  solutions.    Mindfulness-Based Strategies    Discuss skills based on development and application of mindfulness.    Skills drawn from compassion-focused therapy, dialectical behavior therapy, mindfulness-based stress reduction, mindfulness-based cognitive therapy, etc.      Patient has reviewed and agreed to the above plan.      Vanita Negrete [facility]   Behavioral Health Clinician   [facility]    February 20,2023

## 2023-05-05 DIAGNOSIS — N94.3 PMS (PREMENSTRUAL SYNDROME): ICD-10-CM

## 2023-05-08 NOTE — TELEPHONE ENCOUNTER
---Prescription approved per [facility] Refill Protocol.       Pippa Barr RN BSN     [facility]        --Last visit:  2/6/2023

## 2023-05-17 ENCOUNTER — MYC MEDICAL ADVICE (OUTPATIENT)
Dept: FAMILY MEDICINE | Facility: CLINIC | Age: 28
End: 2023-05-17
Payer: COMMERCIAL

## 2023-05-17 DIAGNOSIS — F98.8 ATTENTION DEFICIT DISORDER WITHOUT HYPERACTIVITY: ICD-10-CM

## 2023-05-17 NOTE — TELEPHONE ENCOUNTER
Routing refill request to provider for review/approval because:  -- Drug not on the [facility] refill protocol   -- Drug not active on patient's medication list  -- Patient requesting bridge.     Last Written Prescription Date:  4/9/2023 - 5/9/2023  Last Fill Quantity: 30,  # refills: 0   Last office visit: 8/8/2022. Last virtual visit: 2/6/2023 with prescribing provider:  Shania Abel   Future Office Visit:  6/22/2023    Dolores Shirley, COBYN RN  [facility]

## 2023-05-18 RX ORDER — DEXTROAMPHETAMINE SACCHARATE, AMPHETAMINE ASPARTATE MONOHYDRATE, DEXTROAMPHETAMINE SULFATE AND AMPHETAMINE SULFATE 2.5; 2.5; 2.5; 2.5 MG/1; MG/1; MG/1; MG/1
10 CAPSULE, EXTENDED RELEASE ORAL DAILY
Qty: 30 CAPSULE | Refills: 0 | OUTPATIENT
Start: 2023-05-18

## 2023-05-18 RX ORDER — DEXTROAMPHETAMINE/AMPHETAMINE 10 MG
CAPSULE, EXT RELEASE 24 HR ORAL
Qty: 30 CAPSULE | Refills: 0 | OUTPATIENT
Start: 2023-05-18

## 2023-05-18 NOTE — TELEPHONE ENCOUNTER
Needs a visit as this is a controlled substance.  If she is stable on current dose, can do e-visit, otherwise, virtual is fine.

## 2023-05-19 ENCOUNTER — VIRTUAL VISIT (OUTPATIENT)
Dept: FAMILY MEDICINE | Facility: CLINIC | Age: 28
End: 2023-05-19
Payer: COMMERCIAL

## 2023-05-19 DIAGNOSIS — F90.0 ATTENTION-DEFICIT HYPERACTIVITY DISORDER, PREDOMINANTLY INATTENTIVE TYPE: Primary | ICD-10-CM

## 2023-05-19 PROCEDURE — 99213 OFFICE O/P EST LOW 20 MIN: CPT | Mod: VID | Performed by: NURSE PRACTITIONER

## 2023-05-19 RX ORDER — DEXTROAMPHETAMINE SACCHARATE, AMPHETAMINE ASPARTATE MONOHYDRATE, DEXTROAMPHETAMINE SULFATE AND AMPHETAMINE SULFATE 2.5; 2.5; 2.5; 2.5 MG/1; MG/1; MG/1; MG/1
10 CAPSULE, EXTENDED RELEASE ORAL DAILY
Qty: 30 CAPSULE | Refills: 0 | Status: SHIPPED | OUTPATIENT
Start: 2023-05-19 | End: 2023-06-18

## 2023-05-19 RX ORDER — DEXTROAMPHETAMINE SACCHARATE, AMPHETAMINE ASPARTATE MONOHYDRATE, DEXTROAMPHETAMINE SULFATE AND AMPHETAMINE SULFATE 2.5; 2.5; 2.5; 2.5 MG/1; MG/1; MG/1; MG/1
10 CAPSULE, EXTENDED RELEASE ORAL DAILY
Qty: 30 CAPSULE | Refills: 0 | Status: SHIPPED | OUTPATIENT
Start: 2023-06-19 | End: 2023-07-19

## 2023-05-19 RX ORDER — DEXTROAMPHETAMINE SACCHARATE, AMPHETAMINE ASPARTATE MONOHYDRATE, DEXTROAMPHETAMINE SULFATE AND AMPHETAMINE SULFATE 2.5; 2.5; 2.5; 2.5 MG/1; MG/1; MG/1; MG/1
10 CAPSULE, EXTENDED RELEASE ORAL DAILY
Qty: 30 CAPSULE | Refills: 0 | Status: SHIPPED | OUTPATIENT
Start: 2023-07-20 | End: 2023-08-19

## 2023-05-19 NOTE — PROGRESS NOTES
Naty is a 27 year old who is being evaluated via a billable video visit.      How would you like to obtain your AVS? MyChart  If the video visit is dropped, the invitation should be resent by: Text to cell phone: [phone]  Will anyone else be joining your video visit? No          Assessment & Plan     Attention-deficit hyperactivity disorder, predominantly inattentive type   Stable, tolerating med, no changes at this time.  PDMP reviewed.  Will update CSA in June when she returns for physical.  Refills provided  - amphetamine-dextroamphetamine (ADDERALL XR) 10 MG 24 hr capsule; Take 1 capsule (10 mg) by mouth daily for 30 days  - amphetamine-dextroamphetamine (ADDERALL XR) 10 MG 24 hr capsule; Take 1 capsule (10 mg) by mouth daily for 30 days  - amphetamine-dextroamphetamine (ADDERALL XR) 10 MG 24 hr capsule; Take 1 capsule (10 mg) by mouth daily for 30 days    Prescription drug management             [facility]   Naty is a 27 year old, presenting for the following health issues:  Recheck Medication (Med Check )        5/19/2023     1:37 PM   Additional Questions   Roomed by Maite Ngo     HPI     Using daily, going well.  Just needs refills of current dose.      Review of Systems   Constitutional, HEENT, cardiovascular, pulmonary, gi and gu systems are negative, except as otherwise noted.      Objective           Vitals:  No vitals were obtained today due to virtual visit.    Physical Exam   GENERAL: Healthy, alert and no distress  EYES: Eyes grossly normal to inspection.  No discharge or erythema, or obvious scleral/conjunctival abnormalities.  RESP: No audible wheeze, cough, or visible cyanosis.  No visible retractions or increased work of breathing.    SKIN: Visible skin clear. No significant rash, abnormal pigmentation or lesions.  NEURO: Cranial nerves grossly intact.  Mentation and speech appropriate for age.  PSYCH: Mentation  appears normal, affect normal/bright, judgement and insight intact, normal speech and appearance well-groomed.                Video-Visit Details    Type of service:  Video Visit   Video Start Time: 1433  Video End Time:1436    Originating Location (pt. Location): Home    Distant Location (provider location):  On-site  Platform used for Video Visit: [employer]     none

## 2023-06-06 ENCOUNTER — VIRTUAL VISIT (OUTPATIENT)
Dept: BEHAVIORAL HEALTH | Facility: CLINIC | Age: 28
End: 2023-06-06
Payer: COMMERCIAL

## 2023-06-06 DIAGNOSIS — F41.1 GAD (GENERALIZED ANXIETY DISORDER): Primary | ICD-10-CM

## 2023-06-06 DIAGNOSIS — F98.8 ATTENTION DEFICIT DISORDER WITHOUT HYPERACTIVITY: ICD-10-CM

## 2023-06-06 DIAGNOSIS — F33.0 MAJOR DEPRESSIVE DISORDER, RECURRENT EPISODE, MILD (H): ICD-10-CM

## 2023-06-06 PROCEDURE — 90832 PSYTX W PT 30 MINUTES: CPT | Mod: VID

## 2023-06-06 ASSESSMENT — ANXIETY QUESTIONNAIRES
1. FEELING NERVOUS, ANXIOUS, OR ON EDGE: MORE THAN HALF THE DAYS
5. BEING SO RESTLESS THAT IT IS HARD TO SIT STILL: MORE THAN HALF THE DAYS
GAD7 TOTAL SCORE: 12
8. IF YOU CHECKED OFF ANY PROBLEMS, HOW DIFFICULT HAVE THESE MADE IT FOR YOU TO DO YOUR WORK, TAKE CARE OF THINGS AT HOME, OR GET ALONG WITH OTHER PEOPLE?: SOMEWHAT DIFFICULT
GAD7 TOTAL SCORE: 12
IF YOU CHECKED OFF ANY PROBLEMS ON THIS QUESTIONNAIRE, HOW DIFFICULT HAVE THESE PROBLEMS MADE IT FOR YOU TO DO YOUR WORK, TAKE CARE OF THINGS AT HOME, OR GET ALONG WITH OTHER PEOPLE: SOMEWHAT DIFFICULT
GAD7 TOTAL SCORE: 12
2. NOT BEING ABLE TO STOP OR CONTROL WORRYING: MORE THAN HALF THE DAYS
4. TROUBLE RELAXING: MORE THAN HALF THE DAYS
6. BECOMING EASILY ANNOYED OR IRRITABLE: MORE THAN HALF THE DAYS
7. FEELING AFRAID AS IF SOMETHING AWFUL MIGHT HAPPEN: NOT AT ALL
7. FEELING AFRAID AS IF SOMETHING AWFUL MIGHT HAPPEN: NOT AT ALL
3. WORRYING TOO MUCH ABOUT DIFFERENT THINGS: MORE THAN HALF THE DAYS

## 2023-06-06 ASSESSMENT — PATIENT HEALTH QUESTIONNAIRE - PHQ9
SUM OF ALL RESPONSES TO PHQ QUESTIONS 1-9: 10
10. IF YOU CHECKED OFF ANY PROBLEMS, HOW DIFFICULT HAVE THESE PROBLEMS MADE IT FOR YOU TO DO YOUR WORK, TAKE CARE OF THINGS AT HOME, OR GET ALONG WITH OTHER PEOPLE: SOMEWHAT DIFFICULT
SUM OF ALL RESPONSES TO PHQ QUESTIONS 1-9: 10

## 2023-06-06 NOTE — PROGRESS NOTES
[facility] Primary Care: Integrated Behavioral Health  June 6th 2023    Behavioral Health Clinician Progress Note    Patient Name: Naty Pérez           Service Type:  Individual      Service Location:   Face to Face in Home / Community     Session Start Time: 3:30 PM  Session End Time: 4:00 PM      Session Length: 16 - 37      Attendees: Client     Service Modality:  Video Visit:      Provider verified identity through the following two step process.  Patient provided:  Patient is known previously to provider    Telemedicine Visit: The patient's condition can be safely assessed and treated via synchronous audio and visual telemedicine encounter.      Reason for Telemedicine Visit: Services only offered telehealth    Originating Site (Patient Location): Patient's home    Distant Site (Provider Location): [facility] Outpatient Setting: [place]    Consent:  The patient/guardian has verbally consented to: the potential risks and benefits of telemedicine (video visit) versus in person care; bill my insurance or make self-payment for services provided; and responsibility for payment of non-covered services.     Patient would like the video invitation sent by:  My Chart    Mode of Communication:  Video Conference via [employer]    As the provider I attest to compliance with applicable laws and regulations related to telemedicine.    Visit Activities (Refresh list every visit): [facility] Only    Diagnostic Assessment Date: 2/7/22 Roxanna Casas PhD, LP  Treatment Plan Review Date:5/20/23  See Flowsheets for today's PHQ-9 and ODILON-7 results  Previous PHQ-9:       4/3/2023     9:56 AM 5/4/2023     2:27 PM 6/6/2023     3:22 PM   PHQ-9 SCORE   PHQ-9 Total Score MyChart 10 (Moderate depression) 3 (Minimal depression) 10 (Moderate depression)   PHQ-9 Total Score 10 3 10     Previous ODILON-7:       2/20/2023    10:01 AM 4/3/2023     9:57 AM 6/6/2023     3:23 PM   ODILON-7 SCORE   Total Score 7 (mild  anxiety) 12 (moderate anxiety) 12 (moderate anxiety)   Total Score 7 12 12       CHARLES LEVEL:      2/6/2020    11:03 AM   CHARLES Score (Last Two)   CHARLES Raw Score 28   Activation Score 50   CHARLES Level 2       DATA  Extended Session (60+ minutes): No  Interactive Complexity: No  Crisis: No  [facility] Patient: No    Treatment Objective(s) Addressed in This Session:  Target Behavior(s): mood    Depressed Mood: Increase interest, engagement, and pleasure in doing things  Decrease frequency and intensity of feeling down, depressed, hopeless  Improve quantity and quality of night time sleep / decrease daytime naps  Feel less tired and more energy during the day   Improve diet, appetite, mindful eating, and / or meal planning  Identify negative self-talk and behaviors: challenge core beliefs, myths, and actions  Improve concentration, focus, and mindfulness in daily activities   Discuss motivation / ambivalence about taking anti-depressant / mood stabilizer medication(s)  Discuss motivation / ambivalence about a referral to specialty mental health services (Therapy, Day Tx, PHP)  Anxiety: will experience a reduction in anxiety, will develop more effective coping skills to manage anxiety symptoms, will develop healthy cognitive patterns and beliefs and will increase ability to function adaptively  Attention Problems: will develop coping skills to effectively manage attention issues    Current Stressors / Issues:  [facility] met with pt for the first time on this date virtually as pt was transferred by colleague. Pt states that she struggles PMDD, pt states that she has been somewhat irritable and has increased anxiety. 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 Pt states that she had a big family event this past weekend, spent time around her father. Pt took time to process her relationship with her father on this date. [facility] and pt discussed boundaries. Pt denies SI/HI/SIB.      Progress on Treatment Objective(s) / Homework:  Stable - ACTION (Actively working towards change); Intervened by reinforcing change plan / affirming steps taken    Motivational Interviewing    MI Intervention: Expressed Empathy/Understanding, Supported Autonomy, Collaboration, Evocation, Permission to raise concern or advise, Open-ended questions and Reflections: simple and complex     Change Talk Expressed by the Patient: Desire to change Ability to change Reasons to change Need to change Committment to change Activation Taking steps    Provider Response to Change Talk: E - Evoked more info from patient about behavior change, A - Affirmed patient's thoughts, decisions, or attempts at behavior change, R - Reflected patient's change talk and S - Summarized patient's change talk statements      Situation         Automatic Thoughts  Cognitive Distortions      Feelings        Behavior        Questioning Thoughts            Also provided psychoeducation about behavioral health condition, symptoms, and treatment options    Care Plan review completed: Yes    Medication Review:  Changes to psychiatric medications, see updated Medication List in EPIC.     Medication Compliance:  Yes    Changes in Health Issues:   None reported    Chemical Use Review:   Substance Use: Chemical use reviewed, no active concerns identified      Tobacco Use: No current tobacco use.      Assessment: Current Emotional / Mental Status (status of significant symptoms):  Risk status (Self / Other harm or suicidal ideation)  Patient denies a history of suicidal ideation, suicide attempts, self-injurious behavior, homicidal ideation, homicidal behavior and and other safety concerns  Patient denies current fears or concerns for personal safety.  Patient denies current or recent suicidal ideation or behaviors.  Patient denies current or recent homicidal ideation or behaviors.  Patient denies current or recent self injurious behavior or  ideation.  Patient denies other safety concerns.  A safety and risk management plan has not been developed at this time, however patient was encouraged to call [place] [phone] should there be a change in any of these risk factors.    Appearance:   Appropriate   Eye Contact:   Good   Psychomotor Behavior: Normal   Attitude:   Cooperative   Orientation:   All  Speech   Rate / Production: Normal    Volume:  Normal   Mood:    Anxious   Affect:    Appropriate   Thought Content:  Clear   Thought Form:  Coherent  Logical   Insight:    Good     Diagnoses:  1. ODILON (generalized anxiety disorder)    2. Major depressive disorder, recurrent episode, mild (H)    3. Attention deficit disorder without hyperactivity        Collateral Reports Completed:  Not Applicable    Plan: (Homework, other):  Patient was given information about behavioral services and encouraged to schedule a follow up appointment with the clinic [facility] in 1 month.  She was also given information about mental health symptoms and treatment options  and Cognitive Behavioral Therapy skills to practice when experiencing anxiety, depression and ADHD.  CD Recommendations: No indications of CD issues.        ______________________________________________________________________                                              Individual Treatment Plan    Patient's Name: Naty Pérez  YOB: 1995    Date of Creation: 3/14/22  Date Treatment Plan Last Reviewed/Revised: 2.20.23    DSM5 Diagnoses: Attention-Deficit/Hyperactivity Disorder  314.00 (F90.0) Predominantly inattentive presentation, 296.31 (F33.0) Major Depressive Disorder, Recurrent Episode, Mild With anxious distress or 300.02 (F41.1) Generalized Anxiety Disorder  Psychosocial / Contextual Factors: relationships, work  PROMIS-10  In general, would you say your health is:: 3  In general, would you say your quality of life is:: 3  In general, how would you rate your physical health?: 3  In general, how  would you rate your mental health, including your mood and your ability to think?: 3  In general, how would you rate your satisfaction with your social activities and relationships?: 3  In general, please rate how well you carry out your usual social activities and roles. (This includes activities at home, at work and in your community, and responsibilities as a parent, child, spouse, employee, friend, etc.): 3  To what extent are you able to carry out your everyday physical activities such as walking, climbing stairs, carrying groceries, or moving a chair?: 5  In the past 7 days, how often have you been bothered by emotional problems such as feeling anxious, depressed, or irritable?: 4  In the past 7 days, how would you rate your fatigue on average?: 3  In the past 7 days, how would you rate your pain on average, where 0 means no pain, and 10 means worst imaginable pain?: 1  Global Mental Health Score: 11  Global Physical Health Score: 15  PROMIS TOTAL - SUBSCORES: 28        Referral / Collaboration:  Referral to another professional/service is not indicated at this time..    Anticipated number of session for this episode of care: ongoing  Anticipation frequency of session: Every other week  Anticipated Duration of each session: 38-52 minutes  Treatment plan will be reviewed in 90 days or when goals have been changed.       MeasurableTreatment Goal(s) related to diagnosis / functional impairment(s)  Goal 1: Patient will experience a reduction in depressive symptoms along with a corresponding increase in positive emotion and life satisfaction.    I will know I've met my goal when I am less worried and mood is more .      Objective #A (Patient Action)    Patient will Increase interest, engagement, and pleasure in doing things.  Status: Continued - Date(s): 2.20.23    Intervention(s)  Therapist will help patient identify pleasant and mastery oriented events that elicit positive, relaxed mood.    Objective #B  Patient  "will Decrease frequency and intensity of feeling down, depressed, hopeless.  Status: Continued - Date(s):2.20.23    Intervention(s)  Therapist will introduce patient to cognitive-behavioral and acceptance and commitment therapy topics aimed to help reduce depression and anxiety    Objective #C  Patient will Identify negative self-talk and behaviors: challenge core beliefs, myths, and actions  Improve concentration, focus, and mindfulness in daily activities .  Status: Continued - Date(s):2.20.23    Intervention(s)  Therapist will help patient identify and manage negative self-talk and automatic thoughts; introduce patient to cognitive distortions; help patient develop cognitive diffusion techniques      Goal 2: Patient will experience a reduction in anxious symptoms, along with a corresponding increase in relaxed emotional states and life satisfaction.    I will know I've met my goal when more focused and less worried.      Objective #A (Patient Action)  Patient will use cognitive-behavioral and thought diffusion strategies identified in therapy to challenge anxious thoughts.    Status: Continued - Date(s):2.20.23    Intervention(s)  Therapist will utilize CBT and ACT ideas to help patient challenge anxious thoughts and reduce intensity/duration of anxious distress    Objective #B  Patient will use \"worry time\" each day for 15 minutes of scheduled worry and then defer obsessive or anxious thinking until the next structured worry time.    Status: Continued - Date(s): 2.20.23    Intervention(s)  Therapist will teach patient how to effectively utilize worry time and/or thought logs/journals each day and incorporate more relaxation behaviors into their routine.    Objective #C  Patient will identify the stressors which contribute to feelings of anxiety  Patient will increase engagement in adaptive coping skills and recreational activities, such as exercise and healthy socialization, to manage distress.  Status: " Continued - Date(s):2.20.23    Intervention(s)  Therapist will help patient identify triggers/situational factors that contribute to anxiety and behavioral skills aimed to manage anxious distress.        Other Possible Therapeutic Intervention(s):    Psycho-education regarding mental health diagnoses and treatment options    Supportive Therapy    Provide affirmations, reflections, and establish working rapport    Emphasize and reflect on strength of therapeutic relationship    Skills training    Explore skills useful to client in current situation.    Skills include assertiveness, communication, conflict management, problem-solving, relaxation, etc.    Solution-Focused Therapy    Explore patterns in patient's relationships and discuss options for new behaviors.    Explore patterns in patient's actions and choices and discuss options for new behaviors.    Cognitive-behavioral Therapy    Discuss common cognitive distortions, identify them in patient's life.    Explore ways to challenge, replace, and act against these cognitions.    Acceptance and Commitment Therapy    Explore and identify important values in patient's life.    Discuss ways to commit to behavioral activation around these values.    Psychodynamic psychotherapy    Discuss patient's emotional dynamics and issues and how they impact behaviors.    Explore patient's history of relationships and how they impact present behaviors.    Explore how to work with and make changes in these schemas and patterns.    Narrative Therapy    Explore the patient's story of his/her life from his/her perspective.    Explore alternate ways of understanding their experience, identifying exceptions, developing new themes.    Interpersonal Psychotherapy    Explore patterns in relationships that are effective or ineffective at helping patient reach their goals, find satisfying experience.    Discuss new patterns or behaviors to engage in for improved social  functioning.    Behavioral Activation    Discuss steps patient can take to become more involved in meaningful activity.    Identify barriers to these activities and explore possible solutions.    Mindfulness-Based Strategies    Discuss skills based on development and application of mindfulness.    Skills drawn from compassion-focused therapy, dialectical behavior therapy, mindfulness-based stress reduction, mindfulness-based cognitive therapy, etc.      Patient has reviewed and agreed to the above plan.      XENIA Carrero   Behavioral Health Clinician   [facility]    June 6th, 2023

## 2023-06-29 ENCOUNTER — OFFICE VISIT (OUTPATIENT)
Dept: FAMILY MEDICINE | Facility: CLINIC | Age: 28
End: 2023-06-29
Payer: COMMERCIAL

## 2023-06-29 VITALS
DIASTOLIC BLOOD PRESSURE: 73 MMHG | WEIGHT: 125 LBS | HEIGHT: 63 IN | TEMPERATURE: 98.4 F | BODY MASS INDEX: 22.15 KG/M2 | HEART RATE: 95 BPM | OXYGEN SATURATION: 100 % | RESPIRATION RATE: 18 BRPM | SYSTOLIC BLOOD PRESSURE: 109 MMHG

## 2023-06-29 DIAGNOSIS — Z13.1 SCREENING FOR DIABETES MELLITUS: ICD-10-CM

## 2023-06-29 DIAGNOSIS — Z13.0 SCREENING FOR IRON DEFICIENCY ANEMIA: ICD-10-CM

## 2023-06-29 DIAGNOSIS — Z13.220 LIPID SCREENING: ICD-10-CM

## 2023-06-29 DIAGNOSIS — Z12.4 CERVICAL CANCER SCREENING: ICD-10-CM

## 2023-06-29 DIAGNOSIS — Z00.00 ROUTINE GENERAL MEDICAL EXAMINATION AT A HEALTH CARE FACILITY: Primary | ICD-10-CM

## 2023-06-29 DIAGNOSIS — F90.0 ATTENTION-DEFICIT HYPERACTIVITY DISORDER, PREDOMINANTLY INATTENTIVE TYPE: ICD-10-CM

## 2023-06-29 DIAGNOSIS — J02.9 SORE THROAT: ICD-10-CM

## 2023-06-29 DIAGNOSIS — R11.0 NAUSEA: ICD-10-CM

## 2023-06-29 DIAGNOSIS — R21 RASH: ICD-10-CM

## 2023-06-29 DIAGNOSIS — N94.6 DYSMENORRHEA: ICD-10-CM

## 2023-06-29 DIAGNOSIS — Z13.29 SCREENING FOR THYROID DISORDER: ICD-10-CM

## 2023-06-29 LAB
ALBUMIN SERPL BCG-MCNC: 4.8 G/DL (ref 3.5–5.2)
ALP SERPL-CCNC: 63 U/L (ref 35–104)
ALT SERPL W P-5'-P-CCNC: 10 U/L (ref 0–50)
ANION GAP SERPL CALCULATED.3IONS-SCNC: 9 MMOL/L (ref 7–15)
AST SERPL W P-5'-P-CCNC: 27 U/L (ref 0–45)
BILIRUB SERPL-MCNC: 0.5 MG/DL
BUN SERPL-MCNC: 10.3 MG/DL (ref 6–20)
CALCIUM SERPL-MCNC: 9.4 MG/DL (ref 8.6–10)
CHLORIDE SERPL-SCNC: 103 MMOL/L (ref 98–107)
CHOLEST SERPL-MCNC: 190 MG/DL
CREAT SERPL-MCNC: 0.8 MG/DL (ref 0.51–0.95)
CREAT UR-MCNC: 39 MG/DL
DEPRECATED HCO3 PLAS-SCNC: 25 MMOL/L (ref 22–29)
ERYTHROCYTE [DISTWIDTH] IN BLOOD BY AUTOMATED COUNT: 11.8 % (ref 10–15)
GFR SERPL CREATININE-BSD FRML MDRD: >90 ML/MIN/1.73M2
GLUCOSE SERPL-MCNC: 83 MG/DL (ref 70–99)
HCT VFR BLD AUTO: 38.9 % (ref 35–47)
HDLC SERPL-MCNC: 58 MG/DL
HGB BLD-MCNC: 13 G/DL (ref 11.7–15.7)
LDLC SERPL CALC-MCNC: 115 MG/DL
MCH RBC QN AUTO: 31.2 PG (ref 26.5–33)
MCHC RBC AUTO-ENTMCNC: 33.4 G/DL (ref 31.5–36.5)
MCV RBC AUTO: 93 FL (ref 78–100)
NONHDLC SERPL-MCNC: 132 MG/DL
PLATELET # BLD AUTO: 348 10E3/UL (ref 150–450)
POTASSIUM SERPL-SCNC: 4.2 MMOL/L (ref 3.4–5.3)
PROT SERPL-MCNC: 8.2 G/DL (ref 6.4–8.3)
RBC # BLD AUTO: 4.17 10E6/UL (ref 3.8–5.2)
SODIUM SERPL-SCNC: 137 MMOL/L (ref 136–145)
TRIGL SERPL-MCNC: 83 MG/DL
TSH SERPL DL<=0.005 MIU/L-ACNC: 1.64 UIU/ML (ref 0.3–4.2)
WBC # BLD AUTO: 4.5 10E3/UL (ref 4–11)

## 2023-06-29 PROCEDURE — 80348 DRUG SCREENING BUPRENORPHINE: CPT | Performed by: NURSE PRACTITIONER

## 2023-06-29 PROCEDURE — 80358 DRUG SCREENING METHADONE: CPT | Performed by: NURSE PRACTITIONER

## 2023-06-29 PROCEDURE — 99213 OFFICE O/P EST LOW 20 MIN: CPT | Mod: 25 | Performed by: NURSE PRACTITIONER

## 2023-06-29 PROCEDURE — 99395 PREV VISIT EST AGE 18-39: CPT | Performed by: NURSE PRACTITIONER

## 2023-06-29 PROCEDURE — 80360 METHYLPHENIDATE: CPT | Performed by: NURSE PRACTITIONER

## 2023-06-29 PROCEDURE — 85027 COMPLETE CBC AUTOMATED: CPT | Performed by: NURSE PRACTITIONER

## 2023-06-29 PROCEDURE — 80359 METHYLENEDIOXYAMPHETAMINES: CPT | Performed by: NURSE PRACTITIONER

## 2023-06-29 PROCEDURE — 80363 OPIOIDS & OPIATE ANALOGS 3/4: CPT | Performed by: NURSE PRACTITIONER

## 2023-06-29 PROCEDURE — 80324 DRUG SCREEN AMPHETAMINES 1/2: CPT | Performed by: NURSE PRACTITIONER

## 2023-06-29 PROCEDURE — 80361 OPIATES 1 OR MORE: CPT | Performed by: NURSE PRACTITIONER

## 2023-06-29 PROCEDURE — G0145 SCR C/V CYTO,THINLAYER,RESCR: HCPCS | Performed by: NURSE PRACTITIONER

## 2023-06-29 PROCEDURE — 80354 DRUG SCREENING FENTANYL: CPT | Performed by: NURSE PRACTITIONER

## 2023-06-29 PROCEDURE — 80366 DRUG SCREENING PREGABALIN: CPT | Performed by: NURSE PRACTITIONER

## 2023-06-29 PROCEDURE — 80365 DRUG SCREENING OXYCODONE: CPT | Performed by: NURSE PRACTITIONER

## 2023-06-29 PROCEDURE — 36415 COLL VENOUS BLD VENIPUNCTURE: CPT | Performed by: NURSE PRACTITIONER

## 2023-06-29 PROCEDURE — 80356 HEROIN METABOLITE: CPT | Performed by: NURSE PRACTITIONER

## 2023-06-29 PROCEDURE — 84443 ASSAY THYROID STIM HORMONE: CPT | Performed by: NURSE PRACTITIONER

## 2023-06-29 PROCEDURE — 80373 DRUG SCREENING TRAMADOL: CPT | Performed by: NURSE PRACTITIONER

## 2023-06-29 PROCEDURE — 83992 ASSAY FOR PHENCYCLIDINE: CPT | Performed by: NURSE PRACTITIONER

## 2023-06-29 PROCEDURE — 80357 KETAMINE AND NORKETAMINE: CPT | Performed by: NURSE PRACTITIONER

## 2023-06-29 PROCEDURE — 80053 COMPREHEN METABOLIC PANEL: CPT | Performed by: NURSE PRACTITIONER

## 2023-06-29 PROCEDURE — 80061 LIPID PANEL: CPT | Performed by: NURSE PRACTITIONER

## 2023-06-29 PROCEDURE — 80355 GABAPENTIN NON-BLOOD: CPT | Performed by: NURSE PRACTITIONER

## 2023-06-29 PROCEDURE — 80372 DRUG SCREENING TAPENTADOL: CPT | Performed by: NURSE PRACTITIONER

## 2023-06-29 PROCEDURE — 80367 DRUG SCREENING PROPOXYPHENE: CPT | Performed by: NURSE PRACTITIONER

## 2023-06-29 PROCEDURE — 80353 DRUG SCREENING COCAINE: CPT | Performed by: NURSE PRACTITIONER

## 2023-06-29 PROCEDURE — 80346 BENZODIAZEPINES1-12: CPT | Performed by: NURSE PRACTITIONER

## 2023-06-29 RX ORDER — DEXTROAMPHETAMINE SACCHARATE, AMPHETAMINE ASPARTATE MONOHYDRATE, DEXTROAMPHETAMINE SULFATE AND AMPHETAMINE SULFATE 2.5; 2.5; 2.5; 2.5 MG/1; MG/1; MG/1; MG/1
10 CAPSULE, EXTENDED RELEASE ORAL DAILY
Qty: 30 CAPSULE | Refills: 0 | Status: SHIPPED | OUTPATIENT
Start: 2023-06-29 | End: 2023-07-29

## 2023-06-29 RX ORDER — DEXTROAMPHETAMINE SACCHARATE, AMPHETAMINE ASPARTATE MONOHYDRATE, DEXTROAMPHETAMINE SULFATE AND AMPHETAMINE SULFATE 2.5; 2.5; 2.5; 2.5 MG/1; MG/1; MG/1; MG/1
10 CAPSULE, EXTENDED RELEASE ORAL DAILY
Qty: 30 CAPSULE | Refills: 0 | Status: CANCELLED | OUTPATIENT
Start: 2023-06-29

## 2023-06-29 RX ORDER — DEXTROAMPHETAMINE SACCHARATE, AMPHETAMINE ASPARTATE MONOHYDRATE, DEXTROAMPHETAMINE SULFATE AND AMPHETAMINE SULFATE 2.5; 2.5; 2.5; 2.5 MG/1; MG/1; MG/1; MG/1
10 CAPSULE, EXTENDED RELEASE ORAL DAILY
Qty: 30 CAPSULE | Refills: 0 | Status: SHIPPED | OUTPATIENT
Start: 2023-08-30 | End: 2023-09-29

## 2023-06-29 RX ORDER — DEXTROAMPHETAMINE SACCHARATE, AMPHETAMINE ASPARTATE MONOHYDRATE, DEXTROAMPHETAMINE SULFATE AND AMPHETAMINE SULFATE 2.5; 2.5; 2.5; 2.5 MG/1; MG/1; MG/1; MG/1
10 CAPSULE, EXTENDED RELEASE ORAL DAILY
Qty: 30 CAPSULE | Refills: 0 | Status: SHIPPED | OUTPATIENT
Start: 2023-07-30 | End: 2023-08-29

## 2023-06-29 ASSESSMENT — ENCOUNTER SYMPTOMS
HEADACHES: 0
CHILLS: 0
COUGH: 0
ABDOMINAL PAIN: 0
EYE PAIN: 0
MYALGIAS: 0
JOINT SWELLING: 0
ARTHRALGIAS: 0
CONSTIPATION: 0
HEARTBURN: 0
HEMATOCHEZIA: 0
FREQUENCY: 0
BREAST MASS: 0
DIZZINESS: 0
FEVER: 0
PALPITATIONS: 0
PARESTHESIAS: 0
DYSURIA: 0
WEAKNESS: 0
SORE THROAT: 1
HEMATURIA: 0
NERVOUS/ANXIOUS: 1
NAUSEA: 1
DIARRHEA: 0
SHORTNESS OF BREATH: 0

## 2023-06-29 NOTE — LETTER
[facility]  06/29/23  Patient: Naty Pérez  YOB: 1995  Medical Record Number: [id number]                                                                                  Non-Opioid Controlled Substance Agreement    This is an agreement between you and your provider regarding safe and appropriate use of controlled substances prescribed by your care team. Controlled substances are?medicines that can cause physical and mental dependence (abuse).     There are strict laws about having and using these medicines. We here at [facility] are  committed to working with you in your efforts to get better. To support you in this work, we'll help you schedule regular office appointments for medicine refills. If we must cancel or change your appointment for any reason, we'll make sure you have enough medicine to last until your next appointment.     As a Provider, I will:   Listen carefully to your concerns while treating you with respect.   Recommend a treatment plan that I believe is in your best interest and may involve therapies other than medicine.    Talk with you often about the possible benefits and the risk of harm of any medicine that we prescribe for you.  Assess the safety of this medicine and check how well it works.    Provide a plan on how to taper (discontinue or go off) using this medicine if the decision is made to stop its use.      ::  As a Patient, I understand controlled substances:     Are prescribed by my care provider to help me function or work and manage my condition(s).?  Are strong medicines and can cause serious side effects.     Need to be taken exactly as prescribed.?Combining controlled substances with certain medicines or chemicals (such as illegal drugs, alcohol, sedatives, sleeping pills, and benzodiazepines) can be dangerous or even fatal.? If I stop taking my medicines suddenly, I may have severe withdrawal symptoms.     The risks, benefits,  and side effects of these medicine(s) were explained to me. I agree that:    I will take part in other treatments as advised by my care team. This may be psychiatry or counseling, physical therapy, behavioral therapy, group treatment or a referral to specialist.    I will keep all my appointments and understand this is part of the monitoring of controlled substances.?My care team may require an office visit for EVERY controlled substance refill. If I miss appointments or don t follow instructions, my care team may stop my medicine    I will take my medicines as prescribed. I will not change the dose or schedule unless my care team tells me to. There will be no refills if I run out early.      I may be asked to come to the clinic and complete a urine drug test or complete a pill count. If I don t give a urine sample or participate in a pill count, the care team may stop my medicine.    I will only receive controlled substance prescriptions from this clinic. If I am treated by another provider, I will tell them that I am taking controlled substances and that I have a treatment agreement with this provider. I will inform my [facility] care team within one business day if I am given a prescription for any controlled substance by another healthcare provider. My [facility] care team can contact other providers and pharmacists about my use of any medicines.    It is up to me to make sure that I don't run out of my medicines on weekends or holidays.?If my care team is willing to refill my prescription without a visit, I must request refills only during office hours. Refills may take up to 3 business days to process. I will use one pharmacy to fill all my controlled substance prescriptions. I will notify the clinic about any changes to my insurance or medicine availability.    I am responsible for my prescriptions. If the medicine/prescription is lost, stolen or destroyed, it will not be replaced.?I also agree  not to share controlled substance medicines with anyone.     I am aware I should not use any illegal or recreational drugs. I agree not to drink alcohol unless my care team says I can.     If I enroll in the [place] [facility] Cannabis program, I will tell my care team before my next refill.    I will tell my care team right away if I become pregnant, have a new medical problem treated outside of my regular clinic, or have a change in my medicines.     I understand that this medicine can affect my thinking, judgment and reaction time.? Alcohol and drugs affect the brain and body, which can affect the safety of my driving. Being under the influence of alcohol or drugs can affect my decision-making, behaviors, personal safety and the safety of others. Driving while impaired (DWI) can occur if a person is driving, operating or in physical control of a car, motorcycle, boat, snowmobile, ATV, motorbike, off-road vehicle or any other motor vehicle ([place] Statute 169A.20). I understand the risk if I choose to drive or operate any vehicle or machinery.    I understand that if I do not follow any of the conditions above, my prescriptions or treatment may be stopped or changed.   I agree that my provider, clinic care team and pharmacy may work with any city, state or federal law enforcement agency that investigates the misuse, sale or other diversion of my controlled medicine. I will allow my provider to discuss my care with, or share a copy of, this agreement with any other treating provider, pharmacy or emergency room where I receive care.     I have read this agreement and have asked questions about anything I did not understand.    ________________________________________________________  Patient Signature - Naty Pérez     ___________________                   Date     ________________________________________________________  Provider Signature - VALE Kennedy CNP       ___________________                   Date      ________________________________________________________  Witness Signature (required if provider not present while patient signing)          ___________________                   Date

## 2023-06-29 NOTE — PROGRESS NOTES
SUBJECTIVE:   CC: Naty is an 27 year old who presents for preventive health visit.       2023     9:46 AM   Additional Questions   Roomed by Cheyenne   Accompanied by Self     Healthy Habits:    Getting at least 3 servings of Calcium per day:  NO    Bi-annual eye exam:  Yes    Dental care twice a year:  Yes    Sleep apnea or symptoms of sleep apnea:  Daytime drowsiness    Diet:  Regular (no restrictions)    Frequency of exercise:  1 day/week    Duration of exercise:  15-30 minutes    Taking medications regularly:  Yes    Medication side effects:  None    PHQ-2 Total Score:    Additional concerns today:  Yes            PROBLEMS TO ADD ON...  Rash above her eye since the .  Was diagnosed with ring worm.  Using ketaconazole  With some improvement.    Sore throat since April.  Lost her voice in March.  Generally hurts more in the morning. Also left ear pain when she wakes up.      Last period was very strange, intense premenstrual symptoms.  Has been a longer period, more mucous.      Bruise easily.  Always has.  Need to be worried?    After BM, sometimes will feel sick. Nausea.  Not every time.  Hasn't tracked pattern.  Not straining.  Nausea will last for 30-60minutes.  Does get nauseous easily, especially with stress.              Social History     Tobacco Use     Smoking status: Never     Smokeless tobacco: Never     Tobacco comments:     non smoking home   Substance Use Topics     Alcohol use: Yes     Comment: 1-2 on weekends             2023     9:38 AM   Alcohol Use   Prescreen: >3 drinks/day or >7 drinks/week? No     Reviewed orders with patient.  Reviewed health maintenance and updated orders accordingly - Yes  Lab work is in process    Breast Cancer Screenin/29/2023     9:38 AM   Breast CA Risk Assessment (FHS-7)   Do you have a family history of breast, colon, or ovarian cancer? No / Unknown         Patient under 40 years of age: Routine Mammogram Screening not recommended.  "  Pertinent mammograms are reviewed under the imaging tab.    History of abnormal Pap smear: NO - age 21-29 PAP every 3 years recommended  NO - age 30-65 PAP every 5 years with negative HPV co-testing recommended      Latest Ref Rng & Units 8/10/2020     9:10 AM 8/10/2020     9:09 AM 1/5/2016    12:00 AM   PAP / HPV   PAP (Historical)   NIL  NIL    HPV 16 DNA NEG^Negative Negative      HPV 18 DNA NEG^Negative Negative      Other HR HPV NEG^Negative Negative        Reviewed and updated as needed this visit by clinical staff   Tobacco  Allergies  Meds              Reviewed and updated as needed this visit by Provider                     Review of Systems   Constitutional: Negative for chills and fever.   HENT: Positive for sore throat. Negative for congestion, ear pain and hearing loss.    Eyes: Negative for pain and visual disturbance.   Respiratory: Negative for cough and shortness of breath.    Cardiovascular: Negative for chest pain, palpitations and peripheral edema.   Gastrointestinal: Positive for nausea. Negative for abdominal pain, constipation, diarrhea, heartburn and hematochezia.   Breasts:  Positive for tenderness. Negative for breast mass and discharge.   Genitourinary: Positive for vaginal bleeding. Negative for dysuria, frequency, genital sores, hematuria, pelvic pain, urgency and vaginal discharge.   Musculoskeletal: Negative for arthralgias, joint swelling and myalgias.   Skin: Negative for rash.   Neurological: Negative for dizziness, weakness, headaches and paresthesias.   Psychiatric/Behavioral: Negative for mood changes. The patient is nervous/anxious.           OBJECTIVE:   /73 (BP Location: Right arm, Patient Position: Sitting, Cuff Size: Adult Regular)   Pulse 95   Temp 98.4  F (36.9  C) (Temporal)   Resp 18   Ht 1.59 m (5' 2.6\")   Wt 56.7 kg (125 lb)   LMP 06/12/2023   SpO2 100%   BMI 22.43 kg/m    Physical Exam  GENERAL: healthy, alert and no distress  EYES: Eyes grossly " normal to inspection, PERRL and conjunctivae and sclerae normal  HENT: ear canals and TM's normal, nose and mouth without ulcers or lesions  NECK: no adenopathy, no asymmetry, masses, or scars and thyroid normal to palpation  RESP: lungs clear to auscultation - no rales, rhonchi or wheezes  BREAST: normal without masses, tenderness or nipple discharge and no palpable axillary masses or adenopathy  CV: regular rate and rhythm, normal S1 S2, no S3 or S4, no murmur, click or rub, no peripheral edema and peripheral pulses strong  ABDOMEN: soft, nontender, no hepatosplenomegaly, no masses and bowel sounds normal   (female): normal female external genitalia, normal urethral meatus, vaginal mucosa pink, moist, well rugated, and normal cervix/adnexa/uterus without masses or discharge  MS: no gross musculoskeletal defects noted, no edema  SKIN: no suspicious lesions or rashes  NEURO: Normal strength and tone, mentation intact and speech normal  PSYCH: mentation appears normal, affect normal/bright        ASSESSMENT/PLAN:   (Z00.00) Routine general medical examination at a health care facility  (primary encounter diagnosis)  Comment: Reviewed medical/social/family history and health maintenance  Plan:     (F90.0) Attention-deficit hyperactivity disorder, predominantly inattentive type  Comment:  Stable, tolerating med, no changes at this time.  CSA completed today, PDMP reviewed.  OK for e-visit in 3 months for refill.    Plan: amphetamine-dextroamphetamine (ADDERALL XR) 10         MG 24 hr capsule, amphetamine-dextroamphetamine        (ADDERALL XR) 10 MG 24 hr capsule,         amphetamine-dextroamphetamine (ADDERALL XR) 10         MG 24 hr capsule, Drug Confirmation Panel Urine        with Creat - lab collect            (R21) Rash  Comment:  Left eyelid/brow Appears consistent with ring work which she has had in the past.  She has antifungal cream at home, ok to continue until rash is resolved.  Can also use hydrocortisone  for discomfort.  Plan:     (J02.9) Sore throat  Comment: Since her laryngitis infection a few months ago.  Improves as the day goes on.  Could also be exacerbated by recent air quality.  We discussed using humidifier during the night.  If symptoms worsen or persist, consider ENT referral.  Plan:     (N94.6) Dysmenorrhea  Comment: Just this month, period has been longer than normal, not as heavy as it usually is.  If symptoms continue, we will consider pelvic ultrasound to rule out fibroids or other issues.  Plan:     (R11.0) Nausea  Comment: After BM that lasts about 30 minutes.  Could be vaso-vagal response.  She also admits stressful situations cause nausea.  Continue to monitor, if symptoms persist we could try Zofran.  Plan:     (Z12.4) Cervical cancer screening  Comment:   Plan: Pap Screen reflex to HPV if ASCUS - recommend         age 25 - 29            (Z13.29) Screening for thyroid disorder  Comment:   Plan: TSH with free T4 reflex            (Z13.0) Screening for iron deficiency anemia  Comment:   Plan: CBC with platelets            (Z13.220) Lipid screening  Comment:   Plan: Lipid panel reflex to direct LDL Non-fasting            (Z13.1) Screening for diabetes mellitus  Comment:   Plan: Comprehensive metabolic panel (BMP + Alb, Alk         Phos, ALT, AST, Total. Bili, TP)              Patient has been advised of split billing requirements and indicates understanding: Yes      COUNSELING:  Reviewed preventive health counseling, as reflected in patient instructions        She reports that she has never smoked. She has never used smokeless tobacco.          VALE Kennedy [facility]

## 2023-06-30 ENCOUNTER — VIRTUAL VISIT (OUTPATIENT)
Dept: BEHAVIORAL HEALTH | Facility: CLINIC | Age: 28
End: 2023-06-30
Payer: COMMERCIAL

## 2023-06-30 DIAGNOSIS — F41.1 GAD (GENERALIZED ANXIETY DISORDER): Primary | ICD-10-CM

## 2023-06-30 DIAGNOSIS — F33.0 MAJOR DEPRESSIVE DISORDER, RECURRENT EPISODE, MILD (H): ICD-10-CM

## 2023-06-30 DIAGNOSIS — F98.8 ATTENTION DEFICIT DISORDER WITHOUT HYPERACTIVITY: ICD-10-CM

## 2023-06-30 PROCEDURE — 90832 PSYTX W PT 30 MINUTES: CPT | Mod: VID

## 2023-06-30 NOTE — PROGRESS NOTES
[facility] Primary Care: Integrated Behavioral Health  June 30th 2023    Behavioral Health Clinician Progress Note    Patient Name: Naty Pérez           Service Type:  Individual      Service Location:   Face to Face in Home / Community     Session Start Time:  11:04 AM  Session End Time: 11:30 AM      Session Length: 16 - 37      Attendees: Client     Service Modality:  Video Visit:      Provider verified identity through the following two step process.  Patient provided:  Patient is known previously to provider    Telemedicine Visit: The patient's condition can be safely assessed and treated via synchronous audio and visual telemedicine encounter.      Reason for Telemedicine Visit: Services only offered telehealth    Originating Site (Patient Location): Patient's home    Distant Site (Provider Location): [facility] Outpatient Setting: [place]    Consent:  The patient/guardian has verbally consented to: the potential risks and benefits of telemedicine (video visit) versus in person care; bill my insurance or make self-payment for services provided; and responsibility for payment of non-covered services.     Patient would like the video invitation sent by:  My Chart    Mode of Communication:  Video Conference via [employer]    As the provider I attest to compliance with applicable laws and regulations related to telemedicine.    Visit Activities (Refresh list every visit): [facility] Only    Diagnostic Assessment Date: 2/7/22 Roxanna Casas PhD, LP  Treatment Plan Review Date:5/20/23- Needs to be reviewed at next visit, did not complete on this date due to clinical need  See Flowsheets for today's PHQ-9 and ODILON-7 results  Previous PHQ-9:       4/3/2023     9:56 AM 5/4/2023     2:27 PM 6/6/2023     3:22 PM   PHQ-9 SCORE   PHQ-9 Total Score MyChart 10 (Moderate depression) 3 (Minimal depression) 10 (Moderate depression)   PHQ-9 Total Score 10 3 10     Previous ODILON-7:       2/20/2023     10:01 AM 4/3/2023     9:57 AM 6/6/2023     3:23 PM   ODILON-7 SCORE   Total Score 7 (mild anxiety) 12 (moderate anxiety) 12 (moderate anxiety)   Total Score 7 12 12       CHARLES LEVEL:      2/6/2020    11:03 AM   CHARLES Score (Last Two)   CHARLES Raw Score 28   Activation Score 50   CHARLES Level 2       DATA  Extended Session (60+ minutes): No  Interactive Complexity: No  Crisis: No  [facility] Patient: No    Treatment Objective(s) Addressed in This Session:  Target Behavior(s): mood    Depressed Mood: Increase interest, engagement, and pleasure in doing things  Decrease frequency and intensity of feeling down, depressed, hopeless  Improve quantity and quality of night time sleep / decrease daytime naps  Feel less tired and more energy during the day   Improve diet, appetite, mindful eating, and / or meal planning  Identify negative self-talk and behaviors: challenge core beliefs, myths, and actions  Improve concentration, focus, and mindfulness in daily activities   Discuss motivation / ambivalence about taking anti-depressant / mood stabilizer medication(s)  Discuss motivation / ambivalence about a referral to specialty mental health services (Therapy, Day Tx, PHP)  Anxiety: will experience a reduction in anxiety, will develop more effective coping skills to manage anxiety symptoms, will develop healthy cognitive patterns and beliefs and will increase ability to function adaptively  Attention Problems: will develop coping skills to effectively manage attention issues    Current Stressors / Issues:    Update:  Antincipaty anxiety, discussed dog and trauma in that regard, Self-care and questioning PTSD.     [facility] met with pt virtually on this date. Pt discussed anticipatory anxiety related to dog's surgery and healing. Pt states that she has been trying to support her partner and show up for him. Pt states that she became tearful and acknowledges that she has been tired and not sleeping well. Pt endorses that she has not been prioritizing  her needs out of concern that she is disappointing of letting her partner down. [facility] and pt discussed CBT and the 3 C's model in regard to irrational thoughts. [facility] and pt also discussed core beliefs and how her relationship with her father affects relationships at present. Pt expressed that she is wondering if she meets criteria for PTSD and would like to explore this further. Pt denies SI/HI/SIB.     Progress on Treatment Objective(s) / Homework:  Stable - ACTION (Actively working towards change); Intervened by reinforcing change plan / affirming steps taken    Motivational Interviewing    MI Intervention: Expressed Empathy/Understanding, Supported Autonomy, Collaboration, Evocation, Permission to raise concern or advise, Open-ended questions and Reflections: simple and complex     Change Talk Expressed by the Patient: Desire to change Ability to change Reasons to change Need to change Committment to change Activation Taking steps    Provider Response to Change Talk: E - Evoked more info from patient about behavior change, A - Affirmed patient's thoughts, decisions, or attempts at behavior change, R - Reflected patient's change talk and S - Summarized patient's change talk statements      Situation         Automatic Thoughts  Cognitive Distortions      Feelings        Behavior        Questioning Thoughts            Also provided psychoeducation about behavioral health condition, symptoms, and treatment options    Care Plan review completed: Yes    Medication Review:  Changes to psychiatric medications, see updated Medication List in EPIC.     Medication Compliance:  Yes    Changes in Health Issues:   None reported    Chemical Use Review:   Substance Use: Chemical use reviewed, no active concerns identified      Tobacco Use: No current tobacco use.      Assessment: Current Emotional / Mental Status (status of significant symptoms):  Risk status (Self / Other harm or suicidal ideation)  Patient denies a history of  suicidal ideation, suicide attempts, self-injurious behavior, homicidal ideation, homicidal behavior and and other safety concerns  Patient denies current fears or concerns for personal safety.  Patient denies current or recent suicidal ideation or behaviors.  Patient denies current or recent homicidal ideation or behaviors.  Patient denies current or recent self injurious behavior or ideation.  Patient denies other safety concerns.  A safety and risk management plan has not been developed at this time, however patient was encouraged to call [place] [phone] should there be a change in any of these risk factors.    Appearance:   Appropriate   Eye Contact:   Good   Psychomotor Behavior: Normal   Attitude:   Cooperative   Orientation:   All  Speech   Rate / Production: Normal    Volume:  Normal   Mood:    Anxious   Affect:    Appropriate   Thought Content:  Clear   Thought Form:  Coherent  Logical   Insight:    Good     Diagnoses:  1. ODILON (generalized anxiety disorder)    2. Major depressive disorder, recurrent episode, mild (H)    3. Attention deficit disorder without hyperactivity        Collateral Reports Completed:  Not Applicable    Plan: (Homework, other):  Patient was given information about behavioral services and encouraged to schedule a follow up appointment with the clinic [facility] in 1 month.  She was also given information about mental health symptoms and treatment options  and Cognitive Behavioral Therapy skills to practice when experiencing anxiety, depression and ADHD.  CD Recommendations: No indications of CD issues.        ______________________________________________________________________                                              Individual Treatment Plan    Patient's Name: Naty Pérez  YOB: 1995    Date of Creation: 3/14/22  Date Treatment Plan Last Reviewed/Revised: 2.20.23    DSM5 Diagnoses: Attention-Deficit/Hyperactivity Disorder  314.00 (F90.0) Predominantly inattentive  presentation, 296.31 (F33.0) Major Depressive Disorder, Recurrent Episode, Mild With anxious distress or 300.02 (F41.1) Generalized Anxiety Disorder  Psychosocial / Contextual Factors: relationships, work  PROMIS-10  In general, would you say your health is:: 3  In general, would you say your quality of life is:: 3  In general, how would you rate your physical health?: 3  In general, how would you rate your mental health, including your mood and your ability to think?: 3  In general, how would you rate your satisfaction with your social activities and relationships?: 3  In general, please rate how well you carry out your usual social activities and roles. (This includes activities at home, at work and in your community, and responsibilities as a parent, child, spouse, employee, friend, etc.): 3  To what extent are you able to carry out your everyday physical activities such as walking, climbing stairs, carrying groceries, or moving a chair?: 5  In the past 7 days, how often have you been bothered by emotional problems such as feeling anxious, depressed, or irritable?: 4  In the past 7 days, how would you rate your fatigue on average?: 3  In the past 7 days, how would you rate your pain on average, where 0 means no pain, and 10 means worst imaginable pain?: 1  Global Mental Health Score: 11  Global Physical Health Score: 15  PROMIS TOTAL - SUBSCORES: 28        Referral / Collaboration:  Referral to another professional/service is not indicated at this time..    Anticipated number of session for this episode of care: ongoing  Anticipation frequency of session: Every other week  Anticipated Duration of each session: 38-52 minutes  Treatment plan will be reviewed in 90 days or when goals have been changed.       MeasurableTreatment Goal(s) related to diagnosis / functional impairment(s)  Goal 1: Patient will experience a reduction in depressive symptoms along with a corresponding increase in positive emotion and life  "satisfaction.    I will know I've met my goal when I am less worried and mood is more .      Objective #A (Patient Action)    Patient will Increase interest, engagement, and pleasure in doing things.  Status: Continued - Date(s): 2.20.23    Intervention(s)  Therapist will help patient identify pleasant and mastery oriented events that elicit positive, relaxed mood.    Objective #B  Patient will Decrease frequency and intensity of feeling down, depressed, hopeless.  Status: Continued - Date(s):2.20.23    Intervention(s)  Therapist will introduce patient to cognitive-behavioral and acceptance and commitment therapy topics aimed to help reduce depression and anxiety    Objective #C  Patient will Identify negative self-talk and behaviors: challenge core beliefs, myths, and actions  Improve concentration, focus, and mindfulness in daily activities .  Status: Continued - Date(s):2.20.23    Intervention(s)  Therapist will help patient identify and manage negative self-talk and automatic thoughts; introduce patient to cognitive distortions; help patient develop cognitive diffusion techniques      Goal 2: Patient will experience a reduction in anxious symptoms, along with a corresponding increase in relaxed emotional states and life satisfaction.    I will know I've met my goal when more focused and less worried.      Objective #A (Patient Action)  Patient will use cognitive-behavioral and thought diffusion strategies identified in therapy to challenge anxious thoughts.    Status: Continued - Date(s):2.20.23    Intervention(s)  Therapist will utilize CBT and ACT ideas to help patient challenge anxious thoughts and reduce intensity/duration of anxious distress    Objective #B  Patient will use \"worry time\" each day for 15 minutes of scheduled worry and then defer obsessive or anxious thinking until the next structured worry time.    Status: Continued - Date(s): 2.20.23    Intervention(s)  Therapist will teach patient how to " effectively utilize worry time and/or thought logs/journals each day and incorporate more relaxation behaviors into their routine.    Objective #C  Patient will identify the stressors which contribute to feelings of anxiety  Patient will increase engagement in adaptive coping skills and recreational activities, such as exercise and healthy socialization, to manage distress.  Status: Continued - Date(s):2.20.23    Intervention(s)  Therapist will help patient identify triggers/situational factors that contribute to anxiety and behavioral skills aimed to manage anxious distress.        Other Possible Therapeutic Intervention(s):    Psycho-education regarding mental health diagnoses and treatment options    Supportive Therapy    Provide affirmations, reflections, and establish working rapport    Emphasize and reflect on strength of therapeutic relationship    Skills training    Explore skills useful to client in current situation.    Skills include assertiveness, communication, conflict management, problem-solving, relaxation, etc.    Solution-Focused Therapy    Explore patterns in patient's relationships and discuss options for new behaviors.    Explore patterns in patient's actions and choices and discuss options for new behaviors.    Cognitive-behavioral Therapy    Discuss common cognitive distortions, identify them in patient's life.    Explore ways to challenge, replace, and act against these cognitions.    Acceptance and Commitment Therapy    Explore and identify important values in patient's life.    Discuss ways to commit to behavioral activation around these values.    Psychodynamic psychotherapy    Discuss patient's emotional dynamics and issues and how they impact behaviors.    Explore patient's history of relationships and how they impact present behaviors.    Explore how to work with and make changes in these schemas and patterns.    Narrative Therapy    Explore the patient's story of his/her life from  his/her perspective.    Explore alternate ways of understanding their experience, identifying exceptions, developing new themes.    Interpersonal Psychotherapy    Explore patterns in relationships that are effective or ineffective at helping patient reach their goals, find satisfying experience.    Discuss new patterns or behaviors to engage in for improved social functioning.    Behavioral Activation    Discuss steps patient can take to become more involved in meaningful activity.    Identify barriers to these activities and explore possible solutions.    Mindfulness-Based Strategies    Discuss skills based on development and application of mindfulness.    Skills drawn from compassion-focused therapy, dialectical behavior therapy, mindfulness-based stress reduction, mindfulness-based cognitive therapy, etc.      Patient has reviewed and agreed to the above plan.      SHELLI Stallings, [facility]  Behavioral Health Clinician  [facility], [address], [address], [address], [place], [place], [address]  [phone]      2023

## 2023-07-03 LAB
AMPHET UR CFM-MCNC: 586 NG/ML
AMPHET/CREAT UR: 1503 NG/MG {CREAT}

## 2023-07-06 ENCOUNTER — MYC MEDICAL ADVICE (OUTPATIENT)
Dept: FAMILY MEDICINE | Facility: CLINIC | Age: 28
End: 2023-07-06
Payer: COMMERCIAL

## 2023-07-06 DIAGNOSIS — N94.6 DYSMENORRHEA: Primary | ICD-10-CM

## 2023-07-06 LAB
BKR LAB AP GYN ADEQUACY: NORMAL
BKR LAB AP GYN INTERPRETATION: NORMAL
BKR LAB AP HPV REFLEX: NORMAL
BKR LAB AP PREVIOUS ABNORMAL: NORMAL
PATH REPORT.COMMENTS IMP SPEC: NORMAL
PATH REPORT.COMMENTS IMP SPEC: NORMAL
PATH REPORT.RELEVANT HX SPEC: NORMAL

## 2023-07-11 NOTE — TELEPHONE ENCOUNTER
"ADITHYA/Shania--    See pts message    Per last office visit plan---    \"   (N94.6) Dysmenorrhea  Comment: Just this month, period has been longer than normal, not as heavy as it usually is.  If symptoms continue, we will consider pelvic ultrasound to rule out fibroids or other issues.\"    GRAEME Rubi RN  [facility]    "

## 2023-07-11 NOTE — TELEPHONE ENCOUNTER
I recommend having a clinic visit (virtual ok) to discuss symptoms, duration, work-up and possible treatment.     Brian Harman, DO

## 2023-07-17 ENCOUNTER — VIRTUAL VISIT (OUTPATIENT)
Dept: FAMILY MEDICINE | Facility: CLINIC | Age: 28
End: 2023-07-17
Payer: COMMERCIAL

## 2023-07-17 DIAGNOSIS — N94.6 DYSMENORRHEA: Primary | ICD-10-CM

## 2023-07-17 DIAGNOSIS — N83.201 RIGHT OVARIAN CYST: ICD-10-CM

## 2023-07-17 PROCEDURE — 99213 OFFICE O/P EST LOW 20 MIN: CPT | Mod: VID | Performed by: NURSE PRACTITIONER

## 2023-07-17 NOTE — PROGRESS NOTES
Naty is a 28 year old who is being evaluated via a billable video visit.      How would you like to obtain your AVS? MyChart  If the video visit is dropped, the invitation should be resent by: Text to cell phone: [phone]  Will anyone else be joining your video visit? No          Assessment & Plan     Dysmenorrhea  Continues to have symptoms.  Will obtain pelvic ultrasound.  Her nexplanon is a few years old.  We could consider replacement vs other form of birth control.  Could also consider progestin OCP to see if symptoms improve.  Pending ultrasound results, may consider referral to OB/GYN to discuss other options.   - US Pelvic Complete with Transvaginal; Future    Prescription drug management        VALE Kennedy [facility]   Naty is a 28 year old, presenting for the following health issues:  Abnormal Bleeding Problem        7/17/2023     9:38 AM   Additional Questions   Roomed by Cheryl DODD     History of Present Illness       Reason for visit:  Long period  Symptom onset:  3-4 weeks ago  Symptoms include:  Period lasted 4 weeks. It started weird thick and not the same.  Symptom intensity:  Moderate  Symptom progression:  Improving  Had these symptoms before:  No  What makes it worse:  It just kept going  What makes it better:  It stopped    She eats 0-1 servings of fruits and vegetables daily.She consumes 1 sweetened beverage(s) daily.She exercises with enough effort to increase her heart rate 9 or less minutes per day.  She exercises with enough effort to increase her heart rate 3 or less days per week.   She is taking medications regularly.    Today's PHQ-9         PHQ-9 Total Score: 9    PHQ-9 Q9 Thoughts of better off dead/self-harm past 2 weeks :   Not at all    How difficult have these problems made it for you to do your work, take care of things at home, or get along with other people: Somewhat difficult     Continues to have abnormal  "periods.  No cramping this time, but lasted about 3-4 weeks.  Started heavier with some clots.          Review of Systems   Constitutional, HEENT, cardiovascular, pulmonary, gi and gu systems are negative, except as otherwise noted.      Objective    Vitals - Patient Reported  Weight (Patient Reported): 56.7 kg (125 lb)  Height (Patient Reported): 154.9 cm (5' 1\")  BMI (Based on Pt Reported Ht/Wt): 23.62  Pain Score: No Pain (0)        Physical Exam   GENERAL: Healthy, alert and no distress  EYES: Eyes grossly normal to inspection.  No discharge or erythema, or obvious scleral/conjunctival abnormalities.  RESP: No audible wheeze, cough, or visible cyanosis.  No visible retractions or increased work of breathing.    SKIN: Visible skin clear. No significant rash, abnormal pigmentation or lesions.  NEURO: Cranial nerves grossly intact.  Mentation and speech appropriate for age.  PSYCH: Mentation appears normal, affect normal/bright, judgement and insight intact, normal speech and appearance well-groomed.                Video-Visit Details    Type of service:  Video Visit   Video Start Time: 1001  Video End Time:1012    Originating Location (pt. Location): Home    Distant Location (provider location):  Off-site  Platform used for Video Visit: Letha"

## 2023-07-21 ENCOUNTER — VIRTUAL VISIT (OUTPATIENT)
Dept: BEHAVIORAL HEALTH | Facility: CLINIC | Age: 28
End: 2023-07-21
Payer: COMMERCIAL

## 2023-07-21 DIAGNOSIS — F41.1 GAD (GENERALIZED ANXIETY DISORDER): Primary | ICD-10-CM

## 2023-07-21 DIAGNOSIS — F98.8 ATTENTION DEFICIT DISORDER WITHOUT HYPERACTIVITY: ICD-10-CM

## 2023-07-21 DIAGNOSIS — F33.0 MAJOR DEPRESSIVE DISORDER, RECURRENT EPISODE, MILD (H): ICD-10-CM

## 2023-07-21 PROCEDURE — 90837 PSYTX W PT 60 MINUTES: CPT | Mod: VID
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
## 2023-07-27 ENCOUNTER — HOSPITAL ENCOUNTER (OUTPATIENT)
Dept: ULTRASOUND IMAGING | Facility: CLINIC | Age: 28
Discharge: HOME | End: 2023-07-27
Attending: NURSE PRACTITIONER | Admitting: NURSE PRACTITIONER
Payer: COMMERCIAL

## 2023-07-27 DIAGNOSIS — N94.6 DYSMENORRHEA: ICD-10-CM

## 2023-07-27 PROCEDURE — 76856 US EXAM PELVIC COMPLETE: CPT

## 2023-08-11 ENCOUNTER — VIRTUAL VISIT (OUTPATIENT)
Dept: BEHAVIORAL HEALTH | Facility: CLINIC | Age: 28
End: 2023-08-11
Payer: COMMERCIAL

## 2023-08-11 DIAGNOSIS — F33.0 MAJOR DEPRESSIVE DISORDER, RECURRENT EPISODE, MILD (H): ICD-10-CM

## 2023-08-11 DIAGNOSIS — F98.8 ATTENTION DEFICIT DISORDER WITHOUT HYPERACTIVITY: ICD-10-CM

## 2023-08-11 DIAGNOSIS — F41.1 GAD (GENERALIZED ANXIETY DISORDER): Primary | ICD-10-CM

## 2023-08-11 PROCEDURE — 90834 PSYTX W PT 45 MINUTES: CPT | Mod: VID

## 2023-08-11 NOTE — PROGRESS NOTES
[facility]: Integrated Behavioral Health  August 11th, 2023     Behavioral Health Clinician Progress Note    Patient Name: Naty Pérez           Service Type:  Individual      Service Location:   Face to Face in Home / Community     Session Start Time:  11:02 AM  Session End Time: 11:49AM      Session Length: 38 - 52      Attendees: Client     Service Modality:  Video Visit:      Provider verified identity through the following two step process.  Patient provided:  Patient is known previously to provider    Telemedicine Visit: The patient's condition can be safely assessed and treated via synchronous audio and visual telemedicine encounter.      Reason for Telemedicine Visit: Services only offered telehealth    Originating Site (Patient Location): Patient's home    Distant Site (Provider Location): [facility]     Consent:  The patient/guardian has verbally consented to: the potential risks and benefits of telemedicine (video visit) versus in person care; bill my insurance or make self-payment for services provided; and responsibility for payment of non-covered services.     Patient would like the video invitation sent by:  My Chart    Mode of Communication:  Video Conference via [employer]    As the provider I attest to compliance with applicable laws and regulations related to telemedicine.    Visit Activities (Refresh list every visit): [facility] Only    Diagnostic Assessment Date: 2/7/22 Roxanna Casas PhD, LP  Treatment Plan Review Date:Reviewed 8/11/23  See Flowsheets for today's PHQ-9 and ODILON-7 results  Previous PHQ-9:       6/6/2023     3:22 PM 7/17/2023     9:45 AM 7/21/2023    10:57 AM   PHQ-9 SCORE   PHQ-9 Total Score MyChart 10 (Moderate depression) 9 (Mild depression) 8 (Mild depression)   PHQ-9 Total Score 10 9 8     Previous ODILON-7:       2/20/2023    10:01 AM 4/3/2023     9:57 AM 6/6/2023     3:23 PM   ODILON-7 SCORE   Total Score 7 (mild anxiety) 12  (moderate anxiety) 12 (moderate anxiety)   Total Score 7 12 12       CHARLES LEVEL:      2/6/2020    11:03 AM   CHARLES Score (Last Two)   CHARLES Raw Score 28   Activation Score 50   CHARLES Level 2       DATA  Extended Session (60+ minutes): No  Interactive Complexity: No  Crisis: No  [facility] Patient: No    Treatment Objective(s) Addressed in This Session:  Target Behavior(s):  mood    Depressed Mood: Increase interest, engagement, and pleasure in doing things  Decrease frequency and intensity of feeling down, depressed, hopeless  Improve quantity and quality of night time sleep / decrease daytime naps  Feel less tired and more energy during the day   Improve diet, appetite, mindful eating, and / or meal planning  Identify negative self-talk and behaviors: challenge core beliefs, myths, and actions  Improve concentration, focus, and mindfulness in daily activities   Discuss motivation / ambivalence about taking anti-depressant / mood stabilizer medication(s)  Discuss motivation / ambivalence about a referral to specialty mental health services (Therapy, Day Tx, PHP)  Anxiety: will experience a reduction in anxiety, will develop more effective coping skills to manage anxiety symptoms, will develop healthy cognitive patterns and beliefs and will increase ability to function adaptively  Attention Problems: will develop coping skills to effectively manage attention issues    Current Stressors / Issues:    Update: [facility] met with pt virtually on this date. Pt discussed disagreement with partner while on vacation. Pt acknowledges a pattern of catastrophic thinking perpetuated by fear of partner ending relationship. [facility] utilized CBT techniques and discussed attempting to restructure automatic negative thoughts. [facility] discussed thought record and use of the 3 C's method.   [facility] and pt also reviewed the PCL-5 PTSD tool and score. No reports of safety concerns. Treatment plan was reviewe don this date.     Progress on Treatment Objective(s) /  Homework:  Stable - ACTION (Actively working towards change); Intervened by reinforcing change plan / affirming steps taken    Motivational Interviewing    MI Intervention: Expressed Empathy/Understanding, Supported Autonomy, Collaboration, Evocation, Permission to raise concern or advise, Open-ended questions and Reflections: simple and complex     Change Talk Expressed by the Patient: Desire to change Ability to change Reasons to change Need to change Committment to change Activation Taking steps    Provider Response to Change Talk: E - Evoked more info from patient about behavior change, A - Affirmed patient's thoughts, decisions, or attempts at behavior change, R - Reflected patient's change talk and S - Summarized patient's change talk statements      Situation       Automatic Thoughts  Cognitive Distortions    Feelings      Behavior      Questioning Thoughts          Also provided psychoeducation about behavioral health condition, symptoms, and treatment options    Care Plan review completed: Yes    Medication Review:  Changes to psychiatric medications, see updated Medication List in EPIC.     Medication Compliance:  Yes    Changes in Health Issues:   None reported    Chemical Use Review:   Substance Use: Chemical use reviewed, no active concerns identified      Tobacco Use: No current tobacco use.      Assessment: Current Emotional / Mental Status (status of significant symptoms):  Risk status (Self / Other harm or suicidal ideation)  Patient denies a history of suicidal ideation, suicide attempts, self-injurious behavior, homicidal ideation, homicidal behavior and and other safety concerns  Patient denies current fears or concerns for personal safety.  Patient denies current or recent suicidal ideation or behaviors.  Patient denies current or recent homicidal ideation or behaviors.  Patient denies current or recent self injurious behavior or ideation.  Patient denies other safety concerns.  A safety and  risk management plan has not been developed at this time, however patient was encouraged to call [place] [phone] should there be a change in any of these risk factors.    Appearance:   Appropriate   Eye Contact:   Good   Psychomotor Behavior: Normal   Attitude:   Cooperative   Orientation:   All  Speech   Rate / Production: Normal    Volume:  Normal   Mood:    Anxious   Affect:    Appropriate   Thought Content:  Clear   Thought Form:  Coherent  Logical   Insight:    Good     Diagnoses:  1. ODILON (generalized anxiety disorder)    2. Major depressive disorder, recurrent episode, mild (H)    3. Attention deficit disorder without hyperactivity          Collateral Reports Completed:  Not Applicable    Plan: (Homework, other):  Patient was given information about behavioral services and encouraged to schedule a follow up appointment with the clinic [facility] in 2 weeks.  She was also given information about mental health symptoms and treatment options  and Cognitive Behavioral Therapy skills to practice when experiencing anxiety, depression and ADHD .  CD Recommendations: No indications of CD issues.        ______________________________________________________________________                                              Individual Treatment Plan    Patient's Name: Naty Pérez  YOB: 1995    Date of Creation: 3/14/22  Date Treatment Plan Last Reviewed/Revised: 8/11/23    DSM5 Diagnoses: Attention-Deficit/Hyperactivity Disorder  314.00 (F90.0) Predominantly inattentive presentation, 296.31 (F33.0) Major Depressive Disorder, Recurrent Episode, Mild With anxious distress or 300.02 (F41.1) Generalized Anxiety Disorder  Psychosocial / Contextual Factors: relationships, work  PROMIS-10  In general, would you say your health is:: 3  In general, would you say your quality of life is:: 3  In general, how would you rate your physical health?: 3  In general, how would you rate your mental health, including your mood and  your ability to think?: 3  In general, how would you rate your satisfaction with your social activities and relationships?: 3  In general, please rate how well you carry out your usual social activities and roles. (This includes activities at home, at work and in your community, and responsibilities as a parent, child, spouse, employee, friend, etc.): 3  To what extent are you able to carry out your everyday physical activities such as walking, climbing stairs, carrying groceries, or moving a chair?: 5  In the past 7 days, how often have you been bothered by emotional problems such as feeling anxious, depressed, or irritable?: 4  In the past 7 days, how would you rate your fatigue on average?: 3  In the past 7 days, how would you rate your pain on average, where 0 means no pain, and 10 means worst imaginable pain?: 1  Global Mental Health Score: 11  Global Physical Health Score: 15  PROMIS TOTAL - SUBSCORES: 28        Referral / Collaboration:  Referral to another professional/service is not indicated at this time..    Anticipated number of session for this episode of care: ongoing  Anticipation frequency of session: Every other week  Anticipated Duration of each session: 38-52 minutes  Treatment plan will be reviewed in 90 days or when goals have been changed.       MeasurableTreatment Goal(s) related to diagnosis / functional impairment(s)  Goal 1: Patient will experience a reduction in depressive symptoms along with a corresponding increase in positive emotion and life satisfaction.    I will know I've met my goal when I am less worried and mood is more .      Objective #A (Patient Action)    Patient will Increase interest, engagement, and pleasure in doing things.  Status: Completed- Date(s): 8/11/23    Intervention(s)  Therapist will help patient identify pleasant and mastery oriented events that elicit positive, relaxed mood.    Objective #B  Patient will Decrease frequency and intensity of feeling down,  "depressed, hopeless.  Status: Completed - Date(s):8/11/23    Intervention(s)  Therapist will introduce patient to cognitive-behavioral and acceptance and commitment therapy topics aimed to help reduce depression and anxiety    Objective #C  Patient will Identify negative self-talk and behaviors: challenge core beliefs, myths, and actions  Improve concentration, focus, and mindfulness in daily activities .  Status: Completed - Date(s):8/11/23    Intervention(s)  Therapist will help patient identify and manage negative self-talk and automatic thoughts; introduce patient to cognitive distortions; help patient develop cognitive diffusion techniques      Goal 2: Patient will experience a reduction in anxious symptoms, along with a corresponding increase in relaxed emotional states and life satisfaction.    I will know I've met my goal when more focused and less worried.      Objective #A (Patient Action)  Patient will use cognitive-behavioral and thought diffusion strategies identified in therapy to challenge anxious thoughts.    Status: Continued - Date(s):8/11/23    Intervention(s)  Therapist will utilize CBT and ACT ideas to help patient challenge anxious thoughts and reduce intensity/duration of anxious distress    Objective #B  Patient will use \"worry time\" each day for 15 minutes of scheduled worry and then defer obsessive or anxious thinking until the next structured worry time.    Status: Continued - Date(s): 8/11/23    Intervention(s)  Therapist will teach patient how to effectively utilize worry time and/or thought logs/journals each day and incorporate more relaxation behaviors into their routine.    Objective #C  Patient will identify the stressors which contribute to feelings of anxiety  Patient will increase engagement in adaptive coping skills and recreational activities, such as exercise and healthy socialization, to manage distress.  Status: Continued - Date(s):8/11/23    Intervention(s)  Therapist will " help patient identify triggers/situational factors that contribute to anxiety and behavioral skills aimed to manage anxious distress.        Other Possible Therapeutic Intervention(s):    Psycho-education regarding mental health diagnoses and treatment options    Supportive Therapy  Provide affirmations, reflections, and establish working rapport  Emphasize and reflect on strength of therapeutic relationship    Skills training  Explore skills useful to client in current situation.  Skills include assertiveness, communication, conflict management, problem-solving, relaxation, etc.    Solution-Focused Therapy  Explore patterns in patient's relationships and discuss options for new behaviors.  Explore patterns in patient's actions and choices and discuss options for new behaviors.    Cognitive-behavioral Therapy  Discuss common cognitive distortions, identify them in patient's life.  Explore ways to challenge, replace, and act against these cognitions.    Acceptance and Commitment Therapy  Explore and identify important values in patient's life.  Discuss ways to commit to behavioral activation around these values.    Psychodynamic psychotherapy  Discuss patient's emotional dynamics and issues and how they impact behaviors.  Explore patient's history of relationships and how they impact present behaviors.  Explore how to work with and make changes in these schemas and patterns.    Narrative Therapy  Explore the patient's story of his/her life from his/her perspective.  Explore alternate ways of understanding their experience, identifying exceptions, developing new themes.    Interpersonal Psychotherapy  Explore patterns in relationships that are effective or ineffective at helping patient reach their goals, find satisfying experience.  Discuss new patterns or behaviors to engage in for improved social functioning.    Behavioral Activation  Discuss steps patient can take to become more involved in meaningful  activity.  Identify barriers to these activities and explore possible solutions.    Mindfulness-Based Strategies  Discuss skills based on development and application of mindfulness.  Skills drawn from compassion-focused therapy, dialectical behavior therapy, mindfulness-based stress reduction, mindfulness-based cognitive therapy, etc.      Patient has reviewed and agreed to the above plan.      SHELLI Stallings, [facility]  Behavioral Health Clinician  [facility], [address], [address], [place], [place], [address]  [phone]    2023

## 2023-08-19 ENCOUNTER — MYC MEDICAL ADVICE (OUTPATIENT)
Dept: FAMILY MEDICINE | Facility: CLINIC | Age: 28
End: 2023-08-19
Payer: COMMERCIAL

## 2023-08-21 NOTE — TELEPHONE ENCOUNTER
Writer responded via [employer].  COBY AntoineN, RN-BC  [facility]

## 2023-08-25 ENCOUNTER — VIRTUAL VISIT (OUTPATIENT)
Dept: BEHAVIORAL HEALTH | Facility: CLINIC | Age: 28
End: 2023-08-25
Payer: COMMERCIAL

## 2023-08-25 DIAGNOSIS — F98.8 ATTENTION DEFICIT DISORDER WITHOUT HYPERACTIVITY: ICD-10-CM

## 2023-08-25 DIAGNOSIS — F33.0 MAJOR DEPRESSIVE DISORDER, RECURRENT EPISODE, MILD (H): ICD-10-CM

## 2023-08-25 DIAGNOSIS — F41.1 GAD (GENERALIZED ANXIETY DISORDER): Primary | ICD-10-CM

## 2023-08-25 PROCEDURE — 90834 PSYTX W PT 45 MINUTES: CPT | Mod: VID

## 2023-08-25 NOTE — PROGRESS NOTES
[facility] Primary Care: Integrated Behavioral Health  August 25th, 2023     Behavioral Health Clinician Progress Note    Patient Name: Naty Pérez           Service Type:  Individual      Service Location:   Face to Face in Home / Community     Session Start Time:  12:31 PM  Session End Time: 1:18 PM      Session Length: 38 - 52      Attendees: Client     Service Modality:  Video Visit:      Provider verified identity through the following two step process.  Patient provided:  Patient is known previously to provider    Telemedicine Visit: The patient's condition can be safely assessed and treated via synchronous audio and visual telemedicine encounter.      Reason for Telemedicine Visit: Services only offered telehealth    Originating Site (Patient Location): Patient's home    Distant Site (Provider Location): [facility]     Consent:  The patient/guardian has verbally consented to: the potential risks and benefits of telemedicine (video visit) versus in person care; bill my insurance or make self-payment for services provided; and responsibility for payment of non-covered services.     Patient would like the video invitation sent by:  My Chart    Mode of Communication:  Video Conference via [employer]well    As the provider I attest to compliance with applicable laws and regulations related to telemedicine.    Visit Activities (Refresh list every visit): [facility] [place] Only    Diagnostic Assessment Date: 2/7/22 Roxanna Casas PhD, LP  Treatment Plan Review Date:Reviewed 8/11/23  See Flowsheets for today's PHQ-9 and ODILON-7 results  Previous PHQ-9:       7/17/2023     9:45 AM 7/21/2023    10:57 AM 8/17/2023    11:24 AM   PHQ-9 SCORE   PHQ-9 Total Score MyChart 9 (Mild depression) 8 (Mild depression) 11 (Moderate depression)   PHQ-9 Total Score 9 8 11     Previous ODILON-7:       4/3/2023     9:57 AM 6/6/2023     3:23 PM 8/17/2023    11:24 AM   ODILON-7 SCORE   Total Score 12 (moderate anxiety) 12  (moderate anxiety) 9 (mild anxiety)   Total Score 12 12 9       CHARLES LEVEL:      2/6/2020    11:03 AM   CHARLES Score (Last Two)   CHARLES Raw Score 28   Activation Score 50   CHARLES Level 2       DATA  Extended Session (60+ minutes): No  Interactive Complexity: No  Crisis: No  [facility] Patient: No    Treatment Objective(s) Addressed in This Session:  Target Behavior(s):  mood    Depressed Mood: Increase interest, engagement, and pleasure in doing things  Decrease frequency and intensity of feeling down, depressed, hopeless  Improve quantity and quality of night time sleep / decrease daytime naps  Feel less tired and more energy during the day   Improve diet, appetite, mindful eating, and / or meal planning  Identify negative self-talk and behaviors: challenge core beliefs, myths, and actions  Improve concentration, focus, and mindfulness in daily activities   Discuss motivation / ambivalence about taking anti-depressant / mood stabilizer medication(s)  Discuss motivation / ambivalence about a referral to specialty mental health services (Therapy, Day Tx, PHP)  Anxiety: will experience a reduction in anxiety, will develop more effective coping skills to manage anxiety symptoms, will develop healthy cognitive patterns and beliefs and will increase ability to function adaptively  Attention Problems: will develop coping skills to effectively manage attention issues    Current Stressors / Issues:    Update: [facility] and pt met virtually on this date. Pt endorses multiple stressors over the last several weeks including,stepfather in the hospital and mood changes related to cycle. Pt discussed anticipatory anxiety related to returning to school. C provided education on self-care and being intentional. Pt also discussed interaction with partner in which she was able to challenge her irrational thought and catastrophizing and reframe to a more reality based thought. Pt states that she was proud of herself for being able to apply this CBT  technique. Pt denies SI/HI/SIB.       Progress on Treatment Objective(s) / Homework:  Stable - ACTION (Actively working towards change); Intervened by reinforcing change plan / affirming steps taken    Motivational Interviewing    MI Intervention: Expressed Empathy/Understanding, Supported Autonomy, Collaboration, Evocation, Permission to raise concern or advise, Open-ended questions and Reflections: simple and complex     Change Talk Expressed by the Patient: Desire to change Ability to change Reasons to change Need to change Committment to change Activation Taking steps    Provider Response to Change Talk: E - Evoked more info from patient about behavior change, A - Affirmed patient's thoughts, decisions, or attempts at behavior change, R - Reflected patient's change talk and S - Summarized patient's change talk statements      Situation       Automatic Thoughts  Cognitive Distortions    Feelings      Behavior      Questioning Thoughts        Cognitive-behavioral Therapy  Discuss common cognitive distortions, identify them in patient's life.  Explore ways to challenge, replace, and act against these cognitions.      Also provided psychoeducation about behavioral health condition, symptoms, and treatment options    Care Plan review completed: Yes    Medication Review:  Changes to psychiatric medications, see updated Medication List in EPIC.     Medication Compliance:  Yes    Changes in Health Issues:   None reported    Chemical Use Review:   Substance Use: Chemical use reviewed, no active concerns identified      Tobacco Use: No current tobacco use.      Assessment: Current Emotional / Mental Status (status of significant symptoms):  Risk status (Self / Other harm or suicidal ideation)  Patient denies a history of suicidal ideation, suicide attempts, self-injurious behavior, homicidal ideation, homicidal behavior and and other safety concerns  Patient denies current fears or concerns for personal safety.  Patient  denies current or recent suicidal ideation or behaviors.  Patient denies current or recent homicidal ideation or behaviors.  Patient denies current or recent self injurious behavior or ideation.  Patient denies other safety concerns.  A safety and risk management plan has not been developed at this time, however patient was encouraged to call [place] [phone] should there be a change in any of these risk factors.    Appearance:   Appropriate   Eye Contact:   Good   Psychomotor Behavior: Normal   Attitude:   Cooperative   Orientation:   All  Speech   Rate / Production: Normal    Volume:  Normal   Mood:    Euthymic  Affect:    Appropriate   Thought Content:  Clear   Thought Form:  Coherent  Logical   Insight:    Good     Diagnoses:  1. ODILON (generalized anxiety disorder)    2. Major depressive disorder, recurrent episode, mild (H)    3. Attention deficit disorder without hyperactivity        Collateral Reports Completed:  Not Applicable    Plan: (Homework, other):  Patient was given information about behavioral services and encouraged to schedule a follow up appointment with the clinic [facility] in 2 weeks.  She was also given information about mental health symptoms and treatment options  and Cognitive Behavioral Therapy skills to practice when experiencing anxiety, depression and ADHD .  CD Recommendations: No indications of CD issues.        ______________________________________________________________________                                              Individual Treatment Plan    Patient's Name: Naty Pérez  YOB: 1995    Date of Creation: 3/14/22  Date Treatment Plan Last Reviewed/Revised: 8/11/23    DSM5 Diagnoses: Attention-Deficit/Hyperactivity Disorder  314.00 (F90.0) Predominantly inattentive presentation, 296.31 (F33.0) Major Depressive Disorder, Recurrent Episode, Mild With anxious distress or 300.02 (F41.1) Generalized Anxiety Disorder  Psychosocial / Contextual Factors: relationships,  work  PROMIS-10  In general, would you say your health is:: 3  In general, would you say your quality of life is:: 3  In general, how would you rate your physical health?: 3  In general, how would you rate your mental health, including your mood and your ability to think?: 3  In general, how would you rate your satisfaction with your social activities and relationships?: 3  In general, please rate how well you carry out your usual social activities and roles. (This includes activities at home, at work and in your community, and responsibilities as a parent, child, spouse, employee, friend, etc.): 3  To what extent are you able to carry out your everyday physical activities such as walking, climbing stairs, carrying groceries, or moving a chair?: 5  In the past 7 days, how often have you been bothered by emotional problems such as feeling anxious, depressed, or irritable?: 4  In the past 7 days, how would you rate your fatigue on average?: 3  In the past 7 days, how would you rate your pain on average, where 0 means no pain, and 10 means worst imaginable pain?: 1  Global Mental Health Score: 11  Global Physical Health Score: 15  PROMIS TOTAL - SUBSCORES: 28        Referral / Collaboration:  Referral to another professional/service is not indicated at this time..    Anticipated number of session for this episode of care: ongoing  Anticipation frequency of session: Every other week  Anticipated Duration of each session: 38-52 minutes  Treatment plan will be reviewed in 90 days or when goals have been changed.       MeasurableTreatment Goal(s) related to diagnosis / functional impairment(s)  Goal 1: Patient will experience a reduction in depressive symptoms along with a corresponding increase in positive emotion and life satisfaction.    I will know I've met my goal when I am less worried and mood is more .      Objective #A (Patient Action)    Patient will Increase interest, engagement, and pleasure in doing  "things.  Status: Completed- Date(s): 8/11/23    Intervention(s)  Therapist will help patient identify pleasant and mastery oriented events that elicit positive, relaxed mood.    Objective #B  Patient will Decrease frequency and intensity of feeling down, depressed, hopeless.  Status: Completed - Date(s):8/11/23    Intervention(s)  Therapist will introduce patient to cognitive-behavioral and acceptance and commitment therapy topics aimed to help reduce depression and anxiety    Objective #C  Patient will Identify negative self-talk and behaviors: challenge core beliefs, myths, and actions  Improve concentration, focus, and mindfulness in daily activities .  Status: Completed - Date(s):8/11/23    Intervention(s)  Therapist will help patient identify and manage negative self-talk and automatic thoughts; introduce patient to cognitive distortions; help patient develop cognitive diffusion techniques      Goal 2: Patient will experience a reduction in anxious symptoms, along with a corresponding increase in relaxed emotional states and life satisfaction.    I will know I've met my goal when more focused and less worried.      Objective #A (Patient Action)  Patient will use cognitive-behavioral and thought diffusion strategies identified in therapy to challenge anxious thoughts.    Status: Continued - Date(s):8/11/23    Intervention(s)  Therapist will utilize CBT and ACT ideas to help patient challenge anxious thoughts and reduce intensity/duration of anxious distress    Objective #B  Patient will use \"worry time\" each day for 15 minutes of scheduled worry and then defer obsessive or anxious thinking until the next structured worry time.    Status: Continued - Date(s): 8/11/23    Intervention(s)  Therapist will teach patient how to effectively utilize worry time and/or thought logs/journals each day and incorporate more relaxation behaviors into their routine.    Objective #C  Patient will identify the stressors which " contribute to feelings of anxiety  Patient will increase engagement in adaptive coping skills and recreational activities, such as exercise and healthy socialization, to manage distress.  Status: Continued - Date(s):8/11/23    Intervention(s)  Therapist will help patient identify triggers/situational factors that contribute to anxiety and behavioral skills aimed to manage anxious distress.        Other Possible Therapeutic Intervention(s):    Psycho-education regarding mental health diagnoses and treatment options    Supportive Therapy  Provide affirmations, reflections, and establish working rapport  Emphasize and reflect on strength of therapeutic relationship    Skills training  Explore skills useful to client in current situation.  Skills include assertiveness, communication, conflict management, problem-solving, relaxation, etc.    Solution-Focused Therapy  Explore patterns in patient's relationships and discuss options for new behaviors.  Explore patterns in patient's actions and choices and discuss options for new behaviors.    Cognitive-behavioral Therapy  Discuss common cognitive distortions, identify them in patient's life.  Explore ways to challenge, replace, and act against these cognitions.    Acceptance and Commitment Therapy  Explore and identify important values in patient's life.  Discuss ways to commit to behavioral activation around these values.    Psychodynamic psychotherapy  Discuss patient's emotional dynamics and issues and how they impact behaviors.  Explore patient's history of relationships and how they impact present behaviors.  Explore how to work with and make changes in these schemas and patterns.    Narrative Therapy  Explore the patient's story of his/her life from his/her perspective.  Explore alternate ways of understanding their experience, identifying exceptions, developing new themes.    Interpersonal Psychotherapy  Explore patterns in relationships that are effective or  ineffective at helping patient reach their goals, find satisfying experience.  Discuss new patterns or behaviors to engage in for improved social functioning.    Behavioral Activation  Discuss steps patient can take to become more involved in meaningful activity.  Identify barriers to these activities and explore possible solutions.    Mindfulness-Based Strategies  Discuss skills based on development and application of mindfulness.  Skills drawn from compassion-focused therapy, dialectical behavior therapy, mindfulness-based stress reduction, mindfulness-based cognitive therapy, etc.      Patient has reviewed and agreed to the above plan.      SHELLI Stallings, [facility]  Behavioral Health Clinician  [facility] Professional [facility], [address], [address], [place], [address]  [phone]    2023

## 2023-09-13 ENCOUNTER — VIRTUAL VISIT (OUTPATIENT)
Dept: BEHAVIORAL HEALTH | Facility: CLINIC | Age: 28
End: 2023-09-13
Payer: COMMERCIAL

## 2023-09-13 DIAGNOSIS — F41.1 GAD (GENERALIZED ANXIETY DISORDER): Primary | ICD-10-CM

## 2023-09-13 DIAGNOSIS — F98.8 ATTENTION DEFICIT DISORDER WITHOUT HYPERACTIVITY: ICD-10-CM

## 2023-09-13 DIAGNOSIS — F33.0 MAJOR DEPRESSIVE DISORDER, RECURRENT EPISODE, MILD (H): ICD-10-CM

## 2023-09-13 PROCEDURE — 90834 PSYTX W PT 45 MINUTES: CPT | Mod: VID

## 2023-09-13 NOTE — PROGRESS NOTES
"[facility]: Integrated Behavioral Health  September 13th, 2023     Behavioral Health Clinician Progress Note    Patient Name: Naty Pérez           Service Type:  Individual      Service Location:   Face to Face in Home / Community     Session Start Time: 3:02 PM Session End Time: 3:50 PM      Session Length: 38 - 52      Attendees: Client     Service Modality:  Phone Visit:      Provider verified identity through the following two step process.  Patient provided:  Patient is known previously to provider    Telephone Visit: The patient's condition can be safely assessed and treated via synchronous audio telemedicine encounter.      Reason for Audio Telemedicine Visit: Patient has requested telehealth visit    Originating Site (Patient Location): Patient's place of employment    Distant Site (Provider Location): [facility]    Consent:  The patient/guardian has verbally consented to:     1. The potential risks and benefits of telemedicine (telephone visit) versus in person care;    The patient has been notified of the following:      \"We have found that certain health care needs can be provided without the need for a face to face visit.  This service lets us provide the care you need with a phone conversation.       I will have full access to your [facility] medical record during this entire phone call.   I will be taking notes for your medical record.      Since this is like an office visit, we will bill your insurance company for this service.       There are potential benefits and risks of telephone visits (e.g. limits to patient confidentiality) that differ from in-person visits.?Confidentiality still applies for telephone services, and nobody will record the visit.  It is important to be in a quiet, private space that is free of distractions (including cell phone or other devices) during the visit.??      If during the course of the call I " "believe a telephone visit is not appropriate, you will not be charged for this service\"     Consent has been obtained for this service by care team member: Yes     Visit Activities (Refresh list every visit): [facility] Only    Diagnostic Assessment Date: 2/7/22 Roxanna Casas PhD, LP  Treatment Plan Review Date:Reviewed 8/11/23  See Flowsheets for today's PHQ-9 and ODILON-7 results  Previous PHQ-9:       7/17/2023     9:45 AM 7/21/2023    10:57 AM 8/17/2023    11:24 AM   PHQ-9 SCORE   PHQ-9 Total Score MyChart 9 (Mild depression) 8 (Mild depression) 11 (Moderate depression)   PHQ-9 Total Score 9 8 11     Previous ODILON-7:       4/3/2023     9:57 AM 6/6/2023     3:23 PM 8/17/2023    11:24 AM   ODILON-7 SCORE   Total Score 12 (moderate anxiety) 12 (moderate anxiety) 9 (mild anxiety)   Total Score 12 12 9       CHARLES LEVEL:      2/6/2020    11:03 AM   CHARLES Score (Last Two)   CHARLES Raw Score 28   Activation Score 50   CHARLES Level 2       DATA  Extended Session (60+ minutes): No  Interactive Complexity: No  Crisis: No  [facility] Patient: No    Treatment Objective(s) Addressed in This Session:  Target Behavior(s):  mood    Depressed Mood: Increase interest, engagement, and pleasure in doing things  Decrease frequency and intensity of feeling down, depressed, hopeless  Improve quantity and quality of night time sleep / decrease daytime naps  Feel less tired and more energy during the day   Improve diet, appetite, mindful eating, and / or meal planning  Identify negative self-talk and behaviors: challenge core beliefs, myths, and actions  Improve concentration, focus, and mindfulness in daily activities   Discuss motivation / ambivalence about taking anti-depressant / mood stabilizer medication(s)  Discuss motivation / ambivalence about a referral to specialty mental health services (Therapy, Day Tx, PHP)  Anxiety: will experience a reduction in anxiety, will develop more effective coping skills to manage anxiety symptoms, will develop healthy " cognitive patterns and beliefs and will increase ability to function adaptively  Attention Problems: will develop coping skills to effectively manage attention issues    Current Stressors / Issues:    Update: [facility] met with pt over the phone on this date due to poor internet connection. Pt states that school is going well so far, reports that this year is easier. Pt endorses feeling a little fatigued. [facility] and pt discussed goals for the upcoming school year, and pt states that she would like to have better work life balance. In regards to self-care, pt is watching TV. Would like to participate in more movement. Stressors include dog's recent injury as well as relationship stress. C and pt discussed communication and conflict resolutions techniques. Pt denies SI/HI/SIB.     Progress on Treatment Objective(s) / Homework:  Stable - ACTION (Actively working towards change); Intervened by reinforcing change plan / affirming steps taken    Motivational Interviewing    MI Intervention: Expressed Empathy/Understanding, Supported Autonomy, Collaboration, Evocation, Permission to raise concern or advise, Open-ended questions and Reflections: simple and complex     Change Talk Expressed by the Patient: Desire to change Ability to change Reasons to change Need to change Committment to change Activation Taking steps    Provider Response to Change Talk: E - Evoked more info from patient about behavior change, A - Affirmed patient's thoughts, decisions, or attempts at behavior change, R - Reflected patient's change talk and S - Summarized patient's change talk statements      Interpersonal Psychotherapy  Explore patterns in relationships that are effective or ineffective at helping patient reach their goals, find satisfying experience.  Discuss new patterns or behaviors to engage in for improved social functioning.      Also provided psychoeducation about behavioral health condition, symptoms, and treatment options    Care Plan  review completed: Yes    Medication Review:  Changes to psychiatric medications, see updated Medication List in EPIC.     Medication Compliance:  Yes    Changes in Health Issues:   None reported    Chemical Use Review:   Substance Use: Chemical use reviewed, no active concerns identified      Tobacco Use: No current tobacco use.      Assessment: Current Emotional / Mental Status (status of significant symptoms):  Risk status (Self / Other harm or suicidal ideation)  Patient denies a history of suicidal ideation, suicide attempts, self-injurious behavior, homicidal ideation, homicidal behavior and and other safety concerns  Patient denies current fears or concerns for personal safety.  Patient denies current or recent suicidal ideation or behaviors.  Patient denies current or recent homicidal ideation or behaviors.  Patient denies current or recent self injurious behavior or ideation.  Patient denies other safety concerns.  A safety and risk management plan has not been developed at this time, however patient was encouraged to call [place] [phone] should there be a change in any of these risk factors.    Appearance:   NALLELY phone encounter   Eye Contact:   NALLELY phone encounter    Psychomotor Behavior: NALLELY phone encounter   Attitude:   Cooperative   Orientation:   All  Speech   Rate / Production: Normal    Volume:  Normal   Mood:    Euthymic  Affect:    NALLELY phone encounter   Thought Content:  Clear   Thought Form:  Coherent  Logical   Insight:    Good     Diagnoses:  No diagnosis found.      Collateral Reports Completed:  Not Applicable    Plan: (Homework, other):  Patient was given information about behavioral services and encouraged to schedule a follow up appointment with the clinic [facility] in 2 weeks.  She was also given information about mental health symptoms and treatment options  and Cognitive Behavioral Therapy skills to practice when experiencing anxiety, depression and ADHD .  CD Recommendations: No  indications of CD issues.        ______________________________________________________________________                                              Individual Treatment Plan    Patient's Name: Naty Pérez  YOB: 1995    Date of Creation: 3/14/22  Date Treatment Plan Last Reviewed/Revised: 8/11/23    DSM5 Diagnoses: Attention-Deficit/Hyperactivity Disorder  314.00 (F90.0) Predominantly inattentive presentation, 296.31 (F33.0) Major Depressive Disorder, Recurrent Episode, Mild With anxious distress or 300.02 (F41.1) Generalized Anxiety Disorder  Psychosocial / Contextual Factors: relationships, work  PROMIS-10  In general, would you say your health is:: 3  In general, would you say your quality of life is:: 3  In general, how would you rate your physical health?: 3  In general, how would you rate your mental health, including your mood and your ability to think?: 3  In general, how would you rate your satisfaction with your social activities and relationships?: 3  In general, please rate how well you carry out your usual social activities and roles. (This includes activities at home, at work and in your community, and responsibilities as a parent, child, spouse, employee, friend, etc.): 3  To what extent are you able to carry out your everyday physical activities such as walking, climbing stairs, carrying groceries, or moving a chair?: 5  In the past 7 days, how often have you been bothered by emotional problems such as feeling anxious, depressed, or irritable?: 4  In the past 7 days, how would you rate your fatigue on average?: 3  In the past 7 days, how would you rate your pain on average, where 0 means no pain, and 10 means worst imaginable pain?: 1  Global Mental Health Score: 11  Global Physical Health Score: 15  PROMIS TOTAL - SUBSCORES: 28        Referral / Collaboration:  Referral to another professional/service is not indicated at this time..    Anticipated number of session for this episode  of care: ongoing  Anticipation frequency of session: Every other week  Anticipated Duration of each session: 38-52 minutes  Treatment plan will be reviewed in 90 days or when goals have been changed.       MeasurableTreatment Goal(s) related to diagnosis / functional impairment(s)  Goal 1: Patient will experience a reduction in depressive symptoms along with a corresponding increase in positive emotion and life satisfaction.    I will know I've met my goal when I am less worried and mood is more .      Objective #A (Patient Action)    Patient will Increase interest, engagement, and pleasure in doing things.  Status: Completed- Date(s): 8/11/23    Intervention(s)  Therapist will help patient identify pleasant and mastery oriented events that elicit positive, relaxed mood.    Objective #B  Patient will Decrease frequency and intensity of feeling down, depressed, hopeless.  Status: Completed - Date(s):8/11/23    Intervention(s)  Therapist will introduce patient to cognitive-behavioral and acceptance and commitment therapy topics aimed to help reduce depression and anxiety    Objective #C  Patient will Identify negative self-talk and behaviors: challenge core beliefs, myths, and actions  Improve concentration, focus, and mindfulness in daily activities .  Status: Completed - Date(s):8/11/23    Intervention(s)  Therapist will help patient identify and manage negative self-talk and automatic thoughts; introduce patient to cognitive distortions; help patient develop cognitive diffusion techniques      Goal 2: Patient will experience a reduction in anxious symptoms, along with a corresponding increase in relaxed emotional states and life satisfaction.    I will know I've met my goal when more focused and less worried.      Objective #A (Patient Action)  Patient will use cognitive-behavioral and thought diffusion strategies identified in therapy to challenge anxious thoughts.    Status: Continued -  "Date(s):8/11/23    Intervention(s)  Therapist will utilize CBT and ACT ideas to help patient challenge anxious thoughts and reduce intensity/duration of anxious distress    Objective #B  Patient will use \"worry time\" each day for 15 minutes of scheduled worry and then defer obsessive or anxious thinking until the next structured worry time.    Status: Continued - Date(s): 8/11/23    Intervention(s)  Therapist will teach patient how to effectively utilize worry time and/or thought logs/journals each day and incorporate more relaxation behaviors into their routine.    Objective #C  Patient will identify the stressors which contribute to feelings of anxiety  Patient will increase engagement in adaptive coping skills and recreational activities, such as exercise and healthy socialization, to manage distress.  Status: Continued - Date(s):8/11/23    Intervention(s)  Therapist will help patient identify triggers/situational factors that contribute to anxiety and behavioral skills aimed to manage anxious distress.        Other Possible Therapeutic Intervention(s):    Psycho-education regarding mental health diagnoses and treatment options    Supportive Therapy  Provide affirmations, reflections, and establish working rapport  Emphasize and reflect on strength of therapeutic relationship    Skills training  Explore skills useful to client in current situation.  Skills include assertiveness, communication, conflict management, problem-solving, relaxation, etc.    Solution-Focused Therapy  Explore patterns in patient's relationships and discuss options for new behaviors.  Explore patterns in patient's actions and choices and discuss options for new behaviors.    Cognitive-behavioral Therapy  Discuss common cognitive distortions, identify them in patient's life.  Explore ways to challenge, replace, and act against these cognitions.    Acceptance and Commitment Therapy  Explore and identify important values in patient's " life.  Discuss ways to commit to behavioral activation around these values.    Psychodynamic psychotherapy  Discuss patient's emotional dynamics and issues and how they impact behaviors.  Explore patient's history of relationships and how they impact present behaviors.  Explore how to work with and make changes in these schemas and patterns.    Narrative Therapy  Explore the patient's story of his/her life from his/her perspective.  Explore alternate ways of understanding their experience, identifying exceptions, developing new themes.    Interpersonal Psychotherapy  Explore patterns in relationships that are effective or ineffective at helping patient reach their goals, find satisfying experience.  Discuss new patterns or behaviors to engage in for improved social functioning.    Behavioral Activation  Discuss steps patient can take to become more involved in meaningful activity.  Identify barriers to these activities and explore possible solutions.    Mindfulness-Based Strategies  Discuss skills based on development and application of mindfulness.  Skills drawn from compassion-focused therapy, dialectical behavior therapy, mindfulness-based stress reduction, mindfulness-based cognitive therapy, etc.      Patient has reviewed and agreed to the above plan.      SHELLI Stallings, [facility]  Behavioral Health Clinician  [facility], [address], [address], [place], [place], [address]  [phone]    2023

## 2023-09-21 ENCOUNTER — IMMUNIZATION (OUTPATIENT)
Dept: NURSING | Facility: CLINIC | Age: 28
End: 2023-09-21
Payer: COMMERCIAL

## 2023-09-21 PROCEDURE — 90471 IMMUNIZATION ADMIN: CPT

## 2023-09-21 PROCEDURE — 90686 IIV4 VACC NO PRSV 0.5 ML IM: CPT

## 2023-09-26 ENCOUNTER — MYC MEDICAL ADVICE (OUTPATIENT)
Dept: FAMILY MEDICINE | Facility: CLINIC | Age: 28
End: 2023-09-26
Payer: COMMERCIAL

## 2023-09-26 ENCOUNTER — HOSPITAL ENCOUNTER (OUTPATIENT)
Dept: ULTRASOUND IMAGING | Facility: CLINIC | Age: 28
Discharge: HOME | End: 2023-09-26
Attending: NURSE PRACTITIONER | Admitting: NURSE PRACTITIONER
Payer: COMMERCIAL

## 2023-09-26 DIAGNOSIS — N83.201 RIGHT OVARIAN CYST: ICD-10-CM

## 2023-09-26 PROCEDURE — 76830 TRANSVAGINAL US NON-OB: CPT

## 2023-09-27 ENCOUNTER — NURSE TRIAGE (OUTPATIENT)
Dept: FAMILY MEDICINE | Facility: CLINIC | Age: 28
End: 2023-09-27
Payer: COMMERCIAL

## 2023-09-27 DIAGNOSIS — U07.1 INFECTION DUE TO 2019 NOVEL CORONAVIRUS: Primary | ICD-10-CM

## 2023-09-27 PROCEDURE — 99207 PR NO CHARGE NURSE ONLY: CPT | Performed by: NURSE PRACTITIONER

## 2023-09-27 NOTE — TELEPHONE ENCOUNTER
RN COVID TREATMENT VISIT  09/27/23      The patient has been triaged and does not require a higher level of care.    Naty Pérez  28 year old  Current weight? 125    Has the patient been seen by a primary care provider at an [facility] or [facility] within the past two years? Yes.   Have you been in close proximity to/do you have a known exposure to a person with a confirmed case of influenza? No.     General treatment eligibility:  Date of positive COVID test (PCR or at home)?  9/26/2023    Are you or have you been hospitalized for this COVID-19 infection? No.   Have you received monoclonal antibodies or antiviral treatment for COVID-19 since this positive test? No.   Do you have any of the following conditions that place you at risk of being very sick from COVID-19?   - Mental health disorders including mood disorders, depression, schizophrenia spectrum disorders   Yes, patient has at least one high risk condition as noted above.     Current COVID symptoms:   - fever or chills  - cough  - fatigue  - muscle or body aches  - headache  - new loss of taste or smell  - sore throat  - congestion or runny nose  Yes. Patient has at least one symptom as selected.     How many days since symptoms started? 5 days or less. Established patient, 12 years or older weighing at least 88.2 lbs, who has symptoms that started in the past 5 days, has not been hospitalized nor received treatment already, and is at risk for being very sick from COVID-19.     Treatment eligibility by RN:  Are you currently pregnant or nursing? No  Do you have a clinically significant hypersensitivity to nirmatrelvir or ritonavir, or toxic epidermal necrolysis (TEN) or Valadez-Khanh Syndrome? No  Do you have a history of hepatitis, any hepatic impairment on the Problem List (such as Child-Quintana Class C, cirrhosis, fatty liver disease, alcoholic liver disease), or was the last liver lab (hepatic panel, ALT, AST, ALK Phos,  bilirubin) elevated in the past 6 months? No  Do you have any history of severe renal impairment (eGFR < 30mL/min)? No    Is patient eligible to continue? Yes, patient meets all eligibility requirements for the RN COVID treatment (as denoted by all no responses above).     Current Outpatient Medications   Medication Sig Dispense Refill    amphetamine-dextroamphetamine (ADDERALL XR) 10 MG 24 hr capsule Take 1 capsule (10 mg) by mouth daily for 30 days 30 capsule 0    benzonatate (TESSALON) 100 MG capsule Take 1 capsule (100 mg) by mouth 3 times daily as needed for cough 21 capsule 1    cholecalciferol (VITAMIN D3) 10 mcg (400 units) TABS tablet Take 10 mcg by mouth daily      etonogestrel (NEXPLANON) 68 MG IMPL 1 each (68 mg) by Subdermal route once      ketoconazole (NIZORAL) 2 % external cream Apply topically daily Apply to affected areas daily up until clear 15 g 1    sertraline (ZOLOFT) 50 MG tablet TAKE 1 TABLET(50 MG) BY MOUTH DAILY 90 tablet 2    vitamin C (ASCORBIC ACID) 250 MG tablet Take 250 mg by mouth daily         Medications from List 1 of the standing order (on medications that exclude the use of Paxlovid) that patient is taking: NONE. Is patient taking Brendan's Wort? No  Is patient taking Brendan's Wort or any meds from List 1? No.   Medications from List 2 of the standing order (on meds that provider needs to adjust) that patient is taking: NONE. Is patient on any of the meds from List 2? No.   Medications from List 3 of standing order (on meds that a RN needs to adjust) that patient is taking: NONE. Is patient on any meds from List 3? No.     Paxlovid has an approximate 90% reduction in hospitalization. Paxlovid can possibly cause altered sense of taste, diarrhea (loose, watery stools), high blood pressure, muscle aches.     Would patient like a Paxlovid prescription?   Yes.   Lab Results   Component Value Date    GFRESTIMATED >90 06/29/2023       Was last eGFR reduced? No, eGFR 60 or greater/ No  "Result on record. Patient can receive the normal renal function dose. Paxlovid Rx sent to [place] [facility]       Temporary change to home medications: None    All medication adjustments (holds, etc) were discussed with the patient and patient was asked to repeat back (teachback) their med adjustment.  Did patient understand med adjustment? No medication adjustments needed.         Reviewed the following instructions with the patient:    Paxlovid (nimatrelvir and ritonavir)    How it works  Two medicines (nirmatrelvir and ritonavir) are taken together. They stop the virus from growing. Less amount of virus is easier for your body to fight.    How to take  Medicine comes in a daily container with both medicine tablets. Take by mouth twice daily (once in the morning, once at night) for 5 days.  The number of tablets to take varies by patient.  Don't chew or break capsules. Swallow whole.    When to take  Take as soon as possible after positive COVID-19 test result, and within 5 days of your first symptoms.    Possible side effects  Can cause altered sense of taste, diarrhea (loose, watery stools), high blood pressure, muscle aches.    Dolores Shirley RN         1. COVID-19 DIAGNOSIS: \"How do you know that you have COVID?\" (e.g., positive lab test or self-test, diagnosed by doctor or NP/PA, symptoms after exposure).      Home test  2. COVID-19 EXPOSURE: \"Was there any known exposure to COVID before the symptoms began?\" CDC Definition of close contact: within 6 feet (2 meters) for a total of 15 minutes or more over a 24-hour period.       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 ONSET: \"When did the COVID-19 symptoms start?\"       Monday evening. 9/25/2023  4. WORST SYMPTOM: \"What is your worst symptom?\" (e.g., cough, fever, shortness of breath, muscle aches)      Body aches  5. COUGH: \"Do you have a cough?\" If Yes, ask: \"How bad is the cough?\"        Yes.   6. FEVER: \"Do you have a fever?\" If Yes, ask: \"What is your " "temperature, how was it measured, and when did it start?\"      Yes.   7. RESPIRATORY STATUS: \"Describe your breathing?\" (e.g., normal; shortness of breath, wheezing, unable to speak)       No.   8. BETTER-SAME-WORSE: \"Are you getting better, staying the same or getting worse compared to yesterday?\"  If getting worse, ask, \"In what way?\"      Worse.   9. OTHER SYMPTOMS: \"Do you have any other symptoms?\"  (e.g., chills, fatigue, headache, loss of smell or taste, muscle pain, sore throat)      Fatigue. Headache -- staying in the dark. Chills. Sore throat. Cough. Muscle aches/pain. Loss of smell.   10. HIGH RISK DISEASE: \"Do you have any chronic medical problems?\" (e.g., asthma, heart or lung disease, weak immune system, obesity, etc.)        Mental health  11. VACCINE: \"Have you had the COVID-19 vaccine?\" If Yes, ask: \"Which one, how many shots, when did you get it?\"        Yes, plus booster. Moderna.   12. PREGNANCY: \"Is there any chance you are pregnant?\" \"When was your last menstrual period?\"        No.   13. O2 SATURATION MONITOR:  \"Do you use an oxygen saturation monitor (pulse oximeter) at home?\" If Yes, ask \"What is your reading (oxygen level) today?\" \"What is your usual oxygen saturation reading?\" (e.g., 95%)        99%  Reason for Disposition   COVID-19 diagnosed by positive lab test (e.g., PCR, rapid self-test kit) and mild symptoms (e.g., cough, fever, others) and no complications or SOB    Additional Information   Negative: SEVERE difficulty breathing (e.g., struggling for each breath, speaks in single words)   Negative: Difficult to awaken or acting confused (e.g., disoriented, slurred speech)   Negative: Bluish (or gray) lips or face now   Negative: Shock suspected (e.g., cold/pale/clammy skin, too weak to stand, low BP, rapid pulse)   Negative: Sounds like a life-threatening emergency to the triager   Negative: Diagnosed or suspected COVID-19 and symptoms lasting 3 or more weeks   Negative: COVID-19 " exposure and no symptoms   Negative: COVID-19 vaccine reaction suspected (e.g., fever, headache, muscle aches) occurring 1 to 3 days after getting vaccine   Negative: COVID-19 vaccine, questions about   Negative: Lives with someone known to have influenza (flu test positive) and flu-like symptoms (e.g., cough, runny nose, sore throat, SOB; with or without fever)   Negative: Possible COVID-19 symptoms and triager concerned about severity of symptoms or other causes   Negative: COVID-19 and breastfeeding, questions about   Negative: SEVERE or constant chest pain or pressure  (Exception: Mild central chest pain, present only when coughing.)   Negative: MODERATE difficulty breathing (e.g., speaks in phrases, SOB even at rest, pulse 100-120)   Negative: Headache and stiff neck (can't touch chin to chest)   Negative: Oxygen level (e.g., pulse oximetry) 90% or lower   Negative: Chest pain or pressure  (Exception: MILD central chest pain, present only when coughing.)   Negative: Drinking very little and dehydration suspected (e.g., no urine > 12 hours, very dry mouth, very lightheaded)   Negative: Patient sounds very sick or weak to the triager   Negative: MILD difficulty breathing (e.g., minimal/no SOB at rest, SOB with walking, pulse <100)   Negative: Fever > 103 F (39.4 C)   Negative: Fever > 101 F (38.3 C) and over 60 years of age   Negative: Fever > 100.0 F (37.8 C) and bedridden (e.g., CVA, chronic illness, recovering from surgery)   Negative: HIGH RISK patient (e.g., weak immune system, age > 64 years, obesity with BMI of 30 or higher, pregnant, chronic lung disease or other chronic medical condition) and COVID symptoms (e.g., cough, fever)  (Exceptions: Already seen by doctor or NP/PA and no new or worsening symptoms.)   Negative: HIGH RISK patient and influenza is widespread in the community and ONE OR MORE respiratory symptoms: cough, sore throat, runny or stuffy nose   Negative: HIGH RISK patient and influenza  exposure within the last 7 days and ONE OR MORE respiratory symptoms: cough, sore throat, runny or stuffy nose   Negative: Oxygen level (e.g., pulse oximetry) 91 to 94%   Negative: COVID-19 infection suspected by caller or triager and mild symptoms (cough, fever, or others) and negative COVID-19 rapid test   Negative: Fever present > 3 days (72 hours)   Negative: Fever returns after gone for over 24 hours and symptoms worse or not improved   Negative: Continuous (nonstop) coughing interferes with work or school and no improvement using cough treatment per Care Advice   Negative: Cough present > 3 weeks    Protocols used: Coronavirus (COVID-19) Diagnosed or [id number]    COBY LynneN RN  [facility]

## 2023-09-27 NOTE — TELEPHONE ENCOUNTER
Called patient regarding covid positive test.     Dolores Shirley, BSN RN  [facility]

## 2023-10-06 ENCOUNTER — VIRTUAL VISIT (OUTPATIENT)
Dept: BEHAVIORAL HEALTH | Facility: CLINIC | Age: 28
End: 2023-10-06
Payer: COMMERCIAL

## 2023-10-06 DIAGNOSIS — F33.0 MAJOR DEPRESSIVE DISORDER, RECURRENT EPISODE, MILD (H): ICD-10-CM

## 2023-10-06 DIAGNOSIS — F98.8 ATTENTION DEFICIT DISORDER WITHOUT HYPERACTIVITY: ICD-10-CM

## 2023-10-06 DIAGNOSIS — F41.1 GAD (GENERALIZED ANXIETY DISORDER): Primary | ICD-10-CM

## 2023-10-06 PROCEDURE — 90832 PSYTX W PT 30 MINUTES: CPT | Mod: TEL

## 2023-10-06 NOTE — PROGRESS NOTES
"[facility] Primary Care: Integrated Behavioral Health  October 6th, 2023     Behavioral Health Clinician Progress Note    Patient Name: Naty Pérez           Service Type:  Individual      Service Location:   Face to Face in Home / Community     Session Start Time: 3:06 PM Session End Time: 3:35 PM      Session Length: 16 - 37      Attendees: Client     Service Modality:  Phone Visit:      Provider verified identity through the following two step process.  Patient provided:  Patient is known previously to provider    Telephone Visit: The patient's condition can be safely assessed and treated via synchronous audio telemedicine encounter.      Reason for Audio Telemedicine Visit: Patient has requested telehealth visit    Originating Site (Patient Location): Patient's place of employment    Distant Site (Provider Location): [facility]    Consent:  The patient/guardian has verbally consented to:     1. The potential risks and benefits of telemedicine (telephone visit) versus in person care;    The patient has been notified of the following:      \"We have found that certain health care needs can be provided without the need for a face to face visit.  This service lets us provide the care you need with a phone conversation.       I will have full access to your [facility] medical record during this entire phone call.   I will be taking notes for your medical record.      Since this is like an office visit, we will bill your insurance company for this service.       There are potential benefits and risks of telephone visits (e.g. limits to patient confidentiality) that differ from in-person visits.?Confidentiality still applies for telephone services, and nobody will record the visit.  It is important to be in a quiet, private space that is free of distractions (including cell phone or other devices) during the visit.??      If during the course of the call I " "believe a telephone visit is not appropriate, you will not be charged for this service\"     Consent has been obtained for this service by care team member: Yes     Visit Activities (Refresh list every visit): [facility] [place] Only    Diagnostic Assessment Date: 2/7/22 Roxanna Casas PhD, LP  Treatment Plan Review Date:Reviewed 8/11/23, due 11/11/23  See Flowsheets for today's PHQ-9 and ODILON-7 results  Previous PHQ-9:       7/21/2023    10:57 AM 8/17/2023    11:24 AM 10/6/2023     3:06 PM   PHQ-9 SCORE   PHQ-9 Total Score MyChart 8 (Mild depression) 11 (Moderate depression) 7 (Mild depression)   PHQ-9 Total Score 8 11 7     Previous ODILON-7:       4/3/2023     9:57 AM 6/6/2023     3:23 PM 8/17/2023    11:24 AM   ODILON-7 SCORE   Total Score 12 (moderate anxiety) 12 (moderate anxiety) 9 (mild anxiety)   Total Score 12 12 9       CHARLES LEVEL:      2/6/2020    11:03 AM   CHARLES Score (Last Two)   CHARLES Raw Score 28   Activation Score 50   CHARLES Level 2       DATA  Extended Session (60+ minutes): No  Interactive Complexity: No  Crisis: No  [facility] Patient: No    Treatment Objective(s) Addressed in This Session:  Target Behavior(s):  mood    Depressed Mood: Increase interest, engagement, and pleasure in doing things  Decrease frequency and intensity of feeling down, depressed, hopeless  Improve quantity and quality of night time sleep / decrease daytime naps  Feel less tired and more energy during the day   Improve diet, appetite, mindful eating, and / or meal planning  Identify negative self-talk and behaviors: challenge core beliefs, myths, and actions  Improve concentration, focus, and mindfulness in daily activities   Discuss motivation / ambivalence about taking anti-depressant / mood stabilizer medication(s)  Discuss motivation / ambivalence about a referral to specialty mental health services (Therapy, Day Tx, PHP)  Anxiety: will experience a reduction in anxiety, will develop more effective coping skills to manage anxiety symptoms, will " develop healthy cognitive patterns and beliefs and will increase ability to function adaptively  Attention Problems: will develop coping skills to effectively manage attention issues    Current Stressors / Issues:    Update: Overall pt reports that she is stable and things are going well. Pt states that it feels good to be back at school and at work. [facility] and pt discussed how structure and routine has helped to improve her mood. Pt reports that classroom is more manageable this year. Pt continues to work on self-care. No safety concerns reported.     Progress on Treatment Objective(s) / Homework:  Stable - ACTION (Actively working towards change); Intervened by reinforcing change plan / affirming steps taken    Motivational Interviewing    MI Intervention: Expressed Empathy/Understanding, Supported Autonomy, Collaboration, Evocation, Permission to raise concern or advise, Open-ended questions and Reflections: simple and complex     Change Talk Expressed by the Patient: Desire to change Ability to change Reasons to change Need to change Committment to change Activation Taking steps    Provider Response to Change Talk: E - Evoked more info from patient about behavior change, A - Affirmed patient's thoughts, decisions, or attempts at behavior change, R - Reflected patient's change talk and S - Summarized patient's change talk statements      Interpersonal Psychotherapy  Explore patterns in relationships that are effective or ineffective at helping patient reach their goals, find satisfying experience.  Discuss new patterns or behaviors to engage in for improved social functioning.      Also provided psychoeducation about behavioral health condition, symptoms, and treatment options    Care Plan review completed: Yes    Medication Review:  No changes to current psychiatric medication(s)    Medication Compliance:  Yes    Changes in Health Issues:   None reported    Chemical Use Review:   Substance Use: Chemical use  reviewed, no active concerns identified      Tobacco Use: No current tobacco use.      Assessment: Current Emotional / Mental Status (status of significant symptoms):  Risk status (Self / Other harm or suicidal ideation)  Patient denies a history of suicidal ideation, suicide attempts, self-injurious behavior, homicidal ideation, homicidal behavior and and other safety concerns  Patient denies current fears or concerns for personal safety.  Patient denies current or recent suicidal ideation or behaviors.  Patient denies current or recent homicidal ideation or behaviors.  Patient denies current or recent self injurious behavior or ideation.  Patient denies other safety concerns.  A safety and risk management plan has not been developed at this time, however patient was encouraged to call [place] [phone] should there be a change in any of these risk factors.    Appearance:                            Appropriate   Eye Contact:                           Good   Psychomotor Behavior:          Normal   Attitude:                                   Cooperative   Orientation:                             All  Speech              Rate / Production:       Normal               Volume:                       Normal   Mood:                                      euthymic   Affect:                                      Appropriate   Thought Content:                    Clear   Thought Form:                        Coherent  Logical   Insight:                                     Good     Diagnoses:  1. ODILON (generalized anxiety disorder)    2. Major depressive disorder, recurrent episode, mild (H24)    3. Attention deficit disorder without hyperactivity          Collateral Reports Completed:  Not Applicable    Plan: (Homework, other):  Patient was given information about behavioral services and encouraged to schedule a follow up appointment with the clinic [facility] in 2 weeks.  She was also given information about mental health symptoms and  treatment options  and Cognitive Behavioral Therapy skills to practice when experiencing anxiety, depression and ADHD .  CD Recommendations: No indications of CD issues.        ______________________________________________________________________                                              Individual Treatment Plan    Patient's Name: Naty Pérez  YOB: 1995    Date of Creation: 3/14/22  Date Treatment Plan Last Reviewed/Revised: 8/11/23    DSM5 Diagnoses: Attention-Deficit/Hyperactivity Disorder  314.00 (F90.0) Predominantly inattentive presentation, 296.31 (F33.0) Major Depressive Disorder, Recurrent Episode, Mild With anxious distress or 300.02 (F41.1) Generalized Anxiety Disorder  Psychosocial / Contextual Factors: relationships, work  PROMIS-10  In general, would you say your health is:: 3  In general, would you say your quality of life is:: 3  In general, how would you rate your physical health?: 3  In general, how would you rate your mental health, including your mood and your ability to think?: 3  In general, how would you rate your satisfaction with your social activities and relationships?: 3  In general, please rate how well you carry out your usual social activities and roles. (This includes activities at home, at work and in your community, and responsibilities as a parent, child, spouse, employee, friend, etc.): 3  To what extent are you able to carry out your everyday physical activities such as walking, climbing stairs, carrying groceries, or moving a chair?: 5  In the past 7 days, how often have you been bothered by emotional problems such as feeling anxious, depressed, or irritable?: 4  In the past 7 days, how would you rate your fatigue on average?: 3  In the past 7 days, how would you rate your pain on average, where 0 means no pain, and 10 means worst imaginable pain?: 1  Global Mental Health Score: 11  Global Physical Health Score: 15  PROMIS TOTAL - SUBSCORES:  28        Referral / Collaboration:  Referral to another professional/service is not indicated at this time..    Anticipated number of session for this episode of care: ongoing  Anticipation frequency of session: Every other week  Anticipated Duration of each session: 38-52 minutes  Treatment plan will be reviewed in 90 days or when goals have been changed.       MeasurableTreatment Goal(s) related to diagnosis / functional impairment(s)  Goal 1: Patient will experience a reduction in depressive symptoms along with a corresponding increase in positive emotion and life satisfaction.    I will know I've met my goal when I am less worried and mood is more .      Objective #A (Patient Action)    Patient will Increase interest, engagement, and pleasure in doing things.  Status: Completed- Date(s): 8/11/23    Intervention(s)  Therapist will help patient identify pleasant and mastery oriented events that elicit positive, relaxed mood.    Objective #B  Patient will Decrease frequency and intensity of feeling down, depressed, hopeless.  Status: Completed - Date(s):8/11/23    Intervention(s)  Therapist will introduce patient to cognitive-behavioral and acceptance and commitment therapy topics aimed to help reduce depression and anxiety    Objective #C  Patient will Identify negative self-talk and behaviors: challenge core beliefs, myths, and actions  Improve concentration, focus, and mindfulness in daily activities .  Status: Completed - Date(s):8/11/23    Intervention(s)  Therapist will help patient identify and manage negative self-talk and automatic thoughts; introduce patient to cognitive distortions; help patient develop cognitive diffusion techniques      Goal 2: Patient will experience a reduction in anxious symptoms, along with a corresponding increase in relaxed emotional states and life satisfaction.    I will know I've met my goal when more focused and less worried.      Objective #A (Patient Action)  Patient will  "use cognitive-behavioral and thought diffusion strategies identified in therapy to challenge anxious thoughts.    Status: Continued - Date(s):8/11/23    Intervention(s)  Therapist will utilize CBT and ACT ideas to help patient challenge anxious thoughts and reduce intensity/duration of anxious distress    Objective #B  Patient will use \"worry time\" each day for 15 minutes of scheduled worry and then defer obsessive or anxious thinking until the next structured worry time.    Status: Continued - Date(s): 8/11/23    Intervention(s)  Therapist will teach patient how to effectively utilize worry time and/or thought logs/journals each day and incorporate more relaxation behaviors into their routine.    Objective #C  Patient will identify the stressors which contribute to feelings of anxiety  Patient will increase engagement in adaptive coping skills and recreational activities, such as exercise and healthy socialization, to manage distress.  Status: Continued - Date(s):8/11/23    Intervention(s)  Therapist will help patient identify triggers/situational factors that contribute to anxiety and behavioral skills aimed to manage anxious distress.        Other Possible Therapeutic Intervention(s):    Psycho-education regarding mental health diagnoses and treatment options    Supportive Therapy  Provide affirmations, reflections, and establish working rapport  Emphasize and reflect on strength of therapeutic relationship    Skills training  Explore skills useful to client in current situation.  Skills include assertiveness, communication, conflict management, problem-solving, relaxation, etc.    Solution-Focused Therapy  Explore patterns in patient's relationships and discuss options for new behaviors.  Explore patterns in patient's actions and choices and discuss options for new behaviors.    Cognitive-behavioral Therapy  Discuss common cognitive distortions, identify them in patient's life.  Explore ways to challenge, " replace, and act against these cognitions.    Acceptance and Commitment Therapy  Explore and identify important values in patient's life.  Discuss ways to commit to behavioral activation around these values.    Psychodynamic psychotherapy  Discuss patient's emotional dynamics and issues and how they impact behaviors.  Explore patient's history of relationships and how they impact present behaviors.  Explore how to work with and make changes in these schemas and patterns.    Narrative Therapy  Explore the patient's story of his/her life from his/her perspective.  Explore alternate ways of understanding their experience, identifying exceptions, developing new themes.    Interpersonal Psychotherapy  Explore patterns in relationships that are effective or ineffective at helping patient reach their goals, find satisfying experience.  Discuss new patterns or behaviors to engage in for improved social functioning.    Behavioral Activation  Discuss steps patient can take to become more involved in meaningful activity.  Identify barriers to these activities and explore possible solutions.    Mindfulness-Based Strategies  Discuss skills based on development and application of mindfulness.  Skills drawn from compassion-focused therapy, dialectical behavior therapy, mindfulness-based stress reduction, mindfulness-based cognitive therapy, etc.      Patient has reviewed and agreed to the above plan.      SHELLI Stallings, [facility]  Behavioral Health Clinician  [facility], [address], [address], [address], [place], [place], [address]  [phone]    2023

## 2023-11-07 ENCOUNTER — VIRTUAL VISIT (OUTPATIENT)
Dept: BEHAVIORAL HEALTH | Facility: CLINIC | Age: 28
End: 2023-11-07
Payer: COMMERCIAL

## 2023-11-07 DIAGNOSIS — F98.8 ATTENTION DEFICIT DISORDER WITHOUT HYPERACTIVITY: ICD-10-CM

## 2023-11-07 DIAGNOSIS — F33.0 MAJOR DEPRESSIVE DISORDER, RECURRENT EPISODE, MILD (H): ICD-10-CM

## 2023-11-07 DIAGNOSIS — F41.1 GAD (GENERALIZED ANXIETY DISORDER): Primary | ICD-10-CM

## 2023-11-07 PROCEDURE — 90832 PSYTX W PT 30 MINUTES: CPT | Mod: VID

## 2023-11-07 NOTE — PROGRESS NOTES
"[facility]: Integrated Behavioral Health  November 7th, 2023     Behavioral Health Clinician Progress Note    Patient Name: Naty Pérez           Service Type:  Individual      Service Location:   Face to Face in Home / Community     Session Start Time: 3:30 PM Session End Time: 3:54 PM      Session Length: 16 - 37      Attendees: Client     Service Modality:  Phone Visit:      Provider verified identity through the following two step process.  Patient provided:  Patient is known previously to provider    Telephone Visit: The patient's condition can be safely assessed and treated via synchronous audio telemedicine encounter.      Reason for Audio Telemedicine Visit: Patient has requested telehealth visit    Originating Site (Patient Location): Patient's place of employment    Distant Site (Provider Location): [facility]    Consent:  The patient/guardian has verbally consented to:     1. The potential risks and benefits of telemedicine (telephone visit) versus in person care;    The patient has been notified of the following:      \"We have found that certain health care needs can be provided without the need for a face to face visit.  This service lets us provide the care you need with a phone conversation.       I will have full access to your [facility] medical record during this entire phone call.   I will be taking notes for your medical record.      Since this is like an office visit, we will bill your insurance company for this service.       There are potential benefits and risks of telephone visits (e.g. limits to patient confidentiality) that differ from in-person visits.?Confidentiality still applies for telephone services, and nobody will record the visit.  It is important to be in a quiet, private space that is free of distractions (including cell phone or other devices) during the visit.??      If during the course of the call I " "believe a telephone visit is not appropriate, you will not be charged for this service\"     Consent has been obtained for this service by care team member: Yes     Visit Activities (Refresh list every visit): [facility] Only    Diagnostic Assessment Date: 2/7/22 Roxanna Casas PhD, LP  Treatment Plan Review Date: Reviewed 11/07/23  See Flowsheets for today's PHQ-9 and ODILON-7 results  Previous PHQ-9:       7/21/2023    10:57 AM 8/17/2023    11:24 AM 10/6/2023     3:06 PM   PHQ-9 SCORE   PHQ-9 Total Score MyChart 8 (Mild depression) 11 (Moderate depression) 7 (Mild depression)   PHQ-9 Total Score 8 11 7     Previous ODILON-7:       4/3/2023     9:57 AM 6/6/2023     3:23 PM 8/17/2023    11:24 AM   ODILON-7 SCORE   Total Score 12 (moderate anxiety) 12 (moderate anxiety) 9 (mild anxiety)   Total Score 12 12 9       CHARLES LEVEL:      2/6/2020    11:03 AM   CHARLES Score (Last Two)   CHARLES Raw Score 28   Activation Score 50   CHARLES Level 2       DATA  Extended Session (60+ minutes): No  Interactive Complexity: No  Crisis: No  [facility] Patient: No    Treatment Objective(s) Addressed in This Session:  Target Behavior(s):  mood    Depressed Mood: Increase interest, engagement, and pleasure in doing things  Decrease frequency and intensity of feeling down, depressed, hopeless  Improve quantity and quality of night time sleep / decrease daytime naps  Feel less tired and more energy during the day   Improve diet, appetite, mindful eating, and / or meal planning  Identify negative self-talk and behaviors: challenge core beliefs, myths, and actions  Improve concentration, focus, and mindfulness in daily activities   Discuss motivation / ambivalence about taking anti-depressant / mood stabilizer medication(s)  Discuss motivation / ambivalence about a referral to specialty mental health services (Therapy, Day Tx, PHP)  Anxiety: will experience a reduction in anxiety, will develop more effective coping skills to manage anxiety symptoms, will develop healthy " cognitive patterns and beliefs and will increase ability to function adaptively  Attention Problems: will develop coping skills to effectively manage attention issues    Current Stressors / Issues:    Update: Overall pt reports that she is stable and things are going well. Pt states that she has been stressed lately due to illness and conferences. Pt is looking forward to friends coming to visit this weekend. C and pt continues to discuss self-care strategies. Treatment plan reviewed.     Progress on Treatment Objective(s) / Homework:  Stable - ACTION (Actively working towards change); Intervened by reinforcing change plan / affirming steps taken    Motivational Interviewing    MI Intervention: Expressed Empathy/Understanding, Supported Autonomy, Collaboration, Evocation, Permission to raise concern or advise, Open-ended questions and Reflections: simple and complex     Change Talk Expressed by the Patient: Desire to change Ability to change Reasons to change Need to change Committment to change Activation Taking steps    Provider Response to Change Talk: E - Evoked more info from patient about behavior change, A - Affirmed patient's thoughts, decisions, or attempts at behavior change, R - Reflected patient's change talk and S - Summarized patient's change talk statements      Interpersonal Psychotherapy  Explore patterns in relationships that are effective or ineffective at helping patient reach their goals, find satisfying experience.  Discuss new patterns or behaviors to engage in for improved social functioning.      Also provided psychoeducation about behavioral health condition, symptoms, and treatment options    Care Plan review completed: Yes    Medication Review:  No changes to current psychiatric medication(s)    Medication Compliance:  Yes    Changes in Health Issues:   None reported    Chemical Use Review:   Substance Use: Chemical use reviewed, no active concerns identified      Tobacco Use: No  current tobacco use.      Assessment: Current Emotional / Mental Status (status of significant symptoms):  Risk status (Self / Other harm or suicidal ideation)  Patient denies a history of suicidal ideation, suicide attempts, self-injurious behavior, homicidal ideation, homicidal behavior and and other safety concerns  Patient denies current fears or concerns for personal safety.  Patient denies current or recent suicidal ideation or behaviors.  Patient denies current or recent homicidal ideation or behaviors.  Patient denies current or recent self injurious behavior or ideation.  Patient denies other safety concerns.  A safety and risk management plan has not been developed at this time, however patient was encouraged to call [place] [phone] should there be a change in any of these risk factors.    Appearance:                            Appropriate   Eye Contact:                           Good   Psychomotor Behavior:          Normal   Attitude:                                   Cooperative   Orientation:                             All  Speech              Rate / Production:       Normal               Volume:                       Normal   Mood:                                      euthymic   Affect:                                      Appropriate   Thought Content:                    Clear   Thought Form:                        Coherent  Logical   Insight:                                     Good     Diagnoses:  1. ODILON (generalized anxiety disorder)    2. Major depressive disorder, recurrent episode, mild (H24)    3. Attention deficit disorder without hyperactivity      Collateral Reports Completed:  Not Applicable    Plan: (Homework, other):  Patient was given information about behavioral services and encouraged to schedule a follow up appointment with the clinic [facility] in 1 month.  She was also given information about mental health symptoms and treatment options  and Cognitive Behavioral Therapy skills to  practice when experiencing anxiety, depression and ADHD .  CD Recommendations: No indications of CD issues.        ______________________________________________________________________                                              Individual Treatment Plan    Patient's Name: Naty Pérez  YOB: 1995    Date of Creation: 3/14/22  Date Treatment Plan Last Reviewed/Revised: 11/07/23    DSM5 Diagnoses: Attention-Deficit/Hyperactivity Disorder  314.00 (F90.0) Predominantly inattentive presentation, 296.31 (F33.0) Major Depressive Disorder, Recurrent Episode, Mild With anxious distress or 300.02 (F41.1) Generalized Anxiety Disorder  Psychosocial / Contextual Factors: relationships, work  PROMIS-10  In general, would you say your health is:: 3  In general, would you say your quality of life is:: 3  In general, how would you rate your physical health?: 3  In general, how would you rate your mental health, including your mood and your ability to think?: 3  In general, how would you rate your satisfaction with your social activities and relationships?: 3  In general, please rate how well you carry out your usual social activities and roles. (This includes activities at home, at work and in your community, and responsibilities as a parent, child, spouse, employee, friend, etc.): 3  To what extent are you able to carry out your everyday physical activities such as walking, climbing stairs, carrying groceries, or moving a chair?: 5  In the past 7 days, how often have you been bothered by emotional problems such as feeling anxious, depressed, or irritable?: 4  In the past 7 days, how would you rate your fatigue on average?: 3  In the past 7 days, how would you rate your pain on average, where 0 means no pain, and 10 means worst imaginable pain?: 1  Global Mental Health Score: 11  Global Physical Health Score: 15  PROMIS TOTAL - SUBSCORES: 28        Referral / Collaboration:  Referral to another professional/service  is not indicated at this time..    Anticipated number of session for this episode of care: ongoing  Anticipation frequency of session: Every other week  Anticipated Duration of each session: 38-52 minutes  Treatment plan will be reviewed in 90 days or when goals have been changed.       MeasurableTreatment Goal(s) related to diagnosis / functional impairment(s)  Goal 1: Patient will experience a reduction in depressive symptoms along with a corresponding increase in positive emotion and life satisfaction.    I will know I've met my goal when I am less worried and mood is more .      Objective #A (Patient Action)    Patient will Increase interest, engagement, and pleasure in doing things.  Status: Completed- Date(s): 8/11/23    Intervention(s)  Therapist will help patient identify pleasant and mastery oriented events that elicit positive, relaxed mood.    Objective #B  Patient will Decrease frequency and intensity of feeling down, depressed, hopeless.  Status: Completed - Date(s):8/11/23    Intervention(s)  Therapist will introduce patient to cognitive-behavioral and acceptance and commitment therapy topics aimed to help reduce depression and anxiety    Objective #C  Patient will Identify negative self-talk and behaviors: challenge core beliefs, myths, and actions  Improve concentration, focus, and mindfulness in daily activities .  Status: Completed - Date(s):8/11/23    Intervention(s)  Therapist will help patient identify and manage negative self-talk and automatic thoughts; introduce patient to cognitive distortions; help patient develop cognitive diffusion techniques      Goal 2: Patient will experience a reduction in anxious symptoms, along with a corresponding increase in relaxed emotional states and life satisfaction.    I will know I've met my goal when more focused and less worried.      Objective #A (Patient Action)  Patient will use cognitive-behavioral and thought diffusion strategies identified in  therapy to challenge anxious thoughts.    Status: Continued - Date(s):11/07/23    Intervention(s)  Therapist will utilize CBT and ACT ideas to help patient challenge anxious thoughts and reduce intensity/duration of anxious distress    Objective #B  Patient will identify the stressors which contribute to feelings of anxiety  Patient will increase engagement in adaptive coping skills and recreational activities, such as exercise and healthy socialization, to manage distress.  Status: Continued - Date(s):11/07/23    Intervention(s)  Therapist will help patient identify triggers/situational factors that contribute to anxiety and behavioral skills aimed to manage anxious distress.        Other Possible Therapeutic Intervention(s):    Psycho-education regarding mental health diagnoses and treatment options    Supportive Therapy  Provide affirmations, reflections, and establish working rapport  Emphasize and reflect on strength of therapeutic relationship    Skills training  Explore skills useful to client in current situation.  Skills include assertiveness, communication, conflict management, problem-solving, relaxation, etc.    Solution-Focused Therapy  Explore patterns in patient's relationships and discuss options for new behaviors.  Explore patterns in patient's actions and choices and discuss options for new behaviors.    Cognitive-behavioral Therapy  Discuss common cognitive distortions, identify them in patient's life.  Explore ways to challenge, replace, and act against these cognitions.    Acceptance and Commitment Therapy  Explore and identify important values in patient's life.  Discuss ways to commit to behavioral activation around these values.    Psychodynamic psychotherapy  Discuss patient's emotional dynamics and issues and how they impact behaviors.  Explore patient's history of relationships and how they impact present behaviors.  Explore how to work with and make changes in these schemas and  patterns.    Narrative Therapy  Explore the patient's story of his/her life from his/her perspective.  Explore alternate ways of understanding their experience, identifying exceptions, developing new themes.    Interpersonal Psychotherapy  Explore patterns in relationships that are effective or ineffective at helping patient reach their goals, find satisfying experience.  Discuss new patterns or behaviors to engage in for improved social functioning.    Behavioral Activation  Discuss steps patient can take to become more involved in meaningful activity.  Identify barriers to these activities and explore possible solutions.    Mindfulness-Based Strategies  Discuss skills based on development and application of mindfulness.  Skills drawn from compassion-focused therapy, dialectical behavior therapy, mindfulness-based stress reduction, mindfulness-based cognitive therapy, etc.      Patient has reviewed and agreed to the above plan.      SHELLI Stallings, [facility]  Behavioral Health Clinician  [facility], [address], [address], [place], [place], [address]  [phone]    2023

## 2023-11-09 ENCOUNTER — VIRTUAL VISIT (OUTPATIENT)
Dept: INTERNAL MEDICINE | Facility: CLINIC | Age: 28
End: 2023-11-09
Payer: COMMERCIAL

## 2023-11-09 DIAGNOSIS — R05.9 COUGH, UNSPECIFIED TYPE: Primary | ICD-10-CM

## 2023-11-09 PROCEDURE — 99213 OFFICE O/P EST LOW 20 MIN: CPT | Mod: VID | Performed by: NURSE PRACTITIONER

## 2023-11-09 RX ORDER — DEXTROAMPHETAMINE SACCHARATE, AMPHETAMINE ASPARTATE MONOHYDRATE, DEXTROAMPHETAMINE SULFATE AND AMPHETAMINE SULFATE 2.5; 2.5; 2.5; 2.5 MG/1; MG/1; MG/1; MG/1
10 CAPSULE, EXTENDED RELEASE ORAL DAILY
COMMUNITY
Start: 2023-10-18 | End: 2024-05-13

## 2023-11-09 RX ORDER — MONTELUKAST SODIUM 10 MG/1
10 TABLET ORAL AT BEDTIME
Qty: 90 TABLET | Refills: 0 | Status: SHIPPED | OUTPATIENT
Start: 2023-11-09 | End: 2024-08-13

## 2023-11-09 ASSESSMENT — ENCOUNTER SYMPTOMS: FATIGUE: 1

## 2023-11-09 NOTE — PROGRESS NOTES
Naty is a 28 year old who is being evaluated via a billable video visit.      How would you like to obtain your AVS? MyChart  If the video visit is dropped, the invitation should be resent by: Send to e-mail at: [email or web address]  Will anyone else be joining your video visit? No          Assessment & Plan     Cough, unspecified type  She had COVID about 2 months ago and is still having some residual pharyngeal congestion and drainage. No red flags but will check chest xr to be safe. I suggested what we try singulair for a month or until there is a good frost. She was agreeable.     - montelukast (SINGULAIR) 10 MG tablet; Take 1 tablet (10 mg) by mouth at bedtime  - XR Chest 2 Views; Future      Hugh Florez [facility]    Subjective   Naty is a 28 year old, presenting for the following health issues:    Fatigue (1 month post covid, lingering cough, congestion, mucus, tired)      11/9/2023     1:21 PM   Additional Questions   Roomed by [facility]       Fatigue  Associated symptoms include fatigue.            Review of Systems   Constitutional:  Positive for fatigue.      Constitutional, HEENT, cardiovascular, pulmonary, gi and gu systems are negative, except as otherwise noted.      Objective           Vitals:  No vitals were obtained today due to virtual visit.    Physical Exam   GENERAL: Healthy, alert and no distress  EYES: Eyes grossly normal to inspection.  No discharge or erythema, or obvious scleral/conjunctival abnormalities.  RESP: No audible wheeze, cough, or visible cyanosis.  No visible retractions or increased work of breathing.    SKIN: Visible skin clear. No significant rash, abnormal pigmentation or lesions.  NEURO: Cranial nerves grossly intact.  Mentation and speech appropriate for age.  PSYCH: Mentation appears normal, affect normal/bright, judgement and insight intact, normal speech and appearance well-groomed.          Video-Visit Details    Type of  service:  Video Visit     Originating Location (pt. Location): Home    Distant Location (provider location):  Off-site  Platform used for Video Visit: Letha

## 2023-11-20 ENCOUNTER — MYC REFILL (OUTPATIENT)
Dept: FAMILY MEDICINE | Facility: CLINIC | Age: 28
End: 2023-11-20
Payer: COMMERCIAL

## 2023-11-20 RX ORDER — DEXTROAMPHETAMINE SACCHARATE, AMPHETAMINE ASPARTATE MONOHYDRATE, DEXTROAMPHETAMINE SULFATE AND AMPHETAMINE SULFATE 2.5; 2.5; 2.5; 2.5 MG/1; MG/1; MG/1; MG/1
10 CAPSULE, EXTENDED RELEASE ORAL DAILY
Qty: 30 CAPSULE | Refills: 0 | OUTPATIENT
Start: 2023-11-20

## 2023-11-20 RX ORDER — DEXTROAMPHETAMINE SACCHARATE, AMPHETAMINE ASPARTATE MONOHYDRATE, DEXTROAMPHETAMINE SULFATE AND AMPHETAMINE SULFATE 2.5; 2.5; 2.5; 2.5 MG/1; MG/1; MG/1; MG/1
10 CAPSULE, EXTENDED RELEASE ORAL DAILY
OUTPATIENT
Start: 2023-11-20

## 2023-11-27 ENCOUNTER — VIRTUAL VISIT (OUTPATIENT)
Dept: BEHAVIORAL HEALTH | Facility: CLINIC | Age: 28
End: 2023-11-27
Payer: COMMERCIAL

## 2023-11-27 ENCOUNTER — MYC MEDICAL ADVICE (OUTPATIENT)
Dept: FAMILY MEDICINE | Facility: CLINIC | Age: 28
End: 2023-11-27

## 2023-11-27 ENCOUNTER — E-VISIT (OUTPATIENT)
Dept: FAMILY MEDICINE | Facility: CLINIC | Age: 28
End: 2023-11-27
Payer: COMMERCIAL

## 2023-11-27 DIAGNOSIS — F41.1 GAD (GENERALIZED ANXIETY DISORDER): Primary | ICD-10-CM

## 2023-11-27 DIAGNOSIS — F90.0 ATTENTION-DEFICIT HYPERACTIVITY DISORDER, PREDOMINANTLY INATTENTIVE TYPE: Primary | ICD-10-CM

## 2023-11-27 DIAGNOSIS — F98.8 ATTENTION DEFICIT DISORDER WITHOUT HYPERACTIVITY: ICD-10-CM

## 2023-11-27 DIAGNOSIS — F33.0 MAJOR DEPRESSIVE DISORDER, RECURRENT EPISODE, MILD (H): ICD-10-CM

## 2023-11-27 PROCEDURE — 90832 PSYTX W PT 30 MINUTES: CPT | Mod: TEL

## 2023-11-27 PROCEDURE — 99421 OL DIG E/M SVC 5-10 MIN: CPT | Performed by: NURSE PRACTITIONER

## 2023-11-27 RX ORDER — DEXTROAMPHETAMINE SACCHARATE, AMPHETAMINE ASPARTATE MONOHYDRATE, DEXTROAMPHETAMINE SULFATE AND AMPHETAMINE SULFATE 2.5; 2.5; 2.5; 2.5 MG/1; MG/1; MG/1; MG/1
10 CAPSULE, EXTENDED RELEASE ORAL DAILY
Qty: 30 CAPSULE | Refills: 0 | Status: SHIPPED | OUTPATIENT
Start: 2024-01-28 | End: 2024-02-27

## 2023-11-27 RX ORDER — DEXTROAMPHETAMINE SACCHARATE, AMPHETAMINE ASPARTATE MONOHYDRATE, DEXTROAMPHETAMINE SULFATE AND AMPHETAMINE SULFATE 2.5; 2.5; 2.5; 2.5 MG/1; MG/1; MG/1; MG/1
10 CAPSULE, EXTENDED RELEASE ORAL DAILY
Qty: 30 CAPSULE | Refills: 0 | Status: SHIPPED | OUTPATIENT
Start: 2023-11-27 | End: 2023-12-27

## 2023-11-27 RX ORDER — DEXTROAMPHETAMINE SACCHARATE, AMPHETAMINE ASPARTATE MONOHYDRATE, DEXTROAMPHETAMINE SULFATE AND AMPHETAMINE SULFATE 2.5; 2.5; 2.5; 2.5 MG/1; MG/1; MG/1; MG/1
10 CAPSULE, EXTENDED RELEASE ORAL DAILY
Qty: 30 CAPSULE | Refills: 0 | Status: SHIPPED | OUTPATIENT
Start: 2023-12-28 | End: 2024-01-27

## 2023-11-27 NOTE — PATIENT INSTRUCTIONS
Thank you for choosing us for your care. I have placed an order for a prescription so that you can start treatment. View your full visit summary for details by clicking on the link below. Your pharmacist will able to address any questions you may have about the medication.     If you're not feeling better within 5-7 days, please schedule an appointment.  You can schedule an appointment right here in [facility], or call [phone]  If the visit is for the same symptoms as your eVisit, we'll refund the cost of your eVisit if seen within seven days.

## 2023-11-27 NOTE — PROGRESS NOTES
"[facility] Primary Care: Integrated Behavioral Health  November 27th, 2023     Behavioral Health Clinician Progress Note    Patient Name: Naty Pérez           Service Type:  Individual      Service Location:   Face to Face in Home / Community     Session Start Time: 3:30 PM Session End Time: 4:04 PM      Session Length: 16 - 37      Attendees: Client     Service Modality:  Phone Visit:      Provider verified identity through the following two step process.  Patient provided:  Patient is known previously to provider    Telephone Visit: The patient's condition can be safely assessed and treated via synchronous audio telemedicine encounter.      Reason for Audio Telemedicine Visit: Patient has requested telehealth visit    Originating Site (Patient Location): Patient's place of employment    Distant Site (Provider Location): [facility]    Consent:  The patient/guardian has verbally consented to:     1. The potential risks and benefits of telemedicine (telephone visit) versus in person care;    The patient has been notified of the following:      \"We have found that certain health care needs can be provided without the need for a face to face visit.  This service lets us provide the care you need with a phone conversation.       I will have full access to your [facility] medical record during this entire phone call.   I will be taking notes for your medical record.      Since this is like an office visit, we will bill your insurance company for this service.       There are potential benefits and risks of telephone visits (e.g. limits to patient confidentiality) that differ from in-person visits.?Confidentiality still applies for telephone services, and nobody will record the visit.  It is important to be in a quiet, private space that is free of distractions (including cell phone or other devices) during the visit.??      If during the course of the call I " "believe a telephone visit is not appropriate, you will not be charged for this service\"     Consent has been obtained for this service by care team member: Yes     Visit Activities (Refresh list every visit): [facility] Only    Diagnostic Assessment Date: 2/7/22 Roxanna Casas PhD, LP  Treatment Plan Review Date: Reviewed 11/07/23  See Flowsheets for today's PHQ-9 and ODILON-7 results  Previous PHQ-9:       10/6/2023     3:06 PM 11/7/2023     3:23 PM 11/27/2023    11:33 AM   PHQ-9 SCORE   PHQ-9 Total Score MyChart 7 (Mild depression) 10 (Moderate depression) 11 (Moderate depression)   PHQ-9 Total Score 7 10 11     Previous ODILON-7:       4/3/2023     9:57 AM 6/6/2023     3:23 PM 8/17/2023    11:24 AM   ODILON-7 SCORE   Total Score 12 (moderate anxiety) 12 (moderate anxiety) 9 (mild anxiety)   Total Score 12 12 9       CHARLES LEVEL:      2/6/2020    11:03 AM   CHARLES Score (Last Two)   CHARLES Raw Score 28   Activation Score 50   CHARLES Level 2       DATA  Extended Session (60+ minutes): No  Interactive Complexity: No  Crisis: No  [facility] Patient: No    Treatment Objective(s) Addressed in This Session:  Target Behavior(s):  mood    Depressed Mood: Increase interest, engagement, and pleasure in doing things  Decrease frequency and intensity of feeling down, depressed, hopeless  Improve quantity and quality of night time sleep / decrease daytime naps  Feel less tired and more energy during the day   Improve diet, appetite, mindful eating, and / or meal planning  Identify negative self-talk and behaviors: challenge core beliefs, myths, and actions  Improve concentration, focus, and mindfulness in daily activities   Discuss motivation / ambivalence about taking anti-depressant / mood stabilizer medication(s)  Discuss motivation / ambivalence about a referral to specialty mental health services (Therapy, Day Tx, PHP)  Anxiety: will experience a reduction in anxiety, will develop more effective coping skills to manage anxiety symptoms, will develop " healthy cognitive patterns and beliefs and will increase ability to function adaptively  Attention Problems: will develop coping skills to effectively manage attention issues    Current Stressors / Issues:    Update: [facility] met with pt face to face in the clinic on this date. Pt endorses she has noticed that her depression can be episodic. Pt states that sh would like to be more aware of when her mood ebbs and flows. BHC and pt discussed. BHC and pt discussed making a red flag or warning sheet with her partner about what he is noticing in regards to shift in pt's mood/behavior when pt is more depressed. Pt also encouraged pt to reflect after the fact on depressive episodes om to what those warning signs are. Pt is agreeable to this suggestion. Pt denies safety concerns including SI/HI/SIB.      Progress on Treatment Objective(s) / Homework:  Stable - ACTION (Actively working towards change); Intervened by reinforcing change plan / affirming steps taken    Motivational Interviewing    MI Intervention: Expressed Empathy/Understanding, Supported Autonomy, Collaboration, Evocation, Permission to raise concern or advise, Open-ended questions and Reflections: simple and complex     Change Talk Expressed by the Patient: Desire to change Ability to change Reasons to change Need to change Committment to change Activation Taking steps    Provider Response to Change Talk: E - Evoked more info from patient about behavior change, A - Affirmed patient's thoughts, decisions, or attempts at behavior change, R - Reflected patient's change talk and S - Summarized patient's change talk statements      Interpersonal Psychotherapy  Explore patterns in relationships that are effective or ineffective at helping patient reach their goals, find satisfying experience.  Discuss new patterns or behaviors to engage in for improved social functioning.      Also provided psychoeducation about behavioral health condition, symptoms, and treatment  options    Care Plan review completed: Yes    Medication Review:  No changes to current psychiatric medication(s)    Medication Compliance:  Yes    Changes in Health Issues:   None reported    Chemical Use Review:   Substance Use: Chemical use reviewed, no active concerns identified      Tobacco Use: No current tobacco use.      Assessment: Current Emotional / Mental Status (status of significant symptoms):  Risk status (Self / Other harm or suicidal ideation)  Patient denies a history of suicidal ideation, suicide attempts, self-injurious behavior, homicidal ideation, homicidal behavior and and other safety concerns  Patient denies current fears or concerns for personal safety.  Patient denies current or recent suicidal ideation or behaviors.  Patient denies current or recent homicidal ideation or behaviors.  Patient denies current or recent self injurious behavior or ideation.  Patient denies other safety concerns.  A safety and risk management plan has not been developed at this time, however patient was encouraged to call [place] [phone] should there be a change in any of these risk factors.    Appearance:                            Appropriate   Eye Contact:                           Good   Psychomotor Behavior:          Normal   Attitude:                                   Cooperative   Orientation:                             All  Speech              Rate / Production:       Normal               Volume:                       Normal   Mood:                                      euthymic   Affect:                                      Appropriate   Thought Content:                    Clear   Thought Form:                        Coherent  Logical   Insight:                                     Good     Diagnoses:  1. ODILON (generalized anxiety disorder)    2. Major depressive disorder, recurrent episode, mild (H24)    3. Attention deficit disorder without hyperactivity        Collateral Reports Completed:  Not  Applicable    Plan: (Homework, other):  Patient was given information about behavioral services and encouraged to schedule a follow up appointment with the clinic [facility] in 1 month.  She was also given information about mental health symptoms and treatment options  and Cognitive Behavioral Therapy skills to practice when experiencing anxiety, depression and ADHD .  CD Recommendations: No indications of CD issues.        ______________________________________________________________________                                              Individual Treatment Plan    Patient's Name: Naty Pérez  YOB: 1995    Date of Creation: 3/14/22  Date Treatment Plan Last Reviewed/Revised: 11/07/23    DSM5 Diagnoses: Attention-Deficit/Hyperactivity Disorder  314.00 (F90.0) Predominantly inattentive presentation, 296.31 (F33.0) Major Depressive Disorder, Recurrent Episode, Mild With anxious distress or 300.02 (F41.1) Generalized Anxiety Disorder  Psychosocial / Contextual Factors: relationships, work  PROMIS-10  In general, would you say your health is:: 3  In general, would you say your quality of life is:: 3  In general, how would you rate your physical health?: 3  In general, how would you rate your mental health, including your mood and your ability to think?: 3  In general, how would you rate your satisfaction with your social activities and relationships?: 3  In general, please rate how well you carry out your usual social activities and roles. (This includes activities at home, at work and in your community, and responsibilities as a parent, child, spouse, employee, friend, etc.): 3  To what extent are you able to carry out your everyday physical activities such as walking, climbing stairs, carrying groceries, or moving a chair?: 5  In the past 7 days, how often have you been bothered by emotional problems such as feeling anxious, depressed, or irritable?: 4  In the past 7 days, how would you rate your fatigue on  average?: 3  In the past 7 days, how would you rate your pain on average, where 0 means no pain, and 10 means worst imaginable pain?: 1  Global Mental Health Score: 11  Global Physical Health Score: 15  PROMIS TOTAL - SUBSCORES: 28        Referral / Collaboration:  Referral to another professional/service is not indicated at this time..    Anticipated number of session for this episode of care: ongoing  Anticipation frequency of session: Every other week  Anticipated Duration of each session: 38-52 minutes  Treatment plan will be reviewed in 90 days or when goals have been changed.       MeasurableTreatment Goal(s) related to diagnosis / functional impairment(s)  Goal 1: Patient will experience a reduction in depressive symptoms along with a corresponding increase in positive emotion and life satisfaction.    I will know I've met my goal when I am less worried and mood is more .      Objective #A (Patient Action)    Patient will Increase interest, engagement, and pleasure in doing things.  Status: Completed- Date(s): 8/11/23    Intervention(s)  Therapist will help patient identify pleasant and mastery oriented events that elicit positive, relaxed mood.    Objective #B  Patient will Decrease frequency and intensity of feeling down, depressed, hopeless.  Status: Completed - Date(s):8/11/23    Intervention(s)  Therapist will introduce patient to cognitive-behavioral and acceptance and commitment therapy topics aimed to help reduce depression and anxiety    Objective #C  Patient will Identify negative self-talk and behaviors: challenge core beliefs, myths, and actions  Improve concentration, focus, and mindfulness in daily activities .  Status: Completed - Date(s):8/11/23    Intervention(s)  Therapist will help patient identify and manage negative self-talk and automatic thoughts; introduce patient to cognitive distortions; help patient develop cognitive diffusion techniques      Goal 2: Patient will experience a  reduction in anxious symptoms, along with a corresponding increase in relaxed emotional states and life satisfaction.    I will know I've met my goal when more focused and less worried.      Objective #A (Patient Action)  Patient will use cognitive-behavioral and thought diffusion strategies identified in therapy to challenge anxious thoughts.    Status: Continued - Date(s):11/07/23    Intervention(s)  Therapist will utilize CBT and ACT ideas to help patient challenge anxious thoughts and reduce intensity/duration of anxious distress    Objective #B  Patient will identify the stressors which contribute to feelings of anxiety  Patient will increase engagement in adaptive coping skills and recreational activities, such as exercise and healthy socialization, to manage distress.  Status: Continued - Date(s):11/07/23    Intervention(s)  Therapist will help patient identify triggers/situational factors that contribute to anxiety and behavioral skills aimed to manage anxious distress.        Other Possible Therapeutic Intervention(s):    Psycho-education regarding mental health diagnoses and treatment options    Supportive Therapy  Provide affirmations, reflections, and establish working rapport  Emphasize and reflect on strength of therapeutic relationship    Skills training  Explore skills useful to client in current situation.  Skills include assertiveness, communication, conflict management, problem-solving, relaxation, etc.    Solution-Focused Therapy  Explore patterns in patient's relationships and discuss options for new behaviors.  Explore patterns in patient's actions and choices and discuss options for new behaviors.    Cognitive-behavioral Therapy  Discuss common cognitive distortions, identify them in patient's life.  Explore ways to challenge, replace, and act against these cognitions.    Acceptance and Commitment Therapy  Explore and identify important values in patient's life.  Discuss ways to commit to  behavioral activation around these values.    Psychodynamic psychotherapy  Discuss patient's emotional dynamics and issues and how they impact behaviors.  Explore patient's history of relationships and how they impact present behaviors.  Explore how to work with and make changes in these schemas and patterns.    Narrative Therapy  Explore the patient's story of his/her life from his/her perspective.  Explore alternate ways of understanding their experience, identifying exceptions, developing new themes.    Interpersonal Psychotherapy  Explore patterns in relationships that are effective or ineffective at helping patient reach their goals, find satisfying experience.  Discuss new patterns or behaviors to engage in for improved social functioning.    Behavioral Activation  Discuss steps patient can take to become more involved in meaningful activity.  Identify barriers to these activities and explore possible solutions.    Mindfulness-Based Strategies  Discuss skills based on development and application of mindfulness.  Skills drawn from compassion-focused therapy, dialectical behavior therapy, mindfulness-based stress reduction, mindfulness-based cognitive therapy, etc.      Patient has reviewed and agreed to the above plan.      SHELLI Stallings, [facility]  Behavioral Health Clinician  [facility] Professional [facility], [address], [address], [place], [place], [address]  [phone]    2023

## 2023-12-22 ENCOUNTER — VIRTUAL VISIT (OUTPATIENT)
Dept: BEHAVIORAL HEALTH | Facility: CLINIC | Age: 28
End: 2023-12-22
Payer: COMMERCIAL

## 2023-12-22 DIAGNOSIS — F98.8 ATTENTION DEFICIT DISORDER WITHOUT HYPERACTIVITY: ICD-10-CM

## 2023-12-22 DIAGNOSIS — F41.1 GAD (GENERALIZED ANXIETY DISORDER): Primary | ICD-10-CM

## 2023-12-22 DIAGNOSIS — F33.0 MAJOR DEPRESSIVE DISORDER, RECURRENT EPISODE, MILD (H): ICD-10-CM

## 2023-12-22 PROCEDURE — 90834 PSYTX W PT 45 MINUTES: CPT | Mod: TEL

## 2023-12-22 NOTE — PROGRESS NOTES
"[facility] Primary Care: Integrated Behavioral Health  December 22nd, 2023     Behavioral Health Clinician Progress Note    Patient Name: Naty Pérez           Service Type:  Individual      Service Location:   Face to Face in Home / Community     Session Start Time: 3:03 PM Session End Time: 3:43 PM      Session Length: 38 - 52      Attendees: Client     Service Modality:  Phone Visit:      Provider verified identity through the following two step process.  Patient provided:  Patient is known previously to provider    Telephone Visit: The patient's condition can be safely assessed and treated via synchronous audio telemedicine encounter.      Reason for Audio Telemedicine Visit: Patient has requested telehealth visit    Originating Site (Patient Location): Patient's place of employment    Distant Site (Provider Location): [facility]    Consent:  The patient/guardian has verbally consented to:     1. The potential risks and benefits of telemedicine (telephone visit) versus in person care;    The patient has been notified of the following:      \"We have found that certain health care needs can be provided without the need for a face to face visit.  This service lets us provide the care you need with a phone conversation.       I will have full access to your [facility] medical record during this entire phone call.   I will be taking notes for your medical record.      Since this is like an office visit, we will bill your insurance company for this service.       There are potential benefits and risks of telephone visits (e.g. limits to patient confidentiality) that differ from in-person visits.?Confidentiality still applies for telephone services, and nobody will record the visit.  It is important to be in a quiet, private space that is free of distractions (including cell phone or other devices) during the visit.??      If during the course of the call I " "believe a telephone visit is not appropriate, you will not be charged for this service\"     Consent has been obtained for this service by care team member: Yes     Visit Activities (Refresh list every visit): [facility] Only    Diagnostic Assessment Date: 2/7/22 Roxanna Casas PhD, LP  Treatment Plan Review Date: Reviewed 11/07/23  See Flowsheets for today's PHQ-9 and ODILON-7 results  Previous PHQ-9:       10/6/2023     3:06 PM 11/7/2023     3:23 PM 11/27/2023    11:33 AM   PHQ-9 SCORE   PHQ-9 Total Score MyChart 7 (Mild depression) 10 (Moderate depression) 11 (Moderate depression)   PHQ-9 Total Score 7 10 11     Previous ODILON-7:       4/3/2023     9:57 AM 6/6/2023     3:23 PM 8/17/2023    11:24 AM   ODILON-7 SCORE   Total Score 12 (moderate anxiety) 12 (moderate anxiety) 9 (mild anxiety)   Total Score 12 12 9       CHARLES LEVEL:      2/6/2020    11:03 AM   CHARLES Score (Last Two)   CHARLES Raw Score 28   Activation Score 50   CHARLES Level 2       DATA  Extended Session (60+ minutes): No  Interactive Complexity: No  Crisis: No  [facility] Patient: No    Treatment Objective(s) Addressed in This Session:  Target Behavior(s):  mood    Depressed Mood: Increase interest, engagement, and pleasure in doing things  Decrease frequency and intensity of feeling down, depressed, hopeless  Improve quantity and quality of night time sleep / decrease daytime naps  Feel less tired and more energy during the day   Improve diet, appetite, mindful eating, and / or meal planning  Identify negative self-talk and behaviors: challenge core beliefs, myths, and actions  Improve concentration, focus, and mindfulness in daily activities   Discuss motivation / ambivalence about taking anti-depressant / mood stabilizer medication(s)  Discuss motivation / ambivalence about a referral to specialty mental health services (Therapy, Day Tx, PHP)  Anxiety: will experience a reduction in anxiety, will develop more effective coping skills to manage anxiety symptoms, will develop " healthy cognitive patterns and beliefs and will increase ability to function adaptively  Attention Problems: will develop coping skills to effectively manage attention issues    Current Stressors / Issues:    Update: [facility] met with pt over the phone on this date. 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 Pt took time to process through holiday plans and setting boundaries. [facility] utilized a solution focused approach. Pt is looking forward to getting her leo over break. No safety concerns to report. Pt is aware that [facility] is leaving health system and last day is January 2024. Pt states that she would like a referral for therapy. Order to be placed on pt's behalf.     Progress on Treatment Objective(s) / Homework:  Stable - ACTION (Actively working towards change); Intervened by reinforcing change plan / affirming steps taken    Motivational Interviewing    MI Intervention: Expressed Empathy/Understanding, Supported Autonomy, Collaboration, Evocation, Permission to raise concern or advise, Open-ended questions and Reflections: simple and complex     Change Talk Expressed by the Patient: Desire to change Ability to change Reasons to change Need to change Committment to change Activation Taking steps    Provider Response to Change Talk: E - Evoked more info from patient about behavior change, A - Affirmed patient's thoughts, decisions, or attempts at behavior change, R - Reflected patient's change talk and S - Summarized patient's change talk statements      Interpersonal Psychotherapy  Explore patterns in relationships that are effective or ineffective at helping patient reach their goals, find satisfying experience.  Discuss new patterns or behaviors to engage in for improved social functioning.      Also provided psychoeducation about behavioral health condition, symptoms, and treatment options    Care Plan review completed: Yes    Medication Review:  No changes to current psychiatric  medication(s)    Medication Compliance:  Yes    Changes in Health Issues:   None reported    Chemical Use Review:   Substance Use: Chemical use reviewed, no active concerns identified      Tobacco Use: No current tobacco use.      Assessment: Current Emotional / Mental Status (status of significant symptoms):  Risk status (Self / Other harm or suicidal ideation)  Patient denies a history of suicidal ideation, suicide attempts, self-injurious behavior, homicidal ideation, homicidal behavior and and other safety concerns  Patient denies current fears or concerns for personal safety.  Patient denies current or recent suicidal ideation or behaviors.  Patient denies current or recent homicidal ideation or behaviors.  Patient denies current or recent self injurious behavior or ideation.  Patient denies other safety concerns.  A safety and risk management plan has not been developed at this time, however patient was encouraged to call [place] [phone] should there be a change in any of these risk factors.    Appearance:                            Appropriate   Eye Contact:                           Good   Psychomotor Behavior:          Normal   Attitude:                                   Cooperative   Orientation:                             All  Speech              Rate / Production:       Normal               Volume:                       Normal   Mood:                                      euthymic   Affect:                                      Appropriate   Thought Content:                    Clear   Thought Form:                        Coherent  Logical   Insight:                                     Good     Diagnoses:  1. ODILON (generalized anxiety disorder)    2. Major depressive disorder, recurrent episode, mild (H24)    3. Attention deficit disorder without hyperactivity          Collateral Reports Completed:  Not Applicable    Plan: (Homework, other):  Patient is aware that [facility] is leaving health system in January  2024 and would like a referral to ongoing individual therapy. An order has been placed on pt's behalf.       ______________________________________________________________________                                              Individual Treatment Plan    Patient's Name: Naty Pérez  YOB: 1995    Date of Creation: 3/14/22  Date Treatment Plan Last Reviewed/Revised: 11/07/23    DSM5 Diagnoses: Attention-Deficit/Hyperactivity Disorder  314.00 (F90.0) Predominantly inattentive presentation, 296.31 (F33.0) Major Depressive Disorder, Recurrent Episode, Mild With anxious distress or 300.02 (F41.1) Generalized Anxiety Disorder  Psychosocial / Contextual Factors: relationships, work  PROMIS-10  In general, would you say your health is:: 3  In general, would you say your quality of life is:: 3  In general, how would you rate your physical health?: 3  In general, how would you rate your mental health, including your mood and your ability to think?: 3  In general, how would you rate your satisfaction with your social activities and relationships?: 3  In general, please rate how well you carry out your usual social activities and roles. (This includes activities at home, at work and in your community, and responsibilities as a parent, child, spouse, employee, friend, etc.): 3  To what extent are you able to carry out your everyday physical activities such as walking, climbing stairs, carrying groceries, or moving a chair?: 5  In the past 7 days, how often have you been bothered by emotional problems such as feeling anxious, depressed, or irritable?: 4  In the past 7 days, how would you rate your fatigue on average?: 3  In the past 7 days, how would you rate your pain on average, where 0 means no pain, and 10 means worst imaginable pain?: 1  Global Mental Health Score: 11  Global Physical Health Score: 15  PROMIS TOTAL - SUBSCORES: 28        Referral / Collaboration:  Referral to another professional/service is  not indicated at this time..    Anticipated number of session for this episode of care: ongoing  Anticipation frequency of session: Every other week  Anticipated Duration of each session: 38-52 minutes  Treatment plan will be reviewed in 90 days or when goals have been changed.       MeasurableTreatment Goal(s) related to diagnosis / functional impairment(s)  Goal 1: Patient will experience a reduction in depressive symptoms along with a corresponding increase in positive emotion and life satisfaction.    I will know I've met my goal when I am less worried and mood is more .      Objective #A (Patient Action)    Patient will Increase interest, engagement, and pleasure in doing things.  Status: Completed- Date(s): 8/11/23    Intervention(s)  Therapist will help patient identify pleasant and mastery oriented events that elicit positive, relaxed mood.    Objective #B  Patient will Decrease frequency and intensity of feeling down, depressed, hopeless.  Status: Completed - Date(s):8/11/23    Intervention(s)  Therapist will introduce patient to cognitive-behavioral and acceptance and commitment therapy topics aimed to help reduce depression and anxiety    Objective #C  Patient will Identify negative self-talk and behaviors: challenge core beliefs, myths, and actions  Improve concentration, focus, and mindfulness in daily activities .  Status: Completed - Date(s):8/11/23    Intervention(s)  Therapist will help patient identify and manage negative self-talk and automatic thoughts; introduce patient to cognitive distortions; help patient develop cognitive diffusion techniques      Goal 2: Patient will experience a reduction in anxious symptoms, along with a corresponding increase in relaxed emotional states and life satisfaction.    I will know I've met my goal when more focused and less worried.      Objective #A (Patient Action)  Patient will use cognitive-behavioral and thought diffusion strategies identified in therapy  to challenge anxious thoughts.    Status: Continued - Date(s):11/07/23    Intervention(s)  Therapist will utilize CBT and ACT ideas to help patient challenge anxious thoughts and reduce intensity/duration of anxious distress    Objective #B  Patient will identify the stressors which contribute to feelings of anxiety  Patient will increase engagement in adaptive coping skills and recreational activities, such as exercise and healthy socialization, to manage distress.  Status: Continued - Date(s):11/07/23    Intervention(s)  Therapist will help patient identify triggers/situational factors that contribute to anxiety and behavioral skills aimed to manage anxious distress.        Other Possible Therapeutic Intervention(s):    Psycho-education regarding mental health diagnoses and treatment options    Supportive Therapy  Provide affirmations, reflections, and establish working rapport  Emphasize and reflect on strength of therapeutic relationship    Skills training  Explore skills useful to client in current situation.  Skills include assertiveness, communication, conflict management, problem-solving, relaxation, etc.    Solution-Focused Therapy  Explore patterns in patient's relationships and discuss options for new behaviors.  Explore patterns in patient's actions and choices and discuss options for new behaviors.    Cognitive-behavioral Therapy  Discuss common cognitive distortions, identify them in patient's life.  Explore ways to challenge, replace, and act against these cognitions.    Acceptance and Commitment Therapy  Explore and identify important values in patient's life.  Discuss ways to commit to behavioral activation around these values.    Psychodynamic psychotherapy  Discuss patient's emotional dynamics and issues and how they impact behaviors.  Explore patient's history of relationships and how they impact present behaviors.  Explore how to work with and make changes in these schemas and  patterns.    Narrative Therapy  Explore the patient's story of his/her life from his/her perspective.  Explore alternate ways of understanding their experience, identifying exceptions, developing new themes.    Interpersonal Psychotherapy  Explore patterns in relationships that are effective or ineffective at helping patient reach their goals, find satisfying experience.  Discuss new patterns or behaviors to engage in for improved social functioning.    Behavioral Activation  Discuss steps patient can take to become more involved in meaningful activity.  Identify barriers to these activities and explore possible solutions.    Mindfulness-Based Strategies  Discuss skills based on development and application of mindfulness.  Skills drawn from compassion-focused therapy, dialectical behavior therapy, mindfulness-based stress reduction, mindfulness-based cognitive therapy, etc.      Patient has reviewed and agreed to the above plan.      SHELLI Stallings, [facility]  Behavioral Health Clinician  [facility], [address], [address], [place], [address]  [phone]    2023

## 2024-01-16 ENCOUNTER — NURSE TRIAGE (OUTPATIENT)
Dept: FAMILY MEDICINE | Facility: CLINIC | Age: 29
End: 2024-01-16
Payer: COMMERCIAL

## 2024-01-16 DIAGNOSIS — U07.1 INFECTION DUE TO 2019 NOVEL CORONAVIRUS: Primary | ICD-10-CM

## 2024-01-16 NOTE — TELEPHONE ENCOUNTER
Writer replied to patient via [email or web address].  Routing to RN hub for GRAEME Thomas, RN  [facility]

## 2024-01-16 NOTE — TELEPHONE ENCOUNTER
RN COVID TREATMENT VISIT  01/16/24      The patient has been triaged and does not require a higher level of care.    Naty Pérez  28 year old  Current weight? 125 lbs    Has the patient been seen by a primary care provider at an [facility] or [facility] within the past two years? Yes.   Have you been in close proximity to/do you have a known exposure to a person with a confirmed case of influenza? No.     General treatment eligibility:  Date of positive COVID test (PCR or at home)?  1/16/24    Are you or have you been hospitalized for this COVID-19 infection? No.   Have you received monoclonal antibodies or antiviral treatment for COVID-19 since this positive test? No.   Do you have any of the following conditions that place you at risk of being very sick from COVID-19?   - Disabilities such as ADHD, cerebral palsy, congenital malformations/birth defects, limitation with self-care activities of daily living, intellectual or developmental disability, learning disability, spinal cord injury  - Mental health disorders including mood disorders, depression, schizophrenia spectrum disorders   Yes, patient has at least one high risk condition as noted above.     Current COVID symptoms:   - fatigue  - muscle or body aches  - headache  - congestion or runny nose  Yes. Patient has at least one symptom as selected.     How many days since symptoms started? 5 days or less. Established patient, 12 years or older weighing at least 88.2 lbs, who has symptoms that started in the past 5 days, has not been hospitalized nor received treatment already, and is at risk for being very sick from COVID-19.     Treatment eligibility by RN:  Are you currently pregnant or nursing? No  Do you have a clinically significant hypersensitivity to nirmatrelvir or ritonavir, or toxic epidermal necrolysis (TEN) or Valadez-Khanh Syndrome? No  Do you have a history of hepatitis, any hepatic impairment on the Problem List  (such as Child-Quintana Class C, cirrhosis, fatty liver disease, alcoholic liver disease), or was the last liver lab (hepatic panel, ALT, AST, ALK Phos, bilirubin) elevated in the past 6 months? No  Do you have any history of severe renal impairment (eGFR < 30mL/min)? No    Is patient eligible to continue? Yes, patient meets all eligibility requirements for the RN COVID treatment (as denoted by all no responses above).     Current Outpatient Medications   Medication Sig Dispense Refill    amphetamine-dextroamphetamine (ADDERALL XR) 10 MG 24 hr capsule Take 1 capsule (10 mg) by mouth daily for 30 days 30 capsule 0    [START ON 1/28/2024] amphetamine-dextroamphetamine (ADDERALL XR) 10 MG 24 hr capsule Take 1 capsule (10 mg) by mouth daily for 30 days 30 capsule 0    amphetamine-dextroamphetamine (ADDERALL XR) 10 MG 24 hr capsule Take 10 mg by mouth daily      cholecalciferol (VITAMIN D3) 10 mcg (400 units) TABS tablet Take 10 mcg by mouth daily      etonogestrel (NEXPLANON) 68 MG IMPL 1 each (68 mg) by Subdermal route once      ketoconazole (NIZORAL) 2 % external cream Apply topically daily Apply to affected areas daily up until clear 15 g 1    montelukast (SINGULAIR) 10 MG tablet Take 1 tablet (10 mg) by mouth at bedtime 90 tablet 0    sertraline (ZOLOFT) 50 MG tablet TAKE 1 TABLET(50 MG) BY MOUTH DAILY 90 tablet 2    vitamin C (ASCORBIC ACID) 250 MG tablet Take 250 mg by mouth daily         Medications from List 1 of the standing order (on medications that exclude the use of Paxlovid) that patient is taking: NONE. Is patient taking Brendan's Wort? No  Is patient taking [place]'s Wort or any meds from List 1? No.   Medications from List 2 of the standing order (on meds that provider needs to adjust) that patient is taking: NONE. Is patient on any of the meds from List 2? No.   Medications from List 3 of standing order (on meds that a RN needs to adjust) that patient is taking: NONE. Is patient on any meds from List 3?  No.     Paxlovid has an approximate 90% reduction in hospitalization. Paxlovid can possibly cause altered sense of taste, diarrhea (loose, watery stools), high blood pressure, muscle aches.     Would patient like a Paxlovid prescription?   Yes.   Lab Results   Component Value Date    GFRESTIMATED >90 06/29/2023       Was last eGFR reduced? No, eGFR 60 or greater/ No Result on record. Patient can receive the normal renal function dose. Paxlovid Rx sent to [place] pharmacy   preferred    Temporary change to home medications: None    All medication adjustments (holds, etc) were discussed with the patient and patient was asked to repeat back (teachback) their med adjustment.  Did patient understand med adjustment? No medication adjustments needed.         Reviewed the following instructions with the patient:    Paxlovid (nimatrelvir and ritonavir)    How it works  Two medicines (nirmatrelvir and ritonavir) are taken together. They stop the virus from growing. Less amount of virus is easier for your body to fight.    How to take  Medicine comes in a daily container with both medicine tablets. Take by mouth twice daily (once in the morning, once at night) for 5 days.  The number of tablets to take varies by patient.  Don't chew or break capsules. Swallow whole.    When to take  Take as soon as possible after positive COVID-19 test result, and within 5 days of your first symptoms.    Possible side effects  Can cause altered sense of taste, diarrhea (loose, watery stools), high blood pressure, muscle aches.    Henrietta Wilkes, RN       Reason for Disposition   [1] HIGH RISK patient (e.g., weak immune system, age > 64 years, obesity with BMI 30 or higher, pregnant, chronic lung disease or other chronic medical condition) AND [2] COVID symptoms (e.g., cough, fever)  (Exceptions: Already seen by PCP and no new or worsening symptoms.)    Additional Information   Negative: SEVERE difficulty breathing (e.g., struggling for each  breath, speaks in single words)   Negative: Difficult to awaken or acting confused (e.g., disoriented, slurred speech)   Negative: Bluish (or gray) lips or face now   Negative: Shock suspected (e.g., cold/pale/clammy skin, too weak to stand, low BP, rapid pulse)   Negative: Sounds like a life-threatening emergency to the triager   Negative: SEVERE or constant chest pain or pressure  (Exception: Mild central chest pain, present only when coughing.)   Negative: MODERATE difficulty breathing (e.g., speaks in phrases, SOB even at rest, pulse 100-120)   Negative: [1] Headache AND [2] stiff neck (can't touch chin to chest)   Negative: Oxygen level (e.g., pulse oximetry) 90 percent or lower   Negative: Chest pain or pressure  (Exception: MILD central chest pain, present only when coughing.)   Negative: [1] Drinking very little AND [2] dehydration suspected (e.g., no urine > 12 hours, very dry mouth, very lightheaded)   Negative: Patient sounds very sick or weak to the triager   Negative: MILD difficulty breathing (e.g., minimal/no SOB at rest, SOB with walking, pulse <100)   Negative: Fever > 103 F (39.4 C)   Negative: [1] Fever > 101 F (38.3 C) AND [2] age > 60 years   Negative: [1] Fever > 100.0 F (37.8 C) AND [2] bedridden (e.g., CVA, chronic illness, recovering from surgery)   Negative: Oxygen level (e.g., pulse oximetry) 91 to 94 percent    Protocols used: Coronavirus (COVID-19) Diagnosed or [id number]

## 2024-01-29 DIAGNOSIS — N94.3 PMS (PREMENSTRUAL SYNDROME): ICD-10-CM

## 2024-02-27 ENCOUNTER — VIRTUAL VISIT (OUTPATIENT)
Dept: FAMILY MEDICINE | Facility: CLINIC | Age: 29
End: 2024-02-27
Payer: COMMERCIAL

## 2024-02-27 DIAGNOSIS — F98.8 ATTENTION DEFICIT DISORDER WITHOUT HYPERACTIVITY: Primary | ICD-10-CM

## 2024-02-27 PROCEDURE — 99213 OFFICE O/P EST LOW 20 MIN: CPT | Mod: 95 | Performed by: NURSE PRACTITIONER

## 2024-02-27 RX ORDER — DEXTROAMPHETAMINE SACCHARATE, AMPHETAMINE ASPARTATE MONOHYDRATE, DEXTROAMPHETAMINE SULFATE AND AMPHETAMINE SULFATE 2.5; 2.5; 2.5; 2.5 MG/1; MG/1; MG/1; MG/1
10 CAPSULE, EXTENDED RELEASE ORAL DAILY
Qty: 30 CAPSULE | Refills: 0 | Status: SHIPPED | OUTPATIENT
Start: 2024-02-27 | End: 2024-03-28

## 2024-02-27 RX ORDER — DEXTROAMPHETAMINE SACCHARATE, AMPHETAMINE ASPARTATE MONOHYDRATE, DEXTROAMPHETAMINE SULFATE AND AMPHETAMINE SULFATE 2.5; 2.5; 2.5; 2.5 MG/1; MG/1; MG/1; MG/1
10 CAPSULE, EXTENDED RELEASE ORAL DAILY
Qty: 30 CAPSULE | Refills: 0 | Status: SHIPPED | OUTPATIENT
Start: 2024-04-29 | End: 2024-05-13

## 2024-02-27 RX ORDER — DEXTROAMPHETAMINE SACCHARATE, AMPHETAMINE ASPARTATE MONOHYDRATE, DEXTROAMPHETAMINE SULFATE AND AMPHETAMINE SULFATE 2.5; 2.5; 2.5; 2.5 MG/1; MG/1; MG/1; MG/1
10 CAPSULE, EXTENDED RELEASE ORAL DAILY
Qty: 30 CAPSULE | Refills: 0 | Status: SHIPPED | OUTPATIENT
Start: 2024-03-29 | End: 2024-04-28

## 2024-02-27 NOTE — PROGRESS NOTES
Naty is a 28 year old who is being evaluated via a billable video visit.      How would you like to obtain your AVS? MyChart  If the video visit is dropped, the invitation should be resent by: Text to cell phone: [phone]  Will anyone else be joining your video visit? No          Assessment & Plan     Attention deficit disorder without hyperactivity   Stable, tolerating med, no changes at this time.  CSA is current, PDMP reviewed.  OK for evisit for next refill and due for preventative in June.    - amphetamine-dextroamphetamine (ADDERALL XR) 10 MG 24 hr capsule; Take 1 capsule (10 mg) by mouth daily for 30 days  - amphetamine-dextroamphetamine (ADDERALL XR) 10 MG 24 hr capsule; Take 1 capsule (10 mg) by mouth daily for 30 days  - amphetamine-dextroamphetamine (ADDERALL XR) 10 MG 24 hr capsule; Take 1 capsule (10 mg) by mouth daily for 30 days        Subjective   Naty is a 28 year old, presenting for the following health issues:  No chief complaint on file.      2/27/2024    10:54 AM   Additional Questions   Roomed by Lennie TY     History of Present Illness       Reason for visit:  Med refill    She eats 0-1 servings of fruits and vegetables daily.She consumes 1 sweetened beverage(s) daily.She exercises with enough effort to increase her heart rate 10 to 19 minutes per day.  She exercises with enough effort to increase her heart rate 4 days per week.   She is taking medications regularly.     Needing refills on her adderall, everything is going fine with current dose.            Objective           Vitals:  No vitals were obtained today due to virtual visit.    Physical Exam   GENERAL: alert and no distress  EYES: Eyes grossly normal to inspection.  No discharge or erythema, or obvious scleral/conjunctival abnormalities.  RESP: No audible wheeze, cough, or visible cyanosis.    SKIN: Visible skin clear. No significant rash, abnormal pigmentation or lesions.  NEURO: Cranial nerves grossly intact.  Mentation  and speech appropriate for age.  PSYCH: Appropriate affect, tone, and pace of words          Video-Visit Details    Type of service:  Video Visit     Originating Location (pt. Location): Home    Distant Location (provider location):  On-site  Platform used for Video Visit: Letha  Signed Electronically by: VALE Kennedy CNP

## 2024-02-29 ENCOUNTER — VIRTUAL VISIT (OUTPATIENT)
Dept: PSYCHOLOGY | Facility: CLINIC | Age: 29
End: 2024-02-29
Payer: COMMERCIAL

## 2024-02-29 DIAGNOSIS — F33.0 MAJOR DEPRESSIVE DISORDER, RECURRENT EPISODE, MILD (H): ICD-10-CM

## 2024-02-29 DIAGNOSIS — F41.1 GAD (GENERALIZED ANXIETY DISORDER): ICD-10-CM

## 2024-02-29 DIAGNOSIS — F98.8 ATTENTION DEFICIT DISORDER WITHOUT HYPERACTIVITY: ICD-10-CM

## 2024-02-29 PROCEDURE — 90791 PSYCH DIAGNOSTIC EVALUATION: CPT | Mod: 93 | Performed by: PSYCHOLOGIST

## 2024-02-29 ASSESSMENT — PATIENT HEALTH QUESTIONNAIRE - PHQ9
SUM OF ALL RESPONSES TO PHQ QUESTIONS 1-9: 9
10. IF YOU CHECKED OFF ANY PROBLEMS, HOW DIFFICULT HAVE THESE PROBLEMS MADE IT FOR YOU TO DO YOUR WORK, TAKE CARE OF THINGS AT HOME, OR GET ALONG WITH OTHER PEOPLE: SOMEWHAT DIFFICULT
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
## 2024-03-07 ENCOUNTER — VIRTUAL VISIT (OUTPATIENT)
Dept: PSYCHOLOGY | Facility: CLINIC | Age: 29
End: 2024-03-07
Payer: COMMERCIAL

## 2024-03-07 DIAGNOSIS — F33.0 MAJOR DEPRESSIVE DISORDER, RECURRENT EPISODE, MILD (H): ICD-10-CM

## 2024-03-07 DIAGNOSIS — F41.1 GAD (GENERALIZED ANXIETY DISORDER): Primary | ICD-10-CM

## 2024-03-07 DIAGNOSIS — F98.8 ATTENTION DEFICIT DISORDER WITHOUT HYPERACTIVITY: ICD-10-CM

## 2024-03-07 PROCEDURE — 90837 PSYTX W PT 60 MINUTES: CPT | Mod: 95 | Performed by: PSYCHOLOGIST

## 2024-03-07 ASSESSMENT — ANXIETY QUESTIONNAIRES
8. IF YOU CHECKED OFF ANY PROBLEMS, HOW DIFFICULT HAVE THESE MADE IT FOR YOU TO DO YOUR WORK, TAKE CARE OF THINGS AT HOME, OR GET ALONG WITH OTHER PEOPLE?: SOMEWHAT DIFFICULT
GAD7 TOTAL SCORE: 9
GAD7 TOTAL SCORE: 9
4. TROUBLE RELAXING: SEVERAL DAYS
2. NOT BEING ABLE TO STOP OR CONTROL WORRYING: SEVERAL DAYS
3. WORRYING TOO MUCH ABOUT DIFFERENT THINGS: MORE THAN HALF THE DAYS
GAD7 TOTAL SCORE: 9
7. FEELING AFRAID AS IF SOMETHING AWFUL MIGHT HAPPEN: NOT AT ALL
7. FEELING AFRAID AS IF SOMETHING AWFUL MIGHT HAPPEN: NOT AT ALL
5. BEING SO RESTLESS THAT IT IS HARD TO SIT STILL: MORE THAN HALF THE DAYS
1. FEELING NERVOUS, ANXIOUS, OR ON EDGE: MORE THAN HALF THE DAYS
6. BECOMING EASILY ANNOYED OR IRRITABLE: SEVERAL DAYS
IF YOU CHECKED OFF ANY PROBLEMS ON THIS QUESTIONNAIRE, HOW DIFFICULT HAVE THESE PROBLEMS MADE IT FOR YOU TO DO YOUR WORK, TAKE CARE OF THINGS AT HOME, OR GET ALONG WITH OTHER PEOPLE: SOMEWHAT DIFFICULT

## 2024-03-07 NOTE — PROGRESS NOTES
Answers submitted by the patient for this visit:  ODILON-7 (Submitted on 3/7/2024)  ODILON 7 TOTAL SCORE: 9    [facility]                                     Progress Note    Patient Name   Date: 3-         Service Type: Individual      Session Start Time  3:00 pm    Session End Time: 4:01 pm        Session Length: 60 min    Session #:  2 in   Attendees: Client    Service Modality:  Video Visit:      Provider verified identity through the following two step process.  Patient provided:  Patient  and Patient address    Telemedicine Visit: The patient's condition can be safely assessed and treated via synchronous audio and visual telemedicine encounter.      Reason for Telemedicine Visit: Services only offered telehealth    Originating Site (Patient Location): Patient's home    Distant Site (Provider Location): Provider Remote Setting- Home Office    Consent:  The patient/guardian has verbally consented to: the potential risks and benefits of telemedicine (video visit) versus in person care; bill my insurance or make self-payment for services provided; and responsibility for payment of non-covered services.     Patient would like the video invitation sent by:  Text to cell phone: yes    Mode of Communication:  Video Conference via   As the provider I attest to compliance with applicable laws and regulations related to telemedicine.    DATA  Interactive Complexity: No  Extended session:  yes  60 minutes  - Patient's presenting concerns require more intensive intervention than could be completed within the usual service    - High distress and under complex circumstances.  See Data section for details  Crisis: No     PROMIS (reviewed every 90 days): at intake 24  Treatment plan reviewed : today  (first  3-07-24   90 days = 24)    Diagnosis:    F41.1 Generalized Anxiety Disorder,   F33.0 Major Depressive Disorder, recurrent, mild  F96.8 Attention Disorder without Hyperactivity  "        Progress Since Last Session (Related to Symptoms / Goals / Homework):   Symptoms: stable    Homework: Completed in session      Episode of Care Goals: Satisfactory progress - ACTION (Actively working towards change); Intervened by reinforcing change plan / affirming steps taken.       Current / Ongoing Stressors and Concerns:    Anxiety,  interpersonal distress      Treatment Objective(s) Addressed in This Session:   identify core stressors/  thoughts that contribute to feeling anxious  Patient will use at least 6 coping skills for anxiety management in the next 90 days.  Learn about physiological pathways related to stress and trauma.     Intervention:   Interpersonal Therapy: , CBT, supportive therapy    Symptoms:    Tries to get get 9 hours of sleep. If not  she can just fall asleep most anywhere.  \"I do struggle with constant fatigue\".  Fractured back at age 12- gymnastics so has intermittent pain at times  Currently - \"I get nauseous a lot\".   [place] in [place]- [place] weight there- just wasn't hungry.    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    Gather additional information about stressors.  Begin to look at physiological manifestations of stress.  Support client in managing her  mood disorder- anxiety.   Start building preliminary tx plan        Identified and processed emotions.    Assessments completed prior to visit:  The following assessments were completed by patient for this visit: none      ASSESSMENT: Current Emotional / Mental Status (status of significant symptoms):   Risk status (Self / Other harm or suicidal ideation)   Patient denies current fears or concerns for personal safety.   Patient denies current or recent suicidal ideation or behaviors.   Patient denies current or recent homicidal ideation or " behaviors.   Patient denies current or recent self injurious behavior or ideation.   Patient denies other safety concerns.   Patient reports there has been no change in risk factors since their last session.     Patient reports there has been no change in protective factors since their last session.     Recommended that patient call 911 or go to the local ED should there be a change in any of these risk factors.     Appearance:   Appropriate    Eye Contact:   Good    Psychomotor Behavior: Normal    Attitude:   Cooperative    Orientation:   All   Speech   Rate / Production: Normal    Volume:  Normal    Mood:    Normal   Affect:    Appropriate  Worrisome    Thought Content:  Clear    Thought Form:  Coherent  Logical    Insight:    Good      Medication Review:   No changes to psychiatric medications, see updated Medication List in EPIC.          Medication Compliance:   NA     Changes in Health Issues:   None reported     Chemical Use Review:   Substance Use: Chemical use reviewed, no active concerns identified      Tobacco Use: No current tobacco use.      Diagnosis:  F41.1 Generalized Anxiety Disorder,   F33.0 Major Depressive Disorder, recurrent, mild  F96.8 Attention Disorder without Hyperactivity     Collateral Reports Completed:   Routed note to PCP as needed    PLAN: (Patient Tasks / Therapist Tasks / Other  Use guidelines as discussed around daily coping skills  and planning for success  Vagal reset.  Hydration / stress support     Lashell Harris LP                                                         ____________________________________________________________________    Individual Treatment Plan    Patient's Name:  Date Of Birth:     Date of Creation: [birth year]  Date Treatment Plan Last Reviewed/Revised: 12-23    DSM5 Diagnoses:  F41.1 Generalized Anxiety Disorder,   F33.0 Major Depressive Disorder, recurrent, mild  F96.8 Attention Disorder without Hyperactivity       Psychosocial / Contextual Factors:  multiple stressors, mood disruption  PROMIS (reviewed every 90 days): at intake 2-29-24    Referral / Collaboration:  Referral to another professional/service is not indicated at this time.    Anticipated number of session for this episode of care: 12.  Anticipation frequency of session: Every other week  Anticipated Duration of each session: 38-52 minutes and/or 53-60 minutes  Treatment plan will be reviewed in 90 days or when goals have been changed.       Measurable Treatment Goal(s) related to diagnosis / functional impairment(s)  Goal 1: Patient will identify core pattern of  thoughts that contribute to feeling stressed or  anxious.    I will know I've met my goal when .  will report 50% less disrupted mood.      Objective #A (Patient Action)    Patient will use at least 6 coping skills for anxiety management in the next 90 days weeks.  Status: New - Date: 2024    Intervention(s)  Therapist will teach coping skills for anxiety/ unwanted changes.      Patient has reviewed and agreed to the above plan.      Lashell Harris LP

## 2024-03-22 ENCOUNTER — VIRTUAL VISIT (OUTPATIENT)
Dept: PSYCHOLOGY | Facility: CLINIC | Age: 29
End: 2024-03-22
Payer: COMMERCIAL

## 2024-03-22 DIAGNOSIS — F33.0 MAJOR DEPRESSIVE DISORDER, RECURRENT EPISODE, MILD (H): ICD-10-CM

## 2024-03-22 DIAGNOSIS — F98.8 ATTENTION DEFICIT DISORDER WITHOUT HYPERACTIVITY: ICD-10-CM

## 2024-03-22 DIAGNOSIS — F41.1 GAD (GENERALIZED ANXIETY DISORDER): Primary | ICD-10-CM

## 2024-03-22 PROCEDURE — 90837 PSYTX W PT 60 MINUTES: CPT | Mod: 95 | Performed by: PSYCHOLOGIST

## 2024-03-22 ASSESSMENT — ANXIETY QUESTIONNAIRES
GAD7 TOTAL SCORE: 8
5. BEING SO RESTLESS THAT IT IS HARD TO SIT STILL: SEVERAL DAYS
GAD7 TOTAL SCORE: 8
3. WORRYING TOO MUCH ABOUT DIFFERENT THINGS: MORE THAN HALF THE DAYS
1. FEELING NERVOUS, ANXIOUS, OR ON EDGE: SEVERAL DAYS
GAD7 TOTAL SCORE: 8
2. NOT BEING ABLE TO STOP OR CONTROL WORRYING: SEVERAL DAYS
7. FEELING AFRAID AS IF SOMETHING AWFUL MIGHT HAPPEN: NOT AT ALL
4. TROUBLE RELAXING: SEVERAL DAYS
8. IF YOU CHECKED OFF ANY PROBLEMS, HOW DIFFICULT HAVE THESE MADE IT FOR YOU TO DO YOUR WORK, TAKE CARE OF THINGS AT HOME, OR GET ALONG WITH OTHER PEOPLE?: SOMEWHAT DIFFICULT
6. BECOMING EASILY ANNOYED OR IRRITABLE: MORE THAN HALF THE DAYS
7. FEELING AFRAID AS IF SOMETHING AWFUL MIGHT HAPPEN: NOT AT ALL
IF YOU CHECKED OFF ANY PROBLEMS ON THIS QUESTIONNAIRE, HOW DIFFICULT HAVE THESE PROBLEMS MADE IT FOR YOU TO DO YOUR WORK, TAKE CARE OF THINGS AT HOME, OR GET ALONG WITH OTHER PEOPLE: SOMEWHAT DIFFICULT

## 2024-03-22 NOTE — PROGRESS NOTES
Answers submitted by the patient for this visit:  ODILON-7 (Submitted on 3/7/2024)  ODILON 7 TOTAL SCORE: 9    [facility]                                     Progress Note    Patient Name   Date: 3-         Service Type: Individual      Session Start Time  3:05 pm    Session End Time: 4:05 pm        Session Length: 60 min    Session #:  3 in   Attendees: Client    Service Modality:  Video Visit:      Provider verified identity through the following two step process.  Patient provided:  Patient  and Patient address    Telemedicine Visit: The patient's condition can be safely assessed and treated via synchronous audio and visual telemedicine encounter.      Reason for Telemedicine Visit: Services only offered telehealth    Originating Site (Patient Location): Patient's home    Distant Site (Provider Location): Provider Remote Setting- Home Office    Consent:  The patient/guardian has verbally consented to: the potential risks and benefits of telemedicine (video visit) versus in person care; bill my insurance or make self-payment for services provided; and responsibility for payment of non-covered services.     Patient would like the video invitation sent by:  Text to cell phone: yes    Mode of Communication:  Video Conference via   As the provider I attest to compliance with applicable laws and regulations related to telemedicine.    DATA  Interactive Complexity: No  Extended session:  yes  60 minutes  on 3-22-24  -   Patient's presenting concerns require more intensive intervention than could be completed within the usual service    - High distress and under complex circumstances.  See Data section for details      Crisis: No     PROMIS (reviewed every 90 days): at intake 24  Treatment plan reviewed : today  (first  3-07-24   90 days = 24)    Diagnosis:    F41.1 Generalized Anxiety Disorder,   F33.0 Major Depressive Disorder, recurrent, mild  F96.8 Attention Disorder without  Hyperactivity         Progress Since Last Session (Related to Symptoms / Goals / Homework):   Symptoms: stable    Homework: Completed in session      Episode of Care Goals: Satisfactory progress - ACTION (Actively working towards change); Intervened by reinforcing change plan / affirming steps taken.       Current / Ongoing Stressors and Concerns:    Anxiety,  interpersonal distress      Treatment Objective(s) Addressed in This Session:   identify core stressors/  thoughts that contribute to feeling anxious  Patient will use at least 6 coping skills for anxiety management in the next 90 days.  Learn about physiological pathways related to stress and trauma.     Intervention:   Interpersonal Therapy: , CBT, supportive therapy    Symptoms:  constant fatigue  Attention  focus / concentration disrupted    Stressors:  Will be having 13 hour day at work.  Has some strategies.  Partner is displaying neuro divergent traits.  Client has made safe places - ex. her bed and tries to protect these.    Explored interpersonal distress:  Ex. Has some notion of non- negotiable.    Identified and processed emotions.    Assessments completed prior to visit:  The following assessments were completed by patient for this visit: ODILON 90 day roland or none      ASSESSMENT: Current Emotional / Mental Status (status of significant symptoms):   Risk status (Self / Other harm or suicidal ideation)   Patient denies current fears or concerns for personal safety.   Patient denies current or recent suicidal ideation or behaviors.   Patient denies current or recent homicidal ideation or behaviors.   Patient denies current or recent self injurious behavior or ideation.   Patient denies other safety concerns.   Patient reports there has been no change in risk factors since their last session.     Patient reports there has been no change in protective factors since their last session.     Recommended that patient call 911 or go to the local ED should  there be a change in any of these risk factors.     Appearance:   Appropriate    Eye Contact:   Good    Psychomotor Behavior: Normal    Attitude:   Cooperative    Orientation:   All   Speech   Rate / Production: Normal    Volume:  Normal    Mood:    Normal   Affect:    Appropriate  Worrisome    Thought Content:  Clear    Thought Form:  Coherent  Logical    Insight:    Good      Medication Review:   No changes to psychiatric medications, see updated Medication List in EPIC.          Medication Compliance:   NA     Changes in Health Issues:   None reported     Chemical Use Review:   Substance Use: Chemical use reviewed, no active concerns identified      Tobacco Use: No current tobacco use.      Diagnosis:  F41.1 Generalized Anxiety Disorder,   F33.0 Major Depressive Disorder, recurrent, mild  F96.8 Attention Disorder without Hyperactivity     Collateral Reports Completed:   Routed note to PCP as needed    PLAN: (Patient Tasks / Therapist Tasks / Other    Consider several CBT skills reframes  Consider 2 behavioral intervetions as discussed.  Past hw  Use guidelines as discussed around daily coping skills  and planning for success  Vagal reset.  Hydration / stress support     Lashell Harris LP                                                         ____________________________________________________________________    Individual Treatment Plan    Patient's Name:  Date Of Birth:     Date of Creation: [birth year]  Date Treatment Plan Last Reviewed/Revised: 12-23    DSM5 Diagnoses:  F41.1 Generalized Anxiety Disorder,   F33.0 Major Depressive Disorder, recurrent, mild  F96.8 Attention Disorder without Hyperactivity       Psychosocial / Contextual Factors: multiple stressors, mood disruption  PROMIS (reviewed every 90 days): at intake 2-29-24    Referral / Collaboration:  Referral to another professional/service is not indicated at this time.    Anticipated number of session for this episode of care: 12.  Anticipation  frequency of session: Every other week  Anticipated Duration of each session: 38-52 minutes and/or 53-60 minutes  Treatment plan will be reviewed in 90 days or when goals have been changed.       Measurable Treatment Goal(s) related to diagnosis / functional impairment(s)  Goal 1: Patient will identify core pattern of  thoughts that contribute to feeling stressed or  anxious.    I will know I've met my goal when .  will report 50% less disrupted mood.      Objective #A (Patient Action)    Patient will use at least 6 coping skills for anxiety management in the next 90 days weeks.  Status: New - Date: 2024    Intervention(s)  Therapist will teach coping skills for anxiety/ unwanted changes.      Patient has reviewed and agreed to the above plan.      Lashell Harris LP         Answers submitted by the patient for this visit:  Patient Health Questionnaire (Submitted on 3/22/2024)  If you checked off any problems, how difficult have these problems made it for you to do your work, take care of things at home, or get along with other people?: Somewhat difficult  PHQ9 TOTAL SCORE: 11  ODILON-7 (Submitted on 3/22/2024)  ODILON 7 TOTAL SCORE: 8

## 2024-03-26 ENCOUNTER — OFFICE VISIT (OUTPATIENT)
Dept: FAMILY MEDICINE | Facility: CLINIC | Age: 29
End: 2024-03-26
Payer: COMMERCIAL

## 2024-03-26 VITALS
WEIGHT: 134 LBS | HEART RATE: 89 BPM | OXYGEN SATURATION: 99 % | BODY MASS INDEX: 24.04 KG/M2 | TEMPERATURE: 97.8 F | SYSTOLIC BLOOD PRESSURE: 110 MMHG | DIASTOLIC BLOOD PRESSURE: 72 MMHG

## 2024-03-26 DIAGNOSIS — U07.1 INFECTION DUE TO 2019 NOVEL CORONAVIRUS: ICD-10-CM

## 2024-03-26 DIAGNOSIS — H65.03 NON-RECURRENT ACUTE SEROUS OTITIS MEDIA OF BOTH EARS: Primary | ICD-10-CM

## 2024-03-26 PROCEDURE — 99214 OFFICE O/P EST MOD 30 MIN: CPT | Performed by: FAMILY MEDICINE

## 2024-03-26 NOTE — PROGRESS NOTES
Assessment & Plan     (H65.03) Non-recurrent acute serous otitis media of both ears  (primary encounter diagnosis)  Comment: Bilateral serous otitis media more on the left than on the right  Plan:      (U07.1) Infection due to 2019 novel coronavirus  Comment: Patient had a COVID infection in January  Plan:      PLAN:  1.  For the serous otitis media recommend over-the-counter antihistamines Afrin she could retry Sudafed  2.  I recommend deferring the COVID booster till later in the spring or possibly even in the fall                  Casey Alfonso is a 28 year old, presenting for the following health issues:  No chief complaint on file.        3/26/2024     2:23 PM   Additional Questions   Roomed by Salome ORTEGA     History of Present Illness       Reason for visit:  Ear discomfort      Patient comes in because of bilateral ear pain.  Patient reports that she does have some very mild cold symptoms slight amount of congestion no cough no fever or chills but she is concerned because she feels that she is having a muffled sensation and some discomfort in both ears some more on the left than on the right.  Week from now the patient is scheduled to travel to [place] and she is concerned about a long flight.  She has had ear infections as an adult.    Patient has not tried any decongestants or over-the-counter medications for this yet, of note Sudafed tends to make her little dizzy, Flonase is caused nosebleeds.    Patient had COVID in October and again in January I told her I actually would delay getting a COVID booster for at least several months and maybe even wait on the fall if particularly there is a new updated vaccine available.    After inspection I told the patient she does not have an ear infection, rather fluid behind the ears and I like her to try over-the-counter antihistamines, she could try Afrin though she can only use that for 3 days, and she could try Sudafed again to see if she tolerates this  better.                        Objective    /72   Pulse 89   Temp 97.8  F (36.6  C)   Wt 60.8 kg (134 lb)   SpO2 99%   BMI 24.04 kg/m    Body mass index is 24.04 kg/m .  Physical Exam   ENT examination.  Both tympanic membranes are normal in appearance.              Signed Electronically by: Priyank Brown MD

## 2024-03-27 ENCOUNTER — MYC MEDICAL ADVICE (OUTPATIENT)
Dept: FAMILY MEDICINE | Facility: CLINIC | Age: 29
End: 2024-03-27
Payer: COMMERCIAL

## 2024-03-27 DIAGNOSIS — F98.8 ATTENTION DEFICIT DISORDER WITHOUT HYPERACTIVITY: Primary | ICD-10-CM

## 2024-03-27 NOTE — TELEPHONE ENCOUNTER
"Shania-Please review and sign if agree.  Once medication sent to pharmacy, clinic staff can send PA team request to submit PA for Adderall XR 10 mg.    \"Hi   I called my pharmacy to fill the next refill and they are out of the off-brand Adderall and they say insurance doesn t cover the brand name. So they are sending you a prior authorization request so that hopefully insurance will approve the brand name this time because they are on back order.   They said to reach out to you and let you know to look for it.\"      Thank you!  COBY AntoineN, RN-[facility]    "

## 2024-03-28 RX ORDER — DEXTROAMPHETAMINE/AMPHETAMINE 10 MG
10 CAPSULE, EXT RELEASE 24 HR ORAL DAILY
Qty: 30 CAPSULE | Refills: 0 | Status: SHIPPED | OUTPATIENT
Start: 2024-03-29

## 2024-04-19 ENCOUNTER — VIRTUAL VISIT (OUTPATIENT)
Dept: PSYCHOLOGY | Facility: CLINIC | Age: 29
End: 2024-04-19
Payer: COMMERCIAL

## 2024-04-19 DIAGNOSIS — F33.0 MAJOR DEPRESSIVE DISORDER, RECURRENT EPISODE, MILD (H): ICD-10-CM

## 2024-04-19 DIAGNOSIS — F41.1 GAD (GENERALIZED ANXIETY DISORDER): ICD-10-CM

## 2024-04-19 DIAGNOSIS — F98.8 ATTENTION DEFICIT DISORDER WITHOUT HYPERACTIVITY: Primary | ICD-10-CM

## 2024-04-19 PROCEDURE — 90837 PSYTX W PT 60 MINUTES: CPT | Mod: 95 | Performed by: PSYCHOLOGIST

## 2024-04-19 ASSESSMENT — PATIENT HEALTH QUESTIONNAIRE - PHQ9
SUM OF ALL RESPONSES TO PHQ QUESTIONS 1-9: 2
10. IF YOU CHECKED OFF ANY PROBLEMS, HOW DIFFICULT HAVE THESE PROBLEMS MADE IT FOR YOU TO DO YOUR WORK, TAKE CARE OF THINGS AT HOME, OR GET ALONG WITH OTHER PEOPLE: NOT DIFFICULT AT ALL
SUM OF ALL RESPONSES TO PHQ QUESTIONS 1-9: 2

## 2024-04-19 NOTE — PROGRESS NOTES
[facility]                                     Progress Note    Patient Name   Date: 2024         Service Type: Individual      Session Start Time  3:00 pm    Session End Time: 4:00 pm        Session Length: 53-60 min    Session #:  4 in   Attendees: Client    Service Modality:  Video Visit:      Provider verified identity through the following two step process.  Patient provided:  Patient  and Patient address    Telemedicine Visit: The patient's condition can be safely assessed and treated via synchronous audio and visual telemedicine encounter.      Reason for Telemedicine Visit: Services only offered telehealth    Originating Site (Patient Location): Patient's home    Distant Site (Provider Location): Provider Remote Setting- Home Office    Consent:  The patient/guardian has verbally consented to: the potential risks and benefits of telemedicine (video visit) versus in person care; bill my insurance or make self-payment for services provided; and responsibility for payment of non-covered services.     Patient would like the video invitation sent by:  Text to cell phone: yes    Mode of Communication:  Video Conference via   As the provider I attest to compliance with applicable laws and regulations related to telemedicine.    DATA  Interactive Complexity: No  Extended session:  yes  53-60 minutes  on 24   ADD, ODILON, MDD  Patient's presenting concerns require more intensive intervention than could be completed within the usual service    - High distress and under complex circumstances.  See Data section for details.   Poor focus, loses her thought by the end of sentence, tangential at times,  fatigue    Crisis: No     PROMIS (reviewed every 90 days): at intake 24  Treatment plan reviewed : today  (first  3-07-24   90 days = 24)    Diagnosis:    F41.1 Generalized Anxiety Disorder,   F33.0 Major Depressive Disorder, recurrent, mild  F96.8 Attention Disorder without  Hyperactivity         Progress Since Last Session (Related to Symptoms / Goals / Homework):   Symptoms: stable    Homework: Completed in session      Episode of Care Goals: Satisfactory progress - ACTION (Actively working towards change); Intervened by reinforcing change plan / affirming steps taken.       Current / Ongoing Stressors and Concerns:    Anxiety, ADD - poor focus   interpersonal distress  with some of partners family; boundaries needed  Work stress/ burnout feeling at times      Treatment Objective(s) Addressed in This Session:   identify core stressors/  thoughts that contribute to feeling anxious  Patient will use at least 6 coping skills for anxiety management in the next 90 days.  Learn about physiological pathways related to stress and trauma.     Intervention:   Interpersonal Therapy: , CBT, supportive therapy    Symptoms:  constant fatigue ( previous to travel fatigue)  Attention  focus / concentration disrupted    Stressors:  Feeling exhausted after returning from a trip to [facility].  Shared her jet lag experience.    Client has worked on asking partner for more balance.  Saw his dishes in the sink and had a feeling about that-  Was able to shift and not be put off- worked to   recognize other things that he does.  Discussed the invisible work ( that take mental and physical energy).      Client has been more communicative when something comes up-     Finds that he responsive.   Client is trying to address her relationship fears taking down some walls.  ( Together for 5 years now) Discussed committment.  Discussed seeing her parents in conflict.    Identified and processed emotions.    Assessments completed prior to visit:  The following assessments were completed by patient for this visit: ODILON 90 day roland or none      ASSESSMENT: Current Emotional / Mental Status (status of significant symptoms):   Risk status (Self / Other harm or suicidal ideation)   Patient denies current fears or concerns  for personal safety.   Patient denies current or recent suicidal ideation or behaviors.   Patient denies current or recent homicidal ideation or behaviors.   Patient denies current or recent self injurious behavior or ideation.   Patient denies other safety concerns.   Patient reports there has been no change in risk factors since their last session.     Patient reports there has been no change in protective factors since their last session.     Recommended that patient call 911 or go to the local ED should there be a change in any of these risk factors.     Appearance:   Appropriate    Eye Contact:   Good    Psychomotor Behavior: Normal    Attitude:   Cooperative    Orientation:   All   Speech   Rate / Production: Normal    Volume:  Normal    Mood:    Normal   Affect:    Appropriate  Worrisome    Thought Content:  Clear    Thought Form:  Coherent  Logical    Insight:    Good      Medication Review:   No changes to psychiatric medications, see updated Medication List in EPIC.          Medication Compliance:   NA     Changes in Health Issues:   None reported     Chemical Use Review:   Substance Use: Chemical use reviewed, no active concerns identified      Tobacco Use: No current tobacco use.      Diagnosis:  F41.1 Generalized Anxiety Disorder,   F33.0 Major Depressive Disorder, recurrent, mild  F96.8 Attention Disorder without Hyperactivity     Collateral Reports Completed:   Routed note to PCP as needed    PLAN: (Patient Tasks / Therapist Tasks / Other  Plan is to attend to resting this weekend.  Give self permission for boundaries as needed  Past hw  Consider several CBT skills reframes  Consider 2 behavioral intervetions as discussed.  Past hw  Use guidelines as discussed around daily coping skills  and planning for success  Vagal reset.  Hydration / stress support     Lashell Harris LP                                                       ____________________________________________________________________    Individual Treatment Plan    Patient's Name:  Date Of Birth:     Date of Creation: 1- 04-24  Date Treatment Plan Last Reviewed/Revised: 12-23    DSM5 Diagnoses:  F41.1 Generalized Anxiety Disorder,   F33.0 Major Depressive Disorder, recurrent, mild  F96.8 Attention Disorder without Hyperactivity       Psychosocial / Contextual Factors: multiple stressors, mood disruption  PROMIS (reviewed every 90 days): at intake 2-29-24    Referral / Collaboration:  Referral to another professional/service is not indicated at this time.    Anticipated number of session for this episode of care: 12.  Anticipation frequency of session: Every other week  Anticipated Duration of each session: 38-52 minutes and/or 53-60 minutes  Treatment plan will be reviewed in 90 days or when goals have been changed.       Update 4-19-24   The plan for the next 90 day is continue/ maintain  with these actions and cognitive behavioral tasks to develop more consistency / skill proficiency in effort to reduce symptom frequency, intensity and duration.    Plan is to attend to resting this weekend.  Give self permission for boundaries as needed  Consider several CBT skills reframes  Consider 2 behavioral intervetions as discussed.  Use guidelines as discussed around daily coping skills  and planning for success  Vagal reset.  Hydration / stress support       Measurable Treatment Goal(s) related to diagnosis / functional impairment(s)  Goal 1: Patient will identify core pattern of  thoughts that contribute to feeling stressed or  anxious.    I will know I've met my goal when .  will report 50% less disrupted mood.      Objective #A (Patient Action)    Patient will use at least 6 coping skills for anxiety management in the next 90 days weeks.  Status: New - Date: 2024    Intervention(s)  Therapist will teach coping skills for anxiety/ unwanted changes.      Patient has reviewed and  agreed to the above plan.      Lashell Harris LP         Answers submitted by the patient for this visit:  Patient Health Questionnaire (Submitted on 3/22/2024)  If you checked off any problems, how difficult have these problems made it for you to do your work, take care of things at home, or get along with other people?: Somewhat difficult  PHQ9 TOTAL SCORE: 11  ODILON-7 (Submitted on 3/22/2024)  ODILON 7 TOTAL SCORE: 8    Answers submitted by the patient for this visit:  Patient Health Questionnaire (Submitted on 4/19/2024)  If you checked off any problems, how difficult have these problems made it for you to do your work, take care of things at home, or get along with other people?: Not difficult at all  PHQ9 TOTAL SCORE: 2

## 2024-04-20 ENCOUNTER — OFFICE VISIT (OUTPATIENT)
Dept: FAMILY MEDICINE | Facility: CLINIC | Age: 29
End: 2024-04-20
Payer: COMMERCIAL

## 2024-04-20 VITALS
SYSTOLIC BLOOD PRESSURE: 101 MMHG | OXYGEN SATURATION: 99 % | RESPIRATION RATE: 18 BRPM | DIASTOLIC BLOOD PRESSURE: 69 MMHG | TEMPERATURE: 98.1 F | HEART RATE: 88 BPM

## 2024-04-20 DIAGNOSIS — J02.9 ACUTE PHARYNGITIS, UNSPECIFIED ETIOLOGY: Primary | ICD-10-CM

## 2024-04-20 DIAGNOSIS — R07.0 THROAT PAIN: ICD-10-CM

## 2024-04-20 LAB
DEPRECATED S PYO AG THROAT QL EIA: NEGATIVE
FLUAV AG SPEC QL IA: NEGATIVE
FLUBV AG SPEC QL IA: NEGATIVE
GROUP A STREP BY PCR: NOT DETECTED

## 2024-04-20 PROCEDURE — 99213 OFFICE O/P EST LOW 20 MIN: CPT | Performed by: FAMILY MEDICINE

## 2024-04-20 PROCEDURE — 87804 INFLUENZA ASSAY W/OPTIC: CPT | Performed by: FAMILY MEDICINE

## 2024-04-20 PROCEDURE — 87635 SARS-COV-2 COVID-19 AMP PRB: CPT | Performed by: FAMILY MEDICINE

## 2024-04-20 PROCEDURE — 87651 STREP A DNA AMP PROBE: CPT | Performed by: FAMILY MEDICINE

## 2024-04-20 RX ORDER — ACETAMINOPHEN 500 MG
500-1000 TABLET ORAL EVERY 6 HOURS PRN
COMMUNITY

## 2024-04-21 LAB — SARS-COV-2 RNA RESP QL NAA+PROBE: NEGATIVE

## 2024-04-21 NOTE — PROGRESS NOTES
Assessment/Plan:   Throat pain  Acute pharyngitis, unspecified etiology  - Streptococcus A Rapid Screen w/Reflex to PCR - Clinic Collect  - Group A Streptococcus PCR Throat Swab  - Symptomatic COVID-19 Virus (Coronavirus) by PCR Nose  - Influenza A & B Antigen - Clinic Collect    3-4 days of ST, fatigue, body aches. No cough or new congestion.   RST negative. Flu test is negative.   Perhaps new viral illness. Covid test and strep confirmation tests are pending.     Plan:  Rest. Fluids, diet as tolerated.   Lozenges, salt water gargles, warm water or tea with honey, cold drinks or ice cream to soothe.   Tylenol and ibuprofen if needed for pain.     We will call if pending tests are positive.     Recheck if worse or no better in 3-5 days.     I discussed red flag symptoms, return precautions to clinic/ER and follow up care with patient/parent.  Expected clinical course, symptomatic cares advised. Questions answered. Patient/parent amenable with plan.      Subjective:     Naty Pérez is a 28 year old female who presents for evaluation of throat pain and fatigue.  Onset of illness on Wednesday with bodyaches fatigue throat pain and feeling a bit feverish.  No cough.  She does have chronic rhinitis but this is unchanged etc. ears feel plugged.  She had a negative home COVID test on Thursday or Friday.  She has had COVID twice this year once in September and once in January.  She is a .  Fatigue is worse.    No Known Allergies  Current Outpatient Medications   Medication Sig Dispense Refill    acetaminophen (TYLENOL) 500 MG tablet Take 500-1,000 mg by mouth every 6 hours as needed for mild pain      sertraline (ZOLOFT) 50 MG tablet TAKE 1 TABLET(50 MG) BY MOUTH DAILY 90 tablet 1    ADDERALL XR 10 MG 24 hr capsule Take 1 capsule (10 mg) by mouth daily (Patient not taking: Reported on 4/20/2024) 30 capsule 0    amphetamine-dextroamphetamine (ADDERALL XR) 10 MG 24 hr capsule Take 1 capsule (10 mg) by  mouth daily for 30 days (Patient not taking: Reported on 4/20/2024) 30 capsule 0    [START ON 4/29/2024] amphetamine-dextroamphetamine (ADDERALL XR) 10 MG 24 hr capsule Take 1 capsule (10 mg) by mouth daily for 30 days (Patient not taking: Reported on 4/20/2024) 30 capsule 0    amphetamine-dextroamphetamine (ADDERALL XR) 10 MG 24 hr capsule Take 10 mg by mouth daily (Patient not taking: Reported on 4/20/2024)      cholecalciferol (VITAMIN D3) 10 mcg (400 units) TABS tablet Take 10 mcg by mouth daily (Patient not taking: Reported on 4/20/2024)      etonogestrel (NEXPLANON) 68 MG IMPL 1 each (68 mg) by Subdermal route once (Patient not taking: Reported on 4/20/2024)      ketoconazole (NIZORAL) 2 % external cream Apply topically daily Apply to affected areas daily up until clear (Patient not taking: Reported on 4/20/2024) 15 g 1    montelukast (SINGULAIR) 10 MG tablet Take 1 tablet (10 mg) by mouth at bedtime (Patient not taking: Reported on 4/20/2024) 90 tablet 0    vitamin C (ASCORBIC ACID) 250 MG tablet Take 250 mg by mouth daily (Patient not taking: Reported on 4/20/2024)       No current facility-administered medications for this visit.     Patient Active Problem List   Diagnosis    Allergy to insects and arachnids    Ingrowing nail    Epistaxis    Lumbosacral spondylosis without myelopathy    Papanicolaou smear of cervix with low grade squamous intraepithelial lesion (LGSIL)    ODILON (generalized anxiety disorder)    Moderate episode of recurrent major depressive disorder (H)    Major depressive disorder, recurrent episode, mild (H24)    Attention deficit disorder without hyperactivity       Objective:     /69   Pulse 88   Temp 98.1  F (36.7  C)   Resp 18   LMP 04/14/2024   SpO2 99%     Physical    General Appearance: Alert, pleasant, no distress, aVSS  Head: Normocephalic, without obvious abnormality, atraumatic  Eyes: Conjunctivae are normal.   Ears: Normal TMs and external ear canals, both ears  Nose:  Mild pale turbinates congestion.  Throat: Throat is red posteriorly, no palatal petechiae no exudate.  No vesicular lesions  Neck: No adenopathy  Lungs: Clear to auscultation bilaterally, respirations unlabored  Heart: Regular rate and rhythm  Skin:  no rashes or lesions  Psychiatric: Patient has a normal mood and affect.         Results for orders placed or performed in visit on 04/20/24   Streptococcus A Rapid Screen w/Reflex to PCR - Clinic Collect     Status: Normal    Specimen: Throat; Swab   Result Value Ref Range    Group A Strep antigen Negative Negative   Group A Streptococcus PCR Throat Swab     Status: Normal    Specimen: Throat; Swab   Result Value Ref Range    Group A strep by PCR Not Detected Not Detected    Narrative    The Xpert Xpress Strep A test, performed on the [employer], is a rapid, qualitative in vitro diagnostic test for the detection of Streptococcus pyogenes (Group A ß-hemolytic Streptococcus, Strep A) in throat swab specimens from patients with signs and symptoms of pharyngitis. The Xpert Xpress Strep A test can be used as an aid in the diagnosis of Group A Streptococcal pharyngitis. The assay is not intended to monitor treatment for Group A Streptococcus infections. The Xpert Xpress Strep A test utilizes an automated real-time polymerase chain reaction (PCR) to detect Streptococcus pyogenes DNA.   Influenza A & B Antigen - Clinic Collect     Status: Normal    Specimen: Nose; Swab   Result Value Ref Range    Influenza A antigen Negative Negative    Influenza B antigen Negative Negative    Narrative    Test results must be correlated with clinical data. If necessary, results should be confirmed by a molecular assay or viral culture.       This note has been dictated in part using voice recognition software.  Any grammatical or context distortions are unintentional and inherent to the software.  Please feel free to contact me directly for clarification if needed.

## 2024-04-21 NOTE — PATIENT INSTRUCTIONS
Rest. Fluids, diet as tolerated.   Lozenges, salt water gargles, warm water or tea with honey, cold drinks or ice cream to soothe.   Tylenol and ibuprofen if needed for pain.     Strep confirmation test and covid tests pending.     Recheck if worse or no better in 3-5 days.

## 2024-05-02 ENCOUNTER — MYC MEDICAL ADVICE (OUTPATIENT)
Dept: FAMILY MEDICINE | Facility: CLINIC | Age: 29
End: 2024-05-02

## 2024-05-02 ENCOUNTER — IMMUNIZATION (OUTPATIENT)
Dept: NURSING | Facility: CLINIC | Age: 29
End: 2024-05-02
Payer: COMMERCIAL

## 2024-05-02 PROCEDURE — 90480 ADMN SARSCOV2 VAC 1/ONLY CMP: CPT

## 2024-05-02 PROCEDURE — 91320 SARSCV2 VAC 30MCG TRS-SUC IM: CPT

## 2024-05-03 ENCOUNTER — VIRTUAL VISIT (OUTPATIENT)
Dept: PSYCHOLOGY | Facility: CLINIC | Age: 29
End: 2024-05-03
Payer: COMMERCIAL

## 2024-05-03 DIAGNOSIS — F41.1 GAD (GENERALIZED ANXIETY DISORDER): ICD-10-CM

## 2024-05-03 DIAGNOSIS — F33.0 MAJOR DEPRESSIVE DISORDER, RECURRENT EPISODE, MILD (H): ICD-10-CM

## 2024-05-03 DIAGNOSIS — F98.8 ATTENTION DEFICIT DISORDER WITHOUT HYPERACTIVITY: Primary | ICD-10-CM

## 2024-05-03 PROCEDURE — 90837 PSYTX W PT 60 MINUTES: CPT | Mod: 95 | Performed by: PSYCHOLOGIST

## 2024-05-03 NOTE — PROGRESS NOTES
[facility]                                     Progress Note    Patient Name   Date: 2024     Service Type: Individual      Session Start Time  3:01 pm    Session End Time: 4:00 pm        Session Length: 53-60 min    Session #:  4 in   Attendees: Client    Service Modality:  Video Visit:      Provider verified identity through the following two step process.  Patient provided:  Patient  and Patient address    Telemedicine Visit: The patient's condition can be safely assessed and treated via synchronous audio and visual telemedicine encounter.      Reason for Telemedicine Visit: Services only offered telehealth    Originating Site (Patient Location): Patient's home    Distant Site (Provider Location): Provider Remote Setting- Home Office    Consent:  The patient/guardian has verbally consented to: the potential risks and benefits of telemedicine (video visit) versus in person care; bill my insurance or make self-payment for services provided; and responsibility for payment of non-covered services.     Patient would like the video invitation sent by:  Text to cell phone: yes    Mode of Communication:  Video Conference via   As the provider I attest to compliance with applicable laws and regulations related to telemedicine.    DATA  Interactive Complexity: No  Extended session:  yes  53-60 minutes  on 24   ADD, ODILON, MDD  Patient's presenting concerns require more intensive intervention than could be completed within the usual service    - High distress and under complex circumstances.  See Data section for details.   Poor focus, loses her thought by the end of sentence, tangential at times,  fatigue    Crisis: No     PROMIS (reviewed every 90 days): at intake 24, 24  Treatment plan reviewed : today  (first  3-07-24   90 days = 24)    Diagnosis:    F41.1 Generalized Anxiety Disorder,   F33.0 Major Depressive Disorder, recurrent, mild  F96.8 Attention Disorder without  "Hyperactivity         Progress Since Last Session (Related to Symptoms / Goals / Homework):   Symptoms: stable    Homework: Completed in session      Episode of Care Goals: Satisfactory progress - ACTION (Actively working towards change); Intervened by reinforcing change plan / affirming steps taken.       Current / Ongoing Stressors and Concerns:    Anxiety, ADD - poor focus   interpersonal distress  with some of partners family; boundaries needed  Work stress/ burnout feeling at times      Treatment Objective(s) Addressed in This Session:   identify core stressors/  thoughts that contribute to feeling anxious  Patient will use at least 6 coping skills for anxiety management in the next 90 days.  Learn about physiological pathways related to stress and trauma.     Intervention:   Interpersonal Therapy: , CBT, supportive therapy    Symptoms:  ongoing fatigue ( previous to travel fatigue)  attention  focus / concentration disrupted      Stressors:  Feeling sick since last visit.   Did go in for care.  Has been going to bed as soon as she can.    Client has asked partner for more help.  Has been \"thinking a different way to approach it\".  Shares that as a kid her partner was diagnosed with   oppositional defiant disorder.     Returned to discussion around asking for what she needs   And attending to balance to what she or he needs.  Does communicate gratitude.      Explored support system- talks virtually with her friends  weekly .    Discussed her future plans - marriage and having babies.    Identified and processed emotions.    Assessments completed prior to visit:  The following assessments were completed by patient for this visit: ODILON 90 day roland or none      ASSESSMENT: Current Emotional / Mental Status (status of significant symptoms):   Risk status (Self / Other harm or suicidal ideation)   Patient denies current fears or concerns for personal safety.   Patient denies current or recent suicidal ideation or " behaviors.   Patient denies current or recent homicidal ideation or behaviors.   Patient denies current or recent self injurious behavior or ideation.   Patient denies other safety concerns.   Patient reports there has been no change in risk factors since their last session.     Patient reports there has been no change in protective factors since their last session.     Recommended that patient call 911 or go to the local ED should there be a change in any of these risk factors.     Appearance:   Appropriate    Eye Contact:   Good    Psychomotor Behavior: Normal    Attitude:   Cooperative    Orientation:   All   Speech   Rate / Production: Normal    Volume:  Normal    Mood:    Normal   Affect:    Appropriate  Worrisome    Thought Content:  Clear    Thought Form:  Coherent  Logical    Insight:    Good      Medication Review:   No changes to psychiatric medications, see updated Medication List in EPIC.          Medication Compliance:   NA     Changes in Health Issues:   None reported     Chemical Use Review:   Substance Use: Chemical use reviewed, no active concerns identified      Tobacco Use: No current tobacco use.      Diagnosis:  F41.1 Generalized Anxiety Disorder,   F33.0 Major Depressive Disorder, recurrent, mild  F96.8 Attention Disorder without Hyperactivity     Collateral Reports Completed:   Routed note to PCP as needed    PLAN: (Patient Tasks / Therapist Tasks / Other      Past hw  Plan is to attend to resting this weekend.  Give self permission for boundaries as needed  Past hw  Consider several CBT skills reframes  Consider 2 behavioral intervetions as discussed.  Past hw  Use guidelines as discussed around daily coping skills  and planning for success  Vagal reset.  Hydration / stress support     Lashell Harris LP                                                      ____________________________________________________________________    Individual Treatment Plan    Patient's Name:  Date Of Birth:     Date  of Creation: 1- 04-24  Date Treatment Plan Last Reviewed/Revised: 12-23    DSM5 Diagnoses:  F41.1 Generalized Anxiety Disorder,   F33.0 Major Depressive Disorder, recurrent, mild  F96.8 Attention Disorder without Hyperactivity       Psychosocial / Contextual Factors: multiple stressors, mood disruption  PROMIS (reviewed every 90 days): at intake 2-29-24    Referral / Collaboration:  Referral to another professional/service is not indicated at this time.    Anticipated number of session for this episode of care: 12.  Anticipation frequency of session: Every other week  Anticipated Duration of each session: 38-52 minutes and/or 53-60 minutes  Treatment plan will be reviewed in 90 days or when goals have been changed.       Update 4-19-24   The plan for the next 90 day is continue/ maintain  with these actions and cognitive behavioral tasks to develop more consistency / skill proficiency in effort to reduce symptom frequency, intensity and duration.    Plan is to attend to resting this weekend.  Give self permission for boundaries as needed  Consider several CBT skills reframes  Consider 2 behavioral intervetions as discussed.  Use guidelines as discussed around daily coping skills  and planning for success  Vagal reset.  Hydration / stress support       Measurable Treatment Goal(s) related to diagnosis / functional impairment(s)  Goal 1: Patient will identify core pattern of  thoughts that contribute to feeling stressed or  anxious.    I will know I've met my goal when .  will report 50% less disrupted mood.      Objective #A (Patient Action)    Patient will use at least 6 coping skills for anxiety management in the next 90 days weeks.  Status: New - Date: 2024    Intervention(s)  Therapist will teach coping skills for anxiety/ unwanted changes.      Patient has reviewed and agreed to the above plan.      Lashell Harris LP         Answers submitted by the patient for this visit:  Patient Health Questionnaire (Submitted  on 3/22/2024)  If you checked off any problems, how difficult have these problems made it for you to do your work, take care of things at home, or get along with other people?: Somewhat difficult  PHQ9 TOTAL SCORE: 11  ODILON-7 (Submitted on 3/22/2024)  ODILON 7 TOTAL SCORE: 8    Answers submitted by the patient for this visit:  Patient Health Questionnaire (Submitted on 5/3/2024)  If you checked off any problems, how difficult have these problems made it for you to do your work, take care of things at home, or get along with other people?: Very difficult  PHQ9 TOTAL SCORE: 12

## 2024-05-05 ENCOUNTER — E-VISIT (OUTPATIENT)
Dept: FAMILY MEDICINE | Facility: CLINIC | Age: 29
End: 2024-05-05
Payer: COMMERCIAL

## 2024-05-05 DIAGNOSIS — R06.02 SHORTNESS OF BREATH: Primary | ICD-10-CM

## 2024-05-05 PROCEDURE — 99207 PR NON-BILLABLE SERV PER CHARTING: CPT | Performed by: NURSE PRACTITIONER

## 2024-05-06 NOTE — PATIENT INSTRUCTIONS
Dear Naty Pérez,    We are sorry you are not feeling well. Based on the responses you provided, it is recommended that you be seen in-person in urgent care so we can better evaluate your symptoms. Please click here to find the nearest urgent care location to you.   You will not be charged for this Visit. Thank you for trusting us with your care.    VALE Kennedy CNP

## 2024-05-11 ENCOUNTER — OFFICE VISIT (OUTPATIENT)
Dept: FAMILY MEDICINE | Facility: CLINIC | Age: 29
End: 2024-05-11
Payer: COMMERCIAL

## 2024-05-11 VITALS
SYSTOLIC BLOOD PRESSURE: 111 MMHG | HEART RATE: 92 BPM | WEIGHT: 130.3 LBS | DIASTOLIC BLOOD PRESSURE: 75 MMHG | TEMPERATURE: 98.8 F | BODY MASS INDEX: 23.38 KG/M2 | OXYGEN SATURATION: 100 %

## 2024-05-11 DIAGNOSIS — J01.90 ACUTE BACTERIAL RHINOSINUSITIS: Primary | ICD-10-CM

## 2024-05-11 DIAGNOSIS — B96.89 ACUTE BACTERIAL RHINOSINUSITIS: Primary | ICD-10-CM

## 2024-05-11 PROCEDURE — 99213 OFFICE O/P EST LOW 20 MIN: CPT | Performed by: NURSE PRACTITIONER

## 2024-05-11 ASSESSMENT — ENCOUNTER SYMPTOMS
MYALGIAS: 1
FATIGUE: 1

## 2024-05-11 NOTE — PATIENT INSTRUCTIONS
Augmentin for sinus infection    Yogurt for diarrhea or probiotics     Sinus rinse or the sudafed as needed.      Come back if not betterin 5-7 days.  (Or evisit)

## 2024-05-11 NOTE — PROGRESS NOTES
"Assessment & Plan     Acute bacterial rhinosinusitis    - amoxicillin-clavulanate (AUGMENTIN) 875-125 MG tablet  Dispense: 14 tablet; Refill: 0       Focused exam and history done due to COVID-19 pandemic in a walk-in setting.      History, exam, and vital signs consistent with a viral URI with secondary bacterial rhinosinusitis based on history/timeline -facial pressure.    Recheck if shortness of breath or new fevers develop.  Rest.     OTCs recommended: None [   ].  Dextromethorphan  [  ], guaifenesin [  ], pseudoephedrine [  x ], Afrin for no greater than 3 days [  ], Tylenol or ibuprofen [  ].  + Sinus rinses.              Return in about 5 days (around 5/16/2024) for If no better.    [facility]    Casey Alfonso is a 28 year old female who presents to clinic today for the following health issues:  Chief Complaint   Patient presents with    URI     X 3 weeks, negative strep, covid and flu    Sinus Problem     Possible sinus infection.     HPI    Congestion starting 23rd.  +Off and on nosebleeds at onset on the left with blowing nose.        Cough starting May 1st.  Hurt to cough.  Clear mucus.      A bit nauseated.      Felt better starting May 6, but then more yellow nasal drainage/congestion/ mucus starting about 5 days ago, worse on the right side.      Rt ear pain.  Sore throat returned temporarily, but better today.      Taking Tylenol and Mucinex yesterday.  \"Made me dizzy.\" Sudafed last week.      Is a teacher.      Was seen on April 20 with negative strep, COVID and flu.  At that time, she had sore throat, fatigue and bodyaches only for the preceding 3 days        Review of Systems   Constitutional:  Positive for fatigue.   Musculoskeletal:  Positive for myalgias.           Objective    /75   Pulse 92   Temp 98.8  F (37.1  C) (Tympanic)   Wt 59.1 kg (130 lb 4.8 oz)   LMP 04/14/2024   SpO2 100%   BMI 23.38 kg/m    Physical " Exam  Constitutional:       Appearance: Normal appearance.   HENT:      Right Ear: Tympanic membrane normal.      Left Ear: Tympanic membrane normal.      Nose: Congestion and rhinorrhea present.      Mouth/Throat:      Pharynx: No posterior oropharyngeal erythema.   Eyes:      Conjunctiva/sclera: Conjunctivae normal.   Pulmonary:      Effort: Pulmonary effort is normal.   Lymphadenopathy:      Cervical: No cervical adenopathy.   Skin:     General: Skin is warm.   Neurological:      Mental Status: She is alert and oriented to person, place, and time.   Psychiatric:         Mood and Affect: Mood normal.

## 2024-05-13 ENCOUNTER — VIRTUAL VISIT (OUTPATIENT)
Dept: FAMILY MEDICINE | Facility: CLINIC | Age: 29
End: 2024-05-13
Payer: COMMERCIAL

## 2024-05-13 DIAGNOSIS — F33.1 MODERATE EPISODE OF RECURRENT MAJOR DEPRESSIVE DISORDER (H): ICD-10-CM

## 2024-05-13 DIAGNOSIS — J06.9 VIRAL UPPER RESPIRATORY TRACT INFECTION: ICD-10-CM

## 2024-05-13 DIAGNOSIS — F98.8 ATTENTION DEFICIT DISORDER WITHOUT HYPERACTIVITY: Primary | ICD-10-CM

## 2024-05-13 PROCEDURE — 99214 OFFICE O/P EST MOD 30 MIN: CPT | Mod: 95 | Performed by: NURSE PRACTITIONER

## 2024-05-13 RX ORDER — DEXTROAMPHETAMINE SACCHARATE, AMPHETAMINE ASPARTATE MONOHYDRATE, DEXTROAMPHETAMINE SULFATE AND AMPHETAMINE SULFATE 2.5; 2.5; 2.5; 2.5 MG/1; MG/1; MG/1; MG/1
10 CAPSULE, EXTENDED RELEASE ORAL DAILY
Qty: 30 CAPSULE | Refills: 0 | Status: SHIPPED | OUTPATIENT
Start: 2024-06-12 | End: 2024-07-12

## 2024-05-13 RX ORDER — DEXTROAMPHETAMINE SACCHARATE, AMPHETAMINE ASPARTATE MONOHYDRATE, DEXTROAMPHETAMINE SULFATE AND AMPHETAMINE SULFATE 2.5; 2.5; 2.5; 2.5 MG/1; MG/1; MG/1; MG/1
10 CAPSULE, EXTENDED RELEASE ORAL DAILY
Qty: 30 CAPSULE | Refills: 0 | Status: SHIPPED | OUTPATIENT
Start: 2024-07-12 | End: 2024-08-11

## 2024-05-13 RX ORDER — DEXTROAMPHETAMINE/AMPHETAMINE 10 MG
10 CAPSULE, EXT RELEASE 24 HR ORAL DAILY
Qty: 30 CAPSULE | Refills: 0 | Status: CANCELLED | OUTPATIENT
Start: 2024-05-13

## 2024-05-13 RX ORDER — DEXTROAMPHETAMINE SACCHARATE, AMPHETAMINE ASPARTATE MONOHYDRATE, DEXTROAMPHETAMINE SULFATE AND AMPHETAMINE SULFATE 2.5; 2.5; 2.5; 2.5 MG/1; MG/1; MG/1; MG/1
10 CAPSULE, EXTENDED RELEASE ORAL DAILY
Qty: 30 CAPSULE | Refills: 0 | Status: SHIPPED | OUTPATIENT
Start: 2024-05-13 | End: 2024-06-12

## 2024-05-13 NOTE — PROGRESS NOTES
"Naty is a 28 year old who is being evaluated via a billable video visit.    How would you like to obtain your AVS? MyChart  If the video visit is dropped, the invitation should be resent by: Text to cell phone: [phone]  Will anyone else be joining your video visit? No      Assessment & Plan     Attention deficit disorder without hyperactivity   Stable, tolerating med, no changes at this time.  Plan to follow up in 3 months in-person for physical and we will update CSA.  - amphetamine-dextroamphetamine (ADDERALL XR) 10 MG 24 hr capsule; Take 1 capsule (10 mg) by mouth daily for 30 days  - amphetamine-dextroamphetamine (ADDERALL XR) 10 MG 24 hr capsule; Take 1 capsule (10 mg) by mouth daily for 30 days  - amphetamine-dextroamphetamine (ADDERALL XR) 10 MG 24 hr capsule; Take 1 capsule (10 mg) by mouth daily for 30 days    Moderate episode of recurrent major depressive disorder (H)  In remission, stable on current meds.     Viral upper respiratory tract infection  Being treated with antibiotics for sinus infection but also recommended she take allergy medication with current air quality and increase in environmental irritants.       The longitudinal plan of care for the diagnosis(es)/condition(s) as documented were addressed during this visit. Due to the added complexity in care, I will continue to support Naty in the subsequent management and with ongoing continuity of care.            Subjective   Naty is a 28 year old, presenting for the following health issues:  Refill Request      5/13/2024     2:33 PM   Additional Questions   Roomed by Cheryl STAHL     History of Present Illness       Reason for visit:  Med refills                    Objective    Vitals - Patient Reported  Weight (Patient Reported): 59 kg (130 lb)  Height (Patient Reported): 154.9 cm (5' 1\")  BMI (Based on Pt Reported Ht/Wt): 24.56  Pain Score: Mild Pain (3)  Pain Loc: Face        Physical Exam   GENERAL: alert and no distress  EYES: Eyes " grossly normal to inspection.  No discharge or erythema, or obvious scleral/conjunctival abnormalities.  RESP: No audible wheeze, cough, or visible cyanosis.    SKIN: Visible skin clear. No significant rash, abnormal pigmentation or lesions.  NEURO: Cranial nerves grossly intact.  Mentation and speech appropriate for age.  PSYCH: Appropriate affect, tone, and pace of words          Video-Visit Details    Type of service:  Video Visit   Originating Location (pt. Location): Home    Distant Location (provider location):  Off-site  Platform used for Video Visit: Letha  Signed Electronically by: VALE Kennedy CNP

## 2024-05-17 ENCOUNTER — VIRTUAL VISIT (OUTPATIENT)
Dept: PSYCHOLOGY | Facility: CLINIC | Age: 29
End: 2024-05-17
Payer: COMMERCIAL

## 2024-05-17 DIAGNOSIS — F33.0 MAJOR DEPRESSIVE DISORDER, RECURRENT EPISODE, MILD (H): ICD-10-CM

## 2024-05-17 DIAGNOSIS — F41.1 GAD (GENERALIZED ANXIETY DISORDER): ICD-10-CM

## 2024-05-17 DIAGNOSIS — F98.8 ATTENTION DEFICIT DISORDER WITHOUT HYPERACTIVITY: Primary | ICD-10-CM

## 2024-05-17 PROCEDURE — 90837 PSYTX W PT 60 MINUTES: CPT | Mod: 95 | Performed by: PSYCHOLOGIST

## 2024-05-17 NOTE — PROGRESS NOTES
[facility]                                     Progress Note    Patient Name   Date: 2024     Service Type: Individual      Session Start Time  3:04 pm    Session End Time: 4:00 pm        Session Length: 53-60 min    Session #:  6 in   Attendees: Client    Service Modality:  Video Visit:      Provider verified identity through the following two step process.  Patient provided:  Patient  and Patient address    Telemedicine Visit: The patient's condition can be safely assessed and treated via synchronous audio and visual telemedicine encounter.      Reason for Telemedicine Visit: Services only offered telehealth    Originating Site (Patient Location): Patient's home    Distant Site (Provider Location): Provider Remote Setting- Home Office    Consent:  The patient/guardian has verbally consented to: the potential risks and benefits of telemedicine (video visit) versus in person care; bill my insurance or make self-payment for services provided; and responsibility for payment of non-covered services.     Patient would like the video invitation sent by:  Text to cell phone: yes    Mode of Communication:  Video Conference via   As the provider I attest to compliance with applicable laws and regulations related to telemedicine.    DATA  Interactive Complexity: No  Extended session:  yes  53-60 minutes  on 24   ADD, ODILON, MDD  Patient's presenting concerns require more intensive intervention than could be completed within the usual service.   -Poor focus, loses her thought by the end of sentence, tangential at times,  fatigue    Crisis: No     PROMIS (reviewed every 90 days): at intake 24, 24  Treatment plan reviewed : today  (first  3-07-24   90 days = 24)    Diagnosis:    F41.1 Generalized Anxiety Disorder,   F33.0 Major Depressive Disorder, recurrent, mild  F96.8 Attention Disorder without Hyperactivity         Progress Since Last Session (Related to Symptoms / Goals /  Homework):   Symptoms: stable    Homework: Completed in session      Episode of Care Goals: Satisfactory progress - ACTION (Actively working towards change); Intervened by reinforcing change plan / affirming steps taken.  upport system- talks virtually with her friends weekly .       Current / Ongoing Stressors and Concerns:    Anxiety, ADD - poor focus   interpersonal distress  with some of partners family; boundaries needed  Work stress/ burnout feeling at times      Treatment Objective(s) Addressed in This Session:   identify core stressors/  thoughts that contribute to feeling anxious  Patient will use at least 6 coping skills for anxiety management in the next 90 days.  Learn about physiological pathways related to stress and trauma.     Intervention:   Interpersonal Therapy: , CBT, supportive therapy    Symptoms:  ongoing fatigue   attention  focus / concentration disrupted      Stressors:  Did have an antibiotic to manage sinus symptoms.    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  Client had felt on top of it- and then got overwhelmed.        Identified and processed emotions.    Assessments completed prior to visit:  The following assessments were completed by patient for this visit: ODILON 90 day roland or none      ASSESSMENT: Current Emotional / Mental Status (status of significant symptoms):   Risk status (Self / Other harm or suicidal ideation)   Patient denies current fears or concerns for personal safety.   Patient denies current or recent suicidal ideation or behaviors.   Patient denies current or recent homicidal ideation or behaviors.   Patient denies current or recent self injurious behavior or ideation.   Patient denies other safety concerns.   Patient reports there has been no change in risk factors since their last session.      Patient reports there has been no change in protective factors since their last session.     Recommended that patient call 911 or go to the local ED should there be a change in any of these risk factors.     Appearance:   Appropriate    Eye Contact:   Good    Psychomotor Behavior: Normal    Attitude:   Cooperative    Orientation:   All   Speech   Rate / Production: Normal    Volume:  Normal    Mood:    Normal   Affect:    Appropriate  Worrisome    Thought Content:  Clear    Thought Form:  Coherent  Logical    Insight:    Good      Medication Review:   No changes to psychiatric medications, see updated Medication List in EPIC.          Medication Compliance:   NA     Changes in Health Issues:   None reported     Chemical Use Review:   Substance Use: Chemical use reviewed, no active concerns identified      Tobacco Use: No current tobacco use.      Diagnosis:  F41.1 Generalized Anxiety Disorder,   F33.0 Major Depressive Disorder, recurrent, mild  F96.8 Attention Disorder without Hyperactivity     Collateral Reports Completed:   Routed note to PCP as needed    PLAN: (Patient Tasks / Therapist Tasks / Other      Past hw  Plan is to attend to resting this weekend.  Give self permission for boundaries as needed  Past hw  Consider several CBT skills reframes  Consider 2 behavioral intervetions as discussed.  Past hw  Use guidelines as discussed around daily coping skills  and planning for success  Vagal reset.  Hydration / stress support     Lashell Harris LP                                                      ____________________________________________________________________    Individual Treatment Plan    Patient's Name:  Date Of Birth:     Date of Creation: [birth year]  Date Treatment Plan Last Reviewed/Revised: 12-23    DSM5 Diagnoses:  F41.1 Generalized Anxiety Disorder,   F33.0 Major Depressive Disorder, recurrent, mild  F96.8 Attention Disorder without Hyperactivity       Psychosocial / Contextual Factors:  multiple stressors, mood disruption  PROMIS (reviewed every 90 days): at intake 2-29-24    Referral / Collaboration:  Referral to another professional/service is not indicated at this time.    Anticipated number of session for this episode of care: 12.  Anticipation frequency of session: Every other week  Anticipated Duration of each session: 38-52 minutes and/or 53-60 minutes  Treatment plan will be reviewed in 90 days or when goals have been changed.       Update 4-19-24   The plan for the next 90 day is continue/ maintain  with these actions and cognitive behavioral tasks to develop more consistency / skill proficiency in effort to reduce symptom frequency, intensity and duration.    Plan is to attend to resting this weekend.  Give self permission for boundaries as needed  Consider several CBT skills reframes  Consider 2 behavioral intervetions as discussed.  Use guidelines as discussed around daily coping skills  and planning for success  Vagal reset.  Hydration / stress support       Measurable Treatment Goal(s) related to diagnosis / functional impairment(s)  Goal 1: Patient will identify core pattern of  thoughts that contribute to feeling stressed or  anxious.    I will know I've met my goal when .  will report 50% less disrupted mood.      Objective #A (Patient Action)    Patient will use at least 6 coping skills for anxiety management in the next 90 days weeks.  Status: New - Date: 2024    Intervention(s)  Therapist will teach coping skills for anxiety/ unwanted changes.      Patient has reviewed and agreed to the above plan.      Lashell Harris LP           Answers submitted by the patient for this visit:  Patient Health Questionnaire (Submitted on 5/3/2024)  If you checked off any problems, how difficult have these problems made it for you to do your work, take care of things at home, or get along with other people?: Very difficult  PHQ9 TOTAL SCORE: 12    Answers submitted by the patient for this  visit:  Patient Health Questionnaire (Submitted on 5/17/2024)  If you checked off any problems, how difficult have these problems made it for you to do your work, take care of things at home, or get along with other people?: Somewhat difficult  PHQ9 TOTAL SCORE: 8

## 2024-05-30 ENCOUNTER — PATIENT OUTREACH (OUTPATIENT)
Dept: CARE COORDINATION | Facility: CLINIC | Age: 29
End: 2024-05-30
Payer: COMMERCIAL

## 2024-06-13 ENCOUNTER — PATIENT OUTREACH (OUTPATIENT)
Dept: CARE COORDINATION | Facility: CLINIC | Age: 29
End: 2024-06-13
Payer: COMMERCIAL

## 2024-06-14 ENCOUNTER — VIRTUAL VISIT (OUTPATIENT)
Dept: PSYCHOLOGY | Facility: CLINIC | Age: 29
End: 2024-06-14
Payer: COMMERCIAL

## 2024-06-14 DIAGNOSIS — F98.8 ATTENTION DEFICIT DISORDER WITHOUT HYPERACTIVITY: Primary | ICD-10-CM

## 2024-06-14 DIAGNOSIS — F41.1 GAD (GENERALIZED ANXIETY DISORDER): ICD-10-CM

## 2024-06-14 DIAGNOSIS — F33.0 MAJOR DEPRESSIVE DISORDER, RECURRENT EPISODE, MILD (H): ICD-10-CM

## 2024-06-14 PROCEDURE — 90837 PSYTX W PT 60 MINUTES: CPT | Mod: 95 | Performed by: PSYCHOLOGIST

## 2024-06-14 ASSESSMENT — ANXIETY QUESTIONNAIRES
8. IF YOU CHECKED OFF ANY PROBLEMS, HOW DIFFICULT HAVE THESE MADE IT FOR YOU TO DO YOUR WORK, TAKE CARE OF THINGS AT HOME, OR GET ALONG WITH OTHER PEOPLE?: SOMEWHAT DIFFICULT
GAD7 TOTAL SCORE: 7
3. WORRYING TOO MUCH ABOUT DIFFERENT THINGS: SEVERAL DAYS
4. TROUBLE RELAXING: MORE THAN HALF THE DAYS
7. FEELING AFRAID AS IF SOMETHING AWFUL MIGHT HAPPEN: NOT AT ALL
1. FEELING NERVOUS, ANXIOUS, OR ON EDGE: SEVERAL DAYS
GAD7 TOTAL SCORE: 7
7. FEELING AFRAID AS IF SOMETHING AWFUL MIGHT HAPPEN: NOT AT ALL
6. BECOMING EASILY ANNOYED OR IRRITABLE: SEVERAL DAYS
2. NOT BEING ABLE TO STOP OR CONTROL WORRYING: SEVERAL DAYS
IF YOU CHECKED OFF ANY PROBLEMS ON THIS QUESTIONNAIRE, HOW DIFFICULT HAVE THESE PROBLEMS MADE IT FOR YOU TO DO YOUR WORK, TAKE CARE OF THINGS AT HOME, OR GET ALONG WITH OTHER PEOPLE: SOMEWHAT DIFFICULT
5. BEING SO RESTLESS THAT IT IS HARD TO SIT STILL: SEVERAL DAYS
GAD7 TOTAL SCORE: 7

## 2024-06-14 NOTE — PROGRESS NOTES
[facility]                                     Progress Note    Patient Name   Date: 2024     Service Type: Individual      Session Start Time  1:04 pm    Session End Time: 2:05 pm        Session Length: 53-60 min    Session #:  7 in   Attendees: Client    Service Modality:  Video Visit:      Provider verified identity through the following two step process.  Patient provided:  Patient  and Patient address    Telemedicine Visit: The patient's condition can be safely assessed and treated via synchronous audio and visual telemedicine encounter.      Reason for Telemedicine Visit: Services only offered telehealth    Originating Site (Patient Location): Patient's home    Distant Site (Provider Location): Provider Remote Setting- Home Office    Consent:  The patient/guardian has verbally consented to: the potential risks and benefits of telemedicine (video visit) versus in person care; bill my insurance or make self-payment for services provided; and responsibility for payment of non-covered services.     Patient would like the video invitation sent by:  my chart yes    Mode of Communication:  Video Conference via [employer]  As the provider I attest to compliance with applicable laws and regulations related to telemedicine.    DATA  Interactive Complexity: No  Extended session:  yes  53-60 minutes  on 24   ADD, ODILON, MDD  Patient's presenting concerns require more intensive intervention than could be completed within the usual service.   -Poor focus, loses her thought by the end of sentence, tangential at time.    Crisis: No     PROMIS (reviewed every 90 days): at intake 24, 24, due   Treatment plan reviewed : today  (first  3-07-24   90 days = 24)    Diagnosis:    F41.1 Generalized Anxiety Disorder,   F33.0 Major Depressive Disorder, recurrent, mild  F96.8 Attention Disorder without Hyperactivity         Progress Since Last Session (Related to Symptoms / Goals /  "Homework):   Symptoms: stable but with loss / grief. Feeling unsettled.    Homework: Completed in session      Episode of Care Goals: Satisfactory progress - ACTION (Actively working towards change); Intervened by reinforcing change plan / affirming steps taken.  upport system- talks virtually with her friends weekly .       Current / Ongoing Stressors and Concerns:    loss / grief. Feeling unsettled.  Anxiety, ADD - poor focus   interpersonal distress  with some of partners family; boundaries needed  Work stress/ burnout feeling at times      Treatment Objective(s) Addressed in This Session:   identify core stressors/  thoughts that contribute to feeling anxious  Patient will use at least 6 coping skills for anxiety management in the next 90 days.  Learn about physiological pathways related to stress and trauma.     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  Realizes that she is a people person-a connector and needs it-  \"I do need to check myself a lot' vs acting like a new puppy (needy) with her partner.    Describes inertia:  Realize I need to have deadlines ( knows it is related to ADHD)  Strategy of body doubling.  themes  Grief/ connector / change / void ritual  / id resources      Identified and processed emotions.    Assessments completed prior to visit:  The following assessments were completed by patient for this visit: ODILON 90 day roland or none      ASSESSMENT: Current Emotional / Mental Status (status of significant symptoms):   Risk status (Self / Other harm or suicidal ideation)   Patient denies current fears or concerns for personal safety.   Patient denies current or recent suicidal ideation or behaviors.   Patient denies current or recent homicidal ideation or " behaviors.   Patient denies current or recent self injurious behavior or ideation.   Patient denies other safety concerns.   Patient reports there has been no change in risk factors since their last session.     Patient reports there has been no change in protective factors since their last session.     Recommended that patient call 911 or go to the local ED should there be a change in any of these risk factors.     Appearance:   Appropriate    Eye Contact:   Good    Psychomotor Behavior: Normal    Attitude:   Cooperative    Orientation:   All   Speech   Rate / Production: Normal    Volume:  Normal    Mood:    Normal   Affect:    Appropriate  Worrisome    Thought Content:  Clear    Thought Form:  Coherent  Logical    Insight:    Good      Medication Review:   No changes to psychiatric medications, see updated Medication List in EPIC.          Medication Compliance:   NA     Changes in Health Issues:   None reported     Chemical Use Review:   Substance Use: Chemical use reviewed, no active concerns identified      Tobacco Use: No current tobacco use.      Diagnosis:  F41.1 Generalized Anxiety Disorder,   F33.0 Major Depressive Disorder, recurrent, mild  F96.8 Attention Disorder without Hyperactivity     Collateral Reports Completed:   Routed note to PCP as needed    PLAN: (Patient Tasks / Therapist Tasks / Other  Plan is to connect with grandmother   May creat a goodbye ritual - journal or think about each student     Past hw  Plan is to attend to resting this weekend.  Give self permission for boundaries as needed  Past hw  Consider several CBT skills reframes  Consider 2 behavioral intervetions as discussed.  Past hw  Use guidelines as discussed around daily coping skills  and planning for success  Vagal reset.  Hydration / stress support     Lashell Harris LP                                                      ____________________________________________________________________    Individual Treatment  Plan    Patient's Name:  Date Of Birth:     Date of Creation: [birth year]  Date Treatment Plan Last Reviewed/Revised: 4-19-24    DSM5 Diagnoses:  F41.1 Generalized Anxiety Disorder,   F33.0 Major Depressive Disorder, recurrent, mild  F96.8 Attention Disorder without Hyperactivity       Psychosocial / Contextual Factors: multiple stressors, mood disruption  PROMIS (reviewed every 90 days): at intake 2-29-24, 5-03-24,    Referral / Collaboration:  Referral to another professional/service is not indicated at this time.    Anticipated number of session for this episode of care: 12.  Anticipation frequency of session: Every other week  Anticipated Duration of each session: 38-52 minutes and/or 53-60 minutes  Treatment plan will be reviewed in 90 days or when goals have been changed.       Update 4-19-24   The plan for the next 90 day is continue/ maintain  with these actions and cognitive behavioral tasks to develop more consistency / skill proficiency in effort to reduce symptom frequency, intensity and duration.    Plan is to attend to resting this weekend.  Give self permission for boundaries as needed  Consider several CBT skills reframes  Consider 2 behavioral intervetions as discussed.  Use guidelines as discussed around daily coping skills  and planning for success  Vagal reset.  Hydration / stress support       Measurable Treatment Goal(s) related to diagnosis / functional impairment(s)  Goal 1: Patient will identify core pattern of  thoughts that contribute to feeling stressed or  anxious.    I will know I've met my goal when .  will report 50% less disrupted mood.      Objective #A (Patient Action)    Patient will use at least 6 coping skills for anxiety management in the next 90 days weeks.  Status: New - Date: 2024    Intervention(s)  Therapist will teach coping skills for anxiety/ unwanted changes.      Patient has reviewed and agreed to the above plan.      Lashell Harris LP     Answers submitted by the patient  for this visit:  Patient Health Questionnaire (Submitted on 6/14/2024)  If you checked off any problems, how difficult have these problems made it for you to do your work, take care of things at home, or get along with other people?: Somewhat difficult  PHQ9 TOTAL SCORE: 10  ODILON-7 (Submitted on 6/14/2024)  ODILON 7 TOTAL SCORE: 7

## 2024-06-28 ENCOUNTER — VIRTUAL VISIT (OUTPATIENT)
Dept: PSYCHOLOGY | Facility: CLINIC | Age: 29
End: 2024-06-28
Payer: COMMERCIAL

## 2024-06-28 DIAGNOSIS — F33.0 MAJOR DEPRESSIVE DISORDER, RECURRENT EPISODE, MILD (H): ICD-10-CM

## 2024-06-28 DIAGNOSIS — F41.1 GAD (GENERALIZED ANXIETY DISORDER): ICD-10-CM

## 2024-06-28 DIAGNOSIS — F98.8 ATTENTION DEFICIT DISORDER WITHOUT HYPERACTIVITY: Primary | ICD-10-CM

## 2024-06-28 PROCEDURE — 90837 PSYTX W PT 60 MINUTES: CPT | Mod: 95 | Performed by: PSYCHOLOGIST

## 2024-06-28 NOTE — PROGRESS NOTES
[facility]                                     Progress Note    Patient Name   Date: 2024     Service Type: Individual      Session Start Time  1:05 pm    Session End Time: 2:00 pm        Session Length: 53-60 min    Session #:  8 in   Attendees: Client    Service Modality:  Video Visit:      Provider verified identity through the following two step process.  Patient provided:  Patient  and Patient address    Telemedicine Visit: The patient's condition can be safely assessed and treated via synchronous audio and visual telemedicine encounter.      Reason for Telemedicine Visit: Services only offered telehealth    Originating Site (Patient Location): Patient's home    Distant Site (Provider Location): Provider Remote Setting- Home Office    Consent:  The patient/guardian has verbally consented to: the potential risks and benefits of telemedicine (video visit) versus in person care; bill my insurance or make self-payment for services provided; and responsibility for payment of non-covered services.     Patient would like the video invitation sent by:  my chart yes    Mode of Communication:  Video Conference via [facility]  As the provider I attest to compliance with applicable laws and regulations related to telemedicine.    DATA  Interactive Complexity: No  Extended session:  yes  53-60 minutes  on 24   ADD, ODILON, MDD  Patient's presenting concerns require more intensive intervention than could be completed within the usual service.   -Poor focus, loses her thought by the end of sentence, tangential at time.    Crisis: No     PROMIS (reviewed every 90 days): at intake 24, 24, due   Treatment plan reviewed : today  (first  3-07-24   90 days = 24)    Diagnosis:    F41.1 Generalized Anxiety Disorder,   F33.0 Major Depressive Disorder, recurrent, mild  F96.8 Attention Disorder without Hyperactivity         Progress Since Last Session (Related to Symptoms / Goals /  Homework):   Symptoms: stable but with loss / grief. Feeling unsettled.    Homework: Completed in session      Episode of Care Goals: Satisfactory progress - ACTION (Actively working towards change); Intervened by reinforcing change plan / affirming steps taken.  upport system- talks virtually with her friends weekly .       Current / Ongoing Stressors and Concerns:    loss / grief. Feeling unsettled.  Anxiety, ADD - poor focus   interpersonal distress  with some of partners family; boundaries needed  Work stress/ burnout feeling at times      Treatment Objective(s) Addressed in This Session:   identify core stressors/  thoughts that contribute to feeling anxious  Patient will use at least 6 coping skills for anxiety management in the next 90 days.  Learn about physiological pathways related to stress and trauma.     Intervention:   Interpersonal Therapy: , CBT, supportive therapy    Symptoms:  loss / grief  ongoing fatigue   attention  focus / concentration disrupted      Stressors:    Narrative work  Dad described as  alcoholic and emotionally abusive, narcissistic  Client tried to talk to him about how he makes her feel-  he could not hear it. Client has attended therapy over the years.    Client was in the [employer] when he  got cancer; she came home.  Explored the change of expectation she had for the relationship.     Identified and processed emotions.    Assessments completed prior to visit:  The following assessments were completed by patient for this visit: ODILON 90 day roland or none      ASSESSMENT: Current Emotional / Mental Status (status of significant symptoms):   Risk status (Self / Other harm or suicidal ideation)   Patient denies current fears or concerns for personal safety.   Patient denies current or recent suicidal ideation or behaviors.   Patient denies current or recent homicidal ideation or behaviors.   Patient denies current or recent self injurious behavior or ideation.   Patient denies  other safety concerns.   Patient reports there has been no change in risk factors since their last session.     Patient reports there has been no change in protective factors since their last session.     Recommended that patient call 911 or go to the local ED should there be a change in any of these risk factors.     Appearance:   Appropriate    Eye Contact:   Good    Psychomotor Behavior: Normal    Attitude:   Cooperative    Orientation:   All   Speech   Rate / Production: Normal    Volume:  Normal    Mood:    Normal   Affect:    Appropriate  Worrisome    Thought Content:  Clear    Thought Form:  Coherent  Logical    Insight:    Good      Medication Review:   No changes to psychiatric medications, see updated Medication List in EPIC.          Medication Compliance:   NA     Changes in Health Issues:   None reported     Chemical Use Review:   Substance Use: Chemical use reviewed, no active concerns identified      Tobacco Use: No current tobacco use.      Diagnosis:  F41.1 Generalized Anxiety Disorder,   F33.0 Major Depressive Disorder, recurrent, mild  F96.8 Attention Disorder without Hyperactivity     Collateral Reports Completed:   Routed note to PCP as needed    PLAN: (Patient Tasks / Therapist Tasks / Other  Set goals for the week. Month.  Keep good boundaries with father.    Plan is to connect with grandmother   May creat a goodbye ritual - journal or think about each student     Past hw  Plan is to attend to resting this weekend.  Give self permission for boundaries as needed  Past hw  Consider several CBT skills reframes  Consider 2 behavioral intervetions as discussed.  Past hw  Use guidelines as discussed around daily coping skills  and planning for success  Vagal reset.  Hydration / stress support     Lashell Harris LP                                                      ____________________________________________________________________    Individual Treatment Plan    Patient's Name:  Date Of  Birth:     Date of Creation: [birth year]  Date Treatment Plan Last Reviewed/Revised: 4-19-24    DSM5 Diagnoses:  F41.1 Generalized Anxiety Disorder,   F33.0 Major Depressive Disorder, recurrent, mild  F96.8 Attention Disorder without Hyperactivity       Psychosocial / Contextual Factors: multiple stressors, mood disruption  PROMIS (reviewed every 90 days): at intake 2-29-24, 5-03-24,    Referral / Collaboration:  Referral to another professional/service is not indicated at this time.    Anticipated number of session for this episode of care: 12.  Anticipation frequency of session: Every other week  Anticipated Duration of each session: 38-52 minutes and/or 53-60 minutes  Treatment plan will be reviewed in 90 days or when goals have been changed.       Update 4-19-24   The plan for the next 90 day is continue/ maintain  with these actions and cognitive behavioral tasks to develop more consistency / skill proficiency in effort to reduce symptom frequency, intensity and duration.    Plan is to attend to resting this weekend.  Give self permission for boundaries as needed  Consider several CBT skills reframes  Consider 2 behavioral intervetions as discussed.  Use guidelines as discussed around daily coping skills  and planning for success  Vagal reset.  Hydration / stress support       Measurable Treatment Goal(s) related to diagnosis / functional impairment(s)  Goal 1: Patient will identify core pattern of  thoughts that contribute to feeling stressed or  anxious.    I will know I've met my goal when .  will report 50% less disrupted mood.      Objective #A (Patient Action)    Patient will use at least 6 coping skills for anxiety management in the next 90 days weeks.  Status: New - Date: 2024    Intervention(s)  Therapist will teach coping skills for anxiety/ unwanted changes.      Patient has reviewed and agreed to the above plan.      Lashell Harris LP     Answers submitted by the patient for this visit:  Patient Health  Questionnaire (Submitted on 6/14/2024)  If you checked off any problems, how difficult have these problems made it for you to do your work, take care of things at home, or get along with other people?: Somewhat difficult  PHQ9 TOTAL SCORE: 10  ODILON-7 (Submitted on 6/14/2024)  ODILON 7 TOTAL SCORE: 7

## 2024-06-29 ENCOUNTER — PATIENT OUTREACH (OUTPATIENT)
Dept: CARE COORDINATION | Facility: CLINIC | Age: 29
End: 2024-06-29
Payer: COMMERCIAL

## 2024-07-11 ENCOUNTER — VIRTUAL VISIT (OUTPATIENT)
Dept: PSYCHOLOGY | Facility: CLINIC | Age: 29
End: 2024-07-11
Payer: COMMERCIAL

## 2024-07-11 DIAGNOSIS — F98.8 ATTENTION DEFICIT DISORDER WITHOUT HYPERACTIVITY: Primary | ICD-10-CM

## 2024-07-11 DIAGNOSIS — F33.0 MAJOR DEPRESSIVE DISORDER, RECURRENT EPISODE, MILD (H): ICD-10-CM

## 2024-07-11 DIAGNOSIS — F41.1 GAD (GENERALIZED ANXIETY DISORDER): ICD-10-CM

## 2024-07-11 PROCEDURE — 90837 PSYTX W PT 60 MINUTES: CPT | Mod: 95 | Performed by: PSYCHOLOGIST

## 2024-07-11 NOTE — PROGRESS NOTES
[facility]                                     Progress Note    Patient Name   Date: 2024     Service Type: Individual      Session Start Time  11:05 pm    Session End Time: 12:00 pm        Session Length: 53-60 min    Session #:  9 in   Attendees: Client    Service Modality:  Video Visit:      Provider verified identity through the following two step process.  Patient provided:  Patient  and Patient address    Telemedicine Visit: The patient's condition can be safely assessed and treated via synchronous audio and visual telemedicine encounter.      Reason for Telemedicine Visit: Services only offered telehealth    Originating Site (Patient Location): Patient's home    Distant Site (Provider Location): Provider Remote Setting- Home Office    Consent:  The patient/guardian has verbally consented to: the potential risks and benefits of telemedicine (video visit) versus in person care; bill my insurance or make self-payment for services provided; and responsibility for payment of non-covered services.     Patient would like the video invitation sent by:  my chart yes    Mode of Communication:  Video Conference via [facility]  As the provider I attest to compliance with applicable laws and regulations related to telemedicine.    DATA  Interactive Complexity: No  Extended session:  yes  53-60 minutes  on 24   ADD, ODILON, MDD  Patient's presenting concerns require more intensive intervention than could be completed within the usual service.   -Poor focus, loses her thought by the end of sentence, tangential at time.    Crisis: No     PROMIS (reviewed every 90 days): at intake 24, 24, due   Treatment plan reviewed : today  (first  3-07-24   90 days = 24)    Diagnosis:    F41.1 Generalized Anxiety Disorder,   F33.0 Major Depressive Disorder, recurrent, mild  F96.8 Attention Disorder without Hyperactivity         Progress Since Last Session (Related to Symptoms / Goals /  Homework):   Symptoms: stable but with loss / grief. Feeling unsettled.    Homework: Completed in session      Episode of Care Goals: Satisfactory progress - ACTION (Actively working towards change); Intervened by reinforcing change plan / affirming steps taken.  upport system- talks virtually with her friends weekly .       Current / Ongoing Stressors and Concerns:    loss / grief. Feeling unsettled.  Anxiety, ADD - poor focus   interpersonal distress  with some of partners family; boundaries needed  Work stress/ burnout feeling at times      Treatment Objective(s) Addressed in This Session:    identify core stressors/  thoughts that contribute to feeling anxious  Patient will use at least 6 coping skills for anxiety management in the next 90 days.  Learn about physiological pathways related to stress and trauma.     Intervention:   Interpersonal Therapy: , CBT, supportive therapy  Updated PROMIS, mood scales  Symptoms:  PMDD diagnosed in her 20's  loss / grief  ongoing fatigue   attention  focus / concentration disrupted      Stressors:  Managing energy/ manadgng space/ stuff/ clutter    Client shared that she has systems to compensate   for her ADD. Was a diagnosed  later in life.  Describes moving a lot in her formative years.  Wants to be organized .  Wants to sift through and lighten the load.  (Shares that micheal was a hoarder)  Values clarification:  Personal growth, productivity, purpose in life.  appreciation, happiness      Identified and processed emotions.    Assessments completed prior to visit:  The following assessments were completed by patient for this visit: ODILON 90 day roland or none      ASSESSMENT: Current Emotional / Mental Status (status of significant symptoms):   Risk status (Self / Other harm or suicidal ideation)   Patient denies current fears or concerns for personal safety.   Patient denies current or recent suicidal ideation or behaviors.   Patient denies current or recent homicidal  ideation or behaviors.   Patient denies current or recent self injurious behavior or ideation.   Patient denies other safety concerns.   Patient reports there has been no change in risk factors since their last session.     Patient reports there has been no change in protective factors since their last session.     Recommended that patient call 911 or go to the local ED should there be a change in any of these risk factors.     Appearance:   Appropriate    Eye Contact:   Good    Psychomotor Behavior: Normal    Attitude:   Cooperative    Orientation:   All   Speech   Rate / Production: Normal    Volume:  Normal    Mood:    Normal   Affect:    Appropriate  Worrisome    Thought Content:  Clear    Thought Form:  Coherent  Logical    Insight:    Good      Medication Review:   No changes to psychiatric medications, see updated Medication List in EPIC.          Medication Compliance:   NA     Changes in Health Issues:   None reported     Chemical Use Review:   Substance Use: Chemical use reviewed, no active concerns identified      Tobacco Use: No current tobacco use.      Diagnosis:  F41.1 Generalized Anxiety Disorder,   F33.0 Major Depressive Disorder, recurrent, mild  F96.8 Attention Disorder without Hyperactivity     Collateral Reports Completed:   Routed note to PCP as needed    PLAN: (Patient Tasks / Therapist Tasks / Other  Review values  Attend to [place]    Set goals for the week. Month.  Keep good boundaries with father.    Plan is to connect with grandmother   May creat a goodbye ritual - journal or think about each student     Past hw  Plan is to attend to resting this weekend.  Give self permission for boundaries as needed  Past hw  Consider several CBT skills reframes  Consider 2 behavioral intervetions as discussed.  Past hw  Use guidelines as discussed around daily coping skills  and planning for success  Vagal reset.  Hydration / stress support     Lashell Harris LP                                                       ____________________________________________________________________    Individual Treatment Plan    Patient's Name:  Date Of Birth:     Date of Creation: [birth year]  Date Treatment Plan Last Reviewed/Revised: 4-19-24    DSM5 Diagnoses:  F41.1 Generalized Anxiety Disorder,   F33.0 Major Depressive Disorder, recurrent, mild  F96.8 Attention Disorder without Hyperactivity       Psychosocial / Contextual Factors: multiple stressors, mood disruption  PROMIS (reviewed every 90 days): at intake 2-29-24, 5-03-24,    Referral / Collaboration:  Referral to another professional/service is not indicated at this time.    Anticipated number of session for this episode of care: 12.  Anticipation frequency of session: Every other week  Anticipated Duration of each session: 38-52 minutes and/or 53-60 minutes  Treatment plan will be reviewed in 90 days or when goals have been changed.       Update 7-11-24   The plan for the next 90 day is continue/ maintain  with these actions and cognitive behavioral tasks to develop more consistency / skill proficiency in effort to reduce symptom frequency, intensity and duration.    Set goals for the week. Month.  Keep good boundaries with father.  Plan is to connect with grandmother   May creat a goodbye ritual - journal or think about each student         Update 4-19-24   The plan for the next 90 day is continue/ maintain  with these actions and cognitive behavioral tasks to develop more consistency / skill proficiency in effort to reduce symptom frequency, intensity and duration.    Plan is to attend to resting this weekend.  Give self permission for boundaries as needed  Consider several CBT skills reframes  Consider 2 behavioral intervetions as discussed.  Use guidelines as discussed around daily coping skills  and planning for success  Vagal reset.  Hydration / stress support       Measurable Treatment Goal(s) related to diagnosis / functional impairment(s)  Goal 1: Patient  will identify core pattern of  thoughts that contribute to feeling stressed or  anxious.    I will know I've met my goal when .  will report 50% less disrupted mood.      Objective #A (Patient Action)    Patient will use at least 6 coping skills for anxiety management in the next 90 days weeks.  Status: New - Date: 2024    Intervention(s)  Therapist will teach coping skills for anxiety/ unwanted changes.      Patient has reviewed and agreed to the above plan.      Lashell Harris LP     Answers submitted by the patient for this visit:  Patient Health Questionnaire (Submitted on 6/14/2024)  If you checked off any problems, how difficult have these problems made it for you to do your work, take care of things at home, or get along with other people?: Somewhat difficult  PHQ9 TOTAL SCORE: 10  ODILON-7 (Submitted on 6/14/2024)  ODILON 7 TOTAL SCORE: 7

## 2024-07-22 ENCOUNTER — VIRTUAL VISIT (OUTPATIENT)
Dept: PSYCHOLOGY | Facility: CLINIC | Age: 29
End: 2024-07-22
Payer: COMMERCIAL

## 2024-07-22 DIAGNOSIS — F41.1 GAD (GENERALIZED ANXIETY DISORDER): ICD-10-CM

## 2024-07-22 DIAGNOSIS — F98.8 ATTENTION DEFICIT DISORDER WITHOUT HYPERACTIVITY: Primary | ICD-10-CM

## 2024-07-22 DIAGNOSIS — F33.0 MAJOR DEPRESSIVE DISORDER, RECURRENT EPISODE, MILD (H): ICD-10-CM

## 2024-07-22 PROCEDURE — 90837 PSYTX W PT 60 MINUTES: CPT | Mod: 95 | Performed by: PSYCHOLOGIST

## 2024-07-22 NOTE — PROGRESS NOTES
[facility]                                     Progress Note    Patient Name   Date: 2024     Service Type: Individual      Session Start Time  11:35 am    Session End Time: 12:30        Session Length: 53-60 min    Session #:  10 in   Attendees: Client    Service Modality:  Video Visit and  phone at the end due to tech issues:      Provider verified identity through the following two step process.  Patient provided:  Patient  and Patient address    Telemedicine Visit: The patient's condition can be safely assessed and treated via synchronous audio and visual telemedicine encounter.      Reason for Telemedicine Visit: Services only offered telehealth    Originating Site (Patient Location): Patient's home    Distant Site (Provider Location): Provider Remote Setting- Home Office    Consent:  The patient/guardian has verbally consented to: the potential risks and benefits of telemedicine (video visit) versus in person care; bill my insurance or make self-payment for services provided; and responsibility for payment of non-covered services.     Patient would like the video invitation sent by:  my chart yes    Mode of Communication:  Video Conference via [employer]  As the provider I attest to compliance with applicable laws and regulations related to telemedicine.    DATA  Interactive Complexity: No  Extended session:  yes  53-60 minutes  on 24   ADD, ODILON, MDD  Patient's presenting concerns require more intensive intervention than could be completed within the usual service.   -Poor focus, loses her thought by the end of sentence, tangential.    Crisis: No     PROMIS (reviewed every 90 days): at intake 24, 24, due   Treatment plan reviewed : today  (first  3-07-24   90 days = 24)    Diagnosis:    F41.1 Generalized Anxiety Disorder,   F33.0 Major Depressive Disorder, recurrent, mild  F96.8 Attention Disorder without Hyperactivity         Progress Since Last Session  (Related to Symptoms / Goals / Homework):   Symptoms: stable but with add symptoms, intra and interpersonal distress  Managing energy/ managing space/ stuff/ clutter. Less feeling of burn out  Homework: Completed in session      Episode of Care Goals: Satisfactory progress - ACTION (Actively working towards change); Intervened by reinforcing change plan / affirming steps taken.  [employer] system- talks virtually with her friends weekly .       Current / Ongoing Stressors and Concerns:    Navigating neuro divergent brain for her and partner  loss / grief. Feeling unsettled.  Anxiety, ADD - poor focus   interpersonal distress  with some of partners family; boundaries needed  Work stress/ burnout feeling at times      Treatment Objective(s) Addressed in This Session:    identify core stressors/  thoughts that contribute to feeling anxious  Patient will use at least 6 coping skills for anxiety management in the next 90 days.  Learn about physiological pathways related to stress and trauma.     Intervention:   Interpersonal Therapy: , CBT, supportive therapy    Symptoms:  PMDD diagnosed in her 20's  ongoing fatigue   attention  focus / concentration disrupted    Stressors:  Managing energy/ managing space/ stuff/ clutter    Client seeking strategies for reducing interpersonal distress  With her partner is described to be neurodivergent with  oppositional behaviors.  Client requesting support around husbands default behaviors.  Discussed avoidance.  Client  would like to be able to have basic expectations.   Explored strategies.  Discussed motivational interviewing.       Identified and processed emotions.    Assessments completed prior to visit:  The following assessments were completed by patient for this visit: ODILON 90 day roland or none      ASSESSMENT: Current Emotional / Mental Status (status of significant symptoms):   Risk status (Self / Other harm or suicidal ideation)   Patient denies current fears or concerns for  personal safety.   Patient denies current or recent suicidal ideation or behaviors.   Patient denies current or recent homicidal ideation or behaviors.   Patient denies current or recent self injurious behavior or ideation.   Patient denies other safety concerns.   Patient reports there has been no change in risk factors since their last session.     Patient reports there has been no change in protective factors since their last session.     Recommended that patient call 911 or go to the local ED should there be a change in any of these risk factors.     Appearance:   Appropriate    Eye Contact:   Good    Psychomotor Behavior: Normal    Attitude:   Cooperative    Orientation:   All   Speech   Rate / Production: Normal    Volume:  Normal    Mood:    Normal   Affect:    Appropriate  Worrisome    Thought Content:  Clear    Thought Form:  Coherent  Logical    Insight:    Good      Medication Review:   No changes to psychiatric medications, see updated Medication List in EPIC.          Medication Compliance:   NA     Changes in Health Issues:   None reported     Chemical Use Review:   Substance Use: Chemical use reviewed, no active concerns identified      Tobacco Use: No current tobacco use.      Diagnosis:  F41.1 Generalized Anxiety Disorder,   F33.0 Major Depressive Disorder, recurrent, mild  F96.8 Attention Disorder without Hyperactivity     Collateral Reports Completed:   Routed note to PCP as needed    PLAN: (Patient Tasks / Therapist Tasks / Other  Plan  is to use several new strategies to improve communication  Without getting dysregulated herself  Review values  Attend to inner boundareis    Set goals for the week. Month.  Keep good boundaries with father.    Plan is to connect with grandmother   May creat a goodbye ritual - journal or think about each student     Past hw  Plan is to attend to resting this weekend.  Give self permission for boundaries as needed  Past hw  Consider several CBT skills  reframes  Consider 2 behavioral intervetions as discussed.  Past hw  Use guidelines as discussed around daily coping skills  and planning for success  Vagal reset.  Hydration / stress support     Lashell Harris LP                                                      ____________________________________________________________________    Individual Treatment Plan    Patient's Name:  Date Of Birth:     Date of Creation: [birth year]  Date Treatment Plan Last Reviewed/Revised: 4-19-24    DSM5 Diagnoses:  F41.1 Generalized Anxiety Disorder,   F33.0 Major Depressive Disorder, recurrent, mild  F96.8 Attention Disorder without Hyperactivity       Psychosocial / Contextual Factors: multiple stressors, mood disruption  PROMIS (reviewed every 90 days): at intake 2-29-24, 5-03-24,    Referral / Collaboration:  Referral to another professional/service is not indicated at this time.    Anticipated number of session for this episode of care: 12.  Anticipation frequency of session: Every other week  Anticipated Duration of each session: 38-52 minutes and/or 53-60 minutes  Treatment plan will be reviewed in 90 days or when goals have been changed.       Update 7-11-24   The plan for the next 90 day is continue/ maintain  with these actions and cognitive behavioral tasks to develop more consistency / skill proficiency in effort to reduce symptom frequency, intensity and duration.    Set goals for the week. Month.  Keep good boundaries with father.  Plan is to connect with grandmother   May creat a goodbye ritual - journal or think about each student         Update 4-19-24   The plan for the next 90 day is continue/ maintain  with these actions and cognitive behavioral tasks to develop more consistency / skill proficiency in effort to reduce symptom frequency, intensity and duration.    Plan is to attend to resting this weekend.  Give self permission for boundaries as needed  Consider several CBT skills reframes  Consider 2 behavioral  intervetions as discussed.  Use guidelines as discussed around daily coping skills  and planning for success  Vagal reset.  Hydration / stress support       Measurable Treatment Goal(s) related to diagnosis / functional impairment(s)  Goal 1: Patient will identify core pattern of  thoughts that contribute to feeling stressed or  anxious.    I will know I've met my goal when .  will report 50% less disrupted mood.      Objective #A (Patient Action)    Patient will use at least 6 coping skills for anxiety management in the next 90 days weeks.  Status: New - Date: 2024    Intervention(s)  Therapist will teach coping skills for anxiety/ unwanted changes.      Patient has reviewed and agreed to the above plan.      Lashell Harris LP     Answers submitted by the patient for this visit:  Patient Health Questionnaire (Submitted on 6/14/2024)  If you checked off any problems, how difficult have these problems made it for you to do your work, take care of things at home, or get along with other people?: Somewhat difficult  PHQ9 TOTAL SCORE: 10  ODILON-7 (Submitted on 6/14/2024)  ODILON 7 TOTAL SCORE: 7

## 2024-08-05 ENCOUNTER — VIRTUAL VISIT (OUTPATIENT)
Dept: PSYCHOLOGY | Facility: CLINIC | Age: 29
End: 2024-08-05
Payer: COMMERCIAL

## 2024-08-05 DIAGNOSIS — F41.1 GAD (GENERALIZED ANXIETY DISORDER): ICD-10-CM

## 2024-08-05 DIAGNOSIS — F98.8 ATTENTION DEFICIT DISORDER WITHOUT HYPERACTIVITY: Primary | ICD-10-CM

## 2024-08-05 DIAGNOSIS — F33.0 MAJOR DEPRESSIVE DISORDER, RECURRENT EPISODE, MILD (H): ICD-10-CM

## 2024-08-05 PROCEDURE — 90837 PSYTX W PT 60 MINUTES: CPT | Mod: 95 | Performed by: PSYCHOLOGIST

## 2024-08-05 NOTE — PROGRESS NOTES
[facility]                                     Progress Note    Patient Name   Date: 2024     Service Type: Individual      Session Start Time  2:31 pm    Session End Time: 3:30 pm       Session Length: 53-60 min    Session #:  11 in   Attendees: Client    Service Modality:  Video Visit and  phone at the end due to tech issues:      Provider verified identity through the following two step process.  Patient provided:  Patient  and Patient address    Telemedicine Visit: The patient's condition can be safely assessed and treated via synchronous audio and visual telemedicine encounter.      Reason for Telemedicine Visit: Services only offered telehealth    Originating Site (Patient Location): Patient's home    Distant Site (Provider Location): Provider Remote Setting- Home Office    Consent:  The patient/guardian has verbally consented to: the potential risks and benefits of telemedicine (video visit) versus in person care; bill my insurance or make self-payment for services provided; and responsibility for payment of non-covered services.     Patient would like the video invitation sent by:  my chart yes    Mode of Communication:  Video Conference via [facility]  As the provider I attest to compliance with applicable laws and regulations related to telemedicine.    DATA  Interactive Complexity: No  Extended session:  yes  53-60 minutes  on 24   ADD, ODILON, MDD, OCD tendency-picking  Patient's presenting concerns require more intensive intervention than could be completed within the usual service.   -Poor focus, loses her thought by the end of sentence, tangential.    Crisis: No     PROMIS (reviewed every 90 days): at intake 24, 24,    Treatment plan reviewed : today  (first  3-07-24   90 days = 24)    Diagnosis:    F41.1 Generalized Anxiety Disorder,   F33.0 Major Depressive Disorder, recurrent, mild  F96.8 Attention Disorder without Hyperactivity         Progress Since  "Last Session (Related to Symptoms / Goals / Homework):   Symptoms: stable but with add symptoms, intra and interpersonal distress  Managing energy/ managing space/ stuff/ clutter. Less feeling of burn out  Homework: Completed in session      Episode of Care Goals: Satisfactory progress - ACTION (Actively working towards change); Intervened by reinforcing change plan / affirming steps taken.  [employer] system- talks virtually with her friends weekly .       Current / Ongoing Stressors and Concerns:    \"I struggle with fatigue every day\" entire adult life  Skin picking \"I pick a lot\"  Navigating neuro divergent brain for her and partner  loss / grief. Feeling unsettled.  Anxiety, ADD - poor focus   interpersonal distress  with some of partners family; boundaries needed  Work stress/ burnout feeling at times      Treatment Objective(s) Addressed in This Session:    identify core stressors/  thoughts that contribute to feeling anxious  Patient will use at least 6 coping skills for anxiety management in the next 90 days.  Learn about physiological pathways related to stress and trauma.     Intervention:   Interpersonal Therapy: , CBT, supportive therapy    Symptoms:  PMDD diagnosed in her 20's  ongoing fatigue   Skin picking \"I pick a lot\"  attention  focus / concentration disrupted    Stressors:  Interpersonal- partner with pattern of reaction of being oppositional  Unlearn old emotional response sets ( dad was an angry person)   Managing energy/ managing space/ stuff/ clutter    Client is viewing helpful material on ADD management techniques.  Came across the technique of \"no wrong answer\".  She plans to incorporate this into her relationship.   Is owning her own behavior of hyper focus during tasks.      Is seeking strategies for how to slow down and sit with distress   of others before responding. Discussed technique of body scan.  \"I grew up with an anger person\" in her dad so shares that she does  have awareness that " she reacts quickly to be able to get safe.    Discussed strategies for managing skin picking.    Identified and processed emotions.    Assessments completed prior to visit:  The following assessments were completed by patient for this visit: ODILON 90 day roland or none      ASSESSMENT: Current Emotional / Mental Status (status of significant symptoms):   Risk status (Self / Other harm or suicidal ideation)   Patient denies current fears or concerns for personal safety.   Patient denies current or recent suicidal ideation or behaviors.   Patient denies current or recent homicidal ideation or behaviors.   Patient denies current or recent self injurious behavior or ideation.   Patient denies other safety concerns.   Patient reports there has been no change in risk factors since their last session.     Patient reports there has been no change in protective factors since their last session.     Recommended that patient call 911 or go to the local ED should there be a change in any of these risk factors.     Appearance:   Appropriate    Eye Contact:   Good    Psychomotor Behavior: Normal    Attitude:   Cooperative    Orientation:   All   Speech   Rate / Production: Normal    Volume:  Normal    Mood:    Normal   Affect:    Appropriate  Worrisome    Thought Content:  Clear    Thought Form:  Coherent  Logical    Insight:    Good      Medication Review:   No changes to psychiatric medications, see updated Medication List in EPIC.          Medication Compliance:   NA     Changes in Health Issues:   None reported     Chemical Use Review:   Substance Use: Chemical use reviewed, no active concerns identified      Tobacco Use: No current tobacco use.      Diagnosis:  F41.1 Generalized Anxiety Disorder,   F33.0 Major Depressive Disorder, recurrent, mild  F96.8 Attention Disorder without Hyperactivity     Collateral Reports Completed:   Routed note to PCP as needed    PLAN: (Patient Tasks / Therapist Tasks / Other  Employ body scan  as a technique  Use CBT skllls daily  Will consider using distraction, substitution, or prolong strategies.    Plan  is to use several new strategies to improve communication  Without getting dysregulated herself  Review values  Attend to inner boundareis    Set goals for the week. Month.  Keep good boundaries with father.    Plan is to connect with grandmother   May creat a goodbye ritual - journal or think about each student     Past hw  Plan is to attend to resting this weekend.  Give self permission for boundaries as needed  Past hw  Consider several CBT skills reframes  Consider 2 behavioral intervetions as discussed.  Past hw  Use guidelines as discussed around daily coping skills  and planning for success  Vagal reset.  Hydration / stress support     Lashell Harris LP                                                      ____________________________________________________________________    Individual Treatment Plan    Patient's Name:  Date Of Birth:     Date of Creation: [birth year]  Date Treatment Plan Last Reviewed/Revised: 4-19-24    DSM5 Diagnoses:  F41.1 Generalized Anxiety Disorder,   F33.0 Major Depressive Disorder, recurrent, mild  F96.8 Attention Disorder without Hyperactivity       Psychosocial / Contextual Factors: multiple stressors, mood disruption  PROMIS (reviewed every 90 days): at intake 2-29-24, 5-03-24, 7-24    Referral / Collaboration:  Referral to another professional/service is not indicated at this time.    Anticipated number of session for this episode of care: 12.  Anticipation frequency of session: Every other week  Anticipated Duration of each session: 38-52 minutes and/or 53-60 minutes  Treatment plan will be reviewed in 90 days or when goals have been changed.       Update 7-11-24   The plan for the next 90 day is continue/ maintain  with these actions and cognitive behavioral tasks to develop more consistency / skill proficiency in effort to reduce symptom frequency, intensity and  duration.    Set goals for the week. Month.  Keep good boundaries with father.  Plan is to connect with grandmother   May creat a goodbye ritual - journal or think about each student         Update 4-19-24   The plan for the next 90 day is continue/ maintain  with these actions and cognitive behavioral tasks to develop more consistency / skill proficiency in effort to reduce symptom frequency, intensity and duration.    Plan is to attend to resting this weekend.  Give self permission for boundaries as needed  Consider several CBT skills reframes  Consider 2 behavioral intervetions as discussed.  Use guidelines as discussed around daily coping skills  and planning for success  Vagal reset.  Hydration / stress support       Measurable Treatment Goal(s) related to diagnosis / functional impairment(s)  Goal 1: Patient will identify core pattern of  thoughts that contribute to feeling stressed or  anxious.    I will know I've met my goal when .  will report 50% less disrupted mood.      Objective #A (Patient Action)    Patient will use at least 6 coping skills for anxiety management in the next 90 days weeks.  Status: New - Date: 2024    Intervention(s)  Therapist will teach coping skills for anxiety/ unwanted changes.      Patient has reviewed and agreed to the above plan.      Lashell Harris LP     Answers submitted by the patient for this visit:  Patient Health Questionnaire (Submitted on 6/14/2024)  If you checked off any problems, how difficult have these problems made it for you to do your work, take care of things at home, or get along with other people?: Somewhat difficult  PHQ9 TOTAL SCORE: 10  ODILON-7 (Submitted on 6/14/2024)  ODILON 7 TOTAL SCORE: 7    Answers submitted by the patient for this visit:  Patient Health Questionnaire (Submitted on 8/5/2024)  If you checked off any problems, how difficult have these problems made it for you to do your work, take care of things at home, or get along with other people?:  Somewhat difficult  PHQ9 TOTAL SCORE: 7

## 2024-08-13 ENCOUNTER — OFFICE VISIT (OUTPATIENT)
Dept: FAMILY MEDICINE | Facility: CLINIC | Age: 29
End: 2024-08-13
Payer: COMMERCIAL

## 2024-08-13 VITALS
TEMPERATURE: 97.8 F | HEIGHT: 62 IN | SYSTOLIC BLOOD PRESSURE: 112 MMHG | BODY MASS INDEX: 24.16 KG/M2 | DIASTOLIC BLOOD PRESSURE: 75 MMHG | HEART RATE: 89 BPM | RESPIRATION RATE: 16 BRPM | OXYGEN SATURATION: 100 % | WEIGHT: 131.3 LBS

## 2024-08-13 DIAGNOSIS — Z13.0 SCREENING FOR IRON DEFICIENCY ANEMIA: ICD-10-CM

## 2024-08-13 DIAGNOSIS — Z00.00 ROUTINE GENERAL MEDICAL EXAMINATION AT A HEALTH CARE FACILITY: Primary | ICD-10-CM

## 2024-08-13 DIAGNOSIS — Z13.29 SCREENING FOR THYROID DISORDER: ICD-10-CM

## 2024-08-13 DIAGNOSIS — Z13.220 LIPID SCREENING: ICD-10-CM

## 2024-08-13 DIAGNOSIS — Z13.1 SCREENING FOR DIABETES MELLITUS: ICD-10-CM

## 2024-08-13 DIAGNOSIS — F98.8 ATTENTION DEFICIT DISORDER WITHOUT HYPERACTIVITY: ICD-10-CM

## 2024-08-13 DIAGNOSIS — N94.3 PMS (PREMENSTRUAL SYNDROME): ICD-10-CM

## 2024-08-13 LAB
ALBUMIN SERPL BCG-MCNC: 4.7 G/DL (ref 3.5–5.2)
ALP SERPL-CCNC: 65 U/L (ref 40–150)
ALT SERPL W P-5'-P-CCNC: 12 U/L (ref 0–50)
ANION GAP SERPL CALCULATED.3IONS-SCNC: 11 MMOL/L (ref 7–15)
AST SERPL W P-5'-P-CCNC: 25 U/L (ref 0–45)
BILIRUB SERPL-MCNC: 0.3 MG/DL
BUN SERPL-MCNC: 8.6 MG/DL (ref 6–20)
CALCIUM SERPL-MCNC: 9.1 MG/DL (ref 8.8–10.4)
CHLORIDE SERPL-SCNC: 102 MMOL/L (ref 98–107)
CHOLEST SERPL-MCNC: 173 MG/DL
CREAT SERPL-MCNC: 0.81 MG/DL (ref 0.51–0.95)
CREAT UR-MCNC: 12 MG/DL
EGFRCR SERPLBLD CKD-EPI 2021: >90 ML/MIN/1.73M2
ERYTHROCYTE [DISTWIDTH] IN BLOOD BY AUTOMATED COUNT: 11.9 % (ref 10–15)
FASTING STATUS PATIENT QL REPORTED: NO
FASTING STATUS PATIENT QL REPORTED: NO
GLUCOSE SERPL-MCNC: 77 MG/DL (ref 70–99)
HCO3 SERPL-SCNC: 23 MMOL/L (ref 22–29)
HCT VFR BLD AUTO: 37 % (ref 35–47)
HDLC SERPL-MCNC: 60 MG/DL
HGB BLD-MCNC: 12.1 G/DL (ref 11.7–15.7)
LDLC SERPL CALC-MCNC: 99 MG/DL
MCH RBC QN AUTO: 30.6 PG (ref 26.5–33)
MCHC RBC AUTO-ENTMCNC: 32.7 G/DL (ref 31.5–36.5)
MCV RBC AUTO: 94 FL (ref 78–100)
NONHDLC SERPL-MCNC: 113 MG/DL
PLATELET # BLD AUTO: 334 10E3/UL (ref 150–450)
POTASSIUM SERPL-SCNC: 3.8 MMOL/L (ref 3.4–5.3)
PROT SERPL-MCNC: 8.1 G/DL (ref 6.4–8.3)
RBC # BLD AUTO: 3.95 10E6/UL (ref 3.8–5.2)
SODIUM SERPL-SCNC: 136 MMOL/L (ref 135–145)
TRIGL SERPL-MCNC: 70 MG/DL
TSH SERPL DL<=0.005 MIU/L-ACNC: 0.99 UIU/ML (ref 0.3–4.2)
WBC # BLD AUTO: 5.8 10E3/UL (ref 4–11)

## 2024-08-13 PROCEDURE — G0481 DRUG TEST DEF 8-14 CLASSES: HCPCS | Performed by: NURSE PRACTITIONER

## 2024-08-13 PROCEDURE — 36415 COLL VENOUS BLD VENIPUNCTURE: CPT | Performed by: NURSE PRACTITIONER

## 2024-08-13 PROCEDURE — 99395 PREV VISIT EST AGE 18-39: CPT | Performed by: NURSE PRACTITIONER

## 2024-08-13 PROCEDURE — 99213 OFFICE O/P EST LOW 20 MIN: CPT | Mod: 25 | Performed by: NURSE PRACTITIONER

## 2024-08-13 PROCEDURE — 85027 COMPLETE CBC AUTOMATED: CPT | Performed by: NURSE PRACTITIONER

## 2024-08-13 PROCEDURE — 84443 ASSAY THYROID STIM HORMONE: CPT | Performed by: NURSE PRACTITIONER

## 2024-08-13 PROCEDURE — 80053 COMPREHEN METABOLIC PANEL: CPT | Performed by: NURSE PRACTITIONER

## 2024-08-13 PROCEDURE — 80061 LIPID PANEL: CPT | Performed by: NURSE PRACTITIONER

## 2024-08-13 RX ORDER — DEXTROAMPHETAMINE/AMPHETAMINE 10 MG
10 CAPSULE, EXT RELEASE 24 HR ORAL DAILY
Qty: 30 CAPSULE | Refills: 0 | Status: CANCELLED | OUTPATIENT
Start: 2024-08-13

## 2024-08-13 RX ORDER — DEXTROAMPHETAMINE SACCHARATE, AMPHETAMINE ASPARTATE MONOHYDRATE, DEXTROAMPHETAMINE SULFATE AND AMPHETAMINE SULFATE 2.5; 2.5; 2.5; 2.5 MG/1; MG/1; MG/1; MG/1
10 CAPSULE, EXTENDED RELEASE ORAL DAILY
Qty: 30 CAPSULE | Refills: 0 | Status: SHIPPED | OUTPATIENT
Start: 2024-09-12 | End: 2024-10-07

## 2024-08-13 RX ORDER — DEXTROAMPHETAMINE SACCHARATE, AMPHETAMINE ASPARTATE MONOHYDRATE, DEXTROAMPHETAMINE SULFATE AND AMPHETAMINE SULFATE 2.5; 2.5; 2.5; 2.5 MG/1; MG/1; MG/1; MG/1
10 CAPSULE, EXTENDED RELEASE ORAL DAILY
Qty: 30 CAPSULE | Refills: 0 | Status: SHIPPED | OUTPATIENT
Start: 2024-08-13 | End: 2024-09-12

## 2024-08-13 RX ORDER — DEXTROAMPHETAMINE SACCHARATE, AMPHETAMINE ASPARTATE MONOHYDRATE, DEXTROAMPHETAMINE SULFATE AND AMPHETAMINE SULFATE 2.5; 2.5; 2.5; 2.5 MG/1; MG/1; MG/1; MG/1
10 CAPSULE, EXTENDED RELEASE ORAL DAILY
Qty: 30 CAPSULE | Refills: 0 | Status: SHIPPED | OUTPATIENT
Start: 2024-10-12 | End: 2024-11-11

## 2024-08-13 SDOH — HEALTH STABILITY: PHYSICAL HEALTH: ON AVERAGE, HOW MANY DAYS PER WEEK DO YOU ENGAGE IN MODERATE TO STRENUOUS EXERCISE (LIKE A BRISK WALK)?: 4 DAYS

## 2024-08-13 SDOH — HEALTH STABILITY: PHYSICAL HEALTH: ON AVERAGE, HOW MANY MINUTES DO YOU ENGAGE IN EXERCISE AT THIS LEVEL?: 60 MIN

## 2024-08-13 ASSESSMENT — PAIN SCALES - GENERAL: PAINLEVEL: NO PAIN (0)

## 2024-08-13 ASSESSMENT — SOCIAL DETERMINANTS OF HEALTH (SDOH): HOW OFTEN DO YOU GET TOGETHER WITH FRIENDS OR RELATIVES?: TWICE A WEEK

## 2024-08-13 NOTE — PROGRESS NOTES
Preventive Care Visit  [facility]  Shania Abel DNP, Nurse Practitioner Primary Care  Aug 13, 2024      Assessment & Plan     Routine general medical examination at a health care facility  Reviewed medical/social/family history and health maintenance     Attention deficit disorder without hyperactivity  CSA updated today.  Tolerating with out issue, refills provided.    - amphetamine-dextroamphetamine (ADDERALL XR) 10 MG 24 hr capsule; Take 1 capsule (10 mg) by mouth daily for 30 days  - amphetamine-dextroamphetamine (ADDERALL XR) 10 MG 24 hr capsule; Take 1 capsule (10 mg) by mouth daily for 30 days  - amphetamine-dextroamphetamine (ADDERALL XR) 10 MG 24 hr capsule; Take 1 capsule (10 mg) by mouth daily for 30 days  - Drug Confirmation Panel Urine with Creat - lab collect; Future    PMS (premenstrual syndrome)   Stable, tolerating med, no changes at this time  - sertraline (ZOLOFT) 50 MG tablet; Take 1 tablet (50 mg) by mouth daily    Screening for iron deficiency anemia  - CBC with platelets; Future    Screening for thyroid disorder  - TSH with free T4 reflex; Future    Screening for diabetes mellitus  - Comprehensive metabolic panel (BMP + Alb, Alk Phos, ALT, AST, Total. Bili, TP); Future    Lipid screening  - Lipid panel reflex to direct LDL Fasting; Future    Patient has been advised of split billing requirements and indicates understanding: Yes        Counseling  Appropriate preventive services were addressed with this patient via screening, questionnaire, or discussion as appropriate for fall prevention, nutrition, physical activity, Tobacco-use cessation, weight loss and cognition.  Checklist reviewing preventive services available has been given to the patient.  Reviewed patient's diet, addressing concerns and/or questions.           Casey Alfonso is a 29 year old, presenting for the following:  Physical (Family planning. )        8/13/2024    10:19 AM   Additional  Questions   Roomed by Cheryl STAHL        Health Care Directive  Patient does not have a Health Care Directive or Living Will: Discussed advance care planning with patient; information given to patient to review.    HPI        8/13/2024   General Health   How would you rate your overall physical health? Good   Feel stress (tense, anxious, or unable to sleep) To some extent      (!) STRESS CONCERN      8/13/2024   Nutrition   Three or more servings of calcium each day? (!) NO   Diet: Regular (no restrictions)   How many servings of fruit and vegetables per day? (!) 2-3   How many sweetened beverages each day? 0-1            8/13/2024   Exercise   Days per week of moderate/strenous exercise 4 days   Average minutes spent exercising at this level 60 min            8/13/2024   Social Factors   Frequency of gathering with friends or relatives Twice a week   Worry food won't last until get money to buy more No   Food not last or not have enough money for food? No   Do you have housing? (Housing is defined as stable permanent housing and does not include staying ouside in a car, in a tent, in an abandoned building, in an overnight shelter, or couch-surfing.) Yes   Are you worried about losing your housing? No   Lack of transportation? No   Unable to get utilities (heat,electricity)? No            8/13/2024   Dental   Dentist two times every year? Yes               Today's PHQ-9 Score:       8/5/2024     2:13 PM   PHQ-9 SCORE   PHQ-9 Total Score MyChart 7 (Mild depression)   PHQ-9 Total Score 7           8/13/2024   Substance Use   Alcohol more than 3/day or more than 7/wk No   Do you use any other substances recreationally? No        Social History     Tobacco Use    Smoking status: Never    Smokeless tobacco: Never    Tobacco comments:     non smoking home   Vaping Use    Vaping status: Never Used   Substance Use Topics    Alcohol use: Yes     Comment: 1-2 on weekends    Drug use: No          Mammogram Screening - Patient  "under 40 years of age: Routine Mammogram Screening not recommended.         8/13/2024   STI Screening   New sexual partner(s) since last STI/HIV test? No        History of abnormal Pap smear: No - age 30- 64 PAP with HPV every 5 years recommended        Latest Ref Rng & Units 6/29/2023    10:25 AM 8/10/2020     9:10 AM 8/10/2020     9:09 AM   PAP / HPV   PAP  Negative for Intraepithelial Lesion or Malignancy (NILM)      PAP (Historical)    NIL    HPV 16 DNA NEG^Negative  Negative     HPV 18 DNA NEG^Negative  Negative     Other HR HPV NEG^Negative  Negative             8/13/2024   Contraception/Family Planning   Questions about contraception or family planning (!) DECLINE           Reviewed and updated as needed this visit by Provider                             Objective    Exam  /75   Pulse 89   Temp 97.8  F (36.6  C) (Temporal)   Resp 16   Ht 1.569 m (5' 1.77\")   Wt 59.6 kg (131 lb 4.8 oz)   LMP 07/10/2024 (Approximate)   SpO2 100%   BMI 24.19 kg/m     Estimated body mass index is 24.19 kg/m  as calculated from the following:    Height as of this encounter: 1.569 m (5' 1.77\").    Weight as of this encounter: 59.6 kg (131 lb 4.8 oz).    Physical Exam  GENERAL: alert and no distress  EYES: Eyes grossly normal to inspection, PERRL and conjunctivae and sclerae normal  HENT: ear canals and TM's normal, nose and mouth without ulcers or lesions  NECK: no adenopathy, no asymmetry, masses, or scars  RESP: lungs clear to auscultation - no rales, rhonchi or wheezes  BREAST: normal without masses, tenderness or nipple discharge and no palpable axillary masses or adenopathy  CV: regular rate and rhythm, normal S1 S2, no S3 or S4, no murmur, click or rub, no peripheral edema  ABDOMEN: soft, nontender, no hepatosplenomegaly, no masses and bowel sounds normal  MS: no gross musculoskeletal defects noted, no edema  SKIN: no suspicious lesions or rashes  NEURO: Normal strength and tone, mentation intact and speech " normal  PSYCH: mentation appears normal, affect normal/bright        Signed Electronically by: Shania Abel, EDEN

## 2024-08-13 NOTE — LETTER
[facility]  08/13/24  Patient: Naty Pérez  YOB: 1995  Medical Record Number: [id number]                                                                                  Non-Opioid Controlled Substance Agreement    This is an agreement between you and your provider regarding safe and appropriate use of controlled substances prescribed by your care team. Controlled substances are?medicines that can cause physical and mental dependence (abuse).     There are strict laws about having and using these medicines. We here at [facility] are  committed to working with you in your efforts to get better. To support you in this work, we'll help you schedule regular office appointments for medicine refills. If we must cancel or change your appointment for any reason, we'll make sure you have enough medicine to last until your next appointment.     As a Provider, I will:   Listen carefully to your concerns while treating you with respect.   Recommend a treatment plan that I believe is in your best interest and may involve therapies other than medicine.    Talk with you often about the possible benefits and the risk of harm of any medicine that we prescribe for you.  Assess the safety of this medicine and check how well it works.    Provide a plan on how to taper (discontinue or go off) using this medicine if the decision is made to stop its use.      ::  As a Patient, I understand controlled substances:     Are prescribed by my care provider to help me function or work and manage my condition(s).?  Are strong medicines and can cause serious side effects.     Need to be taken exactly as prescribed.?Combining controlled substances with certain medicines or chemicals (such as illegal drugs, alcohol, sedatives, sleeping pills, and benzodiazepines) can be dangerous or even fatal.? If I stop taking my medicines suddenly, I may have severe withdrawal symptoms.     The risks, benefits,  and side effects of these medicine(s) were explained to me. I agree that:    I will take part in other treatments as advised by my care team. This may be psychiatry or counseling, physical therapy, behavioral therapy, group treatment or a referral to specialist.    I will keep all my appointments and understand this is part of the monitoring of controlled substances.?My care team may require an office visit for EVERY controlled substance refill. If I miss appointments or don t follow instructions, my care team may stop my medicine    I will take my medicines as prescribed. I will not change the dose or schedule unless my care team tells me to. There will be no refills if I run out early.      I may be asked to come to the clinic and complete a urine drug test or complete a pill count. If I don t give a urine sample or participate in a pill count, the care team may stop my medicine.    I will only receive controlled substance prescriptions from this clinic. If I am treated by another provider, I will tell them that I am taking controlled substances and that I have a treatment agreement with this provider. I will inform my [facility] care team within one business day if I am given a prescription for any controlled substance by another healthcare provider. My [facility] care team can contact other providers and pharmacists about my use of any medicines.    It is up to me to make sure that I don't run out of my medicines on weekends or holidays.?If my care team is willing to refill my prescription without a visit, I must request refills only during office hours. Refills may take up to 3 business days to process. I will use one pharmacy to fill all my controlled substance prescriptions. I will notify the clinic about any changes to my insurance or medicine availability.    I am responsible for my prescriptions. If the medicine/prescription is lost, stolen or destroyed, it will not be replaced.?I also agree  not to share controlled substance medicines with anyone.     I am aware I should not use any illegal or recreational drugs. I agree not to drink alcohol unless my care team says I can.     If I enroll in the [place] [facility] Cannabis program, I will tell my care team before my next refill.    I will tell my care team right away if I become pregnant, have a new medical problem treated outside of my regular clinic, or have a change in my medicines.     I understand that this medicine can affect my thinking, judgment and reaction time.? Alcohol and drugs affect the brain and body, which can affect the safety of my driving. Being under the influence of alcohol or drugs can affect my decision-making, behaviors, personal safety and the safety of others. Driving while impaired (DWI) can occur if a person is driving, operating or in physical control of a car, motorcycle, boat, snowmobile, ATV, motorbike, off-road vehicle or any other motor vehicle ([place] Statute 169A.20). I understand the risk if I choose to drive or operate any vehicle or machinery.    I understand that if I do not follow any of the conditions above, my prescriptions or treatment may be stopped or changed.   I agree that my provider, clinic care team and pharmacy may work with any city, state or federal law enforcement agency that investigates the misuse, sale or other diversion of my controlled medicine. I will allow my provider to discuss my care with, or share a copy of, this agreement with any other treating provider, pharmacy or emergency room where I receive care.     I have read this agreement and have asked questions about anything I did not understand.    ________________________________________________________  Patient Signature - Naty Pérez     ___________________                   Date     ________________________________________________________  Provider Signature - Shania Abel [facility]       ___________________                   Date      ________________________________________________________  Witness Signature (required if provider not present while patient signing)          ___________________                   Date

## 2024-08-13 NOTE — PATIENT INSTRUCTIONS
Patient Education   Preventive Care Advice   This is general advice given by our system to help you stay healthy. However, your care team may have specific advice just for you. Please talk to your care team about your preventive care needs.  Nutrition  Eat 5 or more servings of fruits and vegetables each day.  Try wheat bread, brown rice and whole grain pasta (instead of white bread, rice, and pasta).  Get enough calcium and vitamin D. Check the label on foods and aim for 100% of the RDA (recommended daily allowance).  Lifestyle  Exercise at least 150 minutes each week  (30 minutes a day, 5 days a week).  Do muscle strengthening activities 2 days a week. These help control your weight and prevent disease.  No smoking.  Wear sunscreen to prevent skin cancer.  Have a dental exam and cleaning every 6 months.  Yearly exams  See your health care team every year to talk about:  Any changes in your health.  Any medicines your care team has prescribed.  Preventive care, family planning, and ways to prevent chronic diseases.  Shots (vaccines)   HPV shots (up to age 26), if you've never had them before.  Hepatitis B shots (up to age 59), if you've never had them before.  COVID-19 shot: Get this shot when it's due.  Flu shot: Get a flu shot every year.  Tetanus shot: Get a tetanus shot every 10 years.  Pneumococcal, hepatitis A, and RSV shots: Ask your care team if you need these based on your risk.  Shingles shot (for age 50 and up)  General health tests  Diabetes screening:  Starting at age 35, Get screened for diabetes at least every 3 years.  If you are younger than age 35, ask your care team if you should be screened for diabetes.  Cholesterol test: At age 39, start having a cholesterol test every 5 years, or more often if advised.  Bone density scan (DEXA): At age 50, ask your care team if you should have this scan for osteoporosis (brittle bones).  Hepatitis C: Get tested at least once in your life.  STIs (sexually  transmitted infections)  Before age 24: Ask your care team if you should be screened for STIs.  After age 24: Get screened for STIs if you're at risk. You are at risk for STIs (including HIV) if:  You are sexually active with more than one person.  You don't use condoms every time.  You or a partner was diagnosed with a sexually transmitted infection.  If you are at risk for HIV, ask about PrEP medicine to prevent HIV.  Get tested for HIV at least once in your life, whether you are at risk for HIV or not.  Cancer screening tests  Cervical cancer screening: If you have a cervix, begin getting regular cervical cancer screening tests starting at age 21.  Breast cancer scan (mammogram): If you've ever had breasts, begin having regular mammograms starting at age 40. This is a scan to check for breast cancer.  Colon cancer screening: It is important to start screening for colon cancer at age 45.  Have a colonoscopy test every 10 years (or more often if you're at risk) Or, ask your provider about stool tests like a FIT test every year or Cologuard test every 3 years.  To learn more about your testing options, visit:   .  For help making a decision, visit:   https://bit.ly/zx82961.  Prostate cancer screening test: If you have a prostate, ask your care team if a prostate cancer screening test (PSA) at age 55 is right for you.  Lung cancer screening: If you are a current or former smoker ages 50 to 80, ask your care team if ongoing lung cancer screenings are right for you.  For informational purposes only. Not to replace the advice of your health care provider. Copyright   2023 [employer]. All rights reserved. Clinically reviewed by the [facility]itions Program. Oja.la [id number] - REV 01/24.

## 2024-08-16 LAB
AMPHET UR CFM-MCNC: 935 NG/ML
AMPHET/CREAT UR: 7792 NG/MG {CREAT}

## 2024-08-19 ENCOUNTER — VIRTUAL VISIT (OUTPATIENT)
Dept: PSYCHOLOGY | Facility: CLINIC | Age: 29
End: 2024-08-19
Payer: COMMERCIAL

## 2024-08-19 DIAGNOSIS — F41.1 GAD (GENERALIZED ANXIETY DISORDER): ICD-10-CM

## 2024-08-19 DIAGNOSIS — F98.8 ATTENTION DEFICIT DISORDER WITHOUT HYPERACTIVITY: Primary | ICD-10-CM

## 2024-08-19 DIAGNOSIS — F33.0 MAJOR DEPRESSIVE DISORDER, RECURRENT EPISODE, MILD (H): ICD-10-CM

## 2024-08-19 PROCEDURE — 90837 PSYTX W PT 60 MINUTES: CPT | Mod: 95 | Performed by: PSYCHOLOGIST

## 2024-08-19 ASSESSMENT — ANXIETY QUESTIONNAIRES
7. FEELING AFRAID AS IF SOMETHING AWFUL MIGHT HAPPEN: NOT AT ALL
5. BEING SO RESTLESS THAT IT IS HARD TO SIT STILL: MORE THAN HALF THE DAYS
GAD7 TOTAL SCORE: 11
IF YOU CHECKED OFF ANY PROBLEMS ON THIS QUESTIONNAIRE, HOW DIFFICULT HAVE THESE PROBLEMS MADE IT FOR YOU TO DO YOUR WORK, TAKE CARE OF THINGS AT HOME, OR GET ALONG WITH OTHER PEOPLE: SOMEWHAT DIFFICULT
GAD7 TOTAL SCORE: 11
4. TROUBLE RELAXING: MORE THAN HALF THE DAYS
2. NOT BEING ABLE TO STOP OR CONTROL WORRYING: SEVERAL DAYS
8. IF YOU CHECKED OFF ANY PROBLEMS, HOW DIFFICULT HAVE THESE MADE IT FOR YOU TO DO YOUR WORK, TAKE CARE OF THINGS AT HOME, OR GET ALONG WITH OTHER PEOPLE?: SOMEWHAT DIFFICULT
3. WORRYING TOO MUCH ABOUT DIFFERENT THINGS: MORE THAN HALF THE DAYS
6. BECOMING EASILY ANNOYED OR IRRITABLE: MORE THAN HALF THE DAYS
7. FEELING AFRAID AS IF SOMETHING AWFUL MIGHT HAPPEN: NOT AT ALL
1. FEELING NERVOUS, ANXIOUS, OR ON EDGE: MORE THAN HALF THE DAYS
GAD7 TOTAL SCORE: 11

## 2024-08-19 NOTE — PROGRESS NOTES
[facility]                                     Progress Note    Patient Name   Date: 2024     Service Type: Individual      Session Start Time  12:30    Session End Time: 1:30 pm       Session Length: 53-60 min    Session #:  12 in 2024  Attendees: Client    Service Modality:  Video Visit       Provider verified identity through the following two step process.  Patient provided:  Patient  and Patient address    Telemedicine Visit: The patient's condition can be safely assessed and treated via synchronous audio and visual telemedicine encounter.      Reason for Telemedicine Visit: Services only offered telehealth    Originating Site (Patient Location): Patient's home    Distant Site (Provider Location): Provider Remote Setting- Home Office    Consent:  The patient/guardian has verbally consented to: the potential risks and benefits of telemedicine (video visit) versus in person care; bill my insurance or make self-payment for services provided; and responsibility for payment of non-covered services.     Patient would like the video invitation sent by:  my chart yes    Mode of Communication:  Video Conference via [facility]  As the provider I attest to compliance with applicable laws and regulations related to telemedicine.    DATA  Interactive Complexity: No  Extended session:  yes  53-60 minutes  on 24   ADD, ODILON, MDD, OCD   Patient's presenting concerns require more intensive intervention than could be completed within the usual service.   -Poor focus, loses her thought by the end of sentence, tangential.    Crisis: No     PROMIS (reviewed every 90 days): at intake 24, 24,    Treatment plan reviewed : today  (first  3-07-24   90 days = 24)    Diagnosis:    F41.1 Generalized Anxiety Disorder,   F33.0 Major Depressive Disorder, recurrent, mild  F96.8 Attention Disorder without Hyperactivity         Progress Since Last Session (Related to Symptoms / Goals /  "Homework):   Symptoms: stable but with add symptoms, intra and interpersonal distress  Managing energy/ managing space/ stuff/ clutter. Less feeling of burn out  Homework: Completed in session      Episode of Care Goals: Satisfactory progress - ACTION (Actively working towards change); Intervened by reinforcing change plan / affirming steps taken.  [employer] system- talks virtually with her friends weekly .       Current / Ongoing Stressors and Concerns:    Loss pattern triggered  Car broke down  \"I struggle with fatigue every day\" entire adult life  Skin picking \"I pick a lot\"  Navigating neuro divergent brain for her and partner  loss / grief. Feeling unsettled.  Anxiety, ADD - poor focus   interpersonal distress  with some of partners family; boundaries needed  Work stress/ burnout feeling at times      Treatment Objective(s) Addressed in This Session:    identify core stressors/  thoughts that contribute to feeling anxious  Patient will use at least 6 coping skills for anxiety management in the next 90 days.  Learn about physiological pathways related to stress and trauma.     Intervention:   Interpersonal Therapy:, CBT, supportive therapy    Homework:  \"I feel like I have made progress in the picking\".    Symptoms:  PMDD diagnosed in her 20's  ongoing fatigue   Skin picking \"I pick a lot\"  attention  focus / concentration disrupted    Stressors:  Car broke down  Spent time hunting, accessed a helper  \"It's exhausting\" researching and test driving.  'Why I am so anxious\".    Examined doing so much all the time and then getting sick.  Wonders about that pattern. Would like better balance.  Explored loss experiences.    Identified and processed emotions.    Assessments completed prior to visit:  The following assessments were completed by patient for this visit: ODILON 90 day roland or none      ASSESSMENT: Current Emotional / Mental Status (status of significant symptoms):   Risk status (Self / Other harm or suicidal " "ideation)         Client denies current fears or concerns for personal safety.   Client denies current or recent suicidal ideation or behaviors.   Client denies current or recent homicidal ideation or behaviors.   Client denies current or recent self injurious behavior or ideation.   Client denies other safety concerns.   A safety and risk management plan has not been developed at this time, however client was given the after-hours number should there be a change in any of these risk factors.     Appearance:   Appropriate    Eye Contact:   Good    Psychomotor Behavior: Normal    Attitude:   Cooperative    Orientation:   All   Speech   Rate / Production: Normal    Volume:  Normal    Mood:    Normal Sad    Affect:    Appropriate  Labile    Thought Content:  Clear    Thought Form:  Coherent  Logical    Insight:    Good       Medication Review:   No changes to psychiatric medications, see updated Medication List in EPIC.          Medication Compliance:   NA     Changes in Health Issues:   None reported     Chemical Use Review:   Substance Use: Chemical use reviewed, no active concerns identified      Tobacco Use: No current tobacco use.      Diagnosis:  F41.1 Generalized Anxiety Disorder,   F33.0 Major Depressive Disorder, recurrent, mild  F96.8 Attention Disorder without Hyperactivity     Collateral Reports Completed:   Routed note to PCP as needed    PLAN: (Patient Tasks / Therapist Tasks / Other  Re-think balance  Consider re-setting my \"spoons\" saying no to inner requests from self   Don't work to exhaustion    Employ body scan as a technique  Use CBT skllls daily  Will consider using distraction, substitution, or prolong strategies.    Plan  is to use several new strategies to improve communication  Without getting dysregulated herself  Review values  Attend to inner boundareis    Set goals for the week. Month.  Keep good boundaries with father.    Plan is to connect with grandmother   May creat a goodbye ritual - " journal or think about each student     Past hw  Plan is to attend to resting this weekend.  Give self permission for boundaries as needed  Past hw  Consider several CBT skills reframes  Consider 2 behavioral intervetions as discussed.  Past hw  Use guidelines as discussed around daily coping skills  and planning for success  Vagal reset.  Hydration / stress support     Lashell HarrisADRIANA                                                      ____________________________________________________________________    Individual Treatment Plan    Patient's Name:  Date Of Birth:     Date of Creation: [birth year]  Date Treatment Plan Last Reviewed/Revised: 4-19-24    DSM5 Diagnoses:  F41.1 Generalized Anxiety Disorder,   F33.0 Major Depressive Disorder, recurrent, mild  F96.8 Attention Disorder without Hyperactivity       Psychosocial / Contextual Factors: multiple stressors, mood disruption  PROMIS (reviewed every 90 days): at intake 2-29-24, 5-03-24, 7-24    Referral / Collaboration:  Referral to another professional/service is not indicated at this time.    Anticipated number of session for this episode of care: 12.  Anticipation frequency of session: Every other week  Anticipated Duration of each session: 38-52 minutes and/or 53-60 minutes  Treatment plan will be reviewed in 90 days or when goals have been changed.       Update 7-11-24   The plan for the next 90 day is continue/ maintain  with these actions and cognitive behavioral tasks to develop more consistency / skill proficiency in effort to reduce symptom frequency, intensity and duration.    Set goals for the week. Month.  Keep good boundaries with father.  Plan is to connect with grandmother   May creat a goodbye ritual - journal or think about each student         Update 4-19-24   The plan for the next 90 day is continue/ maintain  with these actions and cognitive behavioral tasks to develop more consistency / skill proficiency in effort to reduce symptom frequency,  intensity and duration.    Plan is to attend to resting this weekend.  Give self permission for boundaries as needed  Consider several CBT skills reframes  Consider 2 behavioral intervetions as discussed.  Use guidelines as discussed around daily coping skills  and planning for success  Vagal reset.  Hydration / stress support       Measurable Treatment Goal(s) related to diagnosis / functional impairment(s)  Goal 1: Patient will identify core pattern of  thoughts that contribute to feeling stressed or  anxious.    I will know I've met my goal when .  will report 50% less disrupted mood.      Objective #A (Patient Action)    Patient will use at least 6 coping skills for anxiety management in the next 90 days weeks.  Status: New - Date: 2024    Intervention(s)  Therapist will teach coping skills for anxiety/ unwanted changes.      Patient has reviewed and agreed to the above plan.      Lashell Harris LP     Answers submitted by the patient for this visit:  Patient Health Questionnaire (Submitted on 6/14/2024)  If you checked off any problems, how difficult have these problems made it for you to do your work, take care of things at home, or get along with other people?: Somewhat difficult  PHQ9 TOTAL SCORE: 10  ODILON-7 (Submitted on 6/14/2024)  ODILON 7 TOTAL SCORE: 7    Answers submitted by the patient for this visit:  Patient Health Questionnaire (Submitted on 8/5/2024)  If you checked off any problems, how difficult have these problems made it for you to do your work, take care of things at home, or get along with other people?: Somewhat difficult  PHQ9 TOTAL SCORE: 7    Answers submitted by the patient for this visit:  Patient Health Questionnaire (Submitted on 8/19/2024)  If you checked off any problems, how difficult have these problems made it for you to do your work, take care of things at home, or get along with other people?: Somewhat difficult  PHQ9 TOTAL SCORE: 9  DOILON-7 (Submitted on 8/19/2024)  ODILON 7 TOTAL  SCORE: 11

## 2024-08-19 NOTE — RESULT ENCOUNTER NOTE
Hello     Thanks for coming to clinic.  The clinician who ordered these tests is currently out of the office, but we wanted to let you know that your results have been reviewed.      Your clinician will review your results upon their return and may get back to you with further information if needed.      Dr. Amber Devi MD / [facility]

## 2024-09-09 ENCOUNTER — VIRTUAL VISIT (OUTPATIENT)
Dept: PSYCHOLOGY | Facility: CLINIC | Age: 29
End: 2024-09-09
Payer: COMMERCIAL

## 2024-09-09 DIAGNOSIS — F98.8 ATTENTION DEFICIT DISORDER WITHOUT HYPERACTIVITY: Primary | ICD-10-CM

## 2024-09-09 DIAGNOSIS — F33.0 MAJOR DEPRESSIVE DISORDER, RECURRENT EPISODE, MILD (H): ICD-10-CM

## 2024-09-09 DIAGNOSIS — F41.1 GAD (GENERALIZED ANXIETY DISORDER): ICD-10-CM

## 2024-09-09 PROCEDURE — 90837 PSYTX W PT 60 MINUTES: CPT | Mod: 95 | Performed by: PSYCHOLOGIST

## 2024-09-09 NOTE — PROGRESS NOTES
[facility]                                     Progress Note    Patient Name   Date: 2024     Service Type: Individual      Session Start Time  3:35    Session End Time: 4:35 pm       Session Length: 53-60 min    Session #:  13 in   Attendees: Client    Service Modality:  Video Visit       Provider verified identity through the following two step process.  Patient provided:  Patient  and Patient address    Telemedicine Visit: The patient's condition can be safely assessed and treated via synchronous audio and visual telemedicine encounter.      Reason for Telemedicine Visit: Services only offered telehealth    Originating Site (Patient Location): Patient's home    Distant Site (Provider Location): Provider Remote Setting- Home Office    Consent:  The patient/guardian has verbally consented to: the potential risks and benefits of telemedicine (video visit) versus in person care; bill my insurance or make self-payment for services provided; and responsibility for payment of non-covered services.     Patient would like the video invitation sent by:  my chart yes    Mode of Communication:  Video Conference via [facility] Well  As the provider I attest to compliance with applicable laws and regulations related to telemedicine.    DATA  Interactive Complexity: No  Extended session:  yes  53-60 minutes  on 24   ADD, ODILON, MDD, OCD   Patient's presenting concerns require more intensive intervention than could be completed within the usual service.   -Poor focus, loses her thought by the end of sentence, tangential.    Crisis: No     PROMIS (reviewed every 90 days): at intake 24, 24,  , due 10-24  Treatment plan reviewed : today  (first  3-07-24   90 days = 24)    Diagnosis:    F41.1 Generalized Anxiety Disorder,   F33.0 Major Depressive Disorder, recurrent, mild  F96.8 Attention Disorder without Hyperactivity         Progress Since Last Session (Related to Symptoms / Goals /  "Homework):   Symptoms: stable but with add symptoms, intra and interpersonal distress  Managing energy/ managing space/ stuff/ clutter. Less feeling of burn out  Homework: Completed in session      Episode of Care Goals: Satisfactory progress - ACTION (Actively working towards change); Intervened by reinforcing change plan / affirming steps taken.  [employer] system- talks virtually with her friends weekly .       Current / Ongoing Stressors and Concerns:    Loss pattern triggered  Car broke down  \"I struggle with fatigue every day\" entire adult life  Skin picking \"I pick a lot\"  Navigating neuro divergent brain for her and partner  loss / grief. Feeling unsettled.  Anxiety, ADD - poor focus   interpersonal distress  with some of partners family; boundaries needed  Work stress/ burnout feeling at times      Treatment Objective(s) Addressed in This Session:    identify core stressors/  thoughts that contribute to feeling anxious  Patient will use at least 6 coping skills for anxiety management in the next 90 days.  Learn about physiological pathways related to stress and trauma.     Intervention:   Interpersonal Therapy:, CBT, supportive therapy    Homework:  \"I feel like I have made progress in the picking\".    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     Did start a pertinent / difficult conversation with partner.  Started a conversation about chores and timing / priority.  Discussed triggers.     Examined good as well as maladaptive patterns.  Constructed homework for improving interpersonal effectiveness.    Identified and processed emotions.    Assessments completed prior to visit:  The following assessments were completed by patient for this visit: ODILON 90 day roland or none      ASSESSMENT: Current Emotional / " "Mental Status (status of significant symptoms):   Risk status (Self / Other harm or suicidal ideation)      Risk status (Self / Other harm or suicidal ideation)   Patient denies current fears or concerns for personal safety.   Patient denies current or recent suicidal ideation or behaviors.   Patient denies current or recent homicidal ideation or behaviors.   Patient denies current or recent self injurious behavior or ideation.   Patient denies other safety concerns.   Patient reports there has been no change in risk factors since their last session.     Patient reports there has been no change in protective factors since their last session.    Recommended that patient call 911 or go to the local ED should there be a change in any of these risk factors.     Appearance:   Appropriate    Eye Contact:   Good    Psychomotor Behavior: Normal    Attitude:   Cooperative    Orientation:   All   Speech   Rate / Production:  Normal    Volume:   Normal    Mood:    Normal   Affect:    Appropriate    Thought Content:  Clear    Thought Form:  Coherent  Logical    Insight:    Good         Medication Review:   No changes to psychiatric medications, see updated Medication List in EPIC.          Medication Compliance:   NA     Changes in Health Issues:   None reported     Chemical Use Review:   Substance Use: Chemical use reviewed, no active concerns identified      Tobacco Use: No current tobacco use.      Diagnosis:  F41.1 Generalized Anxiety Disorder,   F33.0 Major Depressive Disorder, recurrent, mild  F96.8 Attention Disorder without Hyperactivity     Collateral Reports Completed:   Routed note to PCP as needed    PLAN: (Patient Tasks / Therapist Tasks / Other  Invite partner to use communication tools as discussed today     Re-think balance  Consider re-setting my \"spoons\" saying no to inner requests from self   Don't work to exhaustion    Employ body scan as a technique  Use CBT skllls daily  Will consider using distraction, " substitution, or prolong strategies.    Plan  is to use several new strategies to improve communication  Without getting dysregulated herself  Review values  Attend to inner boundareis    Set goals for the week. Month.  Keep good boundaries with father.    Plan is to connect with grandmother   May creat a goodbye ritual - journal or think about each student     Past hw  Plan is to attend to resting this weekend.  Give self permission for boundaries as needed  Past hw  Consider several CBT skills reframes  Consider 2 behavioral intervetions as discussed.  Past hw  Use guidelines as discussed around daily coping skills  and planning for success  Vagal reset.  Hydration / stress support     Lashell Harris LP                                                      ____________________________________________________________________    Individual Treatment Plan    Patient's Name:  Date Of Birth:     Date of Creation: [birth year]  Date Treatment Plan Last Reviewed/Revised: 4-19-24    DSM5 Diagnoses:  F41.1 Generalized Anxiety Disorder,   F33.0 Major Depressive Disorder, recurrent, mild  F96.8 Attention Disorder without Hyperactivity       Psychosocial / Contextual Factors: multiple stressors, mood disruption  PROMIS (reviewed every 90 days): at intake 2-29-24, 5-03-24, 7-24    Referral / Collaboration:  Referral to another professional/service is not indicated at this time.    Anticipated number of session for this episode of care: 12.  Anticipation frequency of session: Every other week  Anticipated Duration of each session: 38-52 minutes and/or 53-60 minutes  Treatment plan will be reviewed in 90 days or when goals have been changed.       Update 7-11-24   The plan for the next 90 day is continue/ maintain  with these actions and cognitive behavioral tasks to develop more consistency / skill proficiency in effort to reduce symptom frequency, intensity and duration.    Set goals for the week. Month.  Keep good boundaries with  father.  Plan is to connect with grandmother   May creat a goodbye ritual - journal or think about each student         Update 4-19-24   The plan for the next 90 day is continue/ maintain  with these actions and cognitive behavioral tasks to develop more consistency / skill proficiency in effort to reduce symptom frequency, intensity and duration.    Plan is to attend to resting this weekend.  Give self permission for boundaries as needed  Consider several CBT skills reframes  Consider 2 behavioral intervetions as discussed.  Use guidelines as discussed around daily coping skills  and planning for success  Vagal reset.  Hydration / stress support       Measurable Treatment Goal(s) related to diagnosis / functional impairment(s)  Goal 1: Patient will identify core pattern of  thoughts that contribute to feeling stressed or  anxious.    I will know I've met my goal when .  will report 50% less disrupted mood.      Objective #A (Patient Action)    Patient will use at least 6 coping skills for anxiety management in the next 90 days weeks.  Status: New - Date: 2024    Intervention(s)  Therapist will teach coping skills for anxiety/ unwanted changes.      Patient has reviewed and agreed to the above plan.      Lashell Harris LP     Answers submitted by the patient for this visit:  Patient Health Questionnaire (Submitted on 6/14/2024)  If you checked off any problems, how difficult have these problems made it for you to do your work, take care of things at home, or get along with other people?: Somewhat difficult  PHQ9 TOTAL SCORE: 10  ODILON-7 (Submitted on 6/14/2024)  ODILON 7 TOTAL SCORE: 7    Answers submitted by the patient for this visit:  Patient Health Questionnaire (Submitted on 8/5/2024)  If you checked off any problems, how difficult have these problems made it for you to do your work, take care of things at home, or get along with other people?: Somewhat difficult  PHQ9 TOTAL SCORE: 7    Answers submitted by the  patient for this visit:  Patient Health Questionnaire (Submitted on 8/19/2024)  If you checked off any problems, how difficult have these problems made it for you to do your work, take care of things at home, or get along with other people?: Somewhat difficult  PHQ9 TOTAL SCORE: 9  ODILON-7 (Submitted on 8/19/2024)  ODILON 7 TOTAL SCORE: 11    Answers submitted by the patient for this visit:  Patient Health Questionnaire (Submitted on 9/9/2024)  If you checked off any problems, how difficult have these problems made it for you to do your work, take care of things at home, or get along with other people?: Somewhat difficult  PHQ9 TOTAL SCORE: 8

## 2024-09-23 ENCOUNTER — VIRTUAL VISIT (OUTPATIENT)
Dept: PSYCHOLOGY | Facility: CLINIC | Age: 29
End: 2024-09-23
Payer: COMMERCIAL

## 2024-09-23 DIAGNOSIS — F33.0 MAJOR DEPRESSIVE DISORDER, RECURRENT EPISODE, MILD (H): ICD-10-CM

## 2024-09-23 DIAGNOSIS — F98.8 ATTENTION DEFICIT DISORDER WITHOUT HYPERACTIVITY: Primary | ICD-10-CM

## 2024-09-23 DIAGNOSIS — F41.1 GAD (GENERALIZED ANXIETY DISORDER): ICD-10-CM

## 2024-09-23 PROCEDURE — 90837 PSYTX W PT 60 MINUTES: CPT | Mod: 95 | Performed by: PSYCHOLOGIST

## 2024-09-23 ASSESSMENT — ANXIETY QUESTIONNAIRES
GAD7 TOTAL SCORE: 10
8. IF YOU CHECKED OFF ANY PROBLEMS, HOW DIFFICULT HAVE THESE MADE IT FOR YOU TO DO YOUR WORK, TAKE CARE OF THINGS AT HOME, OR GET ALONG WITH OTHER PEOPLE?: SOMEWHAT DIFFICULT
7. FEELING AFRAID AS IF SOMETHING AWFUL MIGHT HAPPEN: SEVERAL DAYS
GAD7 TOTAL SCORE: 10
GAD7 TOTAL SCORE: 10

## 2024-09-23 NOTE — PROGRESS NOTES
[facility]                                     Progress Note    Patient Name   Date: 2024     Service Type: Individual      Session Start Time  3:34    Session End Time: 4:35 pm       Session Length: 53-60 min    Session #:  14 in   Attendees: Client    Service Modality:  Video Visit       Provider verified identity through the following two step process.  Patient provided:  Patient  and Patient address    Telemedicine Visit: The patient's condition can be safely assessed and treated via synchronous audio and visual telemedicine encounter.      Reason for Telemedicine Visit: Services only offered telehealth    Originating Site (Patient Location): Patient's home    Distant Site (Provider Location): Provider Remote Setting- Home Office    Consent:  The patient/guardian has verbally consented to: the potential risks and benefits of telemedicine (video visit) versus in person care; bill my insurance or make self-payment for services provided; and responsibility for payment of non-covered services.     Patient would like the video invitation sent by:  my chart yes    Mode of Communication:  Video Conference via [employer]  As the provider I attest to compliance with applicable laws and regulations related to telemedicine.    DATA  Interactive Complexity: No  Extended session:  yes  53-60 minutes  on 24   ADD, ODILON, MDD, OCD   Patient's presenting concerns require more intensive intervention than could be completed within the usual service.   -Poor focus, loses her thought by the end of sentence, tangential.    Crisis: No     PROMIS (reviewed every 90 days): at intake 24, 24,  , due 10-24  Treatment plan reviewed : today  (first  3-07-24   90 days = 24)    Diagnosis:    F41.1 Generalized Anxiety Disorder,   F33.0 Major Depressive Disorder, recurrent, mild  F96.8 Attention Disorder without Hyperactivity         Progress Since Last Session (Related to Symptoms / Goals /  "Homework):   Symptoms: stable but with add symptoms, intra and interpersonal distress  Managing energy/ managing space/ stuff/ clutter. Less feeling of burn out  Homework: Completed in session      Episode of Care Goals: Satisfactory progress - ACTION (Actively working towards change); Intervened by reinforcing change plan / affirming steps taken.  [employer] system- talks virtually with her friends weekly .       Current / Ongoing Stressors and Concerns:    Loss pattern triggered  Car broke down  \"I struggle with fatigue every day\" entire adult life  Skin picking \"I pick a lot\"  Navigating neuro divergent brain for her and partner  loss / grief. Feeling unsettled.  Anxiety, ADD - poor focus   interpersonal distress  with some of partners family; boundaries needed  Work stress/ burnout feeling at times      Treatment Objective(s) Addressed in This Session:    identify core stressors/  thoughts that contribute to feeling anxious  Patient will use at least 6 coping skills for anxiety management in the next 90 days.  Learn about physiological pathways related to stress and trauma.     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 Discussed personal  , need as she carries oit day to day duties.    Reviewed homework. Is having ongoing progress in   How and when she communicates with her partner.  (Has knowledge of ODD). Has felt more support.    Reviewed picking behaviors and skills used.    Identified and processed emotions.    Assessments completed prior to visit:  The following assessments were completed by patient for this visit: ODILON 90 day roland or none      ASSESSMENT: Current Emotional / Mental Status (status of significant " "symptoms):   *       Risk status (Self / Other harm or suicidal ideation)   Patient denies current fears or concerns for personal safety.   Patient denies current or recent suicidal ideation or behaviors.   Patient denies current or recent homicidal ideation or behaviors.   Patient denies current or recent self injurious behavior or ideation.   Patient denies other safety concerns.   Patient reports there has been no change in risk factors since their last session.     Patient reports there has been no change in protective factors since their last session.    Recommended that patient call 911 or go to the local ED should there be a change in any of these risk factors.     Appearance:   Appropriate    Eye Contact:   Good    Psychomotor Behavior: Normal    Attitude:   Cooperative    Orientation:   All   Speech   Rate / Production:  Normal    Volume:   Normal    Mood:    Normal   Affect:    Appropriate    Thought Content:  Clear    Thought Form:  Coherent  Logical    Insight:    Good            Medication Review:   No changes to psychiatric medications, see updated Medication List in EPIC.          Medication Compliance:   NA     Changes in Health Issues:   None reported     Chemical Use Review:   Substance Use: Chemical use reviewed, no active concerns identified      Tobacco Use: No current tobacco use.      Diagnosis:  F41.1 Generalized Anxiety Disorder,   F33.0 Major Depressive Disorder, recurrent, mild  F96.8 Attention Disorder without Hyperactivity     Collateral Reports Completed:   Routed note to PCP as needed    PLAN: (Patient Tasks / Therapist Tasks / Other  Invite partner to use communication tools as discussed today     Re-think balance  Consider re-setting my \"spoons\" saying no to inner requests from self   Don't work to exhaustion    Employ body scan as a technique  Use CBT skllls daily  Will consider using distraction, substitution, or prolong strategies.    Plan  is to use several new strategies to " improve communication  Without getting dysregulated herself  Review values  Attend to inner boundareis    Set goals for the week. Month.  Keep good boundaries with father.    Plan is to connect with grandmother   May creat a goodbye ritual - journal or think about each student     Past hw  Plan is to attend to resting this weekend.  Give self permission for boundaries as needed  Past hw  Consider several CBT skills reframes  Consider 2 behavioral intervetions as discussed.  Past hw  Use guidelines as discussed around daily coping skills  and planning for success  Vagal reset.  Hydration / stress support     Lashell Harris LP                                                      ____________________________________________________________________    Individual Treatment Plan    Patient's Name:  Date Of Birth:     Date of Creation: [birth year]  Date Treatment Plan Last Reviewed/Revised: 4-19-24    DSM5 Diagnoses:  F41.1 Generalized Anxiety Disorder,   F33.0 Major Depressive Disorder, recurrent, mild  F96.8 Attention Disorder without Hyperactivity       Psychosocial / Contextual Factors: multiple stressors, mood disruption  PROMIS (reviewed every 90 days): at intake 2-29-24, 5-03-24, 7-24    Referral / Collaboration:  Referral to another professional/service is not indicated at this time.    Anticipated number of session for this episode of care: 12.  Anticipation frequency of session: Every other week  Anticipated Duration of each session: 38-52 minutes and/or 53-60 minutes  Treatment plan will be reviewed in 90 days or when goals have been changed.       Update 7-11-24   The plan for the next 90 day is continue/ maintain  with these actions and cognitive behavioral tasks to develop more consistency / skill proficiency in effort to reduce symptom frequency, intensity and duration.    Set goals for the week. Month.  Keep good boundaries with father.  Plan is to connect with grandmother   May creat a goodbye ritual - journal  or think about each student         Update 4-19-24   The plan for the next 90 day is continue/ maintain  with these actions and cognitive behavioral tasks to develop more consistency / skill proficiency in effort to reduce symptom frequency, intensity and duration.    Plan is to attend to resting this weekend.  Give self permission for boundaries as needed  Consider several CBT skills reframes  Consider 2 behavioral intervetions as discussed.  Use guidelines as discussed around daily coping skills  and planning for success  Vagal reset.  Hydration / stress support       Measurable Treatment Goal(s) related to diagnosis / functional impairment(s)  Goal 1: Patient will identify core pattern of  thoughts that contribute to feeling stressed or  anxious.    I will know I've met my goal when .  will report 50% less disrupted mood.      Objective #A (Patient Action)    Patient will use at least 6 coping skills for anxiety management in the next 90 days weeks.  Status: New - Date: 2024    Intervention(s)  Therapist will teach coping skills for anxiety/ unwanted changes.      Patient has reviewed and agreed to the above plan.      Lashell Harris LP       Answers submitted by the patient for this visit:  Patient Health Questionnaire (Submitted on 9/23/2024)  If you checked off any problems, how difficult have these problems made it for you to do your work, take care of things at home, or get along with other people?: Somewhat difficult  PHQ9 TOTAL SCORE: 9  Patient Health Questionnaire (G7) (Submitted on 9/23/2024)  ODILON 7 TOTAL SCORE: 10

## 2024-10-03 ENCOUNTER — MYC MEDICAL ADVICE (OUTPATIENT)
Dept: FAMILY MEDICINE | Facility: CLINIC | Age: 29
End: 2024-10-03
Payer: COMMERCIAL

## 2024-10-03 DIAGNOSIS — F98.8 ATTENTION DEFICIT DISORDER WITHOUT HYPERACTIVITY: ICD-10-CM

## 2024-10-03 NOTE — TELEPHONE ENCOUNTER
Writer responded via [employer].    Dolores Ac, BSN RN  [facility]

## 2024-10-07 ENCOUNTER — VIRTUAL VISIT (OUTPATIENT)
Dept: PSYCHOLOGY | Facility: CLINIC | Age: 29
End: 2024-10-07
Payer: COMMERCIAL

## 2024-10-07 ENCOUNTER — MYC MEDICAL ADVICE (OUTPATIENT)
Dept: FAMILY MEDICINE | Facility: CLINIC | Age: 29
End: 2024-10-07
Payer: COMMERCIAL

## 2024-10-07 DIAGNOSIS — F98.8 ATTENTION DEFICIT DISORDER WITHOUT HYPERACTIVITY: Primary | ICD-10-CM

## 2024-10-07 DIAGNOSIS — F33.0 MAJOR DEPRESSIVE DISORDER, RECURRENT EPISODE, MILD (H): ICD-10-CM

## 2024-10-07 DIAGNOSIS — F41.1 GAD (GENERALIZED ANXIETY DISORDER): ICD-10-CM

## 2024-10-07 PROCEDURE — 90837 PSYTX W PT 60 MINUTES: CPT | Mod: 95 | Performed by: PSYCHOLOGIST

## 2024-10-07 RX ORDER — DEXTROAMPHETAMINE SACCHARATE, AMPHETAMINE ASPARTATE MONOHYDRATE, DEXTROAMPHETAMINE SULFATE AND AMPHETAMINE SULFATE 2.5; 2.5; 2.5; 2.5 MG/1; MG/1; MG/1; MG/1
10 CAPSULE, EXTENDED RELEASE ORAL DAILY
Qty: 30 CAPSULE | Refills: 0 | Status: SHIPPED | OUTPATIENT
Start: 2024-10-07

## 2024-10-07 NOTE — PROGRESS NOTES
[facility]                                     Progress Note    Patient Name   Date: 10-     Service Type: Individual      Session Start Time  3:33    Session End Time: 4:35 pm       Session Length: 53-60 min    Session #:  15 in   Attendees: Client    Service Modality:  Video Visit       Provider verified identity through the following two step process.  Patient provided:  Patient  and Patient address    Telemedicine Visit: The patient's condition can be safely assessed and treated via synchronous audio and visual telemedicine encounter.      Reason for Telemedicine Visit: Services only offered telehealth    Originating Site (Patient Location): Patient's home    Distant Site (Provider Location): Provider Remote Setting- Home Office    Consent:  The patient/guardian has verbally consented to: the potential risks and benefits of telemedicine (video visit) versus in person care; bill my insurance or make self-payment for services provided; and responsibility for payment of non-covered services.     Patient would like the video invitation sent by:  my chart yes    Mode of Communication:  Video Conference via [employer]  As the provider I attest to compliance with applicable laws and regulations related to telemedicine.    DATA  Interactive Complexity: No  Extended session:  yes  53-60 minutes  on 10-07-24   ADD, ODILON, MDD, OCD   Patient's presenting concerns require more intensive intervention than could be completed within the usual service.   -Poor focus, loses her thought by the end of sentence, tangential.    Crisis: No     PROMIS (reviewed every 90 days): at intake 24, 24,  , due 10-24  Treatment plan reviewed : today  (first  3-07-24   90 days = 24)    Diagnosis:    F41.1 Generalized Anxiety Disorder,   F33.0 Major Depressive Disorder, recurrent, mild  F96.8 Attention Disorder without Hyperactivity         Progress Since Last Session (Related to Symptoms / Goals /  "Homework):   Symptoms: stable but with add symptoms, intra and interpersonal distress  Managing energy/ managing space/ stuff/ clutter. Less feeling of burn out  Homework: Completed in session      Episode of Care Goals: Satisfactory progress - ACTION (Actively working towards change); Intervened by reinforcing change plan / affirming steps taken.  [employer] system- talks virtually with her friends weekly .       Current / Ongoing Stressors and Concerns:    Loss pattern triggered  Car broke down  \"I struggle with fatigue every day\" entire adult life  Skin picking \"I pick a lot\"  Navigating neuro divergent brain for her and partner  loss / grief. Feeling unsettled.  Anxiety, ADD - poor focus   interpersonal distress  with some of partners family; boundaries needed  Work stress/ burnout feeling at times      Treatment Objective(s) Addressed in This Session:    identify core stressors/  thoughts that contribute to feeling anxious  Patient will use at least 6 coping skills for anxiety management in the next 90 days.  Learn about physiological pathways related to stress and trauma.     Intervention:   Interpersonal Therapy:, CBT, supportive therapy    Homework:  in session and at home    Symptoms:  PMDD diagnosed in her 20's  ongoing fatigue   Skin picking \"I pick a lot\"  attention  focus / concentration disrupted    Stressors:    Client reports that she had trouble filling her Adderall  Called around to a few pharmacy.  Going to work without it was difficult.    Has never run out of her selective serotonin reuptake inhibitor;  Reports that she does still have depressive episodes  \"They still happen - but they don't run as long\".  Explored things that she has tried.  Has felt bad when she is dysregulated with her partner.    Explored gratitude:  Shares that this is my 1st solid place- since she was age 2.  Had moved so much throughout her life.      Reviewed skills used to manage mood during pre- menstrual " week.    Identified and processed emotions.    Assessments completed prior to visit:  The following assessments were completed by patient for this visit: ODILON 90 day roland or none      ASSESSMENT: Current Emotional / Mental Status (status of significant symptoms):   10-07-24 Risk status (Self / Other harm or suicidal ideation)   Patient denies current fears or concerns for personal safety.   Patient denies current or recent suicidal ideation or behaviors.   Patient denies current or recent homicidal ideation or behaviors.   Patient denies current or recent self injurious behavior or ideation.   Patient denies other safety concerns.   Patient reports there has been no change in risk factors since their last session.     Patient reports there has been no change in protective factors since their last session.    Recommended that patient call 911 or go to the local ED should there be a change in any of these risk factors.     Appearance:   Appropriate    Eye Contact:   Good    Psychomotor Behavior: Normal    Attitude:   Cooperative    Orientation:   All   Speech   Rate / Production:  Normal    Volume:   Normal    Mood:    Normal   Affect:    Appropriate    Thought Content:  Clear    Thought Form:  Coherent  Logical    Insight:    Good       Medication Review:   No changes to psychiatric medications, see updated Medication List in EPIC.          Medication Compliance:   NA     Changes in Health Issues:   None reported     Chemical Use Review:   Substance Use: Chemical use reviewed, no active concerns identified      Tobacco Use: No current tobacco use.      Diagnosis:  F41.1 Generalized Anxiety Disorder,   F33.0 Major Depressive Disorder, recurrent, mild  F96.8 Attention Disorder without Hyperactivity     Collateral Reports Completed:   Routed note to PCP as needed    PLAN: (Patient Tasks / Therapist Tasks / Other    Client wants to stay a bit ahead of her symptoms.    Invite partner to use communication tools as  "discussed today     Re-think balance  Consider re-setting my \"spoons\" saying no to inner requests from self   Don't work to exhaustion    Employ body scan as a technique  Use CBT skllls daily  Will consider using distraction, substitution, or prolong strategies.    Plan  is to use several new strategies to improve communication  Without getting dysregulated herself  Review values  Attend to inner boundareis    Set goals for the week. Month.  Keep good boundaries with father.    Plan is to connect with grandmother   May creat a goodbye ritual - journal or think about each student     Past hw  Plan is to attend to resting this weekend.  Give self permission for boundaries as needed  Past hw  Consider several CBT skills reframes  Consider 2 behavioral intervetions as discussed.  Past hw  Use guidelines as discussed around daily coping skills  and planning for success  Vagal reset.  Hydration / stress support     Lashell Harris LP                                                      ____________________________________________________________________    Individual Treatment Plan    Patient's Name:  Date Of Birth:     Date of Creation: [birth year]  Date Treatment Plan Last Reviewed/Revised: 4-19-24    DSM5 Diagnoses:  F41.1 Generalized Anxiety Disorder,   F33.0 Major Depressive Disorder, recurrent, mild  F96.8 Attention Disorder without Hyperactivity       Psychosocial / Contextual Factors: multiple stressors, mood disruption  PROMIS (reviewed every 90 days): at intake 2-29-24, 5-03-24, 7-24    Referral / Collaboration:  Referral to another professional/service is not indicated at this time.    Anticipated number of session for this episode of care: 12.  Anticipation frequency of session: Every other week  Anticipated Duration of each session: 38-52 minutes and/or 53-60 minutes  Treatment plan will be reviewed in 90 days or when goals have been changed.       Update 7-11-24   The plan for the next 90 day is continue/ maintain "  with these actions and cognitive behavioral tasks to develop more consistency / skill proficiency in effort to reduce symptom frequency, intensity and duration.    Set goals for the week. Month.  Keep good boundaries with father.  Plan is to connect with grandmother   May creat a goodbye ritual - journal or think about each student         Update 4-19-24   The plan for the next 90 day is continue/ maintain  with these actions and cognitive behavioral tasks to develop more consistency / skill proficiency in effort to reduce symptom frequency, intensity and duration.    Plan is to attend to resting this weekend.  Give self permission for boundaries as needed  Consider several CBT skills reframes  Consider 2 behavioral intervetions as discussed.  Use guidelines as discussed around daily coping skills  and planning for success  Vagal reset.  Hydration / stress support       Measurable Treatment Goal(s) related to diagnosis / functional impairment(s)  Goal 1: Patient will identify core pattern of  thoughts that contribute to feeling stressed or  anxious.    I will know I've met my goal when .  will report 50% less disrupted mood.      Objective #A (Patient Action)    Patient will use at least 6 coping skills for anxiety management in the next 90 days weeks.  Status: New - Date: 2024    Intervention(s)  Therapist will teach coping skills for anxiety/ unwanted changes.      Patient has reviewed and agreed to the above plan.      Lashell Harris LP

## 2024-10-07 NOTE — TELEPHONE ENCOUNTER
Shania-Please review and sign if agree.  Med and requested pharmacy pended.    Thank you!  COBY AntoineN, RN-[facility]

## 2024-10-07 NOTE — TELEPHONE ENCOUNTER
Writer responded via [employer].  COBY AntoineN, RN-BC  [facility]

## 2024-10-07 NOTE — TELEPHONE ENCOUNTER
Future appt:     Your appointments     Date & Time Appointment Department [facility])    May 26, 2021  1:15 PM CDT Exam - Established with Natali Quintanilla, [address] [place] [address], [place] ([place])            [facility]  We could try a mail pharmacy for the next round- I have had some luck with this in addition to [place] pharmacies.

## 2024-10-07 NOTE — TELEPHONE ENCOUNTER
Writer responded via [employer].  COBY AntoineN, RN-BC  [facility]

## 2024-10-19 ENCOUNTER — IMMUNIZATION (OUTPATIENT)
Dept: FAMILY MEDICINE | Facility: CLINIC | Age: 29
End: 2024-10-19
Payer: COMMERCIAL

## 2024-10-19 PROCEDURE — 90480 ADMN SARSCOV2 VAC 1/ONLY CMP: CPT

## 2024-10-19 PROCEDURE — 91320 SARSCV2 VAC 30MCG TRS-SUC IM: CPT

## 2024-10-19 PROCEDURE — 90656 IIV3 VACC NO PRSV 0.5 ML IM: CPT

## 2024-10-19 PROCEDURE — 90471 IMMUNIZATION ADMIN: CPT

## 2024-10-31 DIAGNOSIS — F98.8 ATTENTION DEFICIT DISORDER WITHOUT HYPERACTIVITY: ICD-10-CM

## 2024-11-01 ENCOUNTER — E-VISIT (OUTPATIENT)
Dept: FAMILY MEDICINE | Facility: CLINIC | Age: 29
End: 2024-11-01
Payer: COMMERCIAL

## 2024-11-01 DIAGNOSIS — F98.8 ATTENTION DEFICIT DISORDER WITHOUT HYPERACTIVITY: Primary | ICD-10-CM

## 2024-11-01 PROCEDURE — 99421 OL DIG E/M SVC 5-10 MIN: CPT | Performed by: NURSE PRACTITIONER

## 2024-11-01 RX ORDER — DEXTROAMPHETAMINE SACCHARATE, AMPHETAMINE ASPARTATE MONOHYDRATE, DEXTROAMPHETAMINE SULFATE AND AMPHETAMINE SULFATE 2.5; 2.5; 2.5; 2.5 MG/1; MG/1; MG/1; MG/1
10 CAPSULE, EXTENDED RELEASE ORAL DAILY
Qty: 30 CAPSULE | Refills: 0 | OUTPATIENT
Start: 2024-11-01

## 2024-11-01 RX ORDER — DEXTROAMPHETAMINE SACCHARATE, AMPHETAMINE ASPARTATE MONOHYDRATE, DEXTROAMPHETAMINE SULFATE AND AMPHETAMINE SULFATE 2.5; 2.5; 2.5; 2.5 MG/1; MG/1; MG/1; MG/1
10 CAPSULE, EXTENDED RELEASE ORAL DAILY
Qty: 30 CAPSULE | Refills: 0 | Status: SHIPPED | OUTPATIENT
Start: 2024-11-01 | End: 2024-12-01

## 2024-11-01 RX ORDER — DEXTROAMPHETAMINE SACCHARATE, AMPHETAMINE ASPARTATE MONOHYDRATE, DEXTROAMPHETAMINE SULFATE AND AMPHETAMINE SULFATE 2.5; 2.5; 2.5; 2.5 MG/1; MG/1; MG/1; MG/1
10 CAPSULE, EXTENDED RELEASE ORAL DAILY
Qty: 30 CAPSULE | Refills: 0 | Status: SHIPPED | OUTPATIENT
Start: 2024-12-31 | End: 2025-01-30

## 2024-11-01 RX ORDER — DEXTROAMPHETAMINE SACCHARATE, AMPHETAMINE ASPARTATE MONOHYDRATE, DEXTROAMPHETAMINE SULFATE AND AMPHETAMINE SULFATE 2.5; 2.5; 2.5; 2.5 MG/1; MG/1; MG/1; MG/1
10 CAPSULE, EXTENDED RELEASE ORAL DAILY
Qty: 30 CAPSULE | Refills: 0 | Status: SHIPPED | OUTPATIENT
Start: 2024-12-01 | End: 2024-12-31

## 2024-11-01 ASSESSMENT — ANXIETY QUESTIONNAIRES
7. FEELING AFRAID AS IF SOMETHING AWFUL MIGHT HAPPEN: SEVERAL DAYS
GAD7 TOTAL SCORE: 8
IF YOU CHECKED OFF ANY PROBLEMS ON THIS QUESTIONNAIRE, HOW DIFFICULT HAVE THESE PROBLEMS MADE IT FOR YOU TO DO YOUR WORK, TAKE CARE OF THINGS AT HOME, OR GET ALONG WITH OTHER PEOPLE: SOMEWHAT DIFFICULT
GAD7 TOTAL SCORE: 8
7. FEELING AFRAID AS IF SOMETHING AWFUL MIGHT HAPPEN: SEVERAL DAYS
5. BEING SO RESTLESS THAT IT IS HARD TO SIT STILL: SEVERAL DAYS
6. BECOMING EASILY ANNOYED OR IRRITABLE: MORE THAN HALF THE DAYS
8. IF YOU CHECKED OFF ANY PROBLEMS, HOW DIFFICULT HAVE THESE MADE IT FOR YOU TO DO YOUR WORK, TAKE CARE OF THINGS AT HOME, OR GET ALONG WITH OTHER PEOPLE?: SOMEWHAT DIFFICULT
4. TROUBLE RELAXING: SEVERAL DAYS
GAD7 TOTAL SCORE: 8
1. FEELING NERVOUS, ANXIOUS, OR ON EDGE: SEVERAL DAYS
2. NOT BEING ABLE TO STOP OR CONTROL WORRYING: SEVERAL DAYS
3. WORRYING TOO MUCH ABOUT DIFFERENT THINGS: SEVERAL DAYS

## 2024-11-01 NOTE — PATIENT INSTRUCTIONS
I am glad you are doing well. I have refilled your medication:  Orders Placed This Encounter   Medications     amphetamine-dextroamphetamine (ADDERALL XR) 10 MG 24 hr capsule     Sig: Take 1 capsule (10 mg) by mouth daily.     Dispense:  30 capsule     Refill:  0     amphetamine-dextroamphetamine (ADDERALL XR) 10 MG 24 hr capsule     Sig: Take 1 capsule (10 mg) by mouth daily.     Dispense:  30 capsule     Refill:  0     amphetamine-dextroamphetamine (ADDERALL XR) 10 MG 24 hr capsule     Sig: Take 1 capsule (10 mg) by mouth daily.     Dispense:  30 capsule     Refill:  0        View your full visit summary for details by clicking on the link below. Your pharmacist will be able to address any questions you may have about the medication.      Thank you for choosing us for your care.

## 2024-11-01 NOTE — TELEPHONE ENCOUNTER
Provider E-Visit time total (minutes): 3 minutes  PDMP reviewed, CSA is current.  OK for e-visit for next visit.  CSA and PHQ are due, sent through [employer].

## 2024-11-11 ENCOUNTER — VIRTUAL VISIT (OUTPATIENT)
Dept: PSYCHOLOGY | Facility: CLINIC | Age: 29
End: 2024-11-11
Payer: COMMERCIAL

## 2024-11-11 DIAGNOSIS — F41.1 GAD (GENERALIZED ANXIETY DISORDER): ICD-10-CM

## 2024-11-11 DIAGNOSIS — F98.8 ATTENTION DEFICIT DISORDER WITHOUT HYPERACTIVITY: Primary | ICD-10-CM

## 2024-11-11 DIAGNOSIS — F33.0 MAJOR DEPRESSIVE DISORDER, RECURRENT EPISODE, MILD (H): ICD-10-CM

## 2024-11-11 PROCEDURE — 90837 PSYTX W PT 60 MINUTES: CPT | Mod: 95 | Performed by: PSYCHOLOGIST

## 2024-11-11 ASSESSMENT — PATIENT HEALTH QUESTIONNAIRE - PHQ9
SUM OF ALL RESPONSES TO PHQ QUESTIONS 1-9: 13
SUM OF ALL RESPONSES TO PHQ QUESTIONS 1-9: 13
10. IF YOU CHECKED OFF ANY PROBLEMS, HOW DIFFICULT HAVE THESE PROBLEMS MADE IT FOR YOU TO DO YOUR WORK, TAKE CARE OF THINGS AT HOME, OR GET ALONG WITH OTHER PEOPLE: VERY DIFFICULT

## 2024-11-11 NOTE — PROGRESS NOTES
[facility]                                     Progress Note    Patient Name   Date: 2024     Service Type: Individual      Session Start Time  3:34 pm   Session End Time: 4:35 pm       Session Length: 53-60 min    Session #:  15 in   Attendees: Client    Service Modality:  Video Visit       Provider verified identity through the following two step process.  Patient provided:  Patient  and Patient address    Telemedicine Visit: The patient's condition can be safely assessed and treated via synchronous audio and visual telemedicine encounter.      Reason for Telemedicine Visit: Services only offered telehealth    Originating Site (Patient Location): Patient's home    Distant Site (Provider Location): Provider Remote Setting- Home Office    Consent:  The patient/guardian has verbally consented to: the potential risks and benefits of telemedicine (video visit) versus in person care; bill my insurance or make self-payment for services provided; and responsibility for payment of non-covered services.     Patient would like the video invitation sent by:  my chart yes    Mode of Communication:  Video Conference via [facility]  As the provider I attest to compliance with applicable laws and regulations related to telemedicine.    DATA  Interactive Complexity: No  Extended session:  yes  53-60 minutes  on 24   ADD, ODILON, MDD, OCD   Patient's presenting concerns require more intensive intervention than could be completed within the usual service.   -Poor focus, loses her thought by the end of sentence, tangential.    Crisis: No     PROMIS (reviewed every 90 days): at intake 24, 24,  ,   Treatment plan reviewed : today  (first  3-07-24   90 days = 24)    Diagnosis:    F41.1 Generalized Anxiety Disorder,   F33.0 Major Depressive Disorder, recurrent, mild  F96.8 Attention Disorder without Hyperactivity         Progress Since Last Session (Related to Symptoms / Goals /  "Homework):   Symptoms: stable but with add symptoms, intra and interpersonal distress  Managing energy/ managing space/ stuff/ clutter. Less feeling of burn out  Homework: Completed in session      Episode of Care Goals: Satisfactory progress - ACTION (Actively working towards change); Intervened by reinforcing change plan / affirming steps taken.  [employer] system- talks virtually with her friends weekly .       Current / Ongoing Stressors and Concerns:    Stress- her dog is sick  Loss pattern triggered  \"I struggle with fatigue every day\" entire adult life  Skin picking \"I pick a lot\"  Navigating neuro divergent brain for her and partner  loss / grief. Feeling unsettled.Anxiety, ADD - poor focus   interpersonal distress  with some of partners family; boundaries needed  Work stress/ burnout feeling at times      Treatment Objective(s) Addressed in This Session:    identify core stressors/  thoughts that contribute to feeling anxious  Patient will use at least 6 coping skills for anxiety management in the next 90 days.  Learn about physiological pathways related to stress and trauma.     Intervention:   Interpersonal Therapy:, CBT, supportive therapy    Homework:  in session and at home      Updated PROMIS and mood scales    Symptoms:  Multiple stressor  PMDD diagnosed in her 20's  ongoing fatigue   Skin picking \"I pick a lot\"  attention  focus / concentration disrupted    Stressors:    Client reports that her dog has been sick and having accidents.    \"There has been so much going on, thing after thing\".  Lots of extra duties. Ira like my brain space was too full  Shares that she has lots of stress around the recent event in the [place].    Has new interpersonal stress with her partner.  Explored inner scripts.      Reviewed skills used to manage mood during pre- menstrual week.    Identified and processed emotions.    Assessments completed prior to visit:  The following assessments were completed by patient for this " "visit: ODILON 90 day roland or none      ASSESSMENT: Current Emotional / Mental Status (status of significant symptoms):   11-11-24      Risk status (Self / Other harm or suicidal ideation)   Patient denies current fears or concerns for personal safety.   Patient denies current or recent suicidal ideation or behaviors.   Patient denies current or recent homicidal ideation or behaviors.   Patient denies current or recent self injurious behavior or ideation.   Patient denies other safety concerns.   Patient reports there has been no change in risk factors since their last session.     Patient reports there has been no change in protective factors since their last session.    Recommended that patient call 911 or go to the local ED should there be a change in any of these risk factors.     Appearance:   Appropriate    Eye Contact:   Good    Psychomotor Behavior: Normal    Attitude:   Cooperative    Orientation:   All   Speech   Rate / Production:  Normal    Volume:   Normal    Mood:    Normal   Affect:    Appropriate    Thought Content:  Clear    Thought Form:  Coherent  Logical    Insight:    Good       Medication Review:   No changes to psychiatric medications, see updated Medication List in EPIC.          Medication Compliance:   NA     Changes in Health Issues:   None reported     Chemical Use Review:   Substance Use: Chemical use reviewed, no active concerns identified      Tobacco Use: No current tobacco use.      Diagnosis:  F41.1 Generalized Anxiety Disorder,   F33.0 Major Depressive Disorder, recurrent, mild  F96.8 Attention Disorder without Hyperactivity     Collateral Reports Completed:   Routed note to PCP as needed    PLAN: (Patient Tasks / Therapist Tasks / Other  Listen to body-  Got o bed sooner    Client wants to stay a bit ahead of her symptoms.    Invite partner to use communication tools as discussed today     Re-think balance  Consider re-setting my \"spoons\" saying no to inner requests from self "   Don't work to exhaustion    Employ body scan as a technique  Use CBT skllls daily  Will consider using distraction, substitution, or prolong strategies.    Plan  is to use several new strategies to improve communication  Without getting dysregulated herself  Review values  Attend to inner boundareis    Set goals for the week. Month.  Keep good boundaries with father.    Plan is to connect with grandmother   May creat a goodbye ritual - journal or think about each student     Past hw  Plan is to attend to resting this weekend.  Give self permission for boundaries as needed  Past hw  Consider several CBT skills reframes  Consider 2 behavioral intervetions as discussed.  Past hw  Use guidelines as discussed around daily coping skills  and planning for success  Vagal reset.  Hydration / stress support     Lashell Harris LP                                                      ____________________________________________________________________    Individual Treatment Plan    Patient's Name:  Date Of Birth:     Date of Creation: [birth year]  Date Treatment Plan Last Reviewed/Revised: 4-19-24    DSM5 Diagnoses:  F41.1 Generalized Anxiety Disorder,   F33.0 Major Depressive Disorder, recurrent, mild  F96.8 Attention Disorder without Hyperactivity       Psychosocial / Contextual Factors: multiple stressors, mood disruption  PROMIS (reviewed every 90 days): at intake 2-29-24, 5-03-24, 7-24    Referral / Collaboration:  Referral to another professional/service is not indicated at this time.    Anticipated number of session for this episode of care: 12.  Anticipation frequency of session: Every other week  Anticipated Duration of each session: 38-52 minutes and/or 53-60 minutes  Treatment plan will be reviewed in 90 days or when goals have been changed.       Update 7-11-24   The plan for the next 90 day is continue/ maintain  with these actions and cognitive behavioral tasks to develop more consistency / skill proficiency in  effort to reduce symptom frequency, intensity and duration.    Set goals for the week. Month.  Keep good boundaries with father.  Plan is to connect with grandmother   May creat a goodbye ritual - journal or think about each student         Update 4-19-24   The plan for the next 90 day is continue/ maintain  with these actions and cognitive behavioral tasks to develop more consistency / skill proficiency in effort to reduce symptom frequency, intensity and duration.    Plan is to attend to resting this weekend.  Give self permission for boundaries as needed  Consider several CBT skills reframes  Consider 2 behavioral intervetions as discussed.  Use guidelines as discussed around daily coping skills  and planning for success  Vagal reset.  Hydration / stress support       Measurable Treatment Goal(s) related to diagnosis / functional impairment(s)  Goal 1: Patient will identify core pattern of  thoughts that contribute to feeling stressed or  anxious.    I will know I've met my goal when .  will report 50% less disrupted mood.      Objective #A (Patient Action)    Patient will use at least 6 coping skills for anxiety management in the next 90 days weeks.  Status: New - Date: 2024    Intervention(s)  Therapist will teach coping skills for anxiety/ unwanted changes.      Patient has reviewed and agreed to the above plan.      Lashell Harris LP       Answers submitted by the patient for this visit:  Patient Health Questionnaire (Submitted on 11/11/2024)  If you checked off any problems, how difficult have these problems made it for you to do your work, take care of things at home, or get along with other people?: Very difficult  PHQ9 TOTAL SCORE: 13

## 2024-12-02 ENCOUNTER — VIRTUAL VISIT (OUTPATIENT)
Dept: PSYCHOLOGY | Facility: CLINIC | Age: 29
End: 2024-12-02
Payer: COMMERCIAL

## 2024-12-02 DIAGNOSIS — F33.0 MAJOR DEPRESSIVE DISORDER, RECURRENT EPISODE, MILD (H): ICD-10-CM

## 2024-12-02 DIAGNOSIS — F98.8 ATTENTION DEFICIT DISORDER WITHOUT HYPERACTIVITY: ICD-10-CM

## 2024-12-02 DIAGNOSIS — F41.1 GAD (GENERALIZED ANXIETY DISORDER): Primary | ICD-10-CM

## 2024-12-02 PROCEDURE — 90837 PSYTX W PT 60 MINUTES: CPT | Mod: 95 | Performed by: PSYCHOLOGIST

## 2024-12-02 NOTE — PROGRESS NOTES
[facility]                                     Progress Note    Patient Name   Date: 2024     Service Type: Individual      Session Start Time  3:31 pm   Session End Time: 4:30 pm       Session Length: 53-60 min    Session #:  16 in   Attendees: Client    Service Modality:  Video Visit       Provider verified identity through the following two step process.  Patient provided:  Patient  and Patient address    Telemedicine Visit: The patient's condition can be safely assessed and treated via synchronous audio and visual telemedicine encounter.      Reason for Telemedicine Visit: Services only offered telehealth    Originating Site (Patient Location): Patient's home    Distant Site (Provider Location): Provider Remote Setting- Home Office    Consent:  The patient/guardian has verbally consented to: the potential risks and benefits of telemedicine (video visit) versus in person care; bill my insurance or make self-payment for services provided; and responsibility for payment of non-covered services.     Patient would like the video invitation sent by:  my chart yes    Mode of Communication:  Video Conference via [facility]  As the provider I attest to compliance with applicable laws and regulations related to telemedicine.    DATA  Interactive Complexity: No  Extended session:  yes  53-60 minutes  on 24   ADD, ODILON, MDD, OCD     -Poor focus, loses her thought by the end of sentence, tangential.  Patient's presenting concerns require more intensive intervention than could be completed within the usual service.   complex circumstances.  See Data section for details.  -The need to manage communication related to complex topics and additional time is needed to address emotional needs/ loss/ or triggering, apply concepts &  discuss resources.  Extra time needed to address symptom level that complicates delivery of care.      Crisis: No     PROMIS (reviewed every 90 days): at intake 24,  "5-03-24,  7-24, 11-24  Treatment plan reviewed : today  (first  3-07-24   90 days = 9-07-24) 12-03-24,  next 3-25    Diagnosis:    F41.1 Generalized Anxiety Disorder,   F33.0 Major Depressive Disorder, recurrent, mild  F96.8 Attention Disorder without Hyperactivity         Progress Since Last Session (Related to Symptoms / Goals / Homework):   Symptoms: stable but with add symptoms, intra and interpersonal distress  Managing energy/ managing space/ stuff/ clutter. feeling of burn out/ and overwhelm  Homework: Completed in session      Episode of Care Goals: Satisfactory progress - ACTION (Actively working towards change); Intervened by reinforcing change plan / affirming steps taken.  support system- talks virtually with her friends weekly .       Current / Ongoing Stressors and Concerns:      Interpersonal distress with mother  \"I struggle with fatigue every day\" entire adult life  Skin picking \"I pick a lot\"  Navigating neuro divergent brain for her and partner  loss / grief. Feeling unsettled.Anxiety, ADD - poor focus   interpersonal distress  with some of partners family; boundaries needed  Work stress/ burnout feeling at times      Treatment Objective(s) Addressed in This Session:    identify core stressors/  thoughts that contribute to feeling anxious  Patient will use at least 6 coping skills for anxiety management in the next 90 days.  Learn about physiological pathways related to stress and trauma.     Intervention:   Interpersonal Therapy:, CBT, supportive therapy    Homework:  in session and at home    Symptoms:  Multiple stressors  PMDD diagnosed in her 20's  ongoing fatigue   Skin picking \"I pick a lot\"  attention  focus / concentration disrupted    Stressors:    Client reports that there has been recent increase with    Interpersonal distress with mother    Discussed interpersonal effectiveness skills.  Explored  family of origin dynamics;  complex family constellation.  Able to articulate family " rules, alliances,  scripts.      Identified and processed emotions.    Assessments completed prior to visit:  The following assessments were completed by patient for this visit: ODILON 90 day roland or none      ASSESSMENT: Current Emotional / Mental Status (status of significant symptoms):   12-02-24    Risk status (Self / Other harm or suicidal ideation)   Patient denies current fears or concerns for personal safety.   Patient denies current or recent suicidal ideation or behaviors.   Patient denies current or recent homicidal ideation or behaviors.   Patient denies current or recent self injurious behavior or ideation.   Patient denies other safety concerns.   Patient reports there has been no change in risk factors since their last session.     Patient reports there has been no change in protective factors since their last session.    Recommended that patient call 911 or go to the local ED should there be a change in any of these risk factors.     Appearance:   Appropriate    Eye Contact:   Good    Psychomotor Behavior: Normal    Attitude:   Cooperative    Orientation:   All   Speech   Rate / Production:  Normal    Volume:   Normal    Mood:    Normal   Affect:    Appropriate    Thought Content:  Clear    Thought Form:  Coherent  Logical    Insight:    Good        Medication Review:   No changes to psychiatric medications, see updated Medication List in EPIC.          Medication Compliance:   NA     Changes in Health Issues:   None reported     Chemical Use Review:   Substance Use: Chemical use reviewed, no active concerns identified      Tobacco Use: No current tobacco use.      Diagnosis:  F41.1 Generalized Anxiety Disorder,   F33.0 Major Depressive Disorder, recurrent, mild  F96.8 Attention Disorder without Hyperactivity     Collateral Reports Completed:   Routed note to PCP as needed    PLAN: (Patient Tasks / Therapist Tasks / Other  employ several interpersonal effectiveness skills.    Listen to body-  Got o  "bed sooner    Client wants to stay a bit ahead of her symptoms.    Invite partner to use communication tools as discussed today     Re-think balance  Consider re-setting my \"spoons\" saying no to inner requests from self   Don't work to exhaustion    Employ body scan as a technique  Use CBT skllls daily  Will consider using distraction, substitution, or prolong strategies.    Plan  is to use several new strategies to improve communication  Without getting dysregulated herself  Review values  Attend to inner boundareis    Set goals for the week. Month.  Keep good boundaries with father.    Plan is to connect with grandmother   May creat a goodbye ritual - journal or think about each student     Past hw  Plan is to attend to resting this weekend.  Give self permission for boundaries as needed  Past hw  Consider several CBT skills reframes  Consider 2 behavioral intervetions as discussed.  Past hw  Use guidelines as discussed around daily coping skills  and planning for success  Vagal reset.  Hydration / stress support     Lashell Harris LP                                                      ____________________________________________________________________    Individual Treatment Plan    Patient's Name:  Date Of Birth:     Date of Creation: [birth year]  Date Treatment Plan Last Reviewed/Revised: 4-19-24    DSM5 Diagnoses:  F41.1 Generalized Anxiety Disorder,   F33.0 Major Depressive Disorder, recurrent, mild  F96.8 Attention Disorder without Hyperactivity       Psychosocial / Contextual Factors: multiple stressors, mood disruption  PROMIS (reviewed every 90 days): at intake 2-29-24, 5-03-24, 7-24    Referral / Collaboration:  Referral to another professional/service is not indicated at this time.    Anticipated number of session for this episode of care: 12.  Anticipation frequency of session: Every other week  Anticipated Duration of each session: 38-52 minutes and/or 53-60 minutes  Treatment plan will be reviewed in 90 " "days or when goals have been changed.   Update 12-03-24  ( gap in care- so updated today)  The plan for the next 90 day is continue/ maintain  with these actions and cognitive behavioral tasks to develop more consistency / skill proficiency in effort to reduce symptom frequency, intensity and duration.  employ several interpersonal effectiveness skills.  Listen to body-  Got to bed sooner  Client wants to stay a bit ahead of her symptoms.  Invite partner to use communication tools as discussed today   Re-think balance  Consider re-setting my \"spoons\" saying no to inner requests from self   Don't work to exhaustion  Employ body scan as a technique  Use CBT skllls daily  Will consider using distraction, substitution, or prolong strategies.  Plan  is to use several new strategies to improve communication  Without getting dysregulated herself  Review values  Attend to inner boundareis        Update 7-11-24   The plan for the next 90 day is continue/ maintain  with these actions and cognitive behavioral tasks to develop more consistency / skill proficiency in effort to reduce symptom frequency, intensity and duration.    Set goals for the week. Month.  Keep good boundaries with father.  Plan is to connect with grandmother   May creat a goodbye ritual - journal or think about each student         Update 4-19-24   The plan for the next 90 day is continue/ maintain  with these actions and cognitive behavioral tasks to develop more consistency / skill proficiency in effort to reduce symptom frequency, intensity and duration.    Plan is to attend to resting this weekend.  Give self permission for boundaries as needed  Consider several CBT skills reframes  Consider 2 behavioral intervetions as discussed.  Use guidelines as discussed around daily coping skills  and planning for success  Vagal reset.  Hydration / stress support       Measurable Treatment Goal(s) related to diagnosis / functional impairment(s)  Goal 1: Patient " will identify core pattern of  thoughts that contribute to feeling stressed or  anxious.    I will know I've met my goal when .  will report 50% less disrupted mood.      Objective #A (Patient Action)    Patient will use at least 6 coping skills for anxiety management in the next 90 days weeks.  Will reports rotating coping skills- identify those that are most effective. Use several of these  consistently.  Status: New - Date:     Intervention(s)  Therapist will teach coping skills for anxiety/ unwanted changes.      Patient has reviewed and agreed to the above plan.      Lashell Harris LP       Answers submitted by the patient for this visit:  Patient Health Questionnaire (Submitted on 11/11/2024)  If you checked off any problems, how difficult have these problems made it for you to do your work, take care of things at home, or get along with other people?: Very difficult  PHQ9 TOTAL SCORE: 13

## 2024-12-16 ENCOUNTER — VIRTUAL VISIT (OUTPATIENT)
Dept: PSYCHOLOGY | Facility: CLINIC | Age: 29
End: 2024-12-16
Payer: COMMERCIAL

## 2024-12-16 DIAGNOSIS — F98.8 ATTENTION DEFICIT DISORDER WITHOUT HYPERACTIVITY: Primary | ICD-10-CM

## 2024-12-16 DIAGNOSIS — F33.0 MAJOR DEPRESSIVE DISORDER, RECURRENT EPISODE, MILD (H): ICD-10-CM

## 2024-12-16 DIAGNOSIS — F41.1 GAD (GENERALIZED ANXIETY DISORDER): ICD-10-CM

## 2024-12-16 PROCEDURE — 90837 PSYTX W PT 60 MINUTES: CPT | Mod: 95 | Performed by: PSYCHOLOGIST

## 2024-12-16 NOTE — PROGRESS NOTES
[facility]                                  Patient Name   Date: 2024     Service Type: Individual      Session Start Time  3:34 pm   Session End Time: 4:31 pm       Session Length: 53-60 min    Session #:  17 in   Attendees: Client    Service Modality:  Video Visit       Provider verified identity through the following two step process.  Patient provided:  Patient  and Patient address    Telemedicine Visit: The patient's condition can be safely assessed and treated via synchronous audio and visual telemedicine encounter.      Reason for Telemedicine Visit: Services only offered telehealth    Originating Site (Patient Location): Patient's home    Distant Site (Provider Location): Provider Remote Setting- Home Office    Consent:  The patient/guardian has verbally consented to: the potential risks and benefits of telemedicine (video visit) versus in person care; bill my insurance or make self-payment for services provided; and responsibility for payment of non-covered services.     Patient would like the video invitation sent by:  my chart yes    Mode of Communication:  Video Conference via [employer]  As the provider I attest to compliance with applicable laws and regulations related to telemedicine.    DATA  Interactive Complexity: No  Extended session:  yes  53-60 minutes  on 24   ADD, ODILON, MDD, OCD     -Poor focus, loses her thought by the end of sentence, tangential.  Patient's presenting concerns require more intensive intervention than could be completed within the usual service.   complex circumstances.  See Data section for details.  -The need to manage communication related to complex topics and additional time is needed to address emotional needs/ loss/ or triggering, apply concepts &  discuss resources.  Extra time needed to address symptom level that complicates delivery of care.      Crisis: No     PROMIS (reviewed every 90 days): at intake 24, 24,  ,  "11-24  Treatment plan reviewed : today  (first  3-07-24   90 days = 9-07-24) 12-03-24,  next 3-25    Diagnosis:    F41.1 Generalized Anxiety Disorder,   F33.0 Major Depressive Disorder, recurrent, mild  F96.8 Attention Disorder without Hyperactivity         Progress Since Last Session (Related to Symptoms / Goals / Homework):   Symptoms: stable but with add symptoms, intra and interpersonal distress  Managing energy/ managing space/ stuff/ clutter. feeling of burn out/ and overwhelm  Homework: Completed in session      Episode of Care Goals: Satisfactory progress - ACTION (Actively working towards change); Intervened by reinforcing change plan / affirming steps taken.  support system- talks virtually with her friends weekly .       Current / Ongoing Stressors and Concerns:      Interpersonal distress with mother  \"I struggle with fatigue every day\" entire adult life  Skin picking \"I pick a lot\"  Navigating neuro divergent brain for her and partner  loss / grief. Feeling unsettled.Anxiety, ADD - poor focus   interpersonal distress  with some of partners family; boundaries needed  Work stress/ burnout feeling at times      Treatment Objective(s) Addressed in This Session:    identify core stressors/  thoughts that contribute to feeling anxious  Patient will use at least 6 coping skills for anxiety management in the next 90 days.  Learn about physiological pathways related to stress and trauma.     Intervention:   Interpersonal Therapy:, CBT, supportive therapy    Homework:  in session and at home    Symptoms:  Crying spells, tires, low mood  Multiple stressors  PMDD diagnosed in her 20's  ongoing fatigue   Skin picking \"I pick a lot\"  attention  focus / concentration disrupted    Stressors:  Crying spells, excessive tiredness, low mood.    Client reports that there has been recent increase with    Interpersonal distress with mother    Discussed  boundary setting around the holidays.    Explored role and unwritten " rules.    Reviewed buffers client may use / have.    Discussed recent event with partner.      Identified and processed emotions.    Assessments completed prior to visit:  The following assessments were completed by patient for this visit: ODILON 90 day roland or none      ASSESSMENT: Current Emotional / Mental Status (status of significant symptoms):   12-16-24     Risk status (Self / Other harm or suicidal ideation)   Patient denies current fears or concerns for personal safety.   Patient denies current or recent suicidal ideation or behaviors.   Patient denies current or recent homicidal ideation or behaviors.   Patient denies current or recent self injurious behavior or ideation.   Patient denies other safety concerns.   Patient reports there has been no change in risk factors since their last session.     Patient reports there has been no change in protective factors since their last session.    Recommended that patient call 911 or go to the local ED should there be a change in any of these risk factors.     Appearance:   Appropriate    Eye Contact:   Good    Psychomotor Behavior: Normal    Attitude:   Cooperative    Orientation:   All   Speech   Rate / Production:  Normal    Volume:   Normal    Mood:    Normal   Affect:    Appropriate    Thought Content:  Clear    Thought Form:  Coherent  Logical    Insight:    Good      Medication Review:   No changes to psychiatric medications, see updated Medication List in EPIC.          Medication Compliance:   NA     Changes in Health Issues:   None reported     Chemical Use Review:   Substance Use: Chemical use reviewed, no active concerns identified      Tobacco Use: No current tobacco use.      Diagnosis:  F41.1 Generalized Anxiety Disorder,   F33.0 Major Depressive Disorder, recurrent, mild  F96.8 Attention Disorder without Hyperactivity     Collateral Reports Completed:   Routed note to PCP as needed    PLAN: (Patient Tasks / Therapist Tasks / Other  Shield self from  "others judgement. Say no when possible and remove self from situation    employ several interpersonal effectiveness skills.    Listen to body-  Got o bed sooner    Client wants to stay a bit ahead of her symptoms.    Invite partner to use communication tools as discussed today     Re-think balance  Consider re-setting my \"spoons\" saying no to inner requests from self   Don't work to exhaustion    Employ body scan as a technique  Use CBT skllls daily  Will consider using distraction, substitution, or prolong strategies.    Plan  is to use several new strategies to improve communication  Without getting dysregulated herself  Review values  Attend to inner boundareis    Set goals for the week. Month.  Keep good boundaries with father.    Plan is to connect with grandmother   May creat a goodbye ritual - journal or think about each student     Past hw  Plan is to attend to resting this weekend.  Give self permission for boundaries as needed  Past hw  Consider several CBT skills reframes  Consider 2 behavioral intervetions as discussed.  Past hw  Use guidelines as discussed around daily coping skills  and planning for success  Vagal reset.  Hydration / stress support     Lashell Harris LP                                                      ____________________________________________________________________    Individual Treatment Plan    Patient's Name:  Date Of Birth:     Date of Creation: [birth year]  Date Treatment Plan Last Reviewed/Revised: 4-19-24    DSM5 Diagnoses:  F41.1 Generalized Anxiety Disorder,   F33.0 Major Depressive Disorder, recurrent, mild  F96.8 Attention Disorder without Hyperactivity       Psychosocial / Contextual Factors: multiple stressors, mood disruption  PROMIS (reviewed every 90 days): at intake 2-29-24, 5-03-24, 7-24, 11-24    Referral / Collaboration:  Referral to another professional/service is not indicated at this time.    Anticipated number of session for this episode of care: " "12.  Anticipation frequency of session: Every other week  Anticipated Duration of each session: 38-52 minutes and/or 53-60 minutes  Treatment plan will be reviewed in 90 days or when goals have been changed.   Update 12-03-24  ( gap in care- so updated today)  The plan for the next 90 day is continue/ maintain  with these actions and cognitive behavioral tasks to develop more consistency / skill proficiency in effort to reduce symptom frequency, intensity and duration.  employ several interpersonal effectiveness skills.  Listen to body-  Got to bed sooner  Client wants to stay a bit ahead of her symptoms.  Invite partner to use communication tools as discussed today   Re-think balance  Consider re-setting my \"spoons\" saying no to inner requests from self   Don't work to exhaustion  Employ body scan as a technique  Use CBT skllls daily  Will consider using distraction, substitution, or prolong strategies.  Plan  is to use several new strategies to improve communication  Without getting dysregulated herself  Review values  Attend to inner boundareis        Update 7-11-24   The plan for the next 90 day is continue/ maintain  with these actions and cognitive behavioral tasks to develop more consistency / skill proficiency in effort to reduce symptom frequency, intensity and duration.    Set goals for the week. Month.  Keep good boundaries with father.  Plan is to connect with grandmother   May creat a goodbye ritual - journal or think about each student         Update 4-19-24   The plan for the next 90 day is continue/ maintain  with these actions and cognitive behavioral tasks to develop more consistency / skill proficiency in effort to reduce symptom frequency, intensity and duration.    Plan is to attend to resting this weekend.  Give self permission for boundaries as needed  Consider several CBT skills reframes  Consider 2 behavioral intervetions as discussed.  Use guidelines as discussed around daily coping skills  " and planning for success  Vagal reset.  Hydration / stress support       Measurable Treatment Goal(s) related to diagnosis / functional impairment(s)  Goal 1: Patient will identify core pattern of  thoughts that contribute to feeling stressed or  anxious.    I will know I've met my goal when .  will report 50% less disrupted mood.      Objective #A (Patient Action)    Patient will use at least 6 coping skills for anxiety management in the next 90 days weeks.  Will reports rotating coping skills- identify those that are most effective. Use several of these  consistently.  Status: New - Date:     Intervention(s)  Therapist will teach coping skills for anxiety/ unwanted changes.      Patient has reviewed and agreed to the above plan.      Lashell Harris LP       Answers submitted by the patient for this visit:  Patient Health Questionnaire (Submitted on 11/11/2024)  If you checked off any problems, how difficult have these problems made it for you to do your work, take care of things at home, or get along with other people?: Very difficult  PHQ9 TOTAL SCORE: 13

## 2025-01-02 ENCOUNTER — MYC REFILL (OUTPATIENT)
Dept: FAMILY MEDICINE | Facility: CLINIC | Age: 30
End: 2025-01-02
Payer: COMMERCIAL

## 2025-01-02 ENCOUNTER — E-VISIT (OUTPATIENT)
Dept: FAMILY MEDICINE | Facility: CLINIC | Age: 30
End: 2025-01-02
Payer: COMMERCIAL

## 2025-01-02 DIAGNOSIS — F98.8 ATTENTION DEFICIT DISORDER WITHOUT HYPERACTIVITY: Primary | ICD-10-CM

## 2025-01-02 DIAGNOSIS — F98.8 ATTENTION DEFICIT DISORDER WITHOUT HYPERACTIVITY: ICD-10-CM

## 2025-01-02 RX ORDER — DEXTROAMPHETAMINE SACCHARATE, AMPHETAMINE ASPARTATE MONOHYDRATE, DEXTROAMPHETAMINE SULFATE AND AMPHETAMINE SULFATE 2.5; 2.5; 2.5; 2.5 MG/1; MG/1; MG/1; MG/1
10 CAPSULE, EXTENDED RELEASE ORAL DAILY
Qty: 30 CAPSULE | Refills: 0 | Status: CANCELLED | OUTPATIENT
Start: 2025-01-02

## 2025-01-04 ASSESSMENT — ANXIETY QUESTIONNAIRES
GAD7 TOTAL SCORE: 7
8. IF YOU CHECKED OFF ANY PROBLEMS, HOW DIFFICULT HAVE THESE MADE IT FOR YOU TO DO YOUR WORK, TAKE CARE OF THINGS AT HOME, OR GET ALONG WITH OTHER PEOPLE?: SOMEWHAT DIFFICULT
5. BEING SO RESTLESS THAT IT IS HARD TO SIT STILL: SEVERAL DAYS
6. BECOMING EASILY ANNOYED OR IRRITABLE: SEVERAL DAYS
1. FEELING NERVOUS, ANXIOUS, OR ON EDGE: SEVERAL DAYS
IF YOU CHECKED OFF ANY PROBLEMS ON THIS QUESTIONNAIRE, HOW DIFFICULT HAVE THESE PROBLEMS MADE IT FOR YOU TO DO YOUR WORK, TAKE CARE OF THINGS AT HOME, OR GET ALONG WITH OTHER PEOPLE: SOMEWHAT DIFFICULT
4. TROUBLE RELAXING: SEVERAL DAYS
7. FEELING AFRAID AS IF SOMETHING AWFUL MIGHT HAPPEN: SEVERAL DAYS
GAD7 TOTAL SCORE: 7
3. WORRYING TOO MUCH ABOUT DIFFERENT THINGS: SEVERAL DAYS
GAD7 TOTAL SCORE: 7
2. NOT BEING ABLE TO STOP OR CONTROL WORRYING: SEVERAL DAYS
7. FEELING AFRAID AS IF SOMETHING AWFUL MIGHT HAPPEN: SEVERAL DAYS

## 2025-01-04 ASSESSMENT — PATIENT HEALTH QUESTIONNAIRE - PHQ9
10. IF YOU CHECKED OFF ANY PROBLEMS, HOW DIFFICULT HAVE THESE PROBLEMS MADE IT FOR YOU TO DO YOUR WORK, TAKE CARE OF THINGS AT HOME, OR GET ALONG WITH OTHER PEOPLE: NOT DIFFICULT AT ALL
SUM OF ALL RESPONSES TO PHQ QUESTIONS 1-9: 2
SUM OF ALL RESPONSES TO PHQ QUESTIONS 1-9: 2

## 2025-01-06 RX ORDER — DEXTROAMPHETAMINE SACCHARATE, AMPHETAMINE ASPARTATE MONOHYDRATE, DEXTROAMPHETAMINE SULFATE AND AMPHETAMINE SULFATE 2.5; 2.5; 2.5; 2.5 MG/1; MG/1; MG/1; MG/1
10 CAPSULE, EXTENDED RELEASE ORAL DAILY
Qty: 30 CAPSULE | Refills: 0 | Status: SHIPPED | OUTPATIENT
Start: 2025-02-05 | End: 2025-03-07

## 2025-01-06 RX ORDER — DEXTROAMPHETAMINE SACCHARATE, AMPHETAMINE ASPARTATE MONOHYDRATE, DEXTROAMPHETAMINE SULFATE AND AMPHETAMINE SULFATE 2.5; 2.5; 2.5; 2.5 MG/1; MG/1; MG/1; MG/1
10 CAPSULE, EXTENDED RELEASE ORAL DAILY
Qty: 30 CAPSULE | Refills: 0 | Status: SHIPPED | OUTPATIENT
Start: 2025-01-06 | End: 2025-02-05

## 2025-01-06 RX ORDER — DEXTROAMPHETAMINE SACCHARATE, AMPHETAMINE ASPARTATE MONOHYDRATE, DEXTROAMPHETAMINE SULFATE AND AMPHETAMINE SULFATE 2.5; 2.5; 2.5; 2.5 MG/1; MG/1; MG/1; MG/1
10 CAPSULE, EXTENDED RELEASE ORAL DAILY
Qty: 30 CAPSULE | Refills: 0 | Status: SHIPPED | OUTPATIENT
Start: 2025-03-07 | End: 2025-04-06

## 2025-01-06 NOTE — TELEPHONE ENCOUNTER
Provider E-Visit time total (minutes): 3 minutes  CSA is current, PDMP reviewed without concerns  OK for e-visit next refills.  Preventative due in August.

## 2025-01-16 ENCOUNTER — OFFICE VISIT (OUTPATIENT)
Dept: FAMILY MEDICINE | Facility: CLINIC | Age: 30
End: 2025-01-16
Payer: COMMERCIAL

## 2025-01-16 VITALS
OXYGEN SATURATION: 97 % | BODY MASS INDEX: 24.66 KG/M2 | RESPIRATION RATE: 16 BRPM | HEIGHT: 62 IN | WEIGHT: 134 LBS | HEART RATE: 94 BPM | TEMPERATURE: 99 F | SYSTOLIC BLOOD PRESSURE: 116 MMHG | DIASTOLIC BLOOD PRESSURE: 58 MMHG

## 2025-01-16 DIAGNOSIS — J02.9 SORE THROAT: Primary | ICD-10-CM

## 2025-01-16 DIAGNOSIS — J06.9 UPPER RESPIRATORY TRACT INFECTION, UNSPECIFIED TYPE: ICD-10-CM

## 2025-01-16 LAB
DEPRECATED S PYO AG THROAT QL EIA: NEGATIVE
FLUAV RNA SPEC QL NAA+PROBE: NEGATIVE
FLUBV RNA RESP QL NAA+PROBE: NEGATIVE
RSV RNA SPEC NAA+PROBE: NEGATIVE
S PYO DNA THROAT QL NAA+PROBE: NOT DETECTED
SARS-COV-2 RNA RESP QL NAA+PROBE: NEGATIVE

## 2025-01-16 PROCEDURE — 87637 SARSCOV2&INF A&B&RSV AMP PRB: CPT | Performed by: NURSE PRACTITIONER

## 2025-01-16 PROCEDURE — 87651 STREP A DNA AMP PROBE: CPT | Performed by: NURSE PRACTITIONER

## 2025-01-16 PROCEDURE — 99213 OFFICE O/P EST LOW 20 MIN: CPT | Performed by: NURSE PRACTITIONER

## 2025-01-16 ASSESSMENT — ENCOUNTER SYMPTOMS: SORE THROAT: 1

## 2025-01-16 NOTE — PROGRESS NOTES
{PROVIDER CHARTING PREFERENCE:286363}    Subjective   Naty is a 29 year old, presenting for the following health issues:  Pharyngitis (X3 days had body aches one night but has resolved. Runny nose, cough, and a very sore throat. Flu and COVID negative at home.)      1/16/2025     3:29 PM   Additional Questions   Roomed by FRANCY Kincaid     Pharyngitis     History of Present Illness       Reason for visit:  Extreme sore throat, overall feeling ill (flu like cough, runny nose, etc.)  Symptom onset:  1-3 days ago  Symptoms include:  Sore throat, lost my voice, full body aches 2 days ago but hagent had them since, fatigue,  Symptom intensity:  Moderate  Symptom progression:  Worsening  Had these symptoms before:  Yes  Has tried/received treatment for these symptoms:  Yes  Previous treatment was successful:  Yes  Prior treatment description:  It feels like strep. Everytime juan j had strep its gone away with antibiotics  What makes it worse:  Talking  What makes it better:  Aleve, tylenol, etc   She is taking medications regularly.       {MA/LPN/RN Pre-Provider Visit Orders- hCG/UA/Strep (Optional):888938}  {SUPERLIST (Optional):616831}  {additonal problems for provider to add (Optional):616247}    {ROS Picklists (Optional):917783}      Objective    There were no vitals taken for this visit.  There is no height or weight on file to calculate BMI.  Physical Exam   {Exam List (Optional):149604}    {Diagnostic Test Results (Optional):385075}        Signed Electronically by: VALE RHODES CNP  {Email feedback regarding this note to [email or web address]   :[id number]}   Left Ear: Tympanic membrane normal.      Nose: Nose normal.      Right Sinus: No maxillary sinus tenderness or frontal sinus tenderness.      Left Sinus: No maxillary sinus tenderness or frontal sinus tenderness.      Mouth/Throat:      Mouth: Mucous membranes are moist.      Pharynx: Posterior oropharyngeal erythema present.      Tonsils: No tonsillar exudate. 1+ on the right. 1+ on the left.   Cardiovascular:      Rate and Rhythm: Normal rate and regular rhythm.      Heart sounds: Normal heart sounds.   Pulmonary:      Effort: Pulmonary effort is normal.      Breath sounds: Normal breath sounds.   Lymphadenopathy:      Cervical: No cervical adenopathy.   Neurological:      General: No focal deficit present.      Mental Status: She is alert and oriented to person, place, and time.   Psychiatric:         Mood and Affect: Mood normal.                    Signed Electronically by: VALE RHODES CNP

## 2025-01-20 ENCOUNTER — VIRTUAL VISIT (OUTPATIENT)
Dept: PSYCHOLOGY | Facility: CLINIC | Age: 30
End: 2025-01-20
Payer: COMMERCIAL

## 2025-01-20 DIAGNOSIS — F33.0 MAJOR DEPRESSIVE DISORDER, RECURRENT EPISODE, MILD: ICD-10-CM

## 2025-01-20 DIAGNOSIS — F41.1 GAD (GENERALIZED ANXIETY DISORDER): ICD-10-CM

## 2025-01-20 DIAGNOSIS — F98.8 ATTENTION DEFICIT DISORDER WITHOUT HYPERACTIVITY: Primary | ICD-10-CM

## 2025-01-20 PROCEDURE — 90837 PSYTX W PT 60 MINUTES: CPT | Mod: 95 | Performed by: PSYCHOLOGIST

## 2025-01-20 NOTE — PROGRESS NOTES
[facility]                                  Patient Name   Date: 2025     Service Type: Individual      Session Start Time  11:31 am   Session End Time: 12:30      Session Length: 53-60 min    Session #:  2 in ,   17 in   Attendees: Client    Service Modality:  Video Visit       Provider verified identity through the following two step process.  Patient provided:  Patient  and Patient address    Telemedicine Visit: The patient's condition can be safely assessed and treated via synchronous audio and visual telemedicine encounter.      Reason for Telemedicine Visit: Services only offered telehealth    Originating Site (Patient Location): Patient's home    Distant Site (Provider Location): Provider Remote Setting- Home Office    Consent:  The patient/guardian has verbally consented to: the potential risks and benefits of telemedicine (video visit) versus in person care; bill my insurance or make self-payment for services provided; and responsibility for payment of non-covered services.     Patient would like the video invitation sent by:  my chart yes    Mode of Communication:  Video Conference via [facility]  As the provider I attest to compliance with applicable laws and regulations related to telemedicine.    DATA  Interactive Complexity: No  Extended session:  yes  60 minutes  on 25   ADD, ODILON, MDD, OCD     -Poor focus, loses her thought by the end of sentence, tangential.  Patient's presenting concerns require more intensive intervention than could be completed within the usual service.   complex circumstances.  See Data section for details.  -The need to manage communication related to complex topics and additional time is needed to address emotional needs/ loss/ or triggering, apply concepts &  discuss resources.  Extra time needed to address symptom level that complicates delivery of care.      Crisis: No     PROMIS (reviewed every 90 days): at intake 24, 24,  ,  "11-24 due 2-25  Treatment plan reviewed : today  (first  3-07-24   90 days = 9-07-24) 12-03-24,  next 3-25    Diagnosis:    F41.1 Generalized Anxiety Disorder,   F33.0 Major Depressive Disorder, recurrent, mild  F96.8 Attention Disorder without Hyperactivity         Progress Since Last Session (Related to Symptoms / Goals / Homework):   Symptoms: stable but with add symptoms, intra and interpersonal distress  Managing energy/ managing space/ stuff/ clutter. feeling of burn out/ and overwhelm  Homework: Completed in session      Episode of Care Goals: Satisfactory progress - ACTION (Actively working towards change); Intervened by reinforcing change plan / affirming steps taken.  support system- talks virtually with her friends weekly .       Current / Ongoing Stressors and Concerns:      Interpersonal distress  \"I struggle with fatigue every day\" entire adult life  Skin picking \"I pick a lot\"  Navigating neuro divergent brain for her and partner  loss / grief. Feeling unsettled.Anxiety, ADD - poor focus   interpersonal distress  with some of partners family; boundaries needed  Work stress/ burnout feeling at times      Treatment Objective(s) Addressed in This Session:    identify core stressors/  thoughts that contribute to feeling anxious  Patient will use at least 6 coping skills for anxiety management in the next 90 days.  Learn about physiological pathways related to stress and trauma.     Intervention:   Interpersonal Therapy:, CBT, supportive therapy    Homework:  in session and at home    Symptoms:  Recently ill- stayed home from work  Low mood / anxiety  High stress  Multiple interpersonal and work stressors  PMDD diagnosed in her 20's  ongoing fatigue   Skin picking \"I pick a lot\"  attention  focus / concentration disrupted    Stressors:    Extra stress, having to clean uo after  and do laundry after a sick pet.  Client reports trying to manage her pets health-  Has been spending much time and money. "   Reviewed all the things that she has tried.    Explored triggers, including recent disruption of her classroom;  lack of support from her  team.  Client is having  an increase is work stress. Feeling anxious.  Reviewed ADHD and imposter syndrome    Discussed recent pattern of focusing on a fears about the future.  Reviewed several CBT techniques.    Identified and processed emotions.    Assessments completed prior to visit:  The following assessments were completed by patient for this visit: ODILON 90 day roland or none      ASSESSMENT: Current Emotional / Mental Status (status of significant symptoms):   1-20-25    Risk status (Self / Other harm or suicidal ideation)   Patient denies current fears or concerns for personal safety.   Patient denies current or recent suicidal ideation or behaviors.   Patient denies current or recent homicidal ideation or behaviors.   Patient denies current or recent self injurious behavior or ideation.   Patient denies other safety concerns.   Patient reports there has been no change in risk factors since their last session.     Patient reports there has been no change in protective factors since their last session.    Recommended that patient call 911 or go to the local ED should there be a change in any of these risk factors.     Appearance:   Appropriate    Eye Contact:   Good    Psychomotor Behavior: Normal    Attitude:   Cooperative    Orientation:   All   Speech   Rate / Production:  Normal    Volume:   Normal    Mood:    Normal   Affect:    Appropriate    Thought Content:  Clear    Thought Form:  Coherent  Logical    Insight:    Good        Medication Review:   No changes to psychiatric medications, see updated Medication List in EPIC.          Medication Compliance:   NA     Changes in Health Issues:   None reported     Chemical Use Review:   Substance Use: Chemical use reviewed, no active concerns identified      Tobacco Use: No current tobacco use.      Diagnosis:  F41.1  "Generalized Anxiety Disorder,   F33.0 Major Depressive Disorder, recurrent, mild  F96.8 Attention Disorder without Hyperactivity     Collateral Reports Completed:   Routed note to PCP as needed    PLAN: (Patient Tasks / Therapist Tasks / Other  Utilize several CBT techniques    Shield self from others judgement. Say no when possible and remove self from situation    employ several interpersonal effectiveness skills.    Listen to body-  Got o bed sooner    Client wants to stay a bit ahead of her symptoms.    Invite partner to use communication tools as discussed today     Re-think balance  Consider re-setting my \"spoons\" saying no to inner requests from self   Don't work to exhaustion    Employ body scan as a technique  Use CBT skllls daily  Will consider using distraction, substitution, or prolong strategies.    Plan  is to use several new strategies to improve communication  Without getting dysregulated herself  Review values  Attend to inner boundareis    Set goals for the week. Month.  Keep good boundaries with father.    Plan is to connect with grandmother   May creat a goodbye ritual - journal or think about each student     Past hw  Plan is to attend to resting this weekend.  Give self permission for boundaries as needed  Past hw  Consider several CBT skills reframes  Consider 2 behavioral intervetions as discussed.  Past hw  Use guidelines as discussed around daily coping skills  and planning for success  Vagal reset.  Hydration / stress support     Lashell Harris LP                                                      ____________________________________________________________________    Individual Treatment Plan    Patient's Name:  Date Of Birth:     Date of Creation: [birth year]  Date Treatment Plan Last Reviewed/Revised: 4-19-24    DSM5 Diagnoses:  F41.1 Generalized Anxiety Disorder,   F33.0 Major Depressive Disorder, recurrent, mild  F96.8 Attention Disorder without Hyperactivity       Psychosocial / " "Contextual Factors: multiple stressors, mood disruption  PROMIS (reviewed every 90 days): at intake 2-29-24, 5-03-24, 7-24, 11-24 due 2-25    Referral / Collaboration:  Referral to another professional/service is not indicated at this time.    Anticipated number of session for this episode of care: 16-20  Anticipation frequency of session: Every other week  Anticipated Duration of each session: 38-52 minutes and/or 53-60 minutes  Treatment plan will be reviewed in 90 days or when goals have been changed.     Update 12-03-24  ( gap in care- so updated today)  The plan for the next 90 day is continue/ maintain  with these actions and cognitive behavioral tasks to develop more consistency / skill proficiency in effort to reduce symptom frequency, intensity and duration.  employ several interpersonal effectiveness skills.  Listen to body-  Got to bed sooner  Client wants to stay a bit ahead of her symptoms.  Invite partner to use communication tools as discussed today   Re-think balance  Consider re-setting my \"spoons\" saying no to inner requests from self   Don't work to exhaustion  Employ body scan as a technique  Use CBT skllls daily  Will consider using distraction, substitution, or prolong strategies.  Plan  is to use several new strategies to improve communication  Without getting dysregulated herself  Review values  Attend to inner boundareis        Update 7-11-24   The plan for the next 90 day is continue/ maintain  with these actions and cognitive behavioral tasks to develop more consistency / skill proficiency in effort to reduce symptom frequency, intensity and duration.    Set goals for the week. Month.  Keep good boundaries with father.  Plan is to connect with grandmother   May creat a goodbye ritual - journal or think about each student         Measurable Treatment Goal(s) related to diagnosis / functional impairment(s)  Goal 1: Patient will identify core pattern of  thoughts that contribute to feeling " stressed or  anxious.    I will know I've met my goal when .  will report 50% less disrupted mood.      Objective #A (Patient Action)    Patient will use at least 6 coping skills for anxiety management in the next 90 days weeks.  Will reports rotating coping skills- identify those that are most effective. Use several of these  consistently.  Status: New - Date:     Intervention(s)  Therapist will teach coping skills for anxiety/ unwanted changes.      Patient has reviewed and agreed to the above plan.      Lashell Harris LP       Answers submitted by the patient for this visit:  Patient Health Questionnaire (Submitted on 11/11/2024)  If you checked off any problems, how difficult have these problems made it for you to do your work, take care of things at home, or get along with other people?: Very difficult  PHQ9 TOTAL SCORE: 13

## 2025-02-03 ENCOUNTER — TELEPHONE (OUTPATIENT)
Dept: DERMATOLOGY | Facility: CLINIC | Age: 30
End: 2025-02-03
Payer: COMMERCIAL

## 2025-02-03 ENCOUNTER — VIRTUAL VISIT (OUTPATIENT)
Dept: PSYCHOLOGY | Facility: CLINIC | Age: 30
End: 2025-02-03
Payer: COMMERCIAL

## 2025-02-03 DIAGNOSIS — F98.8 ATTENTION DEFICIT DISORDER WITHOUT HYPERACTIVITY: Primary | ICD-10-CM

## 2025-02-03 DIAGNOSIS — F41.1 GAD (GENERALIZED ANXIETY DISORDER): ICD-10-CM

## 2025-02-03 DIAGNOSIS — F33.0 MAJOR DEPRESSIVE DISORDER, RECURRENT EPISODE, MILD: ICD-10-CM

## 2025-02-03 PROCEDURE — 90837 PSYTX W PT 60 MINUTES: CPT | Mod: 95 | Performed by: PSYCHOLOGIST

## 2025-02-03 NOTE — PROGRESS NOTES
[facility]                                  Patient Name   Date: 2025     Service Type: Individual      Session Start Time  2:33 pm   Session End Time: 3:30      Session Length: 53-60 min    Session #:  3 in ,   17 in   Attendees: Client    Service Modality:  Video Visit       Provider verified identity through the following two step process.  Patient provided:  Patient  and Patient address    Telemedicine Visit: The patient's condition can be safely assessed and treated via synchronous audio and visual telemedicine encounter.      Reason for Telemedicine Visit: Services only offered telehealth    Originating Site (Patient Location): Patient's home    Distant Site (Provider Location): Provider Remote Setting- Home Office    Consent:  The patient/guardian has verbally consented to: the potential risks and benefits of telemedicine (video visit) versus in person care; bill my insurance or make self-payment for services provided; and responsibility for payment of non-covered services.     Patient would like the video invitation sent by:  my chart yes    Mode of Communication:  Video Conference via Am Well  As the provider I attest to compliance with applicable laws and regulations related to telemedicine.    DATA  Interactive Complexity: No  Extended session:  yes  60 minutes  on 25   ADD, ODILON, MDD, OCD     -Poor focus, loses her thought by the end of sentence, tangential.  Patient's presenting concerns require more intensive intervention than could be completed within the usual service.   complex circumstances.  See Data section for details.  -The need to manage communication related to complex topics and additional time is needed to address emotional needs/ loss/ or triggering, apply concepts &  discuss resources.  Extra time needed to address symptom level that complicates delivery of care.      Crisis: No     PROMIS (reviewed every 90 days): at intake 24, 24,  ,  "11-24 due 2-25  Treatment plan reviewed : today  (first  3-07-24   90 days = 9-07-24) 12-03-24,  next 3-25    Diagnosis:    F41.1 Generalized Anxiety Disorder,   F33.0 Major Depressive Disorder, recurrent, mild  F96.8 Attention Disorder without Hyperactivity         Progress Since Last Session (Related to Symptoms / Goals / Homework):   Symptoms: stable but with face picking, ADD symptoms, intra and interpersonal distress  Managing energy/ managing space/ stuff/ clutter. feeling of burn out/ and overwhelm.  Zero panic attacks for about 6 years.    Homework: Completed in session      Episode of Care Goals: Satisfactory progress - ACTION (Actively working towards change); Intervened by reinforcing change plan / affirming steps taken.  support system- talks virtually with her friends weekly .       Current / Ongoing Stressors and Concerns:      Interpersonal distress  \"I struggle with fatigue every day\" entire adult life  Skin picking \"I pick a lot\"  Navigating neuro divergent brain for her and partner  loss / grief. Feeling unsettled.Anxiety, ADD - poor focus   interpersonal distress  with some of partners family; boundaries needed  Work stress/ burnout feeling at times      Treatment Objective(s) Addressed in This Session:    identify core stressors/  thoughts that contribute to feeling anxious  Patient will use at least 6 coping skills for anxiety management in the next 90 days.  Learn about physiological pathways related to stress and trauma.     Intervention:   Interpersonal Therapy:, CBT, supportive therapy    Homework:  in session and at home    Symptoms:  Disruption of attention  focus / concentration   Anxiety,  ongoing fatigue   Multiple interpersonal and work stressors  PMDD diagnosed in her 20's  Skin picking \"I pick a lot\"      Stressors:  Interpersonal  Has reached out for a dermatology appointment- face picking   Feels that she needs specific dates and to   have things planned out.  Spouse is needing " spontaneity.    Client reports that she has tried on a few wedding dresses.  Does anticipate a marriage proposal, but it has been delayed.  Shares that she is feeling confused, conflicted.  Likes the results from sertraline- shares that she cesar like to stay on it   , if / when she gets pregnancy.    Offered values clarification. Exercise.    Identified and processed emotions.    Assessments completed prior to visit:  The following assessments were completed by patient for this visit: ODILON 90 day roland or none      ASSESSMENT: Current Emotional / Mental Status (status of significant symptoms):   2-03-25       Risk status (Self / Other harm or suicidal ideation)   Patient denies current fears or concerns for personal safety.   Patient denies current or recent suicidal ideation or behaviors.   Patient denies current or recent homicidal ideation or behaviors.   Patient denies current or recent self injurious behavior or ideation.   Patient denies other safety concerns.   Patient reports there has been no change in risk factors since their last session.     Patient reports there has been no change in protective factors since their last session.    Recommended that patient call 911 or go to the local ED should there be a change in any of these risk factors.     Appearance:   Appropriate    Eye Contact:   Good    Psychomotor Behavior: Normal    Attitude:   Cooperative    Orientation:   All   Speech   Rate / Production:  Normal    Volume:   Normal    Mood:    Anxious   Affect:    Anxious   Thought Content:  Clear.  at times Ruminations   Thought Form:   Worrisome   Insight:    Good      Medication Review:   No changes to psychiatric medications, see updated Medication List in EPIC.          Medication Compliance:   NA     Changes in Health Issues:   None reported     Chemical Use Review:   Substance Use: Chemical use reviewed, no active concerns identified      Tobacco Use: No current tobacco use.      Diagnosis:  F41.1  "Generalized Anxiety Disorder,   F33.0 Major Depressive Disorder, recurrent, mild  F96.8 Attention Disorder without Hyperactivity     Collateral Reports Completed:   Routed note to PCP as needed    PLAN: (Patient Tasks / Therapist Tasks / Other  Review values worksheet  Talk to her provider     Utilize several CBT techniques    Shield self from others judgement. Say no when possible and remove self from situation    employ several interpersonal effectiveness skills.    Listen to body-  Got o bed sooner    Client wants to stay a bit ahead of her symptoms.    Invite partner to use communication tools as discussed today     Re-think balance  Consider re-setting my \"spoons\" saying no to inner requests from self   Don't work to exhaustion    Employ body scan as a technique  Use CBT skllls daily  Will consider using distraction, substitution, or prolong strategies.    Plan  is to use several new strategies to improve communication  Without getting dysregulated herself  Review values  Attend to inner boundareis    Set goals for the week. Month.  Keep good boundaries with father.    Plan is to connect with grandmother   May creat a goodbye ritual - journal or think about each student     Past hw  Plan is to attend to resting this weekend.  Give self permission for boundaries as needed  Past hw  Consider several CBT skills reframes  Consider 2 behavioral intervetions as discussed.  Past hw  Use guidelines as discussed around daily coping skills  and planning for success  Vagal reset.  Hydration / stress support     Lashell Harris LP                                                      ____________________________________________________________________    Individual Treatment Plan    Patient's Name:  Date Of Birth:     Date of Creation: [birth year]  Date Treatment Plan Last Reviewed/Revised: 4-19-24    DSM5 Diagnoses:  F41.1 Generalized Anxiety Disorder,   F33.0 Major Depressive Disorder, recurrent, mild  F96.8 Attention Disorder " "without Hyperactivity       Psychosocial / Contextual Factors: multiple stressors, mood disruption  PROMIS (reviewed every 90 days): at intake 2-29-24, 5-03-24, 7-24, 11-24 due 2-25    Referral / Collaboration:  Referral to another professional/service is not indicated at this time.    Anticipated number of session for this episode of care: 16-20  Anticipation frequency of session: Every other week  Anticipated Duration of each session: 38-52 minutes and/or 53-60 minutes  Treatment plan will be reviewed in 90 days or when goals have been changed.     Update 12-03-24  ( gap in care- so updated today)  The plan for the next 90 day is continue/ maintain  with these actions and cognitive behavioral tasks to develop more consistency / skill proficiency in effort to reduce symptom frequency, intensity and duration.  employ several interpersonal effectiveness skills.  Listen to body-  Got to bed sooner  Client wants to stay a bit ahead of her symptoms.  Invite partner to use communication tools as discussed today   Re-think balance  Consider re-setting my \"spoons\" saying no to inner requests from self   Don't work to exhaustion  Employ body scan as a technique  Use CBT skllls daily  Will consider using distraction, substitution, or prolong strategies.  Plan  is to use several new strategies to improve communication  Without getting dysregulated herself  Review values  Attend to inner boundareis        Update 7-11-24   The plan for the next 90 day is continue/ maintain  with these actions and cognitive behavioral tasks to develop more consistency / skill proficiency in effort to reduce symptom frequency, intensity and duration.    Set goals for the week. Month.  Keep good boundaries with father.  Plan is to connect with grandmother   May creat a goodbye ritual - journal or think about each student         Measurable Treatment Goal(s) related to diagnosis / functional impairment(s)  Goal 1: Patient will identify core pattern " of  thoughts that contribute to feeling stressed or  anxious.    I will know I've met my goal when .  will report 50% less disrupted mood.      Objective #A (Patient Action)    Patient will use at least 6 coping skills for anxiety management in the next 90 days weeks.  Will reports rotating coping skills- identify those that are most effective. Use several of these  consistently.  Status: New - Date:     Intervention(s)  Therapist will teach coping skills for anxiety/ unwanted changes.      Patient has reviewed and agreed to the above plan.      Lashell Harris LP       Answers submitted by the patient for this visit:  Patient Health Questionnaire (Submitted on 11/11/2024)  If you checked off any problems, how difficult have these problems made it for you to do your work, take care of things at home, or get along with other people?: Very difficult  PHQ9 TOTAL SCORE: 13

## 2025-02-03 NOTE — TELEPHONE ENCOUNTER
Patient Details  Patient's Full Name  Naty Pérez  Patient's Date of Birth  1995  Patient's Phone Number  [phone]  Patient's Email ID  [email or web address]  Has the patient visited [facility] in the past 3 years?  Yes  Address Details  Address  [address], [place]  [address]  Appointment Preferences  Reason for appointment  I have some acne and I have a problem with picking at it and making it all worse. I was hoping the dermatologist could take a look and give me some things to try.   Preferred Location  [place]  Preferred Visit Type  In-person   Services   Do you need an  for your appointment?  No  Billing Preference  Which billing situation applies to the patient?  Patient has insurance  Insurance Information  Insurance Provider Name  [employer]  Member ID/ Policy Number  [id number]  Group Number  [id number]  Insurance Contact for Provider  [phone]  Policy Day  Is the patient the Insurance Policy day/subscriber?  Yes, patient is the policy day  Callback Preferences  Could you share your preferred callback time with us?  Yes, I have a preferred callback time  Preferred Time  Wednesday - Afternoon (Noon to 4:30 PM)  Another Time  Friday - Afternoon (Noon to 4:30 PM)  Note: Please note this is an auto-generated email from SYSTEM

## 2025-02-11 ENCOUNTER — E-VISIT (OUTPATIENT)
Dept: URGENT CARE | Facility: CLINIC | Age: 30
End: 2025-02-11
Payer: COMMERCIAL

## 2025-02-11 DIAGNOSIS — J01.90 ACUTE BACTERIAL SINUSITIS: Primary | ICD-10-CM

## 2025-02-11 DIAGNOSIS — B96.89 ACUTE BACTERIAL SINUSITIS: Primary | ICD-10-CM

## 2025-02-11 NOTE — PATIENT INSTRUCTIONS
Acute Sinusitis: Care Instructions  Overview     Acute sinusitis is an inflammation of the mucous membranes inside the nose and sinuses. Sinuses are the hollow spaces in your skull around the eyes and nose. Acute sinusitis often follows a cold. Acute sinusitis causes thick, discolored mucus that drains from the nose or down the back of the throat. It also can cause pain and pressure in your head and face along with a stuffy or blocked nose.  In most cases, sinusitis gets better on its own in 1 to 2 weeks. But some mild symptoms may last for several weeks. Sometimes antibiotics are needed if there is a bacterial infection.  Follow-up care is a key part of your treatment and safety. Be sure to make and go to all appointments, and call your doctor if you are having problems. It's also a good idea to know your test results and keep a list of the medicines you take.  How can you care for yourself at home?  Use saline (saltwater) nasal washes. This can help keep your nasal passages open and wash out mucus and allergens.  You can buy saline nose washes at a grocery store or drugstore. Follow the instructions on the package.  You can make your own at home. Add 1 teaspoon of non-iodized salt and 1 teaspoon of baking soda to 2 cups of distilled or boiled and cooled water. Fill a squeeze bottle or a nasal cleansing pot (such as a neti pot) with the nasal wash. Then put the tip into your nostril, and lean over the sink. With your mouth open, gently squirt the liquid. Repeat on the other side.  Try a decongestant nasal spray like oxymetazoline (Afrin). Do not use it for more than 3 days in a row. Using it for more than 3 days can make your congestion worse.  If needed, take an over-the-counter pain medicine, such as acetaminophen (Tylenol), ibuprofen (Advil, Motrin), or naproxen (Aleve). Read and follow all instructions on the label.  If the doctor prescribed antibiotics, take them as directed. Do not stop taking them just  "because you feel better. You need to take the full course of antibiotics.  Be careful when taking over-the-counter cold or flu medicines and Tylenol at the same time. Many of these medicines have acetaminophen, which is Tylenol. Read the labels to make sure that you are not taking more than the recommended dose. Too much acetaminophen (Tylenol) can be harmful.  Try a steroid nasal spray. It may help with your symptoms.  Breathe warm, moist air. You can use a steamy shower, a hot bath, or a sink filled with hot water. Avoid cold, dry air. Using a humidifier in your home may help. Follow the directions for cleaning the machine.  When should you call for help?   Call your doctor now or seek immediate medical care if:    You have new or worse swelling, redness, or pain in your face or around one or both of your eyes.     You have double vision or a change in your vision.     You have a high fever.     You have a severe headache and a stiff neck.     You have mental changes, such as feeling confused or much less alert.   Watch closely for changes in your health, and be sure to contact your doctor if:    You are not getting better as expected.   Where can you learn more?  Go to https://[email or web address]/patiented  Enter I933 in the search box to learn more about \"Acute Sinusitis: Care Instructions.\"  Current as of: September 27, 2023  Content Version: 14.3    2024 [employer].   Care instructions adapted under license by your healthcare professional. If you have questions about a medical condition or this instruction, always ask your healthcare professional. [employer] disclaims any warranty or liability for your use of this information.    Dear Naty Pérez    After reviewing your responses, I've been able to diagnose you with Acute bacterial sinusitis.      Based on your responses and diagnosis, I have prescribed   Orders Placed This Encounter   Medications     amoxicillin-clavulanate " (AUGMENTIN) 875-125 MG tablet     Sig: Take 1 tablet by mouth 2 times daily for 7 days.     Dispense:  14 tablet     Refill:  0      to treat your symptoms. I have sent this to your pharmacy.?     It is also important to stay well hydrated, get lots of rest and take over-the-counter decongestants,?tylenol?or ibuprofen if you?are able to?take those medications per your primary care provider to help relieve discomfort.?     It is important that you take?all of?your prescribed medication even if your symptoms are improving after a few doses.? Taking?all of?your medicine helps prevent the symptoms from returning.?     If your symptoms worsen, you develop severe headache, vomiting, high fever (>102), or are not improving in 7 days, please contact your primary care provider for an appointment or visit any of our convenient Walk-in Care or Urgent Care Centers to be seen which can be found on our website?here.?     Thanks again for choosing?us?as your health care partner,?   ?  VALE Quinn CNP?   Thank you for choosing us for your care. I have placed an order for a prescription so that you can start treatment. View your full visit summary for details by clicking on the link below. Your pharmacist will able to address any questions you may have about the medication.     If you're not feeling better within 5-7 days, please schedule an appointment.  You can schedule an appointment right here in [facility], or call [phone]  If the visit is for the same symptoms as your eVisit, we'll refund the cost of your eVisit if seen within seven days.

## 2025-03-10 ENCOUNTER — VIRTUAL VISIT (OUTPATIENT)
Dept: PSYCHOLOGY | Facility: CLINIC | Age: 30
End: 2025-03-10
Payer: COMMERCIAL

## 2025-03-10 DIAGNOSIS — F33.0 MAJOR DEPRESSIVE DISORDER, RECURRENT EPISODE, MILD: ICD-10-CM

## 2025-03-10 DIAGNOSIS — F41.1 GAD (GENERALIZED ANXIETY DISORDER): ICD-10-CM

## 2025-03-10 DIAGNOSIS — F98.8 ATTENTION DEFICIT DISORDER WITHOUT HYPERACTIVITY: Primary | ICD-10-CM

## 2025-03-10 PROCEDURE — 90837 PSYTX W PT 60 MINUTES: CPT | Mod: 95 | Performed by: PSYCHOLOGIST

## 2025-03-10 ASSESSMENT — PATIENT HEALTH QUESTIONNAIRE - PHQ9
10. IF YOU CHECKED OFF ANY PROBLEMS, HOW DIFFICULT HAVE THESE PROBLEMS MADE IT FOR YOU TO DO YOUR WORK, TAKE CARE OF THINGS AT HOME, OR GET ALONG WITH OTHER PEOPLE: SOMEWHAT DIFFICULT
SUM OF ALL RESPONSES TO PHQ QUESTIONS 1-9: 6
SUM OF ALL RESPONSES TO PHQ QUESTIONS 1-9: 6

## 2025-03-10 NOTE — PROGRESS NOTES
[facility]                                  Patient Name   Date: 3-     Service Type: Individual      Session Start Time  2:42 pm   Session End Time: 3:42  pm   Session Length: 53-60 min    Session #:  4 in ,   17 in   Attendees: Client    Service Modality:  Video Visit       Provider verified identity through the following two step process.  Patient provided:  Patient  and Patient address    Telemedicine Visit: The patient's condition can be safely assessed and treated via synchronous audio and visual telemedicine encounter.      Reason for Telemedicine Visit:  patient convenience    Originating Site (Patient Location): Patient's home    Distant Site (Provider Location): Provider Remote Setting- Home Office    Consent:  The patient/guardian has verbally consented to: the potential risks and benefits of telemedicine (video visit) versus in person care; bill my insurance or make self-payment for services provided; and responsibility for payment of non-covered services.     Patient would like the video invitation sent by:  my chart yes    Mode of Communication:  Video Conference via [employer]  As the provider I attest to compliance with applicable laws and regulations related to telemedicine.    DATA  Interactive Complexity: No  Extended session:  yes  60 minutes  on 3-10-25   ADD, ODILON, MDD, OCD     -Poor focus, loses her thought by the end of sentence, tangential.  Patient's presenting concerns require more intensive intervention than could be completed within the usual service.   -The need to manage communication related to complex topics and additional time is needed to address emotional needs/ loss/ or triggering, apply concepts &  discuss resources.  Extra time needed to address symptom level that complicates delivery of care.      Crisis: No     PROMIS (reviewed every 90 days): at intake 24, 24,  ,  due   Treatment plan reviewed : today  (first  3-07-24    "90 days = 9-07-24) 12-03-24,  next 3-25    Diagnosis:    F41.1 Generalized Anxiety Disorder,   F33.0 Major Depressive Disorder, recurrent, mild  F96.8 Attention Disorder without Hyperactivity         Progress Since Last Session (Related to Symptoms / Goals / Homework):   Symptoms: stable but with face picking, ADD symptoms, intra and interpersonal distress  Managing energy/ managing space/ stuff/ clutter. feeling of burn out/ and overwhelm.  Zero panic attacks for about 6 years.    Homework: Completed in session      Episode of Care Goals: Satisfactory progress - ACTION (Actively working towards change); Intervened by reinforcing change plan / affirming steps taken.  support system- talks virtually with her friends weekly .       Current / Ongoing Stressors and Concerns:      Interpersonal distress  \"I struggle with fatigue every day\" entire adult life  Skin picking \"I pick a lot\"  Navigating neuro divergent brain for her and partner  loss / grief. Feeling unsettled.Anxiety, ADD - poor focus   interpersonal distress  with some of partners family; boundaries needed  Work stress/ burnout feeling at times      Treatment Objective(s) Addressed in This Session:    identify core stressors/  thoughts that contribute to feeling anxious  Patient will use at least 6 coping skills for anxiety management in the next 90 days.  Learn about physiological pathways related to stress and trauma.     Intervention:   Interpersonal Therapy:, CBT, supportive therapy    Homework:  in session and at home    Symptoms:  Disruption of attention  focus / concentration   Anxiety,  ongoing fatigue   Multiple interpersonal and work stressors  PMDD diagnosed in her 20's  Skin picking \"I pick a lot\"      Stressors:  Interpersonal  Client reports that she got engaged since last session.  Has an increased focus on wedding prep-  Looking at rings, venues , dress., invitations.    Is trying to figure out the direction regarding  Having  " "parents.    Has question around \" do I just pick fits\".  Has been particular about cleaning- partner  Often gets derailed and does not follow through..  Conflict resolution around neuro divergent persons.  \"Its the accountability\".  Family of origin - dad was a yeller; client endorses some dissociation.    Constructed an anger thermometer.  Client did well with this.  Paired this with fight/flight/ freeze/ joe response sets.  Shares that her \"go to\" has been joe / give up any boundaries   to make it better. Explored fears- abandonment vs. Physical rubin      Introduced Emotional Freedom Tapping  (EFT )  Constructed stem statement.  Client did well with this. Is encouraged to do 3-5 rounds 2-3 times a day    Identified and processed emotions.    Assessments completed prior to visit:  The following assessments were completed by patient for this visit: ODILON 90 day roland or none      ASSESSMENT: Current Emotional / Mental Status (status of significant symptoms):   2-03-25       Risk status (Self / Other harm or suicidal ideation)   Patient denies current fears or concerns for personal safety.   Patient denies current or recent suicidal ideation or behaviors.   Patient denies current or recent homicidal ideation or behaviors.   Patient denies current or recent self injurious behavior or ideation.   Patient denies other safety concerns.   Patient reports there has been no change in risk factors since their last session.     Patient reports there has been no change in protective factors since their last session.    Recommended that patient call 911 or go to the local ED should there be a change in any of these risk factors.     Appearance:   Appropriate    Eye Contact:   Good    Psychomotor Behavior: Normal    Attitude:   Cooperative    Orientation:   All   Speech   Rate / Production:  Normal    Volume:   Normal    Mood:    Anxious Dysregulated when partner expresses irritability   Affect:    Anxious   Thought " "Content:  Clear.  at times Ruminations   Thought Form:   Worrisome Need to pick at skin   Insight:    Good      Medication Review:   No changes to psychiatric medications, see updated Medication List in EPIC.          Medication Compliance:   NA     Changes in Health Issues:   None reported     Chemical Use Review:   Substance Use: Chemical use reviewed, no active concerns identified      Tobacco Use: No current tobacco use.      Diagnosis:  F41.1 Generalized Anxiety Disorder,   F33.0 Major Depressive Disorder, recurrent, mild  F96.8 Attention Disorder without Hyperactivity     Collateral Reports Completed:   Routed note to PCP as needed    PLAN: (Patient Tasks / Therapist Tasks / Other  Review anger thermometer  Practice EFT    Review values worksheet  Talk to her provider     Utilize several CBT techniques    Shield self from others judgement. Say no when possible and remove self from situation    employ several interpersonal effectiveness skills.    Listen to body-  Got o bed sooner    Client wants to stay a bit ahead of her symptoms.    Invite partner to use communication tools as discussed today     Re-think balance  Consider re-setting my \"spoons\" saying no to inner requests from self   Don't work to exhaustion    Employ body scan as a technique  Use CBT skllls daily  Will consider using distraction, substitution, or prolong strategies.    Plan  is to use several new strategies to improve communication  Without getting dysregulated herself  Review values  Attend to inner boundareis    Set goals for the week. Month.  Keep good boundaries with father.    Plan is to connect with grandmother   May creat a goodbye ritual - journal or think about each student     Past hw  Plan is to attend to resting this weekend.  Give self permission for boundaries as needed  Past hw  Consider several CBT skills reframes  Consider 2 behavioral intervetions as discussed.  Past hw  Use guidelines as discussed around daily coping " "skills  and planning for success  Vagal reset.  Hydration / stress support     Lashell Harris ADRIANA                                                      ____________________________________________________________________    Individual Treatment Plan    Patient's Name:  Date Of Birth:     Date of Creation: 1- 04-24  Date Treatment Plan Last Reviewed/Revised: 4-19-24    DSM5 Diagnoses:  F41.1 Generalized Anxiety Disorder,   F33.0 Major Depressive Disorder, recurrent, mild  F96.8 Attention Disorder without Hyperactivity       Psychosocial / Contextual Factors: multiple stressors, mood disruption  PROMIS (reviewed every 90 days): at intake 2-29-24, 5-03-24, 7-24, 11-24 due 2-25    Referral / Collaboration:  Referral to another professional/service is not indicated at this time.    Anticipated number of session for this episode of care: 16-20  Anticipation frequency of session: Every other week  Anticipated Duration of each session: 38-52 minutes and/or 53-60 minutes  Treatment plan will be reviewed in 90 days or when goals have been changed.     Update 12-03-24  ( gap in care- so updated today)  The plan for the next 90 day is continue/ maintain  with these actions and cognitive behavioral tasks to develop more consistency / skill proficiency in effort to reduce symptom frequency, intensity and duration.  employ several interpersonal effectiveness skills.  Listen to body-  Got to bed sooner  Client wants to stay a bit ahead of her symptoms.  Invite partner to use communication tools as discussed today   Re-think balance  Consider re-setting my \"spoons\" saying no to inner requests from self   Don't work to exhaustion  Employ body scan as a technique  Use CBT skllls daily  Will consider using distraction, substitution, or prolong strategies.  Plan  is to use several new strategies to improve communication  Without getting dysregulated herself  Review values  Attend to inner boundareis        Update 7-11-24   The plan for the " next 90 day is continue/ maintain  with these actions and cognitive behavioral tasks to develop more consistency / skill proficiency in effort to reduce symptom frequency, intensity and duration.    Set goals for the week. Month.  Keep good boundaries with father.  Plan is to connect with grandmother   May creat a goodbye ritual - journal or think about each student         Measurable Treatment Goal(s) related to diagnosis / functional impairment(s)  Goal 1: Patient will identify core pattern of  thoughts that contribute to feeling stressed or  anxious.    I will know I've met my goal when .  will report 50% less disrupted mood.      Objective #A (Patient Action)    Patient will use at least 6 coping skills for anxiety management in the next 90 days weeks.  Will reports rotating coping skills- identify those that are most effective. Use several of these  consistently.  Status: New - Date:     Intervention(s)  Therapist will teach coping skills for anxiety/ unwanted changes.      Patient has reviewed and agreed to the above plan.      Lashell Harris LP       Answers submitted by the patient for this visit:  Patient Health Questionnaire (Submitted on 11/11/2024)  If you checked off any problems, how difficult have these problems made it for you to do your work, take care of things at home, or get along with other people?: Very difficult  PHQ9 TOTAL SCORE: 13

## 2025-04-07 ENCOUNTER — VIRTUAL VISIT (OUTPATIENT)
Dept: PSYCHOLOGY | Facility: CLINIC | Age: 30
End: 2025-04-07
Payer: COMMERCIAL

## 2025-04-07 ENCOUNTER — VIRTUAL VISIT (OUTPATIENT)
Dept: FAMILY MEDICINE | Facility: CLINIC | Age: 30
End: 2025-04-07
Payer: COMMERCIAL

## 2025-04-07 DIAGNOSIS — F41.1 GAD (GENERALIZED ANXIETY DISORDER): ICD-10-CM

## 2025-04-07 DIAGNOSIS — F33.0 MAJOR DEPRESSIVE DISORDER, RECURRENT EPISODE, MILD: ICD-10-CM

## 2025-04-07 DIAGNOSIS — F98.8 ATTENTION DEFICIT DISORDER WITHOUT HYPERACTIVITY: Primary | ICD-10-CM

## 2025-04-07 DIAGNOSIS — F98.8 ATTENTION DEFICIT DISORDER WITHOUT HYPERACTIVITY: ICD-10-CM

## 2025-04-07 PROCEDURE — 90837 PSYTX W PT 60 MINUTES: CPT | Mod: 95 | Performed by: PSYCHOLOGIST

## 2025-04-07 PROCEDURE — 98005 SYNCH AUDIO-VIDEO EST LOW 20: CPT | Performed by: NURSE PRACTITIONER

## 2025-04-07 RX ORDER — DEXTROAMPHETAMINE SACCHARATE, AMPHETAMINE ASPARTATE MONOHYDRATE, DEXTROAMPHETAMINE SULFATE AND AMPHETAMINE SULFATE 2.5; 2.5; 2.5; 2.5 MG/1; MG/1; MG/1; MG/1
10 CAPSULE, EXTENDED RELEASE ORAL DAILY
Qty: 30 CAPSULE | Refills: 0 | Status: SHIPPED | OUTPATIENT
Start: 2025-06-06 | End: 2025-07-06

## 2025-04-07 RX ORDER — DEXTROAMPHETAMINE SACCHARATE, AMPHETAMINE ASPARTATE MONOHYDRATE, DEXTROAMPHETAMINE SULFATE AND AMPHETAMINE SULFATE 2.5; 2.5; 2.5; 2.5 MG/1; MG/1; MG/1; MG/1
10 CAPSULE, EXTENDED RELEASE ORAL DAILY
Qty: 30 CAPSULE | Refills: 0 | Status: SHIPPED | OUTPATIENT
Start: 2025-05-07 | End: 2025-06-06

## 2025-04-07 RX ORDER — DEXTROAMPHETAMINE/AMPHETAMINE 10 MG
10 CAPSULE, EXT RELEASE 24 HR ORAL DAILY
Qty: 30 CAPSULE | Refills: 0 | Status: CANCELLED | OUTPATIENT
Start: 2025-04-07

## 2025-04-07 RX ORDER — DEXTROAMPHETAMINE SACCHARATE, AMPHETAMINE ASPARTATE MONOHYDRATE, DEXTROAMPHETAMINE SULFATE AND AMPHETAMINE SULFATE 2.5; 2.5; 2.5; 2.5 MG/1; MG/1; MG/1; MG/1
10 CAPSULE, EXTENDED RELEASE ORAL DAILY
Qty: 30 CAPSULE | Refills: 0 | Status: SHIPPED | OUTPATIENT
Start: 2025-04-07 | End: 2025-05-07

## 2025-04-07 NOTE — PROGRESS NOTES
Naty is a 29 year old who is being evaluated via a billable video visit.    How would you like to obtain your AVS? MyChart  If the video visit is dropped, the invitation should be resent by: Text to cell phone: [phone]  Will anyone else be joining your video visit? No      Assessment & Plan     Attention deficit disorder without hyperactivity  Stable on current dose.  CSA is up to date, PDMP reviewed.  Ok for e-visit next set of refills.    - amphetamine-dextroamphetamine (ADDERALL XR) 10 MG 24 hr capsule; Take 1 capsule (10 mg) by mouth daily.  - amphetamine-dextroamphetamine (ADDERALL XR) 10 MG 24 hr capsule; Take 1 capsule (10 mg) by mouth daily.  - amphetamine-dextroamphetamine (ADDERALL XR) 10 MG 24 hr capsule; Take 1 capsule (10 mg) by mouth daily.        Subjective   Naty is a 29 year old, presenting for the following health issues:  Follow Up (ADHD refill adderall no concerns )      4/7/2025     2:26 PM   Additional Questions   Roomed by Shital DAWKINS     History of Present Illness       Reason for visit:  Prescription Refill   She is taking medications regularly.      Going overall, just needs refills.                  Objective           Vitals:  No vitals were obtained today due to virtual visit.    Physical Exam   GENERAL: alert and no distress  EYES: Eyes grossly normal to inspection.  No discharge or erythema, or obvious scleral/conjunctival abnormalities.  RESP: No audible wheeze, cough, or visible cyanosis.    SKIN: Visible skin clear. No significant rash, abnormal pigmentation or lesions.  NEURO: Cranial nerves grossly intact.  Mentation and speech appropriate for age.  PSYCH: Appropriate affect, tone, and pace of words          Video-Visit Details    Type of service:  Video Visit   Originating Location (pt. Location): Home    Distant Location (provider location):  Off-site  Platform used for Video Visit: Letha  Signed Electronically by: Shania Abel DNP

## 2025-04-07 NOTE — PROGRESS NOTES
[facility]                                  Patient Name   Date: 2025     Service Type: Individual      Session Start Time  3:34 pm   Session End Time: 4:32  pm   Session Length: 53-60 min    Session #:  5 in ,   17 in   Attendees: Client    Service Modality:  Video Visit       Provider verified identity through the following two step process.  Patient provided:  Patient  and Patient address    Telemedicine Visit: The patient's condition can be safely assessed and treated via synchronous audio and visual telemedicine encounter.      Reason for Telemedicine Visit:  patient convenience    Originating Site (Patient Location): Patient's home    Distant Site (Provider Location): Provider Remote Setting- Home Office    Consent:  The patient/guardian has verbally consented to: the potential risks and benefits of telemedicine (video visit) versus in person care; bill my insurance or make self-payment for services provided; and responsibility for payment of non-covered services.     Patient would like the video invitation sent by:  my chart yes    Mode of Communication:  Video Conference via [facility]  As the provider I attest to compliance with applicable laws and regulations related to telemedicine.    DATA  Interactive Complexity: No  Extended session:  yes  53-60 minutes  on 25   ADD, ODILON, MDD, OCD     -Poor focus, loses her thought by the end of sentence, tangential.  Patient's presenting concerns require more intensive intervention than could be completed within the usual service.   -The need to manage communication related to complex topics and additional time is needed to address emotional needs/ loss/ or triggering, apply concepts &  discuss resources.  Extra time needed to address symptom level that complicates delivery of care.      Crisis: No     PROMIS (reviewed every 90 days): at intake 24, 24,  , , , care gap     Treatment plan reviewed : today   "(first  3-07-24   90 days = 9-07-24) 12-03-24, care gap  4-25    Diagnosis:    F41.1 Generalized Anxiety Disorder,   F33.0 Major Depressive Disorder, recurrent, mild  F96.8 Attention Disorder without Hyperactivity         Progress Since Last Session (Related to Symptoms / Goals / Homework):   Symptoms: stable but with face picking, ADD symptoms, intra and interpersonal distress  Managing energy/ managing space/ stuff/ clutter. feeling of burn out/ and overwhelm.  Zero panic attacks for about 6 years.    Homework: Completed in session      Episode of Care Goals: Satisfactory progress - ACTION (Actively working towards change); Intervened by reinforcing change plan / affirming steps taken.  support system- talks virtually with her friends weekly .       Current / Ongoing Stressors and Concerns:      Interpersonal distress  \"I struggle with fatigue every day\" entire adult life  Skin picking \"I pick a lot\"  Navigating neuro divergent brain for her and partner  loss / grief. Feeling unsettled.Anxiety, ADD - poor focus   interpersonal distress  with some of partners family; boundaries needed  Work stress/ burnout feeling at times      Treatment Objective(s) Addressed in This Session:    identify core stressors/  thoughts that contribute to feeling anxious  Patient will use at least 6 coping skills for anxiety management in the next 90 days.  Learn about physiological pathways related to stress and trauma.     Intervention:   Interpersonal Therapy:, CBT, supportive therapy    Homework:  in session and at home    Symptoms:  Disruption of attention  focus / concentration   Anxiety,  ongoing fatigue   Multiple interpersonal and work stressors  PMDD diagnosed in her 20's  Skin picking \"I pick a lot\"    Stressors:  Interpersonal  Client reports that she saw extended family who live  5 hours away.. Family threw a surprise engagement party.  Shares that her dad was there: \"Its so intense\" in that  her father starts intense " political  monologue/ dialogue.  (It why she doesn't want a relationship with him)  Family includes a   and a senator - he tries to engage them.    Cousins mentioned how to navigate having   parents at the wedding.  Discussed possible helpers who understand her fathers behaviors.    Explored triggers.  Reviewed the good committment that she has with leila.    Identified and processed emotions.    Assessments completed prior to visit:  The following assessments were completed by patient for this visit: ODILON 90 day roland or none      ASSESSMENT: Current Emotional / Mental Status (status of significant symptoms):   Assessed 4-07-25 no change in risk       Risk status (Self / Other harm or suicidal ideation)   Patient denies current fears or concerns for personal safety.   Patient denies current or recent suicidal ideation or behaviors.   Patient denies current or recent homicidal ideation or behaviors.   Patient denies current or recent self injurious behavior or ideation.   Patient denies other safety concerns.   Patient reports there has been no change in risk factors since their last session.     Patient reports there has been no change in protective factors since their last session.    Recommended that patient call 911 or go to the local ED should there be a change in any of these risk factors.     Appearance:   Appropriate    Eye Contact:   Good    Psychomotor Behavior: Normal    Attitude:   Cooperative    Orientation:   All   Speech   Rate / Production:  Normal    Volume:   Normal    Mood:    Anxious Dysregulated when partner expresses irritability   Affect:    Anxious   Thought Content:  Clear.  at times Ruminations   Thought Form:   Worrisome Need to pick at skin   Insight:    Good      Medication Review:   No changes to psychiatric medications, see updated Medication List in EPIC.          Medication Compliance:   NA     Changes in Health Issues:   None reported     Chemical Use  "Review:   Substance Use: Chemical use reviewed, no active concerns identified      Tobacco Use: No current tobacco use.      Diagnosis:  F41.1 Generalized Anxiety Disorder,   F33.0 Major Depressive Disorder, recurrent, mild  F96.8 Attention Disorder without Hyperactivity     Collateral Reports Completed:   Routed note to PCP as needed    PLAN: (Patient Tasks / Therapist Tasks / Other  Reflect on the he good committment that she has with fiance.  Identify \"handlers \" to distract dad  Use grounding skills    Review anger thermometer  Practice EFT    Review values worksheet  Talk to her provider     Utilize several CBT techniques    Shield self from others judgement. Say no when possible and remove self from situation      Lashell ADRIANA Harris                                                      ____________________________________________________________________    Individual Treatment Plan    Patient's Name:  Date Of Birth:     Date of Creation: [birth year]  Date Treatment Plan Last Reviewed/Revised: 4-19-24    DSM5 Diagnoses:  F41.1 Generalized Anxiety Disorder,   F33.0 Major Depressive Disorder, recurrent, mild  F96.8 Attention Disorder without Hyperactivity       Psychosocial / Contextual Factors: multiple stressors, mood disruption  PROMIS (reviewed every 90 days): at intake 2-29-24, 5-03-24, 7-24, 11-24 , 4-25    Referral / Collaboration:  Referral to another professional/service is not indicated at this time.    Anticipated number of session for this episode of care: 16-20  Anticipation frequency of session: Every other week  Anticipated Duration of each session: 38-52 minutes and/or 53-60 minutes  Treatment plan will be reviewed in 90 days or when goals have been changed.     Update 4-03-24  ( gap in care- so updated today)  The plan for the next 90 day is continue/ maintain  with these actions and cognitive behavioral tasks to develop more consistency / skill proficiency in effort to reduce symptom frequency, " "intensity and duration.    Review anger thermometer  Practice EFT  Review values worksheet  Talk to her provider   Utilize several CBT techniques  Shield self from others judgement. Say no when possible and remove self from situation      Update 12-03-24  ( gap in care- so updated today)  The plan for the next 90 day is continue/ maintain  with these actions and cognitive behavioral tasks to develop more consistency / skill proficiency in effort to reduce symptom frequency, intensity and duration.  employ several interpersonal effectiveness skills.  Listen to body-  Got to bed sooner  Client wants to stay a bit ahead of her symptoms.  Invite partner to use communication tools as discussed today   Re-think balance  Consider re-setting my \"spoons\" saying no to inner requests from self   Don't work to exhaustion  Employ body scan as a technique  Use CBT skllls daily  Will consider using distraction, substitution, or prolong strategies.  Plan  is to use several new strategies to improve communication  Without getting dysregulated herself  Review values  Attend to inner boundareis        Update 7-11-24   The plan for the next 90 day is continue/ maintain  with these actions and cognitive behavioral tasks to develop more consistency / skill proficiency in effort to reduce symptom frequency, intensity and duration.    Set goals for the week. Month.  Keep good boundaries with father.  Plan is to connect with grandmother   May creat a goodbye ritual - journal or think about each student         Measurable Treatment Goal(s) related to diagnosis / functional impairment(s)  Goal 1: Patient will identify core pattern of  thoughts that contribute to feeling stressed or  anxious.    I will know I've met my goal when .  will report 50% less disrupted mood.      Objective #A (Patient Action)    Patient will use at least 6 coping skills for anxiety management in the next 90 days weeks.  Will reports rotating coping skills- identify " those that are most effective. Use several of these  consistently.  Status: New - Date:     Intervention(s)  Therapist will teach coping skills for anxiety/ unwanted changes.      Patient has reviewed and agreed to the above plan.      Lashell Harris LP       Answers submitted by the patient for this visit:  Patient Health Questionnaire (Submitted on 11/11/2024)  If you checked off any problems, how difficult have these problems made it for you to do your work, take care of things at home, or get along with other people?: Very difficult  PHQ9 TOTAL SCORE: 13

## 2025-04-21 ENCOUNTER — VIRTUAL VISIT (OUTPATIENT)
Dept: PSYCHOLOGY | Facility: CLINIC | Age: 30
End: 2025-04-21
Payer: COMMERCIAL

## 2025-04-21 DIAGNOSIS — F98.8 ATTENTION DEFICIT DISORDER WITHOUT HYPERACTIVITY: Primary | ICD-10-CM

## 2025-04-21 DIAGNOSIS — F33.0 MAJOR DEPRESSIVE DISORDER, RECURRENT EPISODE, MILD: ICD-10-CM

## 2025-04-21 DIAGNOSIS — F41.1 GAD (GENERALIZED ANXIETY DISORDER): ICD-10-CM

## 2025-04-21 PROCEDURE — 90837 PSYTX W PT 60 MINUTES: CPT | Mod: 95 | Performed by: PSYCHOLOGIST

## 2025-04-21 ASSESSMENT — PATIENT HEALTH QUESTIONNAIRE - PHQ9
10. IF YOU CHECKED OFF ANY PROBLEMS, HOW DIFFICULT HAVE THESE PROBLEMS MADE IT FOR YOU TO DO YOUR WORK, TAKE CARE OF THINGS AT HOME, OR GET ALONG WITH OTHER PEOPLE: SOMEWHAT DIFFICULT
SUM OF ALL RESPONSES TO PHQ QUESTIONS 1-9: 2
SUM OF ALL RESPONSES TO PHQ QUESTIONS 1-9: 2

## 2025-04-21 NOTE — PROGRESS NOTES
[facility]                                  Patient Name   Date: 2025     Service Type: Individual      Session Start Time  2:37 pm   Session End Time: 3:33  pm   Session Length: 53-60 min    Session #:  6 in ,   17 in   Attendees: Client    Service Modality:  Video Visit       Provider verified identity through the following two step process.  Patient provided:  Patient  and Patient address    Telemedicine Visit: The patient's condition can be safely assessed and treated via synchronous audio and visual telemedicine encounter.      Reason for Telemedicine Visit:  patient convenience    Originating Site (Patient Location): Patient's home    Distant Site (Provider Location): Provider Remote Setting- Home Office    Consent:  The patient/guardian has verbally consented to: the potential risks and benefits of telemedicine (video visit) versus in person care; bill my insurance or make self-payment for services provided; and responsibility for payment of non-covered services.     Patient would like the video invitation sent by:  my chart yes    Mode of Communication:  Video Conference via [facility]  As the provider I attest to compliance with applicable laws and regulations related to telemedicine.    DATA  Interactive Complexity: No  Extended session:  yes  53-60 minutes  on 25     ADD, ODILON, MDD, OCD     -Poor focus, loses her thought by the end of sentence, tangential.  Patient's presenting concerns require more intensive intervention than could be completed within the usual service. Provider used clinical judgment in determining the necessary length for the session to benefit the patient's needs.    Crisis: No     PROMIS (reviewed every 90 days): at intake 24, 24,  , , , care gap  so completed on    Treatment plan reviewed : today  (first  3-07-24   90 days = 24) 24, care gap      Diagnosis:    F41.1 Generalized Anxiety Disorder,   F33.0  "Major Depressive Disorder, recurrent, mild  F96.8 Attention Disorder without Hyperactivity         Progress Since Last Session (Related to Symptoms / Goals / Homework):   Symptoms: stable but with face picking, ADD symptoms, intra and interpersonal distress  Managing energy/ managing space/ stuff/ clutter. feeling of burn out/ and overwhelm.  Zero panic attacks for about 6 years.    Homework: Completed in session      Episode of Care Goals: Satisfactory progress - ACTION (Actively working towards change); Intervened by reinforcing change plan / affirming steps taken.  No longer having panic attacks     Current / Ongoing Stressors and Concerns:      Interpersonal distress  \"I struggle with fatigue every day\" entire adult life  Skin picking \"I pick a lot\"  Navigating neuro divergent brain for her and partner  loss / grief. Feeling unsettled.Anxiety, ADD - poor focus   interpersonal distress  with some of partners family; boundaries needed  Work stress/ burnout feeling at times      Treatment Objective(s) Addressed in This Session:    identify core stressors/  thoughts that contribute to feeling anxious  Patient will use at least 6 coping skills for anxiety management in the next 90 days.  Learn about physiological pathways related to stress and trauma.     Intervention:   Interpersonal Therapy:, CBT, supportive therapy    Homework:  in session and at home    Symptoms:  Disruption of attention  focus / concentration   Anxiety,  ongoing fatigue   Multiple interpersonal and work stressors  PMDD worsens once a month (diagnosed in her 20's)  Skin picking \"I pick a lot\"    Reviewed skills used.    Stressors:  Interpersonal  Client reports that she has had reflection time  around her family of origin.   Parents  / mom moved when client was 3.  Client lived in [place] for 2 years.    \"I spent my whole childhood trying to manage other peoples emotions\"  Especially with dad, but also mom who gets who gets very " emotional also.  Is trying to unlearn- cause its my baseline.  Extra stress with the PMDD.    Explored strategies to execute in setting boundaries.  Discussed metaphors.  Explored triggers.  Reviewed DBT skill sets.  Distress tolerance, Self -soothe with the senses.     Identified and processed emotions.    Assessments completed prior to visit:  The following assessments were completed by patient for this visit: ODILON 90 day roland or none      ASSESSMENT: Current Emotional / Mental Status (status of significant symptoms):   Assessed 4-21-25 no change in risk       Risk status (Self / Other harm or suicidal ideation)   Patient denies current fears or concerns for personal safety.   Patient denies current or recent suicidal ideation or behaviors.   Patient denies current or recent homicidal ideation or behaviors.   Patient denies current or recent self injurious behavior or ideation.   Patient denies other safety concerns.   Patient reports there has been no change in risk factors since their last session.     Patient reports there has been no change in protective factors since their last session.    Recommended that patient call 911 or go to the local ED should there be a change in any of these risk factors.     Appearance:   Appropriate    Eye Contact:   Good    Psychomotor Behavior: Normal    Attitude:   Cooperative    Orientation:   All   Speech   Rate / Production:  Normal    Volume:   Normal    Mood:    Anxious, Dysregulated when partner expresses irritability   Affect:    Anxious, feeling guilt   Thought Content:  Clear.  at times Ruminations   Thought Form:   Worrisome Compulsion-Need to pick at skin   Insight:    Good      Medication Review:   No changes to psychiatric medications, see updated Medication List in EPIC.          Medication Compliance:   NA     Changes in Health Issues:   None reported     Chemical Use Review:   Substance Use: Chemical use reviewed, no active concerns identified      Tobacco  "Use: No current tobacco use.      Diagnosis:  F41.1 Generalized Anxiety Disorder,   F33.0 Major Depressive Disorder, recurrent, mild  F96.8 Attention Disorder without Hyperactivity     Collateral Reports Completed:   Routed note to PCP as needed    PLAN: (Patient Tasks / Therapist Tasks / Other  Consider using several boundaries techniques  DBT skill sets: Distress tolerance, Self -soothe with the senses.     Reflect on the he good committment that she has with fiance.  Identify \"handlers \" to distract dad  Use grounding skills    Review anger thermometer  Practice EFT    Review values worksheet  Talk to her provider     Utilize several CBT techniques    Shield self from others judgement. Say no when possible and remove self from situation      Lashell ADRIANA Harris                                                      ____________________________________________________________________    Individual Treatment Plan    Patient's Name:  Date Of Birth:     Date of Creation: 4-03-25  Date Treatment Plan Last Reviewed/Revised: 4-03-24    DSM5 Diagnoses:  F41.1 Generalized Anxiety Disorder,   F33.0 Major Depressive Disorder, recurrent, mild  F96.8 Attention Disorder without Hyperactivity       Psychosocial / Contextual Factors: multiple stressors, mood disruption  PROMIS (reviewed every 90 days): at intake 2-29-24, 5-03-24, 7-24, 11-24 , 4-25    Referral / Collaboration:  Referral to another professional/service is not indicated at this time.    Anticipated number of session for this episode of care: 16-20  Anticipation frequency of session: Every other week  Anticipated Duration of each session: 38-52 minutes and/or 53-60 minutes  Treatment plan will be reviewed in 90 days or when goals have been changed.     Update 4-03-24  ( gap in care- so updated today)  The plan for the next 90 day is continue/ maintain  with these actions and cognitive behavioral tasks to develop more consistency / skill proficiency in effort to reduce " "symptom frequency, intensity and duration.    Review anger thermometer  Practice EFT  Review values worksheet  Talk to her provider   Utilize several CBT techniques  Shield self from others judgement. Say no when possible and remove self from situation      Update 12-03-24  ( gap in care- so updated today)  The plan for the next 90 day is continue/ maintain  with these actions and cognitive behavioral tasks to develop more consistency / skill proficiency in effort to reduce symptom frequency, intensity and duration.  employ several interpersonal effectiveness skills.  Listen to body-  Got to bed sooner  Client wants to stay a bit ahead of her symptoms.  Invite partner to use communication tools as discussed today   Re-think balance  Consider re-setting my \"spoons\" saying no to inner requests from self   Don't work to exhaustion  Employ body scan as a technique  Use CBT skllls daily  Will consider using distraction, substitution, or prolong strategies.  Plan  is to use several new strategies to improve communication  Without getting dysregulated herself  Review values  Attend to inner boundareis        Measurable Treatment Goal(s) related to diagnosis / functional impairment(s)  Goal 1: Patient will identify core pattern of  thoughts that contribute to feeling stressed or  anxious.    I will know I've met my goal when .  will report 50% less disrupted mood.      Objective #A (Patient Action)    Patient will use at least 6 coping skills for anxiety management in the next 90 days weeks.  Will reports rotating coping skills- identify those that are most effective. Use several of these  consistently.  Status: New - Date:     Intervention(s)  Therapist will teach coping skills for anxiety/ unwanted changes.      Patient has reviewed and agreed to the above plan.      Lashell Harris LP       Answers submitted by the patient for this visit:  Patient Health Questionnaire (Submitted on 11/11/2024)  If you checked off any " problems, how difficult have these problems made it for you to do your work, take care of things at home, or get along with other people?: Very difficult  PHQ9 TOTAL SCORE: 13

## 2025-06-02 ENCOUNTER — VIRTUAL VISIT (OUTPATIENT)
Dept: PSYCHOLOGY | Facility: CLINIC | Age: 30
End: 2025-06-02
Payer: COMMERCIAL

## 2025-06-02 DIAGNOSIS — F33.0 MAJOR DEPRESSIVE DISORDER, RECURRENT EPISODE, MILD: ICD-10-CM

## 2025-06-02 DIAGNOSIS — F98.8 ATTENTION DEFICIT DISORDER WITHOUT HYPERACTIVITY: Primary | ICD-10-CM

## 2025-06-02 DIAGNOSIS — F41.1 GAD (GENERALIZED ANXIETY DISORDER): ICD-10-CM

## 2025-06-02 PROCEDURE — 90837 PSYTX W PT 60 MINUTES: CPT | Mod: 95 | Performed by: PSYCHOLOGIST

## 2025-06-02 NOTE — PROGRESS NOTES
[facility]                                  Patient Name   Date: 2025     Service Type: Individual      Session Start Time  3:31 pm   Session End Time: 4:33  pm   Session Length: 53-60 min    Session #:  7 in ,   17 in   Attendees: Client    Service Modality:  Video Visit       Provider verified identity through the following two step process.  Patient provided:  Patient  and Patient address    Telemedicine Visit: The patient's condition can be safely assessed and treated via synchronous audio and visual telemedicine encounter.      Reason for Telemedicine Visit:  patient convenience    Originating Site (Patient Location): Patient's home    Distant Site (Provider Location): Provider Remote Setting- Home Office    Consent:  The patient/guardian has verbally consented to: the potential risks and benefits of telemedicine (video visit) versus in person care; bill my insurance or make self-payment for services provided; and responsibility for payment of non-covered services.     Patient would like the video invitation sent by:  my chart yes    Mode of Communication:  Video Conference via [facility]  As the provider I attest to compliance with applicable laws and regulations related to telemedicine.    DATA  Interactive Complexity: No  Extended session:  yes  53-60 minutes  on 25     ADD, ODILON, MDD, OCD     -Poor focus, loses her thought by the end of sentence, tangential.  Patient's presenting concerns require more intensive intervention than could be completed within the usual service. Provider used clinical judgment in determining the necessary length for the session to benefit the patient's needs.    Crisis: No     PROMIS (reviewed every 90 days): at intake 24, 24,  , , , care gap  so completed on    Treatment plan reviewed : today  (first  3-07-24   90 days = 24) 24, care gap      Diagnosis:    F41.1 Generalized Anxiety Disorder,   F33.0  "Major Depressive Disorder, recurrent, mild  F96.8 Attention Disorder without Hyperactivity         Progress Since Last Session (Related to Symptoms / Goals / Homework):   Symptoms: stable but with intermittent face picking, ADD symptoms, intra and interpersonal distress  Managing energy/ managing space/ stuff/ clutter. feeling of burn out/ and overwhelm.  Zero panic attacks for about 6 years.    Homework: Completed in session      Episode of Care Goals: Satisfactory progress - ACTION (Actively working towards change); Intervened by reinforcing change plan / affirming steps taken.  No longer having panic attacks     Current / Ongoing Stressors and Concerns:      Some improvement in work stress - school year coming to a close.  \"I struggle with fatigue every day\" entire adult life  Skin picking \"I pick a lot\"  Navigating neuro divergent brain for her and partner  loss / grief. Feeling unsettled.Anxiety, ADD - poor focus   interpersonal distress  with some of partners family; boundaries needed  Work stress/ burnout feeling at times      Treatment Objective(s) Addressed in This Session:    identify core stressors/  thoughts that contribute to feeling anxious  Patient will use at least 6 coping skills for anxiety management in the next 90 days.  Learn about physiological pathways related to stress and trauma.     Intervention:   Interpersonal Therapy:, CBT, supportive therapy    Homework:  in session and at home    Symptoms:  Disruption of attention  focus / concentration   Anxiety,  ongoing fatigue   Multiple interpersonal and work stressors  PMDD worsens once a month (diagnosed in her 20's)  Skin picking \"I pick a lot\"    Reviewed skills used.    Client reports:  Some improvement in work stress - school year coming to a close.  Explored skills used.    Has been feeling  more accomplished - in that certain wedding planning  task are getting  done.    Explored level of emotional connection and commitment with " alie.    Identified and processed emotions.    Assessments completed prior to visit:  The following assessments were completed by patient for this visit: ODILON 90 day roland or none      ASSESSMENT: Current Emotional / Mental Status (status of significant symptoms):   Assessed 6-02-25 no change in risk       Risk status (Self / Other harm or suicidal ideation)   Patient denies current fears or concerns for personal safety.   Patient denies current or recent suicidal ideation or behaviors.   Patient denies current or recent homicidal ideation or behaviors.   Patient denies current or recent self injurious behavior or ideation.   Patient denies other safety concerns.   Patient reports there has been no change in risk factors since their last session.     Patient reports there has been no change in protective factors since their last session.    Recommended that patient call 911 or go to the local ED should there be a change in any of these risk factors.     Appearance:   Appropriate    Eye Contact:   Good    Psychomotor Behavior: Normal    Attitude:   Cooperative    Orientation:   All   Speech   Rate / Production:  Normal    Volume:   Normal    Mood:    Anxious, Dysregulated when partner expresses irritability   Affect:    Anxious, feeling guilt   Thought Content:  Clear.  at times Ruminations   Thought Form:   Worrisome Compulsion-Need to pick at skin   Insight:    Good      Medication Review:   No changes to psychiatric medications, see updated Medication List in EPIC.          Medication Compliance:   NA     Changes in Health Issues:   None reported     Chemical Use Review:   Substance Use: Chemical use reviewed, no active concerns identified      Tobacco Use: No current tobacco use.      Diagnosis:  F41.1 Generalized Anxiety Disorder,   F33.0 Major Depressive Disorder, recurrent, mild  F96.8 Attention Disorder without Hyperactivity     Collateral Reports Completed:   Routed note to PCP as needed    PLAN:  "(Patient Tasks / Therapist Tasks / Other  Consider using several boundaries techniques  DBT skill sets: Distress tolerance, Self -soothe with the senses.     Reflect on the he good committment that she has with fiance.  Identify \"handlers \" to distract dad  Use grounding skills    Review anger thermometer  Practice EFT    Review values worksheet  Talk to her provider     Utilize several CBT techniques    Shield self from others judgement. Say no when possible and remove self from situation      Lashell Harris, LP                                                      ____________________________________________________________________    Individual Treatment Plan    Patient's Name:  Date Of Birth:     Date of Creation: 4[birth year]03-25  Date Treatment Plan Last Reviewed/Revised: 4-03-24    DSM5 Diagnoses:  F41.1 Generalized Anxiety Disorder,   F33.0 Major Depressive Disorder, recurrent, mild  F96.8 Attention Disorder without Hyperactivity       Psychosocial / Contextual Factors: multiple stressors, mood disruption  PROMIS (reviewed every 90 days): at intake 2-29-24, 5-03-24, 7-24, 11-24 , 4-25    Referral / Collaboration:  Referral to another professional/service is not indicated at this time.    Anticipated number of session for this episode of care: 16-20  Anticipation frequency of session: Every other week  Anticipated Duration of each session: 38-52 minutes and/or 53-60 minutes  Treatment plan will be reviewed in 90 days or when goals have been changed.     Update 4-03-24  ( gap in care- so updated today)  The plan for the next 90 day is continue/ maintain  with these actions and cognitive behavioral tasks to develop more consistency / skill proficiency in effort to reduce symptom frequency, intensity and duration.    Review anger thermometer  Practice EFT  Review values worksheet  Talk to her provider   Utilize several CBT techniques  Shield self from others judgement. Say no when possible and remove self from " "situation      Update 12-03-24  ( gap in care- so updated today)  The plan for the next 90 day is continue/ maintain  with these actions and cognitive behavioral tasks to develop more consistency / skill proficiency in effort to reduce symptom frequency, intensity and duration.  employ several interpersonal effectiveness skills.  Listen to body-  Got to bed sooner  Client wants to stay a bit ahead of her symptoms.  Invite partner to use communication tools as discussed today   Re-think balance  Consider re-setting my \"spoons\" saying no to inner requests from self   Don't work to exhaustion  Employ body scan as a technique  Use CBT skllls daily  Will consider using distraction, substitution, or prolong strategies.  Plan  is to use several new strategies to improve communication  Without getting dysregulated herself  Review values  Attend to inner boundareis        Measurable Treatment Goal(s) related to diagnosis / functional impairment(s)  Goal 1: Patient will identify core pattern of  thoughts that contribute to feeling stressed or  anxious.    I will know I've met my goal when .  will report 50% less disrupted mood.      Objective #A (Patient Action)    Patient will use at least 6 coping skills for anxiety management in the next 90 days weeks.  Will reports rotating coping skills- identify those that are most effective. Use several of these  consistently.  Status: New - Date:     Intervention(s)  Therapist will teach coping skills for anxiety/ unwanted changes.      Patient has reviewed and agreed to the above plan.      Lashell Harris LP       Answers submitted by the patient for this visit:  Patient Health Questionnaire (Submitted on 11/11/2024)  If you checked off any problems, how difficult have these problems made it for you to do your work, take care of things at home, or get along with other people?: Very difficult  PHQ9 TOTAL SCORE: 13    "

## 2025-06-16 ENCOUNTER — VIRTUAL VISIT (OUTPATIENT)
Dept: PSYCHOLOGY | Facility: CLINIC | Age: 30
End: 2025-06-16
Payer: COMMERCIAL

## 2025-06-16 DIAGNOSIS — F41.1 GAD (GENERALIZED ANXIETY DISORDER): ICD-10-CM

## 2025-06-16 DIAGNOSIS — F33.0 MAJOR DEPRESSIVE DISORDER, RECURRENT EPISODE, MILD: ICD-10-CM

## 2025-06-16 DIAGNOSIS — F98.8 ATTENTION DEFICIT DISORDER WITHOUT HYPERACTIVITY: Primary | ICD-10-CM

## 2025-06-16 PROCEDURE — 90837 PSYTX W PT 60 MINUTES: CPT | Mod: 95 | Performed by: PSYCHOLOGIST

## 2025-06-16 ASSESSMENT — PATIENT HEALTH QUESTIONNAIRE - PHQ9
10. IF YOU CHECKED OFF ANY PROBLEMS, HOW DIFFICULT HAVE THESE PROBLEMS MADE IT FOR YOU TO DO YOUR WORK, TAKE CARE OF THINGS AT HOME, OR GET ALONG WITH OTHER PEOPLE: VERY DIFFICULT
SUM OF ALL RESPONSES TO PHQ QUESTIONS 1-9: 14
SUM OF ALL RESPONSES TO PHQ QUESTIONS 1-9: 14

## 2025-06-16 ASSESSMENT — ANXIETY QUESTIONNAIRES
2. NOT BEING ABLE TO STOP OR CONTROL WORRYING: MORE THAN HALF THE DAYS
4. TROUBLE RELAXING: MORE THAN HALF THE DAYS
5. BEING SO RESTLESS THAT IT IS HARD TO SIT STILL: MORE THAN HALF THE DAYS
3. WORRYING TOO MUCH ABOUT DIFFERENT THINGS: MORE THAN HALF THE DAYS
GAD7 TOTAL SCORE: 13
GAD7 TOTAL SCORE: 13
7. FEELING AFRAID AS IF SOMETHING AWFUL MIGHT HAPPEN: SEVERAL DAYS
8. IF YOU CHECKED OFF ANY PROBLEMS, HOW DIFFICULT HAVE THESE MADE IT FOR YOU TO DO YOUR WORK, TAKE CARE OF THINGS AT HOME, OR GET ALONG WITH OTHER PEOPLE?: VERY DIFFICULT
IF YOU CHECKED OFF ANY PROBLEMS ON THIS QUESTIONNAIRE, HOW DIFFICULT HAVE THESE PROBLEMS MADE IT FOR YOU TO DO YOUR WORK, TAKE CARE OF THINGS AT HOME, OR GET ALONG WITH OTHER PEOPLE: VERY DIFFICULT
6. BECOMING EASILY ANNOYED OR IRRITABLE: MORE THAN HALF THE DAYS
GAD7 TOTAL SCORE: 13
1. FEELING NERVOUS, ANXIOUS, OR ON EDGE: MORE THAN HALF THE DAYS
7. FEELING AFRAID AS IF SOMETHING AWFUL MIGHT HAPPEN: SEVERAL DAYS

## 2025-06-16 NOTE — PROGRESS NOTES
[facility]                                  Patient Name   Date: 2025     Service Type: Individual      Session Start Time  12:33 pm   Session End Time: 1:33  pm   Session Length: 53-60 min    Session #:  8 in ,   17 in   Attendees: Client    Service Modality:  Video Visit       Provider verified identity through the following two step process.  Patient provided:  Patient  and Patient address    Telemedicine Visit: The patient's condition can be safely assessed and treated via synchronous audio and visual telemedicine encounter.      Reason for Telemedicine Visit:  patient convenience    Originating Site (Patient Location): Patient's home    Distant Site (Provider Location): Provider Remote Setting- Home Office    Consent:  The patient/guardian has verbally consented to: the potential risks and benefits of telemedicine (video visit) versus in person care; bill my insurance or make self-payment for services provided; and responsibility for payment of non-covered services.     Patient would like the video invitation sent by:  my chart yes    Mode of Communication:  Video Conference via [facility]  As the provider I attest to compliance with applicable laws and regulations related to telemedicine.    DATA  Interactive Complexity: No  Extended session:  yes  53-60 minutes  on 25     ADD, ODILON, MDD, OCD     -Poor focus, loses her thought by the end of sentence, tangential.  Patient's presenting concerns require more intensive intervention than could be completed within the usual service. Provider used clinical judgment in determining the necessary length for the session to benefit the patient's needs.    Crisis: No     PROMIS (reviewed every 90 days): at intake 24, 24,  , , , care gap  so completed on    Treatment plan reviewed : today  (first  3-07-24   90 days = 24) 24, care gap  , due     Diagnosis:    F41.1 Generalized Anxiety  "Disorder,   F33.0 Major Depressive Disorder, recurrent, mild  F96.8 Attention Disorder without Hyperactivity         Progress Since Last Session (Related to Symptoms / Goals / Homework):   Symptoms: stable but with intermittent face picking, ADD symptoms,   Personal/ family intra and interpersonal distress. Some unexpected discord.  Managed an emerging feeling of a  panic attack ( last big one 6 years ago)    Homework: Completed in session      Episode of Care Goals: Satisfactory progress - ACTION (Actively working towards change); Intervened by reinforcing change plan / affirming steps taken.  No longer having panic attacks     Current / Ongoing Stressors and Concerns:      Some improvement in work stress - school year coming to a close.  \"I struggle with fatigue every day\" entire adult life  Skin picking \"I pick a lot\"  Navigating neuro divergent brain for her and partner  loss / grief. Feeling unsettled.Anxiety, ADD - poor focus   interpersonal distress  with some of partners family; boundaries needed  Work stress/ burnout feeling at times      Treatment Objective(s) Addressed in This Session:    identify core stressors/  thoughts that contribute to feeling anxious  Patient will use at least 6 coping skills for anxiety management in the next 90 days.  Learn about physiological pathways related to stress and trauma.     Intervention:   Interpersonal Therapy:, CBT, supportive therapy    Homework:  in session and at home    Symptoms:  Stated an acne medicine  Near panic attack/ more anxious  Disruption of attention  focus / concentration   Anxiety,  ongoing fatigue   Multiple interpersonal and work stressors  PMDD worsens once a month (diagnosed in her 20's)    Reviewed skills used.    Client reports:  Some increased anxiety and   Potential symptoms that coincided with the start of an acne med side effects    .Some unexpected discord.  Explored internal scripts:  Test for how to set boundaries  How to make " decisions.    Explored emotions and  skills used.  Identified and processed emotions.    Assessments completed prior to visit:  The following assessments were completed by patient for this visit: ODILON 90 day roland or none      ASSESSMENT: Current Emotional / Mental Status (status of significant symptoms):   Assessed 6-16-25 no change in risk factors/ nor in protective facators       Risk status (Self / Other harm or suicidal ideation)   Patient denies current fears or concerns for personal safety.   Patient denies current or recent suicidal ideation or behaviors.   Patient denies current or recent homicidal ideation or behaviors.   Patient denies current or recent self injurious behavior or ideation.   Patient denies other safety concerns.   Patient reports there has been no change in risk factors since their last session.     Patient reports there has been no change in protective factors since their last session.    Recommended that patient call 911 or go to the local ED should there be a change in any of these risk factors.     Appearance:   Appropriate    Eye Contact:   Good    Psychomotor Behavior: Normal    Attitude:   Cooperative    Orientation:   All   Speech   Rate / Production:  Normal    Volume:   Normal    Mood:    Anxious,    Affect:    Anxious, feeling guilt   Thought Content:   at times Ruminations   Thought Form:   Worrisome Compulsion-Need to pick at skin   Insight:    Good      Medication Review:   No changes to psychiatric medications, see updated Medication List in EPIC.          Medication Compliance:   NA     Changes in Health Issues:   None reported     Chemical Use Review:   Substance Use: Chemical use reviewed, no active concerns identified      Tobacco Use: No current tobacco use.      Diagnosis:  F41.1 Generalized Anxiety Disorder,   F33.0 Major Depressive Disorder, recurrent, mild  F96.8 Attention Disorder without Hyperactivity     Collateral Reports Completed:   Routed note to PCP as  "needed    PLAN: (Patient Tasks / Therapist Tasks / Other  Centering and grounding exercises for- self trust  Increase self-recrimination / increase self compassion      Consider using several boundaries techniques  DBT skill sets: Distress tolerance, Self -soothe with the senses.     Reflect on the he good committment that she has with fiance.  Identify \"handlers \" to distract dad  Use grounding skills    Review anger thermometer  Practice EFT    Review values worksheet  Talk to her provider     Utilize several CBT techniques    Shield self from others judgement. Say no when possible and remove self from situation      Lashell HarrisADRIANA                                                      ____________________________________________________________________    Individual Treatment Plan    Patient's Name:  Date Of Birth:     Date of Creation: 4-03-25  Date Treatment Plan Last Reviewed/Revised: 4-03-24    DSM5 Diagnoses:  F41.1 Generalized Anxiety Disorder,   F33.0 Major Depressive Disorder, recurrent, mild  F96.8 Attention Disorder without Hyperactivity       Psychosocial / Contextual Factors: multiple stressors, mood disruption  PROMIS (reviewed every 90 days): at intake 2-29-24, 5-03-24, 7-24, 11-24 , 4-25    Referral / Collaboration:  Referral to another professional/service is not indicated at this time.    Anticipated number of session for this episode of care: 16-20  Anticipation frequency of session: Every other week  Anticipated Duration of each session: 38-52 minutes and/or 53-60 minutes  Treatment plan will be reviewed in 90 days or when goals have been changed.       Update  due 7-03-24  ( gap in care- so updated today)  The plan for the next 90 day is continue/ maintain  with these actions and cognitive behavioral tasks to develop more consistency / skill proficiency in effort to reduce symptom frequency, intensity and duration.    Consider using several boundaries techniques  DBT skill sets: Distress " "tolerance, Self -soothe with the senses.   Reflect on the he good committment that she has with fiance.  Identify \"handlers \" to distract dad  Use grounding skills  Review anger thermometer  Practice EFT  Review values worksheet  Talk to her provider   Utilize several CBT techniques  Shield self from others judgement. Say no when possible and remove self from situation      Update 4-03-24  ( gap in care- so updated today)  The plan for the next 90 day is continue/ maintain  with these actions and cognitive behavioral tasks to develop more consistency / skill proficiency in effort to reduce symptom frequency, intensity and duration.    Review anger thermometer  Practice EFT  Review values worksheet  Talk to her provider   Utilize several CBT techniques  Shield self from others judgement. Say no when possible and remove self from situation      Update 12-03-24  ( gap in care- so updated today)  The plan for the next 90 day is continue/ maintain  with these actions and cognitive behavioral tasks to develop more consistency / skill proficiency in effort to reduce symptom frequency, intensity and duration.  employ several interpersonal effectiveness skills.  Listen to body-  Got to bed sooner  Client wants to stay a bit ahead of her symptoms.  Invite partner to use communication tools as discussed today   Re-think balance  Consider re-setting my \"spoons\" saying no to inner requests from self   Don't work to exhaustion  Employ body scan as a technique  Use CBT skllls daily  Will consider using distraction, substitution, or prolong strategies.  Plan  is to use several new strategies to improve communication  Without getting dysregulated herself  Review values  Attend to inner boundareis        Measurable Treatment Goal(s) related to diagnosis / functional impairment(s)  Goal 1: Patient will identify core pattern of  thoughts that contribute to feeling stressed or  anxious.    I will know I've met my goal when .  will " report 50% less disrupted mood.      Objective #A (Patient Action)    Patient will use at least 6 coping skills for anxiety management in the next 90 days weeks.  Will reports rotating coping skills- identify those that are most effective. Use several of these  consistently.  Status: New - Date:     Intervention(s)  Therapist will teach coping skills for anxiety/ unwanted changes.      Patient has reviewed and agreed to the above plan.      Lashell Harris LP         Patient Health Questionnaire (G7) (Submitted on 6/16/2025)  ODILON 7 TOTAL SCORE: 13

## 2025-07-01 ENCOUNTER — VIRTUAL VISIT (OUTPATIENT)
Facility: CLINIC | Age: 30
End: 2025-07-01
Payer: COMMERCIAL

## 2025-07-01 DIAGNOSIS — F41.1 GAD (GENERALIZED ANXIETY DISORDER): ICD-10-CM

## 2025-07-01 DIAGNOSIS — F98.8 ATTENTION DEFICIT DISORDER WITHOUT HYPERACTIVITY: Primary | ICD-10-CM

## 2025-07-01 DIAGNOSIS — F33.0 MAJOR DEPRESSIVE DISORDER, RECURRENT EPISODE, MILD: ICD-10-CM

## 2025-07-01 PROCEDURE — 90837 PSYTX W PT 60 MINUTES: CPT | Mod: 95 | Performed by: PSYCHOLOGIST

## 2025-07-01 NOTE — PROGRESS NOTES
[facility]                                  Patient Name   Date: 2025     Service Type: Individual      Session Start Time  5:30 pm   Session End Time: 6:30  pm   Session Length: 53-60 min    Session #:  9 in ,   17 in   Attendees: Client    Service Modality:  Video Visit       Provider verified identity through the following two step process.  Patient provided:  Patient  and Patient address    Telemedicine Visit: The patient's condition can be safely assessed and treated via synchronous audio and visual telemedicine encounter.      Reason for Telemedicine Visit:  patient convenience    Originating Site (Patient Location): Patient's home    Distant Site (Provider Location): Provider Remote Setting- Home Office    Consent:  The patient/guardian has verbally consented to: the potential risks and benefits of telemedicine (video visit) versus in person care; bill my insurance or make self-payment for services provided; and responsibility for payment of non-covered services.     Patient would like the video invitation sent by:  my chart yes    Mode of Communication:  Video Conference via [employer]  As the provider I attest to compliance with applicable laws and regulations related to telemedicine.    DATA  Interactive Complexity: No  Extended session:  yes  53-60 minutes  on 25     ADD, ODILON, MDD, OCD     -Poor focus, loses her thought by the end of sentence, tangential.  Patient's presenting concerns require more intensive intervention than could be completed within the usual service. Provider used clinical judgment in determining the necessary length for the session to benefit the patient's needs.    Crisis: No     PROMIS (reviewed every 90 days): at intake 24, 24,  , , , care gap  so completed on    Treatment plan reviewed : today  (first  3-07-24   90 days = 24) 24, care gap  ,     Diagnosis:    F41.1 Generalized Anxiety Disorder,  "  F33.0 Major Depressive Disorder, recurrent, mild  F96.8 Attention Disorder without Hyperactivity         Progress Since Last Session (Related to Symptoms / Goals / Homework):   Symptoms: stable but with intermittent face picking, ADD symptoms,   Personal/ family intra and interpersonal distress. Some unexpected discord.  Managed an emerging feeling of a  panic attack ( last big one 6 years ago)    Homework: Completed in session      Episode of Care Goals: Satisfactory progress - ACTION (Actively working towards change); Intervened by reinforcing change plan / affirming steps taken.  No longer having panic attacks     Current / Ongoing Stressors and Concerns:        \"I struggle with fatigue every day\" entire adult life  Skin picking \"I pick a lot\"  Navigating neuro divergent brain for her and partner  loss / grief. Feeling unsettled.Anxiety, ADD - poor focus   interpersonal distress  with some of partners family; boundaries needed  Work stress/ burnout feeling at times      Treatment Objective(s) Addressed in This Session:    identify core stressors/  thoughts that contribute to feeling anxious  Patient will use at least 6 coping skills for anxiety management in the next 90 days.  Learn about physiological pathways related to stress and trauma.     Intervention:   Interpersonal Therapy:, CBT, supportive therapy    Homework:  in session and at home    Symptoms:  Jittery / anxious  Stated an acne medicine  Near panic attack/ more anxious  Disruption of attention  focus / concentration   Anxiety,  ongoing fatigue   Multiple interpersonal and work stressors  PMDD worsens once a month (diagnosed in her 20's)    Reviewed skills used.    Client reports:  Less worried about acne med side effects.    Anxiety level-   \"It's been a tough week\".  PMDD vs other weeks of the cycle.    .Some unexpected medical concerns for their dog-   Process-waiting for vet results, hospital,making decisions.  High anxiety, jittery.\"can feel " "myself coming down\"      Explored getting triggered.  Discussed dyad patterns and needs when each member is triggered.    Identified and processed emotions.    Assessments completed prior to visit:  The following assessments were completed by patient for this visit: ODILON 90 day roland or none      ASSESSMENT: Current Emotional / Mental Status (status of significant symptoms):   Assessed 7-01-25 no change in risk factors/ nor in protective facators       Risk status (Self / Other harm or suicidal ideation)   Patient denies current fears or concerns for personal safety.   Patient denies current or recent suicidal ideation or behaviors.   Patient denies current or recent homicidal ideation or behaviors.   Patient denies current or recent self injurious behavior or ideation.   Patient denies other safety concerns.   Patient reports there has been no change in risk factors since their last session.     Patient reports there has been no change in protective factors since their last session.    Recommended that patient call 911 or go to the local ED should there be a change in any of these risk factors.     Appearance:   Appropriate    Eye Contact:   Good    Psychomotor Behavior: Normal    Attitude:   Cooperative    Orientation:   All   Speech   Rate / Production:  Normal    Volume:   Normal    Mood:    Anxious,    Affect:    Anxious, feeling guilt   Thought Content:   at times Ruminations   Thought Form:   Worrisome Compulsion-Need to pick at skin   Insight:    Good      Medication Review:   No changes to psychiatric medications, see updated Medication List in EPIC.          Medication Compliance:   NA     Changes in Health Issues:   None reported     Chemical Use Review:   Substance Use: Chemical use reviewed, no active concerns identified      Tobacco Use: No current tobacco use.      Diagnosis:  F41.1 Generalized Anxiety Disorder,   F33.0 Major Depressive Disorder, recurrent, mild  F96.8 Attention Disorder without " "Hyperactivity     Collateral Reports Completed:   Routed note to PCP as needed    PLAN: (Patient Tasks / Therapist Tasks / Other  Enact re-regulation skills  Read Kera 4 horseman  Offer symbol of - taking a break / coming back    Centering and grounding exercises for- self trust  Increase self-recrimination / increase self compassion          Lashell Harris, ADRIANA                                                      ____________________________________________________________________    Individual Treatment Plan    Patient's Name: Naty Pérez Date Of Birth: [birth year]    Date of Creation: 4-03-25  Date Treatment Plan Last Reviewed/Revised: 7-01-25    DSM5 Diagnoses:  F41.1 Generalized Anxiety Disorder,   F33.0 Major Depressive Disorder, recurrent, mild  F96.8 Attention Disorder without Hyperactivity       Psychosocial / Contextual Factors: multiple stressors, mood disruption  PROMIS (reviewed every 90 days): at intake 2-29-24, 5-03-24, 7-24, 11-24 , 4-25    Referral / Collaboration:  Referral to another professional/service is not indicated at this time.    Anticipated number of session for this episode of care: 16-20  Anticipation frequency of session: Every other week  Anticipated Duration of each session: 38-52 minutes and/or 53-60 minutes  Treatment plan will be reviewed in 90 days or when goals have been changed.       Update  7-01-24  ( gap in care- so updated today)  The plan for the next 90 day is continue/ maintain  with these actions and cognitive behavioral tasks to develop more consistency / skill proficiency in effort to reduce symptom frequency, intensity and duration.    Consider using several boundaries techniques  DBT skill sets: Distress tolerance, Self -soothe with the senses.   Reflect on the he good committment that she has with fiance.  Identify \"handlers \" to distract dad  Use grounding skills  Review anger thermometer  Practice EFT  Review values worksheet  Talk to her provider   Utilize several " "CBT techniques  Shield self from others judgement. Say no when possible and remove self from situation      Update 4-03-24  ( gap in care- so updated today)  The plan for the next 90 day is continue/ maintain  with these actions and cognitive behavioral tasks to develop more consistency / skill proficiency in effort to reduce symptom frequency, intensity and duration.    Review anger thermometer  Practice EFT  Review values worksheet  Talk to her provider   Utilize several CBT techniques  Shield self from others judgement. Say no when possible and remove self from situation      Update 12-03-24  ( gap in care- so updated today)  The plan for the next 90 day is continue/ maintain  with these actions and cognitive behavioral tasks to develop more consistency / skill proficiency in effort to reduce symptom frequency, intensity and duration.  employ several interpersonal effectiveness skills.  Listen to body-  Got to bed sooner  Client wants to stay a bit ahead of her symptoms.  Invite partner to use communication tools as discussed today   Re-think balance  Consider re-setting my \"spoons\" saying no to inner requests from self   Don't work to exhaustion  Employ body scan as a technique  Use CBT skllls daily  Will consider using distraction, substitution, or prolong strategies.  Plan  is to use several new strategies to improve communication  Without getting dysregulated herself  Review values  Attend to inner boundareis        Measurable Treatment Goal(s) related to diagnosis / functional impairment(s)  Goal 1: Patient will identify core pattern of  thoughts that contribute to feeling stressed or  anxious.    I will know I've met my goal when .  will report 50% less disrupted mood.      Objective #A (Patient Action)    Patient will use at least 6 coping skills for anxiety management in the next 90 days weeks.  Will reports rotating coping skills- identify those that are most effective. Use several of these  " consistently.  Status: New - Date:     Intervention(s)  Therapist will teach coping skills for anxiety/ unwanted changes.      Patient has reviewed and agreed to the above plan.      Lashell Harris LP         Patient Health Questionnaire (G7) (Submitted on 6/16/2025)  ODILON 7 TOTAL SCORE: 13

## 2025-07-14 ENCOUNTER — VIRTUAL VISIT (OUTPATIENT)
Dept: PSYCHOLOGY | Facility: CLINIC | Age: 30
End: 2025-07-14
Payer: COMMERCIAL

## 2025-07-14 DIAGNOSIS — F33.0 MAJOR DEPRESSIVE DISORDER, RECURRENT EPISODE, MILD: ICD-10-CM

## 2025-07-14 DIAGNOSIS — F41.1 GAD (GENERALIZED ANXIETY DISORDER): ICD-10-CM

## 2025-07-14 DIAGNOSIS — F98.8 ATTENTION DEFICIT DISORDER WITHOUT HYPERACTIVITY: Primary | ICD-10-CM

## 2025-07-14 PROCEDURE — 90837 PSYTX W PT 60 MINUTES: CPT | Mod: 95 | Performed by: PSYCHOLOGIST

## 2025-07-14 NOTE — PROGRESS NOTES
[facility]                                  Patient Name   Date: 2025     Service Type: Individual      Session Start Time  3:40 pm   Session End Time: 4:40  pm   Session Length: 53-60 min    Session #:  10 in ,   17 in   Attendees: Client    Service Modality:  Video Visit       Provider verified identity through the following two step process.  Patient provided:  Patient  and Patient address    Telemedicine Visit: The patient's condition can be safely assessed and treated via synchronous audio and visual telemedicine encounter.      Reason for Telemedicine Visit:  patient convenience    Originating Site (Patient Location): Patient's home    Distant Site (Provider Location): Provider Remote Setting- Home Office    Consent:  The patient/guardian has verbally consented to: the potential risks and benefits of telemedicine (video visit) versus in person care; bill my insurance or make self-payment for services provided; and responsibility for payment of non-covered services.     Patient would like the video invitation sent by:  my chart yes    Mode of Communication:  Video Conference via [facility]  As the provider I attest to compliance with applicable laws and regulations related to telemedicine.    DATA  Interactive Complexity: No  Extended session:  yes  53-60 minutes  on 25     ADD, ODILON, MDD, OCD     -Poor focus, loses her thought by the end of sentence, tangential.  Patient's presenting concerns require more intensive intervention than could be completed within the usual service. Provider used clinical judgment in determining the necessary length for the session to benefit the patient's needs.    Crisis: No     PROMIS (reviewed every 90 days): at intake 24, 24,  , , , care gap  so completed on    Treatment plan reviewed : today  (first  3-07-24   90 days = 24) 24, care gap  ,     Diagnosis:    F41.1 Generalized Anxiety Disorder,  "  F33.0 Major Depressive Disorder, recurrent, mild  F96.8 Attention Disorder without Hyperactivity         Progress Since Last Session (Related to Symptoms / Goals / Homework):   Symptoms: stable but with intermittent face picking, ADD symptoms,   Personal/ family intra and interpersonal distress. Some unexpected discord.  Managed an emerging feeling of a  panic attack ( last big one 6 years ago)    Homework: Completed in session      Episode of Care Goals: Satisfactory progress - ACTION (Actively working towards change); Intervened by reinforcing change plan / affirming steps taken.  No longer having panic attacks     Current / Ongoing Stressors and Concerns:    Interpersonal stress with father  Insomnia  PMDD vs other weeks of the cycle.  \"I struggle with fatigue every day\" entire adult life  Skin picking \"I pick a lot\"  Navigating neuro divergent brain for her and partner  loss / grief. Feeling unsettled.Anxiety, ADD - poor focus   interpersonal distress  with some of partners family; boundaries needed       Treatment Objective(s) Addressed in This Session:   Learn conflict resolution skills / family stressors   identify core stressors/  thoughts that contribute to feeling anxious  Patient will use at least 6 coping skills for anxiety management in the next 90 days.  Learn about physiological pathways related to stress and trauma.     Intervention:   Interpersonal Therapy:, CBT, supportive therapy    Homework:  in session and at home    Symptoms:  disruption of attention  focus / concentration   Anxiety,  ongoing fatigue   Multiple interpersonal and work stressors  PMDD worsens once a month (diagnosed in her 20's)    Reviewed skills used.    Client reports:  Anxiety around personal/ family intra and interpersonal distress.  Dad with  hx of narcissistic  behaviors/ anger patterns.  Wedding this year- deciding about whether to have  Bio dad and step dad walk her down the isle.   Identified pressure, guilt, and is " aware of conflict.  Want to align with fiance- who has clear picture of   how to navigate the event.    Reports stress around ongoing medical concerns for their dog-   Caring for complex needs.    Identified and processed emotions.    Assessments completed prior to visit:  The following assessments were completed by patient for this visit: ODILON 90 day roland or none      ASSESSMENT: Current Emotional / Mental Status (status of significant symptoms):   Assessed 7-14-25 no change in risk factors/ nor in protective facators       Risk status (Self / Other harm or suicidal ideation)   Patient denies current fears or concerns for personal safety.   Patient denies current or recent suicidal ideation or behaviors.   Patient denies current or recent homicidal ideation or behaviors.   Patient denies current or recent self injurious behavior or ideation.   Patient denies other safety concerns.   Patient reports there has been no change in risk factors since their last session.     Patient reports there has been no change in protective factors since their last session.    Recommended that patient call 911 or go to the local ED should there be a change in any of these risk factors.     Appearance:   Appropriate    Eye Contact:   Good    Psychomotor Behavior: Normal    Attitude:   Cooperative    Orientation:   All   Speech   Rate / Production:  Normal    Volume:   Normal    Mood:    Stressed about family dynamics   Affect:    Using skills   Thought Content:   at times Ruminations   Thought Form:   Worrisome Compulsion-Need to pick at skin   Insight:    Good      Medication Review:   No changes to psychiatric medications, see updated Medication List in EPIC.          Medication Compliance:   NA     Changes in Health Issues:   None reported     Chemical Use Review:   Substance Use: Chemical use reviewed, no active concerns identified      Tobacco Use: No current tobacco use.      Diagnosis:  F41.1 Generalized Anxiety Disorder,  "  F33.0 Major Depressive Disorder, recurrent, mild  F96.8 Attention Disorder without Hyperactivity     Collateral Reports Completed:   Routed note to PCP as needed    PLAN: (Patient Tasks / Therapist Tasks / Other  Read session notes around PTSD  triggering and aggressors.    Enact re-regulation skills  Read Kera Paulson horseman  Offer symbol of - taking a break / coming back    Centering and grounding exercises for- self trust  Increase self-recrimination / increase self compassion          Lashellolman Harris, LP                                                      ____________________________________________________________________    Individual Treatment Plan    Patient's Name: Naty Pérez Date Of Birth: [birth year]    Date of Creation: 4-03-25  Date Treatment Plan Last Reviewed/Revised: 7-01-25    DSM5 Diagnoses:  F41.1 Generalized Anxiety Disorder,   F33.0 Major Depressive Disorder, recurrent, mild  F96.8 Attention Disorder without Hyperactivity       Psychosocial / Contextual Factors: multiple stressors, mood disruption  PROMIS (reviewed every 90 days): at intake 2-29-24, 5-03-24, 7-24, 11-24 , 4-25    Referral / Collaboration:  Referral to another professional/service is not indicated at this time.    Anticipated number of session for this episode of care: 16-20  Anticipation frequency of session: Every other week  Anticipated Duration of each session: 38-52 minutes and/or 53-60 minutes  Treatment plan will be reviewed in 90 days or when goals have been changed.       Update  7-01-24  ( gap in care- so updated today)  The plan for the next 90 day is continue/ maintain  with these actions and cognitive behavioral tasks to develop more consistency / skill proficiency in effort to reduce symptom frequency, intensity and duration.    Consider using several boundaries techniques  DBT skill sets: Distress tolerance, Self -soothe with the senses.   Reflect on the he good committment that she has with fiance.  Identify \"handlers " "\" to distract dad  Use grounding skills  Review anger thermometer  Practice EFT  Review values worksheet  Talk to her provider   Utilize several CBT techniques  Shield self from others judgement. Say no when possible and remove self from situation      Update 4-03-24  ( gap in care- so updated today)  The plan for the next 90 day is continue/ maintain  with these actions and cognitive behavioral tasks to develop more consistency / skill proficiency in effort to reduce symptom frequency, intensity and duration.    Review anger thermometer  Practice EFT  Review values worksheet  Talk to her provider   Utilize several CBT techniques  Shield self from others judgement. Say no when possible and remove self from situation      Update 12-03-24  ( gap in care- so updated today)  The plan for the next 90 day is continue/ maintain  with these actions and cognitive behavioral tasks to develop more consistency / skill proficiency in effort to reduce symptom frequency, intensity and duration.  employ several interpersonal effectiveness skills.  Listen to body-  Got to bed sooner  Client wants to stay a bit ahead of her symptoms.  Invite partner to use communication tools as discussed today   Re-think balance  Consider re-setting my \"spoons\" saying no to inner requests from self   Don't work to exhaustion  Employ body scan as a technique  Use CBT skllls daily  Will consider using distraction, substitution, or prolong strategies.  Plan  is to use several new strategies to improve communication  Without getting dysregulated herself  Review values  Attend to inner boundareis        Measurable Treatment Goal(s) related to diagnosis / functional impairment(s)  Goal 1: Patient will identify core pattern of  thoughts that contribute to feeling stressed or  anxious.    I will know I've met my goal when .  will report 50% less disrupted mood.      Objective #A (Patient Action)    Patient will use at least 6 coping skills for anxiety " management in the next 90 days weeks.  Will reports rotating coping skills- identify those that are most effective. Use several of these  consistently.  Status: New - Date:     Intervention(s)  Therapist will teach coping skills for anxiety/ unwanted changes.      Patient has reviewed and agreed to the above plan.      Lashell Harris LP         Patient Health Questionnaire (G7) (Submitted on 6/16/2025)  ODILON 7 TOTAL SCORE: 13

## 2025-07-29 ENCOUNTER — VIRTUAL VISIT (OUTPATIENT)
Facility: CLINIC | Age: 30
End: 2025-07-29
Payer: COMMERCIAL

## 2025-07-29 ENCOUNTER — MYC REFILL (OUTPATIENT)
Dept: FAMILY MEDICINE | Facility: CLINIC | Age: 30
End: 2025-07-29
Payer: COMMERCIAL

## 2025-07-29 DIAGNOSIS — F98.8 ATTENTION DEFICIT DISORDER WITHOUT HYPERACTIVITY: Primary | ICD-10-CM

## 2025-07-29 DIAGNOSIS — F41.1 GAD (GENERALIZED ANXIETY DISORDER): ICD-10-CM

## 2025-07-29 DIAGNOSIS — F33.0 MAJOR DEPRESSIVE DISORDER, RECURRENT EPISODE, MILD: ICD-10-CM

## 2025-07-29 DIAGNOSIS — N94.3 PMS (PREMENSTRUAL SYNDROME): ICD-10-CM

## 2025-07-29 PROCEDURE — 90837 PSYTX W PT 60 MINUTES: CPT | Mod: 95 | Performed by: PSYCHOLOGIST

## 2025-07-29 NOTE — PROGRESS NOTES
[facility]                                  Patient Name   Date: 2025     Service Type: Individual      Session Start Time 10:30 am   Session End Time: 11:30  am   Session Length: 60 min    Session #:  11 in ,   17 in   Attendees: Client    Service Modality:  Video Visit       Provider verified identity through the following two step process.  Patient provided:  Patient  and Patient address    Telemedicine Visit: The patient's condition can be safely assessed and treated via synchronous audio and visual telemedicine encounter.      Reason for Telemedicine Visit:  patient convenience    Originating Site (Patient Location): Patient's home    Distant Site (Provider Location): Provider Remote Setting- Home Office    Consent:  The patient/guardian has verbally consented to: the potential risks and benefits of telemedicine (video visit) versus in person care; bill my insurance or make self-payment for services provided; and responsibility for payment of non-covered services.     Patient would like the video invitation sent by:  my chart yes    Mode of Communication:  Video Conference via [employer] [facility]  As the provider I attest to compliance with applicable laws and regulations related to telemedicine.    DATA  Interactive Complexity: No  Extended session:  yes  60 minutes  on 25     ADD, ODILON, MDD, OCD     -Poor focus, loses her thought by the end of sentence, tangential.  Patient's presenting concerns require more intensive intervention than could be completed within the usual service. Provider used clinical judgment in determining the necessary length for the session to benefit the patient's needs.    Crisis: No     PROMIS (reviewed every 90 days): at intake 24, 24,  , , , care gap  so completed on    Treatment plan reviewed : today  (first  3-07-24   90 days = 24) 24, care gap  ,     Diagnosis:    F41.1 Generalized Anxiety Disorder,   F33.0  "Major Depressive Disorder, recurrent, mild  F96.8 Attention Disorder without Hyperactivity         Progress Since Last Session (Related to Symptoms / Goals / Homework):   Symptoms: stable but with intermittent face picking, ADD symptoms,   Personal/ family intra and interpersonal distress/ discord.  Intermittent panic attacks.    Homework: Completed in session      Episode of Care Goals: Satisfactory progress - ACTION (Actively working towards change); Intervened by reinforcing change plan / affirming steps taken.  No longer having reegular panic attacks     Current / Ongoing Stressors and Concerns:    Interpersonal stress with family  Insomnia  PMDD vs other weeks of the cycle.  \"I struggle with fatigue every day\" entire adult life  Skin picking \"I pick a lot\"  Navigating neuro divergent brain for her and partner  loss / grief. Feeling unsettled.Anxiety, ADD - poor focus   interpersonal distress  with some of partners family; boundaries needed       Treatment Objective(s) Addressed in This Session:   Learn conflict resolution skills / family stressors   identify core stressors/  thoughts that contribute to feeling anxious  Patient will use at least 6 coping skills for anxiety management in the next 90 days.  Learn about physiological pathways related to stress and trauma.     Intervention:   Interpersonal Therapy:, CBT, supportive therapy    Homework:  in session and at home    Symptoms:  disruption of attention  focus / concentration   Anxiety,  ongoing fatigue   Multiple interpersonal and work stressors  PMDD worsens once a month (diagnosed in her 20's)    Reviewed skills used.    Client reports:  Increased thoughts/ problem solving anxiety around   role that her mentally ill  bio- dad ( from bio-mom)  might play.  Has suggested option to her mother- ideas got blocked.    Explored her experience of family expectations and unsolicited  advise from elders.    Grief identified, Offered skills.  Identified " and processed emotions.    Assessments completed prior to visit:  The following assessments were completed by patient for this visit: ODILON 90 day roland or none      ASSESSMENT: Current Emotional / Mental Status (status of significant symptoms):   Assessed 7-29-25 no change in risk factors/ nor in protective facators       Risk status (Self / Other harm or suicidal ideation)   Patient denies current fears or concerns for personal safety.   Patient denies current or recent suicidal ideation or behaviors.   Patient denies current or recent homicidal ideation or behaviors.   Patient denies current or recent self injurious behavior or ideation.   Patient denies other safety concerns.   Patient reports there has been no change in risk factors since their last session.     Patient reports there has been no change in protective factors since their last session.    Recommended that patient call 911 or go to the local ED should there be a change in any of these risk factors.     Appearance:   Appropriate    Eye Contact:   Good    Psychomotor Behavior: Normal    Attitude:   Cooperative    Orientation:   All   Speech   Rate / Production:  Normal    Volume:   Normal    Mood:    Stressed about family dynamics   Affect:    Using skills   Thought Content:   at times Ruminations   Thought Form:   Worrisome Compulsion-Need to pick at skin   Insight:    Good      Medication Review:   No changes to psychiatric medications, see updated Medication List in EPIC.          Medication Compliance:   NA     Changes in Health Issues:   None reported     Chemical Use Review:   Substance Use: Chemical use reviewed, no active concerns identified      Tobacco Use: No current tobacco use.      Diagnosis:  F41.1 Generalized Anxiety Disorder,   F33.0 Major Depressive Disorder, recurrent, mild  F96.8 Attention Disorder without Hyperactivity     Collateral Reports Completed:   Routed note to PCP as needed    PLAN: (Patient Tasks / Therapist Tasks /  "Other  Assess needs and  explore courage of convictions / boundaries   Identify triggers and old scripts-     Read session notes around PTSD  triggering and aggressors.    Enact re-regulation skills  Read Kera Paulson horseman  Offer symbol of - taking a break / coming back    Centering and grounding exercises for- self trust  Increase self-recrimination / increase self compassion          Lashell Harris, LP                                                      ____________________________________________________________________    Individual Treatment Plan    Patient's Name: Naty Pérez Date Of Birth: [birth year]    Date of Creation: 4-03-25  Date Treatment Plan Last Reviewed/Revised: 7-01-25    DSM5 Diagnoses:  F41.1 Generalized Anxiety Disorder,   F33.0 Major Depressive Disorder, recurrent, mild  F96.8 Attention Disorder without Hyperactivity       Psychosocial / Contextual Factors: multiple stressors, mood disruption  PROMIS (reviewed every 90 days): at intake 2-29-24, 5-03-24, 7-24, 11-24 , 4-25    Referral / Collaboration:  Referral to another professional/service is not indicated at this time.    Anticipated number of session for this episode of care: 16-20  Anticipation frequency of session: Every other week  Anticipated Duration of each session: 38-52 minutes and/or 53-60 minutes  Treatment plan will be reviewed in 90 days or when goals have been changed.       Update  7-01-24  ( gap in care- so updated today)  The plan for the next 90 day is continue/ maintain  with these actions and cognitive behavioral tasks to develop more consistency / skill proficiency in effort to reduce symptom frequency, intensity and duration.    Consider using several boundaries techniques  DBT skill sets: Distress tolerance, Self -soothe with the senses.   Reflect on the he good committment that she has with fiance.  Identify \"handlers \" to distract dad  Use grounding skills  Review anger thermometer  Practice EFT  Review values " "worksheet  Talk to her provider   Utilize several CBT techniques  Shield self from others judgement. Say no when possible and remove self from situation      Update 4-03-24  ( gap in care- so updated today)  The plan for the next 90 day is continue/ maintain  with these actions and cognitive behavioral tasks to develop more consistency / skill proficiency in effort to reduce symptom frequency, intensity and duration.    Review anger thermometer  Practice EFT  Review values worksheet  Talk to her provider   Utilize several CBT techniques  Shield self from others judgement. Say no when possible and remove self from situation      Update 12-03-24  ( gap in care- so updated today)  The plan for the next 90 day is continue/ maintain  with these actions and cognitive behavioral tasks to develop more consistency / skill proficiency in effort to reduce symptom frequency, intensity and duration.  employ several interpersonal effectiveness skills.  Listen to body-  Got to bed sooner  Client wants to stay a bit ahead of her symptoms.  Invite partner to use communication tools as discussed today   Re-think balance  Consider re-setting my \"spoons\" saying no to inner requests from self   Don't work to exhaustion  Employ body scan as a technique  Use CBT skllls daily  Will consider using distraction, substitution, or prolong strategies.  Plan  is to use several new strategies to improve communication  Without getting dysregulated herself  Review values  Attend to inner boundareis        Measurable Treatment Goal(s) related to diagnosis / functional impairment(s)  Goal 1: Patient will identify core pattern of  thoughts that contribute to feeling stressed or  anxious.    I will know I've met my goal when .  will report 50% less disrupted mood.      Objective #A (Patient Action)    Patient will use at least 6 coping skills for anxiety management in the next 90 days weeks.  Will reports rotating coping skills- identify those that are " most effective. Use several of these  consistently.  Status: New - Date:     Intervention(s)  Therapist will teach coping skills for anxiety/ unwanted changes.      Patient has reviewed and agreed to the above plan.      Lashell Harris LP         Patient Health Questionnaire (G7) (Submitted on 6/16/2025)  ODILON 7 TOTAL SCORE: 13

## 2025-08-12 ENCOUNTER — VIRTUAL VISIT (OUTPATIENT)
Facility: CLINIC | Age: 30
End: 2025-08-12
Payer: COMMERCIAL

## 2025-08-12 DIAGNOSIS — F41.1 GAD (GENERALIZED ANXIETY DISORDER): ICD-10-CM

## 2025-08-12 DIAGNOSIS — F98.8 ATTENTION DEFICIT DISORDER WITHOUT HYPERACTIVITY: Primary | ICD-10-CM

## 2025-08-12 DIAGNOSIS — F33.0 MAJOR DEPRESSIVE DISORDER, RECURRENT EPISODE, MILD: ICD-10-CM

## 2025-08-12 PROCEDURE — 90837 PSYTX W PT 60 MINUTES: CPT | Mod: 95 | Performed by: PSYCHOLOGIST

## 2025-08-13 ENCOUNTER — OFFICE VISIT (OUTPATIENT)
Dept: DERMATOLOGY | Facility: CLINIC | Age: 30
End: 2025-08-13
Payer: COMMERCIAL

## 2025-08-13 DIAGNOSIS — L70.0 ACNE VULGARIS: ICD-10-CM

## 2025-08-13 PROCEDURE — 99213 OFFICE O/P EST LOW 20 MIN: CPT | Performed by: STUDENT IN AN ORGANIZED HEALTH CARE EDUCATION/TRAINING PROGRAM

## 2025-08-13 RX ORDER — SPIRONOLACTONE 100 MG/1
TABLET, FILM COATED ORAL
Qty: 90 TABLET | Refills: 3 | Status: SHIPPED | OUTPATIENT
Start: 2025-08-13